# Patient Record
Sex: MALE | Race: WHITE | NOT HISPANIC OR LATINO | Employment: OTHER | URBAN - METROPOLITAN AREA
[De-identification: names, ages, dates, MRNs, and addresses within clinical notes are randomized per-mention and may not be internally consistent; named-entity substitution may affect disease eponyms.]

---

## 2018-12-11 ENCOUNTER — TELEPHONE (OUTPATIENT)
Dept: PRIMARY CARE CLINIC | Facility: CLINIC | Age: 78
End: 2018-12-11

## 2018-12-11 NOTE — TELEPHONE ENCOUNTER
----- Message from Noelle Dowling sent at 12/11/2018 10:05 AM CST -----  Type: Needs Medical Advice    Who Called:  [facility]  Best Call Back Number: [phone]  Additional Information: caller is requesting a return call concerning post hospital discharge medications, noted discrepancies.    Thank you

## 2018-12-11 NOTE — TELEPHONE ENCOUNTER
We have not seen patient yet. He does have an appointment tomorrow in which we will review medications and discharge instructions.

## 2018-12-12 ENCOUNTER — OFFICE VISIT (OUTPATIENT)
Dept: PRIMARY CARE CLINIC | Facility: CLINIC | Age: 78
End: 2018-12-12
Payer: MEDICARE

## 2018-12-12 VITALS
WEIGHT: 284.19 LBS | HEIGHT: 76 IN | RESPIRATION RATE: 18 BRPM | HEART RATE: 82 BPM | OXYGEN SATURATION: 95 % | TEMPERATURE: 98 F | DIASTOLIC BLOOD PRESSURE: 77 MMHG | BODY MASS INDEX: 34.61 KG/M2 | SYSTOLIC BLOOD PRESSURE: 146 MMHG

## 2018-12-12 DIAGNOSIS — Z95.1 HX OF CABG: ICD-10-CM

## 2018-12-12 DIAGNOSIS — Z86.79 H/O ATRIAL FIBRILLATION WITHOUT CURRENT MEDICATION: ICD-10-CM

## 2018-12-12 DIAGNOSIS — I73.9 PVD (PERIPHERAL VASCULAR DISEASE): ICD-10-CM

## 2018-12-12 DIAGNOSIS — G89.4 CHRONIC PAIN SYNDROME: ICD-10-CM

## 2018-12-12 DIAGNOSIS — I25.10 CORONARY ARTERY DISEASE, ANGINA PRESENCE UNSPECIFIED, UNSPECIFIED VESSEL OR LESION TYPE, UNSPECIFIED WHETHER NATIVE OR TRANSPLANTED HEART: ICD-10-CM

## 2018-12-12 DIAGNOSIS — I10 ESSENTIAL HYPERTENSION: ICD-10-CM

## 2018-12-12 DIAGNOSIS — E11.9 TYPE 2 DIABETES MELLITUS WITHOUT COMPLICATION, WITHOUT LONG-TERM CURRENT USE OF INSULIN: Primary | ICD-10-CM

## 2018-12-12 DIAGNOSIS — I50.9 CHRONIC CONGESTIVE HEART FAILURE, UNSPECIFIED HEART FAILURE TYPE: ICD-10-CM

## 2018-12-12 DIAGNOSIS — M79.604 LEG PAIN, DIFFUSE, RIGHT: ICD-10-CM

## 2018-12-12 PROCEDURE — 99203 OFFICE O/P NEW LOW 30 MIN: CPT | Mod: S$GLB,,, | Performed by: INTERNAL MEDICINE

## 2018-12-12 PROCEDURE — 1101F PT FALLS ASSESS-DOCD LE1/YR: CPT | Mod: CPTII,S$GLB,, | Performed by: INTERNAL MEDICINE

## 2018-12-12 PROCEDURE — 99999 PR PBB SHADOW E&M-EST. PATIENT-LVL IV: CPT | Mod: PBBFAC,,, | Performed by: INTERNAL MEDICINE

## 2018-12-12 RX ORDER — TAMSULOSIN HYDROCHLORIDE 0.4 MG/1
0.4 CAPSULE ORAL DAILY
COMMUNITY
End: 2019-04-10 | Stop reason: ALTCHOICE

## 2018-12-12 RX ORDER — METFORMIN HYDROCHLORIDE EXTENDED-RELEASE TABLETS 500 MG/1
500 TABLET, FILM COATED, EXTENDED RELEASE ORAL 2 TIMES DAILY WITH MEALS
Qty: 180 TABLET | Refills: 3 | Status: SHIPPED | OUTPATIENT
Start: 2018-12-12 | End: 2018-12-14

## 2018-12-12 RX ORDER — HYDROCHLOROTHIAZIDE 12.5 MG/1
12.5 CAPSULE ORAL EVERY MORNING
Qty: 90 CAPSULE | Refills: 3 | Status: ON HOLD | OUTPATIENT
Start: 2018-12-12 | End: 2019-02-02

## 2018-12-12 RX ORDER — ALBUTEROL SULFATE 90 UG/1
2 AEROSOL, METERED RESPIRATORY (INHALATION) EVERY 6 HOURS PRN
COMMUNITY
End: 2019-06-21

## 2018-12-12 RX ORDER — MONTELUKAST SODIUM 10 MG/1
10 TABLET ORAL NIGHTLY
COMMUNITY
End: 2019-06-21

## 2018-12-13 NOTE — PROGRESS NOTES
Subjective:       Patient ID: Nick De Anda is a 78 y.o. male.    Chief Complaint: Establish Care    HPI  patient is here of at establish new primary care and refill his medication is major medical problem include type 2 diabetes mellitus currently well controlled with medication and diet congestive heart failure the stable on diuretic history of coronary artery disease status post the CABG and history of single episode of atrial fib see  the Pap was as cardiologist away getting monitor currently on Plavix as an anticoagulant patient main physical symptom is the chronic pain in his right leg the toe leg the knee the hip and lower back patient currently see a podiatrist will have a right toe surgery to straighten up to toe eating it may help him with the get his gait and eventually pain when he had the proper gait patient bring the results most recent left in October this year old normal in diabetics well control hemoglobin A1c 6.1  Review of Systems   Constitutional: Negative for activity change, fatigue and unexpected weight change.   HENT: Negative for congestion, dental problem, nosebleeds and rhinorrhea.    Eyes: Negative for visual disturbance.   Respiratory: Negative for shortness of breath and wheezing.    Cardiovascular: Negative for chest pain and palpitations.   Gastrointestinal: Negative for constipation, diarrhea and nausea.   Genitourinary: Negative for difficulty urinating, dysuria and hematuria.   Musculoskeletal: Positive for arthralgias and back pain. Negative for myalgias.   Skin: Negative for rash.   Neurological: Negative for weakness and headaches.   Psychiatric/Behavioral: Negative for behavioral problems and dysphoric mood. The patient is not nervous/anxious.        Objective:      Physical Exam   Constitutional: He is oriented to person, place, and time. He appears well-developed and well-nourished. No distress.   HENT:   Head: Normocephalic and atraumatic.   Right Ear: External ear  normal.   Left Ear: External ear normal.   Nose: Nose normal.   Mouth/Throat: Oropharynx is clear and moist. No oropharyngeal exudate.   Eyes: Conjunctivae and EOM are normal. Pupils are equal, round, and reactive to light. Right eye exhibits no discharge. Left eye exhibits no discharge.   Neck: Normal range of motion. Neck supple. No thyromegaly present.   Cardiovascular: Normal rate, regular rhythm, normal heart sounds and intact distal pulses. Exam reveals no gallop and no friction rub.   No murmur heard.  Pulmonary/Chest: Effort normal and breath sounds normal. No respiratory distress. He has no wheezes. He has no rales. He exhibits no tenderness.   Abdominal: Soft. Bowel sounds are normal. He exhibits no distension. There is no tenderness. There is no rebound and no guarding.   Musculoskeletal: Normal range of motion. He exhibits no edema, tenderness (Tender and swollen right big toe tenderness around the right kneecap and right hip) or deformity.   Lymphadenopathy:     He has no cervical adenopathy.   Neurological: He is alert and oriented to person, place, and time.   Skin: Skin is warm and dry. Capillary refill takes less than 2 seconds. No rash noted. No erythema.   Psychiatric: He has a normal mood and affect. Judgment and thought content normal.   Nursing note and vitals reviewed.      Assessment:       1. Type 2 diabetes mellitus without complication, without long-term current use of insulin    2. Chronic congestive heart failure, unspecified heart failure type    3. Hx of CABG    4. H/O atrial fibrillation without current medication    5. Essential hypertension    6. Coronary artery disease, angina presence unspecified, unspecified vessel or lesion type, unspecified whether native or transplanted heart    7. PVD (peripheral vascular disease)    8. Chronic pain syndrome    9. Leg pain, diffuse, right        Plan:       Type 2 diabetes mellitus without complication, without long-term current use of  insulin  Comments:  Well controlled with diet and metformin b.i.d.  Orders:  -     metFORMIN (FORTAMET) 500 mg 24 hr tablet; Take 1 tablet (500 mg total) by mouth 2 (two) times daily with meals.  Dispense: 180 tablet; Refill: 3    Chronic congestive heart failure, unspecified heart failure type  -     hydroCHLOROthiazide (MICROZIDE) 12.5 mg capsule; Take 1 capsule (12.5 mg total) by mouth every morning.  Dispense: 90 capsule; Refill: 3    Hx of CABG    H/O atrial fibrillation without current medication  Comments:  Patient has appointment cardiologist Dr. sanchez    Essential hypertension  Comments:  Stable on medication    Coronary artery disease, angina presence unspecified, unspecified vessel or lesion type, unspecified whether native or transplanted heart    PVD (peripheral vascular disease)  Comments:  Continue with medical treatment    Chronic pain syndrome  -     Ambulatory referral to Pain Clinic    Leg pain, diffuse, right  Comments:  Rule out radiculopathy

## 2018-12-14 RX ORDER — METFORMIN HYDROCHLORIDE 500 MG/1
500 TABLET, EXTENDED RELEASE ORAL 2 TIMES DAILY WITH MEALS
Qty: 180 TABLET | Refills: 3 | OUTPATIENT
Start: 2018-12-14 | End: 2019-12-14

## 2018-12-14 NOTE — TELEPHONE ENCOUNTER
Spoke with pharmacy, patient's insurance does not cover Fortamet 500 mg 24 hour BID. Patient's insurance covers Glucophage XR. Pended. Please sign and send RX to pharmacy.

## 2018-12-14 NOTE — TELEPHONE ENCOUNTER
----- Message from Alicia Vance sent at 12/14/2018  1:33 PM CST -----  Contact: self  Patient states that insurance is denying the prescription of metFORMIN (FORTAMET) 500 mg 24 hr tablet due to it has the word Osmotic. Patient needs the prescription resent with out the word osmotic. Please call patient at [phone].   [facility] [address] [place] [id number]  Phone: [phone] Fax: [phone]

## 2018-12-19 RX ORDER — METFORMIN HYDROCHLORIDE 500 MG/1
500 TABLET, EXTENDED RELEASE ORAL 2 TIMES DAILY WITH MEALS
Qty: 180 TABLET | Refills: 3 | Status: SHIPPED | OUTPATIENT
Start: 2018-12-19 | End: 2020-05-05

## 2018-12-19 NOTE — ADDENDUM NOTE
Addended by: DANIEL ARTEAGA on: 12/19/2018 11:55 AM     Modules accepted: Orders     No pertinent family history in first degree relatives

## 2018-12-19 NOTE — TELEPHONE ENCOUNTER
Patient last saw you on 12/12/18 and was prescribed Fortamet 500 mg BID. Insurance does not cover this medication. Re-pended. Please send RX to patient's pharmacy. Patient has a F/U with you in February.

## 2019-01-03 ENCOUNTER — TELEPHONE (OUTPATIENT)
Dept: PAIN MEDICINE | Facility: CLINIC | Age: 79
End: 2019-01-03

## 2019-01-03 DIAGNOSIS — M54.42 CHRONIC LOW BACK PAIN WITH BILATERAL SCIATICA, UNSPECIFIED BACK PAIN LATERALITY: Primary | ICD-10-CM

## 2019-01-03 DIAGNOSIS — G89.29 CHRONIC LOW BACK PAIN WITH BILATERAL SCIATICA, UNSPECIFIED BACK PAIN LATERALITY: Primary | ICD-10-CM

## 2019-01-03 DIAGNOSIS — M54.41 CHRONIC LOW BACK PAIN WITH BILATERAL SCIATICA, UNSPECIFIED BACK PAIN LATERALITY: Primary | ICD-10-CM

## 2019-01-03 NOTE — TELEPHONE ENCOUNTER
Called and spoke with patient about setting an appointment with [facility] related to a referral from Dr Adler. Patient stated he is experiencing pain to his lower back, down his legs, extending to his feet; stated he tore his achilles tendon and has an awkward gait due to it which is causing pain to his lower back and hips. Informed patient of [facility] practice and treatment plans. Made patient aware that imaging orders were being placed that needs to be obtained prior to the appointment. Patient indicated understanding the information provided and would like to proceed and has scheduled an appointment. No further issues discussed.

## 2019-01-10 NOTE — TELEPHONE ENCOUNTER
Please sign pended order and include how many times pt. Is to test his sugar.    ----- Message from Celeste Reyes sent at 1/10/2019  4:20 PM CST -----  Contact: pt  Pt is requesting a prescription for a new Blood Glucose Meter and test strips.  Please call pt to advise  Call back   Thanks

## 2019-01-11 ENCOUNTER — OFFICE VISIT (OUTPATIENT)
Dept: PRIMARY CARE CLINIC | Facility: CLINIC | Age: 79
End: 2019-01-11
Payer: MEDICARE

## 2019-01-11 VITALS
TEMPERATURE: 98 F | OXYGEN SATURATION: 97 % | HEIGHT: 76 IN | DIASTOLIC BLOOD PRESSURE: 77 MMHG | WEIGHT: 283.13 LBS | SYSTOLIC BLOOD PRESSURE: 134 MMHG | RESPIRATION RATE: 18 BRPM | HEART RATE: 76 BPM | BODY MASS INDEX: 34.48 KG/M2

## 2019-01-11 DIAGNOSIS — I10 ESSENTIAL HYPERTENSION: ICD-10-CM

## 2019-01-11 DIAGNOSIS — E11.9 TYPE 2 DIABETES MELLITUS WITHOUT COMPLICATION, WITHOUT LONG-TERM CURRENT USE OF INSULIN: Primary | ICD-10-CM

## 2019-01-11 DIAGNOSIS — F41.9 ANXIETY: ICD-10-CM

## 2019-01-11 DIAGNOSIS — G89.4 CHRONIC PAIN SYNDROME: ICD-10-CM

## 2019-01-11 DIAGNOSIS — Z12.5 PROSTATE CANCER SCREENING: ICD-10-CM

## 2019-01-11 PROCEDURE — 3078F PR MOST RECENT DIASTOLIC BLOOD PRESSURE < 80 MM HG: ICD-10-PCS | Mod: CPTII,S$GLB,, | Performed by: INTERNAL MEDICINE

## 2019-01-11 PROCEDURE — 3075F PR MOST RECENT SYSTOLIC BLOOD PRESS GE 130-139MM HG: ICD-10-PCS | Mod: CPTII,S$GLB,, | Performed by: INTERNAL MEDICINE

## 2019-01-11 PROCEDURE — 99999 PR PBB SHADOW E&M-EST. PATIENT-LVL V: CPT | Mod: PBBFAC,,, | Performed by: INTERNAL MEDICINE

## 2019-01-11 PROCEDURE — 3075F SYST BP GE 130 - 139MM HG: CPT | Mod: CPTII,S$GLB,, | Performed by: INTERNAL MEDICINE

## 2019-01-11 PROCEDURE — 1101F PR PT FALLS ASSESS DOC 0-1 FALLS W/OUT INJ PAST YR: ICD-10-PCS | Mod: CPTII,S$GLB,, | Performed by: INTERNAL MEDICINE

## 2019-01-11 PROCEDURE — 1101F PT FALLS ASSESS-DOCD LE1/YR: CPT | Mod: CPTII,S$GLB,, | Performed by: INTERNAL MEDICINE

## 2019-01-11 PROCEDURE — 3078F DIAST BP <80 MM HG: CPT | Mod: CPTII,S$GLB,, | Performed by: INTERNAL MEDICINE

## 2019-01-11 PROCEDURE — 99213 OFFICE O/P EST LOW 20 MIN: CPT | Mod: S$GLB,,, | Performed by: INTERNAL MEDICINE

## 2019-01-11 PROCEDURE — 99499 RISK ADDL DX/OHS AUDIT: ICD-10-PCS | Mod: S$GLB,,, | Performed by: INTERNAL MEDICINE

## 2019-01-11 PROCEDURE — 99999 PR PBB SHADOW E&M-EST. PATIENT-LVL V: ICD-10-PCS | Mod: PBBFAC,,, | Performed by: INTERNAL MEDICINE

## 2019-01-11 PROCEDURE — 99213 PR OFFICE/OUTPT VISIT, EST, LEVL III, 20-29 MIN: ICD-10-PCS | Mod: S$GLB,,, | Performed by: INTERNAL MEDICINE

## 2019-01-11 PROCEDURE — 99499 UNLISTED E&M SERVICE: CPT | Mod: S$GLB,,, | Performed by: INTERNAL MEDICINE

## 2019-01-11 RX ORDER — INSULIN PUMP SYRINGE, 3 ML
EACH MISCELLANEOUS
Qty: 1 EACH | Refills: 0 | Status: SHIPPED | OUTPATIENT
Start: 2019-01-11 | End: 2020-10-05

## 2019-01-11 RX ORDER — CIPROFLOXACIN 500 MG/1
TABLET ORAL
COMMUNITY
Start: 2019-01-07 | End: 2019-02-01 | Stop reason: SDUPTHER

## 2019-01-11 RX ORDER — RIVAROXABAN 20 MG/1
20 TABLET, FILM COATED ORAL NIGHTLY
COMMUNITY
Start: 2019-01-04 | End: 2019-12-02 | Stop reason: SDUPTHER

## 2019-01-13 PROBLEM — G89.4 CHRONIC PAIN SYNDROME: Status: ACTIVE | Noted: 2019-01-13

## 2019-01-13 PROBLEM — E11.9 TYPE 2 DIABETES MELLITUS WITHOUT COMPLICATION, WITHOUT LONG-TERM CURRENT USE OF INSULIN: Status: ACTIVE | Noted: 2019-01-13

## 2019-01-13 PROBLEM — I10 ESSENTIAL HYPERTENSION: Status: ACTIVE | Noted: 2019-01-13

## 2019-01-13 PROBLEM — Z95.1 HX OF CABG: Status: ACTIVE | Noted: 2019-01-13

## 2019-01-13 NOTE — PROGRESS NOTES
Subjective:       Patient ID: Nick De Anda is a 78 y.o. male.    Chief Complaint: Follow-up    HPI  patient is here for follow-up he was seen in emergency room for a bronchitis treated with antibiotic and steroid which make his a blood glucose fluctuating higher but better now trending down and his bronchitis also resolving finish up his antibiotic patient had chronic pain in knee hip lower back and anxiety has been on hydrocodone 10 and Valium 10 but now about to see new pain management may need injects shin in his lower back patient thing that he can get off the hydrocodone 10 but has a lot of anxiety according to patient and his wife nervous about try to get of Valium  Review of Systems    Objective:      Physical Exam   Constitutional: He is oriented to person, place, and time. He appears well-developed and well-nourished. No distress.   Ambulate with cane   HENT:   Head: Normocephalic and atraumatic.   Right Ear: External ear normal.   Left Ear: External ear normal.   Mouth/Throat: Oropharynx is clear and moist. No oropharyngeal exudate.   Mild nasal congestion bilaterally   Eyes: Conjunctivae and EOM are normal. Pupils are equal, round, and reactive to light. Right eye exhibits no discharge. Left eye exhibits no discharge.   Neck: Normal range of motion. Neck supple. No thyromegaly present.   Cardiovascular: Normal rate, regular rhythm, normal heart sounds and intact distal pulses. Exam reveals no gallop and no friction rub.   No murmur heard.  Pulmonary/Chest: Effort normal and breath sounds normal. No respiratory distress. He has no wheezes. He has no rales. He exhibits no tenderness.   Abdominal: Soft. Bowel sounds are normal. He exhibits no distension. There is no tenderness. There is no rebound and no guarding.   Musculoskeletal: Normal range of motion. He exhibits no edema, tenderness or deformity.   Lymphadenopathy:     He has no cervical adenopathy.   Neurological: He is alert and oriented to  person, place, and time.   Skin: Skin is warm and dry. Capillary refill takes less than 2 seconds. No rash noted. No erythema.   Psychiatric: He has a normal mood and affect. Judgment and thought content normal.   Nursing note and vitals reviewed.      Assessment:       1. Type 2 diabetes mellitus without complication, without long-term current use of insulin    2. Anxiety    3. Chronic pain syndrome    4. Essential hypertension    5. Prostate cancer screening        Plan:       Type 2 diabetes mellitus without complication, without long-term current use of insulin  Comments:  fluctuating due to treatment with steroid for bronchitis  Orders:  -     Hemoglobin A1c; Future; Expected date: 01/13/2019  -     Microalbumin/creatinine urine ratio; Future; Expected date: 01/13/2019    Anxiety  -     Ambulatory referral to Psychiatry    Chronic pain syndrome  Comments:  About to see new pain management possible lower back injection    Essential hypertension  Comments:  Stable on medication  Orders:  -     CBC auto differential; Future; Expected date: 01/13/2019  -     Comprehensive metabolic panel; Future; Expected date: 01/13/2019  -     Lipid panel; Future; Expected date: 01/13/2019    Prostate cancer screening  -     PSA, Screening; Future; Expected date: 01/13/2019

## 2019-01-29 ENCOUNTER — OFFICE VISIT (OUTPATIENT)
Dept: PAIN MEDICINE | Facility: CLINIC | Age: 79
End: 2019-01-29
Attending: ANESTHESIOLOGY
Payer: MEDICARE

## 2019-01-29 VITALS
DIASTOLIC BLOOD PRESSURE: 70 MMHG | SYSTOLIC BLOOD PRESSURE: 128 MMHG | WEIGHT: 284.31 LBS | HEIGHT: 76 IN | HEART RATE: 98 BPM | BODY MASS INDEX: 34.62 KG/M2

## 2019-01-29 DIAGNOSIS — M47.816 LUMBAR SPONDYLOSIS: Primary | ICD-10-CM

## 2019-01-29 DIAGNOSIS — Z79.01 ANTICOAGULANT LONG-TERM USE: ICD-10-CM

## 2019-01-29 DIAGNOSIS — M62.838 MUSCLE SPASM: ICD-10-CM

## 2019-01-29 DIAGNOSIS — M51.36 DDD (DEGENERATIVE DISC DISEASE), LUMBAR: ICD-10-CM

## 2019-01-29 PROCEDURE — 99204 OFFICE O/P NEW MOD 45 MIN: CPT | Mod: S$GLB,,, | Performed by: ANESTHESIOLOGY

## 2019-01-29 PROCEDURE — 3078F DIAST BP <80 MM HG: CPT | Mod: CPTII,S$GLB,, | Performed by: ANESTHESIOLOGY

## 2019-01-29 PROCEDURE — 1101F PT FALLS ASSESS-DOCD LE1/YR: CPT | Mod: CPTII,S$GLB,, | Performed by: ANESTHESIOLOGY

## 2019-01-29 PROCEDURE — 99204 PR OFFICE/OUTPT VISIT, NEW, LEVL IV, 45-59 MIN: ICD-10-PCS | Mod: S$GLB,,, | Performed by: ANESTHESIOLOGY

## 2019-01-29 PROCEDURE — 99499 RISK ADDL DX/OHS AUDIT: ICD-10-PCS | Mod: S$GLB,,, | Performed by: ANESTHESIOLOGY

## 2019-01-29 PROCEDURE — 99499 UNLISTED E&M SERVICE: CPT | Mod: S$GLB,,, | Performed by: ANESTHESIOLOGY

## 2019-01-29 PROCEDURE — 99999 PR PBB SHADOW E&M-EST. PATIENT-LVL III: ICD-10-PCS | Mod: PBBFAC,,, | Performed by: ANESTHESIOLOGY

## 2019-01-29 PROCEDURE — 1101F PR PT FALLS ASSESS DOC 0-1 FALLS W/OUT INJ PAST YR: ICD-10-PCS | Mod: CPTII,S$GLB,, | Performed by: ANESTHESIOLOGY

## 2019-01-29 PROCEDURE — 3074F PR MOST RECENT SYSTOLIC BLOOD PRESSURE < 130 MM HG: ICD-10-PCS | Mod: CPTII,S$GLB,, | Performed by: ANESTHESIOLOGY

## 2019-01-29 PROCEDURE — 3074F SYST BP LT 130 MM HG: CPT | Mod: CPTII,S$GLB,, | Performed by: ANESTHESIOLOGY

## 2019-01-29 PROCEDURE — 99999 PR PBB SHADOW E&M-EST. PATIENT-LVL III: CPT | Mod: PBBFAC,,, | Performed by: ANESTHESIOLOGY

## 2019-01-29 PROCEDURE — 3078F PR MOST RECENT DIASTOLIC BLOOD PRESSURE < 80 MM HG: ICD-10-PCS | Mod: CPTII,S$GLB,, | Performed by: ANESTHESIOLOGY

## 2019-01-29 RX ORDER — TIZANIDINE 4 MG/1
4 TABLET ORAL DAILY PRN
Qty: 30 TABLET | Refills: 0 | Status: SHIPPED | OUTPATIENT
Start: 2019-01-29 | End: 2019-02-24 | Stop reason: SDUPTHER

## 2019-01-29 NOTE — PROGRESS NOTES
Chronic Pain - New Consult    Referring Physician: Santiago Adler MD    Chief Complaint   Patient presents with    Back Pain        SUBJECTIVE:    Nick De Anda is a 79 y/o male with hx of CAD s/p CABG (2012) and a-fib on Xarelto who presents to the clinic for the evaluation of low back pain. The back pain started >10 years ago and symptoms have been worsening over the past year. He attributes the worsening low back pain to an altered gait secondary to post-op pain from right foot surgery.The pain is located in the lower back area and radiates into the buttocks and hips. He does not complain of pain radiating down the leg but does endorse muscle spasm in the right lower extremity. The back pain is described as aching, burning, sharp and stabbing and is rated as 7/10. The pain is rated with a score of  7/10 on the BEST day and a score of 10/10 on the WORST day.  Symptoms interfere with daily activity and sleeping. The pain is exacerbated by Sitting, Standing, Lifting and Getting out of bed/chair.  The pain is mitigated by medication and rest. He was previously prescribed Norco and Valium by his previous PCP who has now retired. The patient reports 3-4 hours of uninterrupted sleep per night.     Patient denies night fever/night sweats, bowel/bladder incontinence, significant weight loss and significant motor weakness.    Physical Therapy/Home Exercise: no      Pain Disability Index Review:  Last 3 PDI Scores 1/29/2019   Pain Disability Index (PDI) 43       Pain Medications:    - Opioids: Norco 10/325 daily as needed (does not take every day)  - Valium 10 mg daily      report:  Reviewed     Imaging:     X-Ray Lumbar Spine Ap Lateral w/Flex Ext (1/22/2019):    FINDINGS:  There are diffuse disc space narrowing throughout the lumbar spine with large bulky bridging osteophytes.  There is sclerosis seen in the mid and lower lumbar spine.  There is no evidence of lumbar instability.    Past Medical History:    Diagnosis Date    Anticoagulant long-term use     Arthritis     Cancer     skin    Coronary artery disease     Diabetes mellitus     diet    History of staphylococcal infection 2012    after CABG    Hypertension      Past Surgical History:   Procedure Laterality Date    CARDIAC SURGERY      cabg    CORONARY STENT PLACEMENT      CORRECTION, HAMMER TOE Left 2014    [facility]    CYSTOSCOPY      prostate infection    EXCISION-BONE SPUR-FOOT SUTURE BRIDGE  WITH GRAFTING & PRP Left 10/23/2015    [facility] at [facility] OR    RECTOPERITONEAL FISTULA CLOSURE      SHOULDER SURGERY      Triple bypass       Social History     Socioeconomic History    Marital status:      Spouse name: Not on file    Number of children: Not on file    Years of education: Not on file    Highest education level: Not on file   Social Needs    Financial resource strain: Not on file    Food insecurity - worry: Not on file    Food insecurity - inability: Not on file    Transportation needs - medical: Not on file    Transportation needs - non-medical: Not on file   Occupational History    Not on file   Tobacco Use    Smoking status: Former Smoker     Last attempt to quit: 10/19/1979     Years since quittin.3   Substance and Sexual Activity    Alcohol use: No    Drug use: Not on file    Sexual activity: Not on file   Other Topics Concern    Not on file   Social History Narrative    Not on file     No family history on file.    Review of patient's allergies indicates:   Allergen Reactions    Penicillins Anaphylaxis     Can take keflex       Current Outpatient Medications   Medication Sig    amlodipine-benazepril 5-20 mg (LOTREL) 5-20 mg per capsule Take 1 capsule by mouth 2 (two) times daily.    ascorbic acid (VITAMIN C) 500 MG tablet Take 500 mg by mouth once daily.    atorvastatin (LIPITOR) 40 MG tablet Take 40 mg by mouth once daily.    blood-glucose  "meter kit Use as instructed    diazepam (VALIUM) 10 MG Tab Take 10 mg by mouth continuous prn.    hydroCHLOROthiazide (MICROZIDE) 12.5 mg capsule Take 1 capsule (12.5 mg total) by mouth every morning.    hydrocodone-acetaminophen 10-325mg (NORCO)  mg Tab Take by mouth continuous prn.    metFORMIN (GLUCOPHAGE-XR) 500 MG 24 hr tablet Take 1 tablet (500 mg total) by mouth 2 (two) times daily with meals.    montelukast (SINGULAIR) 10 mg tablet Take 10 mg by mouth every evening.    potassium chloride SA (K-DUR,KLOR-CON) 10 MEQ tablet Take 10 mEq by mouth once daily.    tamsulosin (FLOMAX) 0.4 mg Cap Take 0.4 mg by mouth once daily.    XARELTO 20 mg Tab Take 20 mg by mouth every evening.     albuterol (VENTOLIN HFA) 90 mcg/actuation inhaler Inhale 2 puffs into the lungs every 6 (six) hours as needed for Wheezing. Rescue    b complex vitamins tablet Take 1 tablet by mouth once daily.    ciprofloxacin HCl (CIPRO) 500 MG tablet     MULTIVITAMIN W-MINERALS/LUTEIN (CENTRUM SILVER ORAL) Take by mouth once daily.    tiZANidine (ZANAFLEX) 4 MG tablet Take 1 tablet (4 mg total) by mouth daily as needed.     No current facility-administered medications for this visit.        REVIEW OF SYSTEMS:    GENERAL:  No weight loss, malaise or fevers.  HEENT:  Negative for frequent or significant headaches.  NECK:  Negative for lumps, goiter, and significant neck swelling.  RESPIRATORY:  Negative for wheezing or shortness of breath.  CARDIOVASCULAR:  + palpitations (hx of a-fib)  GI:  Negative for abdominal discomfort, blood in stools or black stools or change in bowel habits.  MUSCULOSKELETAL:  See HPI.  SKIN:  Negative for lesions, rash, and itching.  PSYCH:  Negative for sleep disturbance, mood disorder and recent psychosocial stressors.  HEMATOLOGY/LYMPHOLOGY:  Takes Xarelto  NEURO:   No history of  seizures or tremors.  All other reviewed and negative other than HPI.    OBJECTIVE:    /70   Pulse 98   Ht 6' 4" " (1.93 m)   Wt 128.9 kg (284 lb 4.5 oz)   BMI 34.60 kg/m²     PHYSICAL EXAMINATION:    General appearance: Well appearing, in no acute distress, alert and oriented x3. Obese.  Psych:  Mood and affect appropriate.  Skin: Skin color, texture, turgor normal, no rashes or lesions, in both upper and lower body.  Head/face:  Normocephalic, atraumatic.   Neck: No pain to palpation over the cervical paraspinous muscles.  No pain with neck flexion, extension, or lateral flexion.   Cor: RRR  Pulm: Breathing unlabored.  GI:  Soft and non-tender.  Back: Straight leg raising is negative to radicular pain. Tenderness to palpation over the lumbar paraspinous muscles and facets. + pain with back extension.  ROM is severely decreased on back extension.  Extremities: Edema noted in the right foot.  Musculoskeletal: Tenderness to palpation over the bilateral greater trochanters. No atrophy or tone abnormalities are noted.  Neuro: Bilateral upper and lower extremity coordination and muscle stretch reflexes are physiologic and symmetric.  No loss of sensation is noted.  Strength testing:    Right hip flexion: 5/5  Left hip flexion: 5/5  Right knee extension: 5/5  Left knee extension: 5/5  Right knee flexion: 5/5  Left knee flexion: 5/5  Right ankle dorsiflexion: 5/5  Left ankle dorsiflexion: 5/5    Gait: Antalgic, uses cane.     ASSESSMENT: 78 y.o.  male with chronic low back pain, consistent with facet arthropathy.     1. Lumbar spondylosis    2. DDD (degenerative disc disease), lumbar    3. Muscle spasm    4. Anticoagulant long-term use         PLAN:     - I have stressed the importance of physical activity and a home exercise plan to help with pain and improve health.  - Rx Tizanidine 4 mg as needed for pain/spasm.  - In the future, I will consider lumbar medial branch blocks. Patient is awaiting appointment with electrophysiology regarding a-fib.  - RTC after cardiology visit.    The above plan and management options were discussed  at length with patient. Patient is in agreement with the above and verbalized understanding. It will be communicated with the referring physician via electronic record, fax, or mail.    Junaid Rocha III  01/29/2019

## 2019-01-29 NOTE — LETTER
January 29, 2019      Santiago Adler MD  [address] [place] [address]           [facility]  [address], [facility] [address]  [place] [address]  Phone: [phone]  Fax: [phone]          Patient: Nick De Anda   MR Number: [id number]   YOB: 1940   Date of Visit: 1/29/2019       Dear Dr. Santiago Adler:    Thank you for referring Nick De Anda to me for evaluation. Attached you will find relevant portions of my assessment and plan of care.    If you have questions, please do not hesitate to call me. I look forward to following Nick De Anda along with you.    Sincerely,    Junaid Rocha III, MD    Enclosure  CC:  No Recipients    If you would like to receive this communication electronically, please contact [email or web address] or [phone] to request more information on test company Link access.    For providers and/or their staff who would like to refer a patient to [facility], please contact us through our one-stop-shop provider referral line, [facility], at [phone].    If you feel you have received this communication in error or would no longer like to receive these types of communications, please e-mail [email or web address]

## 2019-01-31 NOTE — TELEPHONE ENCOUNTER
Wants a presc for cipro for a UTI, also dropped a form off for diabetic supplies and wants to be sure it is done

## 2019-01-31 NOTE — TELEPHONE ENCOUNTER
----- Message from Darcie Vernon sent at 1/31/2019 12:57 PM CST -----  Contact: Wife  Type:  Patient Requesting Referral    Who Called:  Anahi, wife  Does the patient already have the specialty appointment scheduled?:  No  If yes, what is the date of that appointment?:  N/A  Referral to What Specialty:  Urologist  Reason for Referral:  UTI  Does the patient want the referral with a specific physician?:  No  Is the specialist an [facility] or [facility]?:  N/A  Patient Requesting a Call Back?:  Yes  Best Call Back Number:  [phone]  Additional Information:   Please call her. Thanks.

## 2019-02-01 RX ORDER — CIPROFLOXACIN 500 MG/1
500 TABLET ORAL EVERY 12 HOURS
Qty: 20 TABLET | Refills: 0 | Status: ON HOLD | OUTPATIENT
Start: 2019-02-01 | End: 2019-02-02

## 2019-02-02 PROBLEM — N39.0 URINARY TRACT INFECTION WITH HEMATURIA: Status: ACTIVE | Noted: 2019-02-02

## 2019-02-02 PROBLEM — R31.9 URINARY TRACT INFECTION WITH HEMATURIA: Status: ACTIVE | Noted: 2019-02-02

## 2019-02-02 PROBLEM — I48.91 ATRIAL FIBRILLATION: Status: ACTIVE | Noted: 2019-02-02

## 2019-02-02 PROBLEM — E87.1 HYPONATREMIA: Status: ACTIVE | Noted: 2019-02-02

## 2019-02-03 PROBLEM — D69.6 THROMBOCYTOPENIA: Status: ACTIVE | Noted: 2019-02-03

## 2019-02-08 ENCOUNTER — TELEPHONE (OUTPATIENT)
Dept: PRIMARY CARE CLINIC | Facility: CLINIC | Age: 79
End: 2019-02-08

## 2019-02-08 NOTE — TELEPHONE ENCOUNTER
Called patient and notified him of lab results and he stated that he brought the prescription back to our office for his glucometer and supplies because [facility] on [address] told him it didn't have a code or was missing info on it. He said he gave it to 's nurse and would like to know if it has been taking care of.

## 2019-02-12 ENCOUNTER — OFFICE VISIT (OUTPATIENT)
Dept: PRIMARY CARE CLINIC | Facility: CLINIC | Age: 79
End: 2019-02-12
Payer: MEDICARE

## 2019-02-12 VITALS
DIASTOLIC BLOOD PRESSURE: 70 MMHG | OXYGEN SATURATION: 94 % | BODY MASS INDEX: 37.91 KG/M2 | WEIGHT: 279.88 LBS | TEMPERATURE: 98 F | HEIGHT: 72 IN | SYSTOLIC BLOOD PRESSURE: 126 MMHG | HEART RATE: 86 BPM | RESPIRATION RATE: 18 BRPM

## 2019-02-12 DIAGNOSIS — Z86.79 H/O ATRIAL FIBRILLATION WITHOUT CURRENT MEDICATION: ICD-10-CM

## 2019-02-12 DIAGNOSIS — N30.00 ACUTE CYSTITIS WITHOUT HEMATURIA: Primary | ICD-10-CM

## 2019-02-12 DIAGNOSIS — E11.9 TYPE 2 DIABETES MELLITUS WITHOUT COMPLICATION, WITHOUT LONG-TERM CURRENT USE OF INSULIN: ICD-10-CM

## 2019-02-12 LAB
BILIRUB SERPL-MCNC: NORMAL MG/DL
BLOOD URINE, POC: NORMAL
COLOR, POC UA: YELLOW
GLUCOSE UR QL STRIP: NORMAL
KETONES UR QL STRIP: NORMAL
LEUKOCYTE ESTERASE URINE, POC: NORMAL
NITRITE, POC UA: NORMAL
PH, POC UA: 5
PROTEIN, POC: NORMAL
SPECIFIC GRAVITY, POC UA: 1.01
UROBILINOGEN, POC UA: NORMAL

## 2019-02-12 PROCEDURE — 3078F PR MOST RECENT DIASTOLIC BLOOD PRESSURE < 80 MM HG: ICD-10-PCS | Mod: CPTII,S$GLB,, | Performed by: INTERNAL MEDICINE

## 2019-02-12 PROCEDURE — 99999 PR PBB SHADOW E&M-EST. PATIENT-LVL IV: CPT | Mod: PBBFAC,,, | Performed by: INTERNAL MEDICINE

## 2019-02-12 PROCEDURE — 99213 OFFICE O/P EST LOW 20 MIN: CPT | Mod: 25,S$GLB,, | Performed by: INTERNAL MEDICINE

## 2019-02-12 PROCEDURE — 3074F SYST BP LT 130 MM HG: CPT | Mod: CPTII,S$GLB,, | Performed by: INTERNAL MEDICINE

## 2019-02-12 PROCEDURE — 99499 RISK ADDL DX/OHS AUDIT: ICD-10-PCS | Mod: S$GLB,,, | Performed by: INTERNAL MEDICINE

## 2019-02-12 PROCEDURE — 99999 PR PBB SHADOW E&M-EST. PATIENT-LVL IV: ICD-10-PCS | Mod: PBBFAC,,, | Performed by: INTERNAL MEDICINE

## 2019-02-12 PROCEDURE — 81002 URINALYSIS NONAUTO W/O SCOPE: CPT | Mod: S$GLB,,, | Performed by: INTERNAL MEDICINE

## 2019-02-12 PROCEDURE — 99213 PR OFFICE/OUTPT VISIT, EST, LEVL III, 20-29 MIN: ICD-10-PCS | Mod: 25,S$GLB,, | Performed by: INTERNAL MEDICINE

## 2019-02-12 PROCEDURE — 99499 UNLISTED E&M SERVICE: CPT | Mod: S$GLB,,, | Performed by: INTERNAL MEDICINE

## 2019-02-12 PROCEDURE — 1101F PR PT FALLS ASSESS DOC 0-1 FALLS W/OUT INJ PAST YR: ICD-10-PCS | Mod: CPTII,S$GLB,, | Performed by: INTERNAL MEDICINE

## 2019-02-12 PROCEDURE — 3078F DIAST BP <80 MM HG: CPT | Mod: CPTII,S$GLB,, | Performed by: INTERNAL MEDICINE

## 2019-02-12 PROCEDURE — 3074F PR MOST RECENT SYSTOLIC BLOOD PRESSURE < 130 MM HG: ICD-10-PCS | Mod: CPTII,S$GLB,, | Performed by: INTERNAL MEDICINE

## 2019-02-12 PROCEDURE — 81002 POCT URINE DIPSTICK WITHOUT MICROSCOPE: ICD-10-PCS | Mod: S$GLB,,, | Performed by: INTERNAL MEDICINE

## 2019-02-12 PROCEDURE — 1101F PT FALLS ASSESS-DOCD LE1/YR: CPT | Mod: CPTII,S$GLB,, | Performed by: INTERNAL MEDICINE

## 2019-02-13 ENCOUNTER — OFFICE VISIT (OUTPATIENT)
Dept: CARDIOLOGY | Facility: CLINIC | Age: 79
End: 2019-02-13
Payer: MEDICARE

## 2019-02-13 VITALS
HEART RATE: 80 BPM | DIASTOLIC BLOOD PRESSURE: 64 MMHG | WEIGHT: 282.44 LBS | BODY MASS INDEX: 38.3 KG/M2 | SYSTOLIC BLOOD PRESSURE: 137 MMHG

## 2019-02-13 DIAGNOSIS — I10 ESSENTIAL HYPERTENSION: ICD-10-CM

## 2019-02-13 DIAGNOSIS — Z95.1 HX OF CABG: ICD-10-CM

## 2019-02-13 DIAGNOSIS — E11.9 TYPE 2 DIABETES MELLITUS WITHOUT COMPLICATION, WITHOUT LONG-TERM CURRENT USE OF INSULIN: ICD-10-CM

## 2019-02-13 DIAGNOSIS — Z98.61 POST PTCA: ICD-10-CM

## 2019-02-13 DIAGNOSIS — I48.19 PERSISTENT ATRIAL FIBRILLATION: Primary | ICD-10-CM

## 2019-02-13 DIAGNOSIS — I25.10 CORONARY ARTERY DISEASE INVOLVING NATIVE CORONARY ARTERY OF NATIVE HEART WITHOUT ANGINA PECTORIS: ICD-10-CM

## 2019-02-13 DIAGNOSIS — E78.5 DYSLIPIDEMIA: ICD-10-CM

## 2019-02-13 PROCEDURE — 99204 OFFICE O/P NEW MOD 45 MIN: CPT | Mod: S$GLB,,, | Performed by: INTERNAL MEDICINE

## 2019-02-13 PROCEDURE — 99204 PR OFFICE/OUTPT VISIT, NEW, LEVL IV, 45-59 MIN: ICD-10-PCS | Mod: S$GLB,,, | Performed by: INTERNAL MEDICINE

## 2019-02-13 PROCEDURE — 99999 PR PBB SHADOW E&M-EST. PATIENT-LVL III: CPT | Mod: PBBFAC,,, | Performed by: INTERNAL MEDICINE

## 2019-02-13 PROCEDURE — 3075F PR MOST RECENT SYSTOLIC BLOOD PRESS GE 130-139MM HG: ICD-10-PCS | Mod: CPTII,S$GLB,, | Performed by: INTERNAL MEDICINE

## 2019-02-13 PROCEDURE — 99999 PR PBB SHADOW E&M-EST. PATIENT-LVL III: ICD-10-PCS | Mod: PBBFAC,,, | Performed by: INTERNAL MEDICINE

## 2019-02-13 PROCEDURE — 1101F PR PT FALLS ASSESS DOC 0-1 FALLS W/OUT INJ PAST YR: ICD-10-PCS | Mod: CPTII,S$GLB,, | Performed by: INTERNAL MEDICINE

## 2019-02-13 PROCEDURE — 3078F PR MOST RECENT DIASTOLIC BLOOD PRESSURE < 80 MM HG: ICD-10-PCS | Mod: CPTII,S$GLB,, | Performed by: INTERNAL MEDICINE

## 2019-02-13 PROCEDURE — 1101F PT FALLS ASSESS-DOCD LE1/YR: CPT | Mod: CPTII,S$GLB,, | Performed by: INTERNAL MEDICINE

## 2019-02-13 PROCEDURE — 99499 RISK ADDL DX/OHS AUDIT: ICD-10-PCS | Mod: S$GLB,,, | Performed by: INTERNAL MEDICINE

## 2019-02-13 PROCEDURE — 3078F DIAST BP <80 MM HG: CPT | Mod: CPTII,S$GLB,, | Performed by: INTERNAL MEDICINE

## 2019-02-13 PROCEDURE — 3075F SYST BP GE 130 - 139MM HG: CPT | Mod: CPTII,S$GLB,, | Performed by: INTERNAL MEDICINE

## 2019-02-13 PROCEDURE — 99499 UNLISTED E&M SERVICE: CPT | Mod: S$GLB,,, | Performed by: INTERNAL MEDICINE

## 2019-02-13 RX ORDER — CLOPIDOGREL BISULFATE 75 MG/1
75 TABLET ORAL DAILY
Qty: 90 TABLET | Refills: 3 | Status: SHIPPED | OUTPATIENT
Start: 2019-02-13 | End: 2020-03-10 | Stop reason: SDUPTHER

## 2019-02-13 RX ORDER — IRBESARTAN 150 MG/1
150 TABLET ORAL NIGHTLY
Qty: 90 TABLET | Refills: 3 | Status: SHIPPED | OUTPATIENT
Start: 2019-02-13 | End: 2019-11-13

## 2019-02-13 RX ORDER — AMLODIPINE BESYLATE 5 MG/1
5 TABLET ORAL DAILY
Qty: 90 TABLET | Refills: 3 | Status: SHIPPED | OUTPATIENT
Start: 2019-02-13 | End: 2019-04-02

## 2019-02-13 NOTE — PATIENT INSTRUCTIONS
Add back Plavix one daily  Stop amlodipine /benazepril   Begin amlodipine 5 mg daily  Begin Irbesartan 150 mg daily

## 2019-02-13 NOTE — PROGRESS NOTES
Subjective:   Patient ID:  Nick De Anda is a 78 y.o. male who presents for evaluation of Other (Transfer)      HPI:   Nick Garzarpresents to establish cardiovascular care. He has been treated at [facility]. Nick DeA nda has known coronary artery disease having had PCI in the past and CABG X 3 in 2014. 2 1/2 months ago he was noted to have an irregular heart beat. Ultimately he states a 30 day monitor confirmed atrial fib. His aspirin and Plavix were stopped and he has been on Xarelto. Recently he had a UTI and atrial fib confirmed. He is on no rate slowing medications. Echo with normal EF and moderate LA enlargementThe patient is unaware of the arrhythmia. Nick De Anda is not exercising due to knee and foot pain. Nick De Anda denies chest pain, shortness of breath, palpitations, presyncope , or syncope. Nick De Anda has hypertension. Nick De Anda has dyslipidemia  on high intensity statin.   .  Review of Systems   Constitution: Positive for weight loss. Negative for weakness, malaise/fatigue and weight gain.   Eyes: Negative for blurred vision.   Cardiovascular: Negative for chest pain, claudication, cyanosis, dyspnea on exertion, irregular heartbeat, leg swelling, near-syncope, orthopnea, palpitations, paroxysmal nocturnal dyspnea and syncope.   Respiratory: Negative for cough, shortness of breath and wheezing.    Musculoskeletal: Positive for joint pain. Negative for falls and myalgias.   Gastrointestinal: Positive for abdominal pain and constipation. Negative for heartburn, nausea and vomiting.        Occasional left subcostal discomfort   Genitourinary: Negative for nocturia.   Neurological: Negative for brief paralysis, dizziness, focal weakness, headaches, numbness and paresthesias.   Psychiatric/Behavioral: Negative for altered mental status.       Current Outpatient Medications   Medication Sig    albuterol (VENTOLIN HFA) 90 mcg/actuation inhaler  Inhale 2 puffs into the lungs every 6 (six) hours as needed for Wheezing. Rescue    ascorbic acid (VITAMIN C) 500 MG tablet Take 500 mg by mouth once daily.    atorvastatin (LIPITOR) 40 MG tablet Take 40 mg by mouth once daily.    b complex vitamins tablet Take 1 tablet by mouth once daily.    blood-glucose meter kit Use as instructed    diazepam (VALIUM) 10 MG Tab Take 10 mg by mouth continuous prn.    doxycycline (VIBRA-TABS) 100 MG tablet Take 1 tablet (100 mg total) by mouth every 12 (twelve) hours.    hydrocodone-acetaminophen 10-325mg (NORCO)  mg Tab Take by mouth continuous prn.    metFORMIN (GLUCOPHAGE-XR) 500 MG 24 hr tablet Take 1 tablet (500 mg total) by mouth 2 (two) times daily with meals.    montelukast (SINGULAIR) 10 mg tablet Take 10 mg by mouth every evening.    MULTIVITAMIN W-MINERALS/LUTEIN (CENTRUM SILVER ORAL) Take by mouth once daily.    nystatin (MYCOSTATIN) powder Apply topically 4 (four) times daily.    potassium chloride SA (K-DUR,KLOR-CON) 10 MEQ tablet Take 10 mEq by mouth once daily.    tamsulosin (FLOMAX) 0.4 mg Cap Take 0.4 mg by mouth once daily.    XARELTO 20 mg Tab Take 20 mg by mouth every evening.     amLODIPine (NORVASC) 5 MG tablet Take 1 tablet (5 mg total) by mouth once daily.    clopidogrel (PLAVIX) 75 mg tablet Take 1 tablet (75 mg total) by mouth once daily.    irbesartan (AVAPRO) 150 MG tablet Take 1 tablet (150 mg total) by mouth every evening.    phenazopyridine (PYRIDIUM) 200 MG tablet Take 1 tablet (200 mg total) by mouth once daily. for 10 days     No current facility-administered medications for this visit.      Objective:   Physical Exam   Constitutional: He is oriented to person, place, and time. He appears well-developed. No distress.   /64 (BP Location: Left arm, Patient Position: Sitting)   Pulse 80   Wt 128.1 kg (282 lb 6.6 oz)   BMI 38.30 kg/m²    HENT:   Head: Normocephalic.   Right Ear: External ear normal.   Left Ear:  External ear normal.   Eyes: EOM are normal. Pupils are equal, round, and reactive to light. No scleral icterus.   Neck: Neck supple. No JVD present. No thyromegaly present.   Cardiovascular: Normal rate, normal heart sounds and intact distal pulses. An irregularly irregular rhythm present. PMI is not displaced. Exam reveals no gallop and no friction rub.   No murmur heard.  Pulmonary/Chest: Effort normal and breath sounds normal. No respiratory distress. He has no wheezes. He has no rales.   Abdominal: Soft. He exhibits no distension. There is no hepatosplenomegaly. There is no tenderness.   Musculoskeletal: He exhibits no edema or tenderness.   Gait normal   Neurological: He is alert and oriented to person, place, and time.   Skin: Skin is warm and dry. No rash noted.   Psychiatric: He has a normal mood and affect. His behavior is normal.       Lab Results   Component Value Date     (L) 02/04/2019    K 4.1 02/04/2019     02/04/2019    CO2 26 02/04/2019    BUN 15 02/04/2019    CREATININE 0.8 02/04/2019     02/04/2019    HGBA1C 5.7 (H) 02/03/2019    AST 33 02/02/2019    ALT 19 02/02/2019    ALBUMIN 3.2 (L) 02/02/2019    PROT 6.5 02/02/2019    BILITOT 1.5 (H) 02/02/2019    WBC 4.60 02/04/2019    HGB 12.0 (L) 02/04/2019    HCT 35.4 (L) 02/04/2019    MCV 86 02/04/2019     02/04/2019    CHOL 113 01/30/2019    HDL 46 01/30/2019    LDLCALC 47 01/30/2019    TRIG 102 01/30/2019       Assessment:     1. Persistent atrial fibrillation    2. Coronary artery disease involving native coronary artery of native heart without angina pectoris: Stable    3. Post PTCA   History of CABG X3   4. Essential hypertension : Adequate control   5. Dyslipidemia :  on high intensity statin At LDL goal   6. Type 2 diabetes mellitus without complication, without long-term current use of insulin        Plan:     Nick was seen today for other.    Diagnoses and all orders for this visit:    Persistent atrial  fibrillation  -     Ambulatory Referral to Electrophysiology              EKG  Coronary artery disease involving native coronary artery of native heart without angina pectoris  -       Post PTCA  History of CABG X 3  -     clopidogrel (PLAVIX) 75 mg tablet; Take 1 tablet (75 mg total) by mouth once daily.    Essential hypertension  -     irbesartan (AVAPRO) 150 MG tablet; Take 1 tablet (150 mg total) by mouth every evening.in place of ACE  -     amLODIPine (NORVASC) 5 MG tablet; Take 1 tablet (5 mg total) by mouth once daily.    Dyslipidemia  - Continue current regimen  .[place]iter[place] diet recommended     Type 2 diabetes mellitus without complication, without long-term current use of insulin

## 2019-02-13 NOTE — PROGRESS NOTES
Patient, Nick De Anda (MRN #[id number]), presented with a recorded BMI of 38.3 kg/m^2 and a documented comorbidity(s):  - Diabetes Mellitus Type 2  - Hypertension  - Hyperlipidemia  - Atrial Fibrillation  to which the severe obesity is a contributing factor. This is consistent with the definition of severe obesity (BMI 35.0-39.9) with comorbidity (ICD-10 E66.01, Z68.35). The patient's severe obesity was monitored, evaluated, addressed and/or treated. This addendum to the medical record is made on 02/13/2019.

## 2019-02-14 NOTE — PROGRESS NOTES
Subjective:       Patient ID: Nick De Anda is a 78 y.o. male.    Chief Complaint: Hospital Follow Up    HPI patient is here for follow-up from the hospital last week admitted for UTI short of breath from atrial fibrillation CHF patient was treated with IV antibiotic UTI resolving for his atrial fib CHF patient was seen by cardiologist Dr. herrera and his Plavix was changed to Xarelto and will have further cardiac workup as outpatient with Dr. Herrera patient currently deny any short of breath chest pain fever dysuria hematuria and no symptoms of bleeding no nausea vomiting or diarrhea still have a few antibiotic to finish for UTI  Review of Systems    Objective:      Physical Exam   Constitutional: He is oriented to person, place, and time. He appears well-developed and well-nourished. No distress.   HENT:   Head: Normocephalic and atraumatic.   Right Ear: External ear normal.   Left Ear: External ear normal.   Nose: Nose normal.   Mouth/Throat: Oropharynx is clear and moist. No oropharyngeal exudate.   Eyes: Conjunctivae and EOM are normal. Pupils are equal, round, and reactive to light. Right eye exhibits no discharge. Left eye exhibits no discharge.   Neck: Normal range of motion. Neck supple. No thyromegaly present.   Cardiovascular: Normal rate, regular rhythm, normal heart sounds and intact distal pulses. Exam reveals no gallop and no friction rub.   No murmur heard.  Pulmonary/Chest: Effort normal and breath sounds normal. No respiratory distress. He has no wheezes. He has no rales. He exhibits no tenderness.   Abdominal: Soft. Bowel sounds are normal. He exhibits no distension. There is no tenderness. There is no rebound and no guarding.   Musculoskeletal: Normal range of motion. He exhibits no edema, tenderness or deformity.   Lymphadenopathy:     He has no cervical adenopathy.   Neurological: He is alert and oriented to person, place, and time.   Skin: Skin is warm and dry. Capillary refill takes less  than 2 seconds. No rash noted. No erythema.   Psychiatric: He has a normal mood and affect. Judgment and thought content normal.   Nursing note and vitals reviewed.      Assessment:       1. Acute cystitis without hematuria    2. Type 2 diabetes mellitus without complication, without long-term current use of insulin    3. H/O atrial fibrillation without current medication        Plan:       Acute cystitis without hematuria  Comments:  Clinically resolving and urine culture and blood culture in the hospital were negative  Orders:  -     POCT URINE DIPSTICK WITHOUT MICROSCOPE    Type 2 diabetes mellitus without complication, without long-term current use of insulin  Comments:  Diabetes well control hemoglobin A1c less than 6.0  Orders:  -     Foot Exam Performed  -     Ambulatory Referral to Ophthalmology    H/O atrial fibrillation without current medication  Comments:  Currently has a regular rhythm on exam and on Xarelto as an anticoagulant off Plavix has appointment with Dr. Herrera next week cardiologist

## 2019-02-15 ENCOUNTER — TELEPHONE (OUTPATIENT)
Dept: CARDIOLOGY | Facility: CLINIC | Age: 79
End: 2019-02-15

## 2019-02-15 ENCOUNTER — TELEPHONE (OUTPATIENT)
Dept: PRIMARY CARE CLINIC | Facility: CLINIC | Age: 79
End: 2019-02-15

## 2019-02-15 NOTE — TELEPHONE ENCOUNTER
Dr. Dean, The pt says that he picked irbesartan up from his pharmacy and in the the drug information literature it advised the pt to call the prescriber if he was on potassium. The pt says that he takes 10 meq of potassium every other day - was not prescribed by you. Last potassium level on 2-4-19 was 4.1. Spoke with Ms Peralta and record was reviewed - says ok for the pt to hold potassium until you return on Tuesday and we will have your response. Pt advised to take irbesartan for his BP and he verbalized understanding of instructions.

## 2019-02-15 NOTE — TELEPHONE ENCOUNTER
----- Message from Steff Osborne sent at 2/15/2019  1:55 PM CST -----  Contact: Patient  Patient is having a problem getting his diabetic supplies.  Patient would like to speak with Yasmine at the office regarding.  Call Back#[phone]  Thanks

## 2019-02-15 NOTE — TELEPHONE ENCOUNTER
----- Message from Gisselle Allen MA sent at 2/15/2019  3:42 PM CST -----  Contact: patient callalina Solorzano please call the patient at [phone] he need  To talk to  you about his medication  Irbesartan 150 mg  And his Potassium. Last visit was on 2- . Thank you.

## 2019-02-18 ENCOUNTER — PATIENT MESSAGE (OUTPATIENT)
Dept: PRIMARY CARE CLINIC | Facility: CLINIC | Age: 79
End: 2019-02-18

## 2019-02-18 ENCOUNTER — TELEPHONE (OUTPATIENT)
Dept: PRIMARY CARE CLINIC | Facility: CLINIC | Age: 79
End: 2019-02-18

## 2019-02-18 NOTE — TELEPHONE ENCOUNTER
----- Message from Becky Goldberg sent at 2/18/2019 11:54 AM CST -----  Contact: self                                                attn:  Dr Adler only  Patient [phone] is calling to speak with Dr Adler himself/[facility] is not honoring the way the prescription is written/patient is paying mostly out of his pocket/patient checks his blood 3 times daily and [employer] [employer] will only pay for 100 strips for 3 months which is not near enough/please advise

## 2019-02-18 NOTE — TELEPHONE ENCOUNTER
Faxed over another prescription stating patient tests three times a day to [facility] @[phone]

## 2019-02-18 NOTE — TELEPHONE ENCOUNTER
,          Pt is aware you are out until 2/19/19.The pt said that he is taking the HCTZ 25 mg 1 tab QD .Instucted the pt that upon his hospital d/c on 2/4/19 he was told to stop taking it.Pt said that was a mistake b/c he has been taking it for many years and would like to stay on it.He said that it helps him keep the fluid off his lungs, he would like to remain on it.He would like you to Rx it and if you do,would you advise that he needs to take K+ too. Please advise.Thanks,Jyoti

## 2019-02-19 ENCOUNTER — INITIAL CONSULT (OUTPATIENT)
Dept: ELECTROPHYSIOLOGY | Facility: CLINIC | Age: 79
End: 2019-02-19
Payer: MEDICARE

## 2019-02-19 ENCOUNTER — PATIENT MESSAGE (OUTPATIENT)
Dept: PRIMARY CARE CLINIC | Facility: CLINIC | Age: 79
End: 2019-02-19

## 2019-02-19 ENCOUNTER — HOSPITAL ENCOUNTER (OUTPATIENT)
Dept: CARDIOLOGY | Facility: CLINIC | Age: 79
Discharge: HOME | End: 2019-02-19
Payer: MEDICARE

## 2019-02-19 ENCOUNTER — PATIENT MESSAGE (OUTPATIENT)
Dept: CARDIOLOGY | Facility: CLINIC | Age: 79
End: 2019-02-19

## 2019-02-19 VITALS
HEIGHT: 76 IN | SYSTOLIC BLOOD PRESSURE: 130 MMHG | WEIGHT: 278.44 LBS | HEART RATE: 96 BPM | DIASTOLIC BLOOD PRESSURE: 72 MMHG | BODY MASS INDEX: 33.91 KG/M2

## 2019-02-19 DIAGNOSIS — I48.19 PERSISTENT ATRIAL FIBRILLATION: ICD-10-CM

## 2019-02-19 DIAGNOSIS — E11.9 TYPE 2 DIABETES MELLITUS WITHOUT COMPLICATION, WITHOUT LONG-TERM CURRENT USE OF INSULIN: ICD-10-CM

## 2019-02-19 DIAGNOSIS — I25.10 CORONARY ARTERY DISEASE INVOLVING NATIVE CORONARY ARTERY OF NATIVE HEART WITHOUT ANGINA PECTORIS: ICD-10-CM

## 2019-02-19 DIAGNOSIS — I48.0 PAROXYSMAL ATRIAL FIBRILLATION: Primary | ICD-10-CM

## 2019-02-19 DIAGNOSIS — Z95.1 HX OF CABG: ICD-10-CM

## 2019-02-19 PROCEDURE — 99999 PR PBB SHADOW E&M-EST. PATIENT-LVL III: CPT | Mod: PBBFAC,,, | Performed by: INTERNAL MEDICINE

## 2019-02-19 PROCEDURE — 1101F PT FALLS ASSESS-DOCD LE1/YR: CPT | Mod: CPTII,S$GLB,, | Performed by: INTERNAL MEDICINE

## 2019-02-19 PROCEDURE — 3075F PR MOST RECENT SYSTOLIC BLOOD PRESS GE 130-139MM HG: ICD-10-PCS | Mod: CPTII,S$GLB,, | Performed by: INTERNAL MEDICINE

## 2019-02-19 PROCEDURE — 3078F PR MOST RECENT DIASTOLIC BLOOD PRESSURE < 80 MM HG: ICD-10-PCS | Mod: CPTII,S$GLB,, | Performed by: INTERNAL MEDICINE

## 2019-02-19 PROCEDURE — 3075F SYST BP GE 130 - 139MM HG: CPT | Mod: CPTII,S$GLB,, | Performed by: INTERNAL MEDICINE

## 2019-02-19 PROCEDURE — 1101F PR PT FALLS ASSESS DOC 0-1 FALLS W/OUT INJ PAST YR: ICD-10-PCS | Mod: CPTII,S$GLB,, | Performed by: INTERNAL MEDICINE

## 2019-02-19 PROCEDURE — 3078F DIAST BP <80 MM HG: CPT | Mod: CPTII,S$GLB,, | Performed by: INTERNAL MEDICINE

## 2019-02-19 PROCEDURE — 99214 OFFICE O/P EST MOD 30 MIN: CPT | Mod: S$GLB,,, | Performed by: INTERNAL MEDICINE

## 2019-02-19 PROCEDURE — 93000 RHYTHM STRIP: ICD-10-PCS | Mod: S$GLB,,, | Performed by: INTERNAL MEDICINE

## 2019-02-19 PROCEDURE — 99214 PR OFFICE/OUTPT VISIT, EST, LEVL IV, 30-39 MIN: ICD-10-PCS | Mod: S$GLB,,, | Performed by: INTERNAL MEDICINE

## 2019-02-19 PROCEDURE — 93000 ELECTROCARDIOGRAM COMPLETE: CPT | Mod: S$GLB,,, | Performed by: INTERNAL MEDICINE

## 2019-02-19 PROCEDURE — 99999 PR PBB SHADOW E&M-EST. PATIENT-LVL III: ICD-10-PCS | Mod: PBBFAC,,, | Performed by: INTERNAL MEDICINE

## 2019-02-19 RX ORDER — HYDROCHLOROTHIAZIDE 12.5 MG/1
12.5 CAPSULE ORAL DAILY
COMMUNITY
Start: 2018-12-12 | End: 2020-01-21 | Stop reason: SDUPTHER

## 2019-02-19 NOTE — TELEPHONE ENCOUNTER
Spoke with patient states needing a written prescription of diabetic supplies so he can bring it to [facility]. Notified can come  states understanding

## 2019-02-19 NOTE — PROGRESS NOTES
Subjective:    Patient ID:  Nick De Anda is a 78 y.o. male who presents for evaluation of Atrial Fibrillation    Referring Cardiologist: Mitesh Dean MD  Primary Care Physician: Santiago Adler MD    HPI  I had the pleasure of seeing Mr. De Anda today in our electrophysiology clinic in consultation for his atrial arrhythmia. As you are aware he is a pleasant 78 year-old man with coronary artery disease s/p CABG with preserved LVEF, type 2 diabetes, hypertension and recently discovered paroxysmal atrial fibrillation. He recently transferred care from [facility] where his AF was diagnosed and he was initiated on xarelto. He was recently admitted for a symptomatic UTI and observed to have a paroxysm of atrial fibrillation with ventricular rates in the 40s-60s (slower rates appeared to correlate during overnight hours). He monitors his vitals at home and for a while notes it indicates he has an irregular heart beat however he feels normal. He has established care with Dr. Dean in cardiology on 2/13/2019 and was noted to be in atrial fibrillation. He has no obvious symptoms. I reviewed all available electrocardiograms in Epic which show sinus rhythm on 2/5/2014 and 2/2/2019. An ECG dated 2/3/2019 notes rate controlled atrial fibrillation.    My interpretation of today's in clinic ECG is normal sinus rhythm.    Review of Systems   Constitution: Negative for fever, weakness and malaise/fatigue.   HENT: Negative for congestion and sore throat.    Eyes: Negative for blurred vision and visual disturbance.   Cardiovascular: Negative for chest pain, dyspnea on exertion, leg swelling and palpitations.   Respiratory: Negative for cough and shortness of breath.    Skin: Negative.    Musculoskeletal: Negative.    Gastrointestinal: Negative for bloating, abdominal pain, hematochezia and melena.   Neurological: Negative for dizziness and light-headedness.        Objective:    Physical Exam   Constitutional: He is  oriented to person, place, and time. He appears well-developed and well-nourished. No distress.   HENT:   Head: Normocephalic and atraumatic.   Eyes: Conjunctivae are normal. Right eye exhibits no discharge. Left eye exhibits no discharge.   Neck: Neck supple. No JVD present.   Cardiovascular: Normal rate, regular rhythm and normal heart sounds. Exam reveals no gallop and no friction rub.   No murmur heard.  Pulmonary/Chest: Effort normal and breath sounds normal. No respiratory distress. He has no wheezes. He has no rales.   Abdominal: Soft. Bowel sounds are normal. He exhibits no distension. There is no tenderness. There is no rebound.   Musculoskeletal: He exhibits no edema.   Neurological: He is alert and oriented to person, place, and time.   Skin: Skin is warm and dry. He is not diaphoretic.   Psychiatric: He has a normal mood and affect. His behavior is normal. Judgment and thought content normal.   Vitals reviewed.        Assessment:       1. Paroxysmal atrial fibrillation    2. Hx of CABG    3. Coronary artery disease involving native coronary artery of native heart without angina pectoris    4. Type 2 diabetes mellitus without complication, without long-term current use of insulin         Plan:     In summary, Mr. De Anda is a pleasant 78 year-old man with coronary artery disease s/p CABG with preserved LVEF, type 2 diabetes, hypertension and recently discovered persistent atrial fibrillation. I had a long discussion with the patient about the pathophysiology and risks of atrial fibrillation and its basic pathophysiology, including its health implications and treatment options. Specifically, I addressed the need for CVA (stroke) prophylaxis with aspirin versus oral anticoagulation (warfarin vs DOACs, discussed bleeding risks, and need to come to the ER for any head trauma for CT scanning even if asymptomatic).  His [id number] score is 5 and long-term anticoagulation is recommended. I also discussed the goal  to reduce symptomatic arrhythmic episodes by pharmacologic and/or procedural methods and utilizing a rhythm versus a rate control strategy.  He has no symptoms with his paroxysms. Recommend no additional therapy at this time and do not see benefit to aggressive rhythm control strategies. He can follow-up with me on an as needed basis. He should follow-up with either Dr. Dean or Dr. Herrera.    Thank you for allowing me to participate in the care of this patient. Please do not hesitate to call me with any questions or concerns.    Yung Paez MD, PhD  Cardiac Electrophysiology

## 2019-02-19 NOTE — TELEPHONE ENCOUNTER
Pt instructed as ordered by  & he reports understanding of these instructions.He asked that a message be sent through the pt portal with these instructions as well.Msg sent.

## 2019-02-19 NOTE — LETTER
February 19, 2019      Mitesh Dean MD  [address]  [facility] [address]           [facility] - Arrhythmia  [address]  [facility] [address]  Phone: [phone]  Fax: [phone]          Patient: Nick De Anda   MR Number: [id number]   YOB: 1940   Date of Visit: 2/19/2019       Dear Dr. Mitesh Dean:    Thank you for referring Nick De Anda to me for evaluation. Attached you will find relevant portions of my assessment and plan of care.    If you have questions, please do not hesitate to call me. I look forward to following Nick De Anda along with you.    Sincerely,    Yung Paez MD    Enclosure  CC:  No Recipients    If you would like to receive this communication electronically, please contact [email or web address] or [phone] to request more information on [employer] access.    For providers and/or their staff who would like to refer a patient to [facility], please contact us through our one-stop-shop provider referral line, [facility], at [phone].    If you feel you have received this communication in error or would no longer like to receive these types of communications, please e-mail [email or web address]

## 2019-02-22 ENCOUNTER — TELEPHONE (OUTPATIENT)
Dept: PRIMARY CARE CLINIC | Facility: CLINIC | Age: 79
End: 2019-02-22

## 2019-02-24 DIAGNOSIS — M62.838 MUSCLE SPASM: ICD-10-CM

## 2019-02-25 RX ORDER — TIZANIDINE 4 MG/1
TABLET ORAL
Qty: 30 TABLET | Refills: 0 | Status: SHIPPED | OUTPATIENT
Start: 2019-02-25 | End: 2019-04-01 | Stop reason: ALTCHOICE

## 2019-03-20 ENCOUNTER — PATIENT MESSAGE (OUTPATIENT)
Dept: PRIMARY CARE CLINIC | Facility: CLINIC | Age: 79
End: 2019-03-20

## 2019-03-20 NOTE — PROGRESS NOTES
Health Maintenance Due   Topic Date Due    Eye Exam  09/29/1950    Zoster Vaccine  09/29/1999    Pneumococcal Vaccine (65+ Low/Medium Risk) (1 of 2 - PCV13) 09/29/2004       Immunizations reviewed. Legacy reviewed. Outreach attempt x 1 via patient portal. Pre-visit chart review completed.  Referral on file for Ophthalmology, patient needs to sign JESSICA.

## 2019-03-22 ENCOUNTER — PATIENT MESSAGE (OUTPATIENT)
Dept: PRIMARY CARE CLINIC | Facility: CLINIC | Age: 79
End: 2019-03-22

## 2019-03-25 ENCOUNTER — TELEPHONE (OUTPATIENT)
Dept: ADMINISTRATIVE | Facility: HOSPITAL | Age: 79
End: 2019-03-25

## 2019-03-25 NOTE — LETTER
March 25, 2019    Dr. Ana Christianson             [facility]  [address]  [facility] [address]  Phone: [phone] March 25, 2019     Patient: Nick De Anda    YOB: 1940   Date of Visit: 3/25/2019       To Whom It May Concern:    Please fax patient's most recent eye exam to [phone] so we may update our records.     If you have any questions or concerns, please don't hesitate to call.    Sincerely,        Santiago Adler MD

## 2019-03-27 ENCOUNTER — OFFICE VISIT (OUTPATIENT)
Dept: CARDIOLOGY | Facility: CLINIC | Age: 79
End: 2019-03-27
Payer: MEDICARE

## 2019-03-27 VITALS
WEIGHT: 278 LBS | DIASTOLIC BLOOD PRESSURE: 67 MMHG | BODY MASS INDEX: 33.84 KG/M2 | SYSTOLIC BLOOD PRESSURE: 132 MMHG | HEART RATE: 84 BPM

## 2019-03-27 DIAGNOSIS — Z98.61 POST PTCA: ICD-10-CM

## 2019-03-27 DIAGNOSIS — E78.5 DYSLIPIDEMIA: ICD-10-CM

## 2019-03-27 DIAGNOSIS — I10 ESSENTIAL HYPERTENSION: ICD-10-CM

## 2019-03-27 DIAGNOSIS — I48.0 PAROXYSMAL ATRIAL FIBRILLATION: ICD-10-CM

## 2019-03-27 DIAGNOSIS — I25.10 CORONARY ARTERY DISEASE INVOLVING NATIVE CORONARY ARTERY OF NATIVE HEART WITHOUT ANGINA PECTORIS: Primary | ICD-10-CM

## 2019-03-27 DIAGNOSIS — Z95.1 HX OF CABG: ICD-10-CM

## 2019-03-27 PROCEDURE — 3078F PR MOST RECENT DIASTOLIC BLOOD PRESSURE < 80 MM HG: ICD-10-PCS | Mod: CPTII,S$GLB,, | Performed by: INTERNAL MEDICINE

## 2019-03-27 PROCEDURE — 99214 OFFICE O/P EST MOD 30 MIN: CPT | Mod: S$GLB,,, | Performed by: INTERNAL MEDICINE

## 2019-03-27 PROCEDURE — 3075F SYST BP GE 130 - 139MM HG: CPT | Mod: CPTII,S$GLB,, | Performed by: INTERNAL MEDICINE

## 2019-03-27 PROCEDURE — 99999 PR PBB SHADOW E&M-EST. PATIENT-LVL IV: ICD-10-PCS | Mod: PBBFAC,,, | Performed by: INTERNAL MEDICINE

## 2019-03-27 PROCEDURE — 3078F DIAST BP <80 MM HG: CPT | Mod: CPTII,S$GLB,, | Performed by: INTERNAL MEDICINE

## 2019-03-27 PROCEDURE — 1101F PR PT FALLS ASSESS DOC 0-1 FALLS W/OUT INJ PAST YR: ICD-10-PCS | Mod: CPTII,S$GLB,, | Performed by: INTERNAL MEDICINE

## 2019-03-27 PROCEDURE — 3075F PR MOST RECENT SYSTOLIC BLOOD PRESS GE 130-139MM HG: ICD-10-PCS | Mod: CPTII,S$GLB,, | Performed by: INTERNAL MEDICINE

## 2019-03-27 PROCEDURE — 99999 PR PBB SHADOW E&M-EST. PATIENT-LVL IV: CPT | Mod: PBBFAC,,, | Performed by: INTERNAL MEDICINE

## 2019-03-27 PROCEDURE — 99214 PR OFFICE/OUTPT VISIT, EST, LEVL IV, 30-39 MIN: ICD-10-PCS | Mod: S$GLB,,, | Performed by: INTERNAL MEDICINE

## 2019-03-27 PROCEDURE — 1101F PT FALLS ASSESS-DOCD LE1/YR: CPT | Mod: CPTII,S$GLB,, | Performed by: INTERNAL MEDICINE

## 2019-03-27 RX ORDER — HYDROCHLOROTHIAZIDE 25 MG/1
12.5 TABLET ORAL DAILY
Qty: 90 TABLET | Refills: 3 | Status: SHIPPED | OUTPATIENT
Start: 2019-03-27 | End: 2019-04-02

## 2019-03-27 NOTE — PROGRESS NOTES
Subjective:    Patient ID:  Nick De Anda is a 78 y.o. male who presents for follow-up of CAD atrial fibrillation  HPI     Nick De Anda was initially seen by Dr Dean 2/13/17 to establish cardiovascular care. He has been treated at [facility][ [facility]]. He has known coronary artery disease having had PCI in the past and CABG X 3 in 2014. 2 1/2 months ago he was noted to have an irregular heart beat. Ultimately he states a 30 day monitor confirmed paroxsymal  atrial fib. He has increase RODRIGEZ when in AF. His was stopped and he has been on plavix and Xarelto. He was referred to [facility] Cifuentes in  and was seen 2/19/19 who recommended to continue with non interventional care with rate control. His home BPs ranges 145-157 systolic. He plans foot surgery.    Conclusion 2/4/19    · Mild concentric left ventricular hypertrophy.  · Normal left ventricular systolic function. The estimated ejection fraction is 55%  · Indeterminate left ventricular diastolic function.  · Normal right ventricular systolic function.  · Moderate left atrial enlargement.  · Mild right atrial enlargement.  · Intermediate central venous pressure (8 mm Hg).  · The estimated PA systolic pressure is 33 mm Hg          Lab Results   Component Value Date     (L) 02/04/2019    K 4.1 02/04/2019     02/04/2019    CO2 26 02/04/2019    BUN 15 02/04/2019    CREATININE 0.8 02/04/2019     02/04/2019    HGBA1C 5.7 (H) 02/03/2019    AST 33 02/02/2019    ALT 19 02/02/2019    ALBUMIN 3.2 (L) 02/02/2019    PROT 6.5 02/02/2019    BILITOT 1.5 (H) 02/02/2019    WBC 4.60 02/04/2019    HGB 12.0 (L) 02/04/2019    HCT 35.4 (L) 02/04/2019    MCV 86 02/04/2019     02/04/2019    PSA 1.4 01/30/2019         Lab Results   Component Value Date    CHOL 113 01/30/2019    HDL 46 01/30/2019    TRIG 102 01/30/2019       Lab Results   Component Value Date    LDLCALC 47 01/30/2019       Past Medical History:   Diagnosis Date    Anticoagulant  long-term use     Arthritis     Cancer     skin    Coronary artery disease     Diabetes mellitus     diet    History of staphylococcal infection 2012    after CABG    Hypertension     Persistent atrial fibrillation 2/2/2019       Current Outpatient Medications:     albuterol (VENTOLIN HFA) 90 mcg/actuation inhaler, Inhale 2 puffs into the lungs every 6 (six) hours as needed for Wheezing. Rescue, Disp: , Rfl:     amLODIPine (NORVASC) 5 MG tablet, Take 1 tablet (5 mg total) by mouth once daily., Disp: 90 tablet, Rfl: 3    ascorbic acid (VITAMIN C) 500 MG tablet, Take 500 mg by mouth once daily., Disp: , Rfl:     atorvastatin (LIPITOR) 40 MG tablet, Take 40 mg by mouth once daily., Disp: , Rfl:     b complex vitamins tablet, Take 1 tablet by mouth once daily., Disp: , Rfl:     blood-glucose meter kit, Use as instructed, Disp: 1 each, Rfl: 0    clopidogrel (PLAVIX) 75 mg tablet, Take 1 tablet (75 mg total) by mouth once daily., Disp: 90 tablet, Rfl: 3    diazepam (VALIUM) 10 MG Tab, Take 10 mg by mouth continuous prn., Disp: , Rfl:     hydroCHLOROthiazide (MICROZIDE) 12.5 mg capsule, , Disp: , Rfl:     hydrocodone-acetaminophen 10-325mg (NORCO)  mg Tab, Take by mouth continuous prn. , Disp: , Rfl:     irbesartan (AVAPRO) 150 MG tablet, Take 1 tablet (150 mg total) by mouth every evening., Disp: 90 tablet, Rfl: 3    metFORMIN (GLUCOPHAGE-XR) 500 MG 24 hr tablet, Take 1 tablet (500 mg total) by mouth 2 (two) times daily with meals., Disp: 180 tablet, Rfl: 3    montelukast (SINGULAIR) 10 mg tablet, Take 10 mg by mouth every evening., Disp: , Rfl:     MULTIVITAMIN W-MINERALS/LUTEIN (CENTRUM SILVER ORAL), Take by mouth once daily., Disp: , Rfl:     nystatin (MYCOSTATIN) powder, Apply topically 4 (four) times daily., Disp: 1 Bottle, Rfl: 2    potassium chloride SA (K-DUR,KLOR-CON) 10 MEQ tablet, Take 10 mEq by mouth once daily., Disp: , Rfl:     tamsulosin (FLOMAX) 0.4 mg Cap, Take 0.4 mg by  mouth once daily., Disp: , Rfl:     tiZANidine (ZANAFLEX) 4 MG tablet, TAKE 1 TABLET BY MOUTH ONCE DAILY AS NEEDED, Disp: 30 tablet, Rfl: 0    XARELTO 20 mg Tab, Take 20 mg by mouth every evening. , Disp: , Rfl:     hydroCHLOROthiazide (HYDRODIURIL) 25 MG tablet, Take 0.5 tablets (12.5 mg total) by mouth once daily., Disp: 90 tablet, Rfl: 3          Review of Systems   Constitution: Negative for decreased appetite, diaphoresis, fever, malaise/fatigue, weight gain and weight loss.   HENT: Negative for congestion, ear discharge, ear pain and nosebleeds.    Eyes: Negative for blurred vision, double vision and visual disturbance.   Cardiovascular: Positive for dyspnea on exertion and palpitations. Negative for chest pain, claudication, cyanosis, irregular heartbeat, leg swelling, near-syncope, orthopnea, paroxysmal nocturnal dyspnea and syncope.   Respiratory: Negative for cough, hemoptysis, shortness of breath, sleep disturbances due to breathing, snoring, sputum production and wheezing.    Endocrine: Negative for polydipsia, polyphagia and polyuria.   Hematologic/Lymphatic: Negative for adenopathy and bleeding problem. Does not bruise/bleed easily.   Skin: Negative for color change, nail changes, poor wound healing and rash.   Musculoskeletal: Positive for joint pain. Negative for muscle cramps and muscle weakness. Joint swelling: foot.   Gastrointestinal: Negative for abdominal pain, anorexia, change in bowel habit, hematochezia, nausea and vomiting.   Genitourinary: Negative for dysuria, frequency and hematuria.   Neurological: Negative for brief paralysis, difficulty with concentration, excessive daytime sleepiness, dizziness, focal weakness, headaches, light-headedness, seizures, vertigo and weakness.   Psychiatric/Behavioral: Negative for altered mental status and depression.   Allergic/Immunologic: Negative for persistent infections.        Objective:/67   Pulse 84   Wt 126.1 kg (278 lb)   BMI 33.84  kg/m²             Physical Exam   Constitutional: He is oriented to person, place, and time. He appears well-developed and well-nourished.   obese   HENT:   Head: Normocephalic.   Right Ear: External ear normal.   Left Ear: External ear normal.   Nose: Nose normal.   Inspection of lips, teeth and gums normal   Eyes: Pupils are equal, round, and reactive to light. EOM are normal. No scleral icterus.   Neck: Normal range of motion. Neck supple. No JVD present. No tracheal deviation present. No thyromegaly present.   Cardiovascular: Normal rate, regular rhythm and intact distal pulses. Exam reveals no gallop and no friction rub.   No murmur heard.  Pulses:       Dorsalis pedis pulses are 0 on the right side, and 0 on the left side.        Posterior tibial pulses are 0 on the right side, and 0 on the left side.   Pulmonary/Chest: Effort normal and breath sounds normal.   Abdominal: Bowel sounds are normal. He exhibits no distension. There is no hepatosplenomegaly. There is no tenderness. There is no guarding.   Musculoskeletal: Normal range of motion. He exhibits no edema or tenderness.   Lymphadenopathy:   Palpation of neck and groin lymph nodes normal   Neurological: He is alert and oriented to person, place, and time. No cranial nerve deficit. He exhibits normal muscle tone. Coordination normal.   Skin: Skin is dry.   Palpation of skin normal   Psychiatric: His behavior is normal. Judgment and thought content normal.         Assessment:       1. Coronary artery disease involving native coronary artery of native heart without angina pectoris    2. Essential hypertension    3. Paroxysmal atrial fibrillation    4. Post PTCA    5. Dyslipidemia    6. Hx of CABG         Plan:       Nick was seen today for follow-up.    Diagnoses and all orders for this visit:    Coronary artery disease involving native coronary artery of native heart without angina pectoris    Essential hypertension  -     Basic metabolic panel; Future;  Expected date: 06/25/2019    Paroxysmal atrial fibrillation    Post PTCA    Dyslipidemia    Hx of CABG    Other orders  -     hydroCHLOROthiazide (HYDRODIURIL) 25 MG tablet; Take 0.5 tablets (12.5 mg total) by mouth once daily.        low CV risk [ 0.9%] for low risk surgery

## 2019-04-01 ENCOUNTER — PATIENT MESSAGE (OUTPATIENT)
Dept: PRIMARY CARE CLINIC | Facility: CLINIC | Age: 79
End: 2019-04-01

## 2019-04-01 DIAGNOSIS — M62.838 MUSCLE SPASM: ICD-10-CM

## 2019-04-02 ENCOUNTER — OFFICE VISIT (OUTPATIENT)
Dept: PRIMARY CARE CLINIC | Facility: CLINIC | Age: 79
End: 2019-04-02
Payer: MEDICARE

## 2019-04-02 VITALS
TEMPERATURE: 98 F | HEIGHT: 76 IN | SYSTOLIC BLOOD PRESSURE: 146 MMHG | RESPIRATION RATE: 18 BRPM | BODY MASS INDEX: 33.84 KG/M2 | OXYGEN SATURATION: 96 % | HEART RATE: 80 BPM | DIASTOLIC BLOOD PRESSURE: 77 MMHG

## 2019-04-02 DIAGNOSIS — I10 ESSENTIAL HYPERTENSION: ICD-10-CM

## 2019-04-02 DIAGNOSIS — E11.9 TYPE 2 DIABETES MELLITUS WITHOUT COMPLICATION, WITHOUT LONG-TERM CURRENT USE OF INSULIN: ICD-10-CM

## 2019-04-02 DIAGNOSIS — L73.9 FOLLICULITIS: Primary | ICD-10-CM

## 2019-04-02 PROCEDURE — 99999 PR PBB SHADOW E&M-EST. PATIENT-LVL IV: ICD-10-PCS | Mod: PBBFAC,,, | Performed by: INTERNAL MEDICINE

## 2019-04-02 PROCEDURE — 1101F PT FALLS ASSESS-DOCD LE1/YR: CPT | Mod: CPTII,S$GLB,, | Performed by: INTERNAL MEDICINE

## 2019-04-02 PROCEDURE — 99214 PR OFFICE/OUTPT VISIT, EST, LEVL IV, 30-39 MIN: ICD-10-PCS | Mod: S$GLB,,, | Performed by: INTERNAL MEDICINE

## 2019-04-02 PROCEDURE — 99999 PR PBB SHADOW E&M-EST. PATIENT-LVL IV: CPT | Mod: PBBFAC,,, | Performed by: INTERNAL MEDICINE

## 2019-04-02 PROCEDURE — 3077F PR MOST RECENT SYSTOLIC BLOOD PRESSURE >= 140 MM HG: ICD-10-PCS | Mod: CPTII,S$GLB,, | Performed by: INTERNAL MEDICINE

## 2019-04-02 PROCEDURE — 3077F SYST BP >= 140 MM HG: CPT | Mod: CPTII,S$GLB,, | Performed by: INTERNAL MEDICINE

## 2019-04-02 PROCEDURE — 99214 OFFICE O/P EST MOD 30 MIN: CPT | Mod: S$GLB,,, | Performed by: INTERNAL MEDICINE

## 2019-04-02 PROCEDURE — 1101F PR PT FALLS ASSESS DOC 0-1 FALLS W/OUT INJ PAST YR: ICD-10-PCS | Mod: CPTII,S$GLB,, | Performed by: INTERNAL MEDICINE

## 2019-04-02 PROCEDURE — 3078F DIAST BP <80 MM HG: CPT | Mod: CPTII,S$GLB,, | Performed by: INTERNAL MEDICINE

## 2019-04-02 PROCEDURE — 3078F PR MOST RECENT DIASTOLIC BLOOD PRESSURE < 80 MM HG: ICD-10-PCS | Mod: CPTII,S$GLB,, | Performed by: INTERNAL MEDICINE

## 2019-04-02 RX ORDER — MUPIROCIN 20 MG/G
OINTMENT TOPICAL 2 TIMES DAILY
Qty: 15 G | Refills: 0 | Status: SHIPPED | OUTPATIENT
Start: 2019-04-02 | End: 2019-09-10 | Stop reason: ALTCHOICE

## 2019-04-02 RX ORDER — AMLODIPINE AND BENAZEPRIL HYDROCHLORIDE 5; 20 MG/1; MG/1
CAPSULE ORAL
Refills: 0 | COMMUNITY
Start: 2019-03-30 | End: 2019-04-09

## 2019-04-02 NOTE — PROGRESS NOTES
Subjective:       Patient ID: Nick De Anda is a 78 y.o. male.    Chief Complaint: Follow-up    HPI  patient is here for follow-up has been seen by cardiologist Dr. Dean and now Dr. Allred his benazepril changed to losartan and diuretics were stopped but and reinstate or patient blood pressure was higher than normal a month ago systolic was in the 150s now begin to go down in the 140s seen his back on diuretic a couple days ago will continue to monitor patient denies short of breath or chest pain dizziness syncope patient also have skin sore on the left upper chest for few days not sure if anything bite him but no exudate  Review of Systems    Objective:      Physical Exam   Constitutional: He is oriented to person, place, and time. He appears well-developed and well-nourished. No distress.   HENT:   Head: Normocephalic and atraumatic.   Right Ear: External ear normal.   Left Ear: External ear normal.   Nose: Nose normal.   Mouth/Throat: Oropharynx is clear and moist. No oropharyngeal exudate.   Eyes: Pupils are equal, round, and reactive to light. Conjunctivae and EOM are normal. Right eye exhibits no discharge. Left eye exhibits no discharge.   Neck: Normal range of motion. Neck supple. No thyromegaly present.   Cardiovascular: Normal rate, regular rhythm, normal heart sounds and intact distal pulses. Exam reveals no gallop and no friction rub.   No murmur heard.  Pulmonary/Chest: Effort normal and breath sounds normal. No respiratory distress. He has no wheezes. He has no rales. He exhibits no tenderness.   Abdominal: Soft. Bowel sounds are normal. He exhibits no distension. There is no tenderness. There is no rebound and no guarding.   Musculoskeletal: Normal range of motion. He exhibits tenderness (Tenderness of the right foot with deformity of the right ankle). He exhibits no edema or deformity.   Lymphadenopathy:     He has no cervical adenopathy.   Neurological: He is alert and oriented to person,  place, and time.   Skin: Skin is warm and dry. Capillary refill takes less than 2 seconds. No rash noted. No erythema.   Small Pap will the left upper chest tender erythematous with a tiny scab in the center   Psychiatric: He has a normal mood and affect. Judgment and thought content normal.   Nursing note and vitals reviewed.      Assessment:       1. Folliculitis    2. Essential hypertension    3. Type 2 diabetes mellitus without complication, without long-term current use of insulin        Plan:       Folliculitis  Comments:  Left upper chest will try local care with Bactroban ointment twice a day  Orders:  -     mupirocin (BACTROBAN) 2 % ointment; Apply topically 2 (two) times daily.  Dispense: 15 g; Refill: 0    Essential hypertension  Comments:  A start improving on the sheet of paper that patient bring in    Type 2 diabetes mellitus without complication, without long-term current use of insulin  Comments:  Fairly stable with diet and medication

## 2019-04-09 ENCOUNTER — CLINICAL SUPPORT (OUTPATIENT)
Dept: PRIMARY CARE CLINIC | Facility: CLINIC | Age: 79
End: 2019-04-09
Payer: MEDICARE

## 2019-04-09 VITALS — HEART RATE: 60 BPM | DIASTOLIC BLOOD PRESSURE: 70 MMHG | SYSTOLIC BLOOD PRESSURE: 120 MMHG

## 2019-04-09 PROCEDURE — 99999 PR PBB SHADOW E&M-EST. PATIENT-LVL II: CPT | Mod: PBBFAC,,,

## 2019-04-09 PROCEDURE — 99999 PR PBB SHADOW E&M-EST. PATIENT-LVL II: ICD-10-PCS | Mod: PBBFAC,,,

## 2019-04-09 RX ORDER — AMLODIPINE BESYLATE 5 MG/1
5 TABLET ORAL DAILY
COMMUNITY
End: 2019-11-13 | Stop reason: SINTOL

## 2019-04-09 NOTE — PROGRESS NOTES
Patient ID verified by name and . Blood pressure and pulse obtained. Physician notified. Patient instructed to continue on current BP medications per physicians order. Patient verbalized understanding.

## 2019-04-10 ENCOUNTER — PATIENT MESSAGE (OUTPATIENT)
Dept: PRIMARY CARE CLINIC | Facility: CLINIC | Age: 79
End: 2019-04-10

## 2019-05-27 ENCOUNTER — PATIENT OUTREACH (OUTPATIENT)
Dept: ADMINISTRATIVE | Facility: HOSPITAL | Age: 79
End: 2019-05-27

## 2019-05-27 NOTE — PROGRESS NOTES
Immunizations reviewed. Legacy reviewed. Message sent to patient via portal. Pre-visit chart review completed.

## 2019-06-10 ENCOUNTER — OFFICE VISIT (OUTPATIENT)
Dept: PRIMARY CARE CLINIC | Facility: CLINIC | Age: 79
End: 2019-06-10
Payer: MEDICARE

## 2019-06-10 VITALS
WEIGHT: 276.69 LBS | BODY MASS INDEX: 33.69 KG/M2 | HEIGHT: 76 IN | OXYGEN SATURATION: 95 % | SYSTOLIC BLOOD PRESSURE: 138 MMHG | HEART RATE: 86 BPM | TEMPERATURE: 99 F | RESPIRATION RATE: 18 BRPM | DIASTOLIC BLOOD PRESSURE: 73 MMHG

## 2019-06-10 DIAGNOSIS — E11.9 TYPE 2 DIABETES MELLITUS WITHOUT COMPLICATION, WITHOUT LONG-TERM CURRENT USE OF INSULIN: ICD-10-CM

## 2019-06-10 DIAGNOSIS — E66.9 OBESITY, UNSPECIFIED CLASSIFICATION, UNSPECIFIED OBESITY TYPE, UNSPECIFIED WHETHER SERIOUS COMORBIDITY PRESENT: ICD-10-CM

## 2019-06-10 DIAGNOSIS — F41.9 ANXIETY: ICD-10-CM

## 2019-06-10 DIAGNOSIS — I10 ESSENTIAL HYPERTENSION: ICD-10-CM

## 2019-06-10 DIAGNOSIS — Z95.1 HX OF CABG: ICD-10-CM

## 2019-06-10 DIAGNOSIS — I48.0 PAROXYSMAL ATRIAL FIBRILLATION: Primary | ICD-10-CM

## 2019-06-10 PROCEDURE — 99499 RISK ADDL DX/OHS AUDIT: ICD-10-PCS | Mod: S$GLB,,, | Performed by: INTERNAL MEDICINE

## 2019-06-10 PROCEDURE — 3078F DIAST BP <80 MM HG: CPT | Mod: CPTII,S$GLB,, | Performed by: INTERNAL MEDICINE

## 2019-06-10 PROCEDURE — 99499 UNLISTED E&M SERVICE: CPT | Mod: S$GLB,,, | Performed by: INTERNAL MEDICINE

## 2019-06-10 PROCEDURE — 3075F PR MOST RECENT SYSTOLIC BLOOD PRESS GE 130-139MM HG: ICD-10-PCS | Mod: CPTII,S$GLB,, | Performed by: INTERNAL MEDICINE

## 2019-06-10 PROCEDURE — 1101F PT FALLS ASSESS-DOCD LE1/YR: CPT | Mod: CPTII,S$GLB,, | Performed by: INTERNAL MEDICINE

## 2019-06-10 PROCEDURE — 1101F PR PT FALLS ASSESS DOC 0-1 FALLS W/OUT INJ PAST YR: ICD-10-PCS | Mod: CPTII,S$GLB,, | Performed by: INTERNAL MEDICINE

## 2019-06-10 PROCEDURE — 99999 PR PBB SHADOW E&M-EST. PATIENT-LVL IV: CPT | Mod: PBBFAC,,, | Performed by: INTERNAL MEDICINE

## 2019-06-10 PROCEDURE — 3078F PR MOST RECENT DIASTOLIC BLOOD PRESSURE < 80 MM HG: ICD-10-PCS | Mod: CPTII,S$GLB,, | Performed by: INTERNAL MEDICINE

## 2019-06-10 PROCEDURE — 3075F SYST BP GE 130 - 139MM HG: CPT | Mod: CPTII,S$GLB,, | Performed by: INTERNAL MEDICINE

## 2019-06-10 PROCEDURE — 99999 PR PBB SHADOW E&M-EST. PATIENT-LVL IV: ICD-10-PCS | Mod: PBBFAC,,, | Performed by: INTERNAL MEDICINE

## 2019-06-10 PROCEDURE — 99214 PR OFFICE/OUTPT VISIT, EST, LEVL IV, 30-39 MIN: ICD-10-PCS | Mod: S$GLB,,, | Performed by: INTERNAL MEDICINE

## 2019-06-10 PROCEDURE — 99214 OFFICE O/P EST MOD 30 MIN: CPT | Mod: S$GLB,,, | Performed by: INTERNAL MEDICINE

## 2019-06-10 RX ORDER — DIAZEPAM 10 MG/1
10 TABLET ORAL DAILY PRN
Qty: 30 TABLET | Refills: 0 | Status: SHIPPED | OUTPATIENT
Start: 2019-06-10 | End: 2019-09-10 | Stop reason: SDUPTHER

## 2019-06-10 NOTE — PROGRESS NOTES
Subjective:       Patient ID: Nick De Anda is a 78 y.o. male.    Chief Complaint: Annual Exam    HPI  Pt c/o here for f/u DM well controlled pt ahs been seen by cardiologist and electrophysiologist for paroxysmal a fib no new change in medication still on xarelto and metoprolol pt feel bad when in a fib tire can't do anything and anxiety when in a fib was on Valium did well only take occasionally  Prn pt denies sob cp bleeding no RODRIGEZ  Pt ahd Prevnar 13 2 yrs ago with [facility] no need for any further pneumococcal vaccine   Review of Systems    Objective:      Physical Exam   Constitutional: He is oriented to person, place, and time. He appears well-developed and well-nourished. No distress.   overwt   HENT:   Head: Normocephalic and atraumatic.   Right Ear: External ear normal.   Left Ear: External ear normal.   Nose: Nose normal.   Mouth/Throat: Oropharynx is clear and moist. No oropharyngeal exudate.   Eyes: Pupils are equal, round, and reactive to light. Conjunctivae and EOM are normal. Right eye exhibits no discharge. Left eye exhibits no discharge.   Neck: Normal range of motion. Neck supple. No thyromegaly present.   Cardiovascular: Normal rate, regular rhythm, normal heart sounds and intact distal pulses. Exam reveals no gallop and no friction rub.   No murmur heard.  Pulmonary/Chest: Effort normal and breath sounds normal. No respiratory distress. He has no wheezes. He has no rales. He exhibits no tenderness.   Abdominal: Soft. Bowel sounds are normal. He exhibits no distension. There is no tenderness. There is no rebound and no guarding.   Musculoskeletal: Normal range of motion. He exhibits no edema, tenderness or deformity.   Lymphadenopathy:     He has no cervical adenopathy.   Neurological: He is alert and oriented to person, place, and time.   Skin: Skin is warm and dry. Capillary refill takes less than 2 seconds. No rash noted. No erythema.   Psychiatric: He has a normal mood and affect.  Judgment and thought content normal.   Nursing note and vitals reviewed.      Assessment:       1. Paroxysmal atrial fibrillation    2. Anxiety    3. Type 2 diabetes mellitus without complication, without long-term current use of insulin    4. Hx of CABG    5. Essential hypertension    6. Obesity, unspecified classification, unspecified obesity type, unspecified whether serious comorbidity present        Plan:       Paroxysmal atrial fibrillation  Comments:  pt will see his cardiologist for ?antiarrythmic drugs    Anxiety  Comments:  will try valium again since pt did well discuss SSRI but also concern with cardiac drug interactions  Orders:  -     diazePAM (VALIUM) 10 MG Tab; Take 1 tablet (10 mg total) by mouth daily as needed.  Dispense: 30 tablet; Refill: 0    Type 2 diabetes mellitus without complication, without long-term current use of insulin  Comments:  well controlled Hgba1c 5.7 last labs    Hx of CABG    Essential hypertension  Comments:  stable on medications    Obesity, unspecified classification, unspecified obesity type, unspecified whether serious comorbidity present  Comments:  pt is working on diet exercise loose a few lbs since last visit

## 2019-06-11 ENCOUNTER — OFFICE VISIT (OUTPATIENT)
Dept: ELECTROPHYSIOLOGY | Facility: CLINIC | Age: 79
End: 2019-06-11
Payer: MEDICARE

## 2019-06-11 ENCOUNTER — HOSPITAL ENCOUNTER (OUTPATIENT)
Dept: CARDIOLOGY | Facility: CLINIC | Age: 79
Discharge: HOME | End: 2019-06-11
Payer: MEDICARE

## 2019-06-11 VITALS
BODY MASS INDEX: 33.73 KG/M2 | HEIGHT: 76 IN | HEART RATE: 71 BPM | WEIGHT: 277 LBS | DIASTOLIC BLOOD PRESSURE: 74 MMHG | SYSTOLIC BLOOD PRESSURE: 151 MMHG

## 2019-06-11 DIAGNOSIS — I48.0 PAROXYSMAL ATRIAL FIBRILLATION: Primary | ICD-10-CM

## 2019-06-11 DIAGNOSIS — I10 ESSENTIAL HYPERTENSION: ICD-10-CM

## 2019-06-11 DIAGNOSIS — Z95.1 HX OF CABG: ICD-10-CM

## 2019-06-11 DIAGNOSIS — E11.9 TYPE 2 DIABETES MELLITUS WITHOUT COMPLICATION, WITHOUT LONG-TERM CURRENT USE OF INSULIN: ICD-10-CM

## 2019-06-11 DIAGNOSIS — I25.10 CORONARY ARTERY DISEASE INVOLVING NATIVE CORONARY ARTERY OF NATIVE HEART WITHOUT ANGINA PECTORIS: ICD-10-CM

## 2019-06-11 DIAGNOSIS — I48.3 TYPICAL ATRIAL FLUTTER: ICD-10-CM

## 2019-06-11 DIAGNOSIS — I48.19 PERSISTENT ATRIAL FIBRILLATION: ICD-10-CM

## 2019-06-11 PROCEDURE — 93005 ELECTROCARDIOGRAM TRACING: CPT | Mod: S$GLB,,, | Performed by: INTERNAL MEDICINE

## 2019-06-11 PROCEDURE — 99999 PR PBB SHADOW E&M-EST. PATIENT-LVL III: CPT | Mod: PBBFAC,,, | Performed by: INTERNAL MEDICINE

## 2019-06-11 PROCEDURE — 3077F SYST BP >= 140 MM HG: CPT | Mod: CPTII,S$GLB,, | Performed by: INTERNAL MEDICINE

## 2019-06-11 PROCEDURE — 93010 RHYTHM STRIP: ICD-10-PCS | Mod: S$GLB,,, | Performed by: INTERNAL MEDICINE

## 2019-06-11 PROCEDURE — 1101F PR PT FALLS ASSESS DOC 0-1 FALLS W/OUT INJ PAST YR: ICD-10-PCS | Mod: CPTII,S$GLB,, | Performed by: INTERNAL MEDICINE

## 2019-06-11 PROCEDURE — 99999 PR PBB SHADOW E&M-EST. PATIENT-LVL III: ICD-10-PCS | Mod: PBBFAC,,, | Performed by: INTERNAL MEDICINE

## 2019-06-11 PROCEDURE — 93005 RHYTHM STRIP: ICD-10-PCS | Mod: S$GLB,,, | Performed by: INTERNAL MEDICINE

## 2019-06-11 PROCEDURE — 99214 OFFICE O/P EST MOD 30 MIN: CPT | Mod: S$GLB,,, | Performed by: INTERNAL MEDICINE

## 2019-06-11 PROCEDURE — 99214 PR OFFICE/OUTPT VISIT, EST, LEVL IV, 30-39 MIN: ICD-10-PCS | Mod: S$GLB,,, | Performed by: INTERNAL MEDICINE

## 2019-06-11 PROCEDURE — 93010 ELECTROCARDIOGRAM REPORT: CPT | Mod: S$GLB,,, | Performed by: INTERNAL MEDICINE

## 2019-06-11 PROCEDURE — 3078F DIAST BP <80 MM HG: CPT | Mod: CPTII,S$GLB,, | Performed by: INTERNAL MEDICINE

## 2019-06-11 PROCEDURE — 3078F PR MOST RECENT DIASTOLIC BLOOD PRESSURE < 80 MM HG: ICD-10-PCS | Mod: CPTII,S$GLB,, | Performed by: INTERNAL MEDICINE

## 2019-06-11 PROCEDURE — 1101F PT FALLS ASSESS-DOCD LE1/YR: CPT | Mod: CPTII,S$GLB,, | Performed by: INTERNAL MEDICINE

## 2019-06-11 PROCEDURE — 3077F PR MOST RECENT SYSTOLIC BLOOD PRESSURE >= 140 MM HG: ICD-10-PCS | Mod: CPTII,S$GLB,, | Performed by: INTERNAL MEDICINE

## 2019-06-11 NOTE — PROGRESS NOTES
Subjective:    Patient ID:  Nick De Anda is a 78 y.o. male who presents for evaluation of Atrial Fibrillation    Referring Cardiologist: Mitesh Dean MD  Primary Care Physician: Santiago Adler MD    HPI  Prior Hx:  I had the pleasure of seeing Mr. De Anda today in our electrophysiology clinic in consultation for his atrial arrhythmia. As you are aware he is a pleasant 78 year-old man with coronary artery disease s/p CABG with preserved LVEF, type 2 diabetes, hypertension and recently discovered paroxysmal atrial fibrillation. He recently transferred care from [facility] where his AF was diagnosed and he was initiated on xarelto. He was recently admitted for a symptomatic UTI and observed to have a paroxysm of atrial fibrillation with ventricular rates in the 40s-60s (slower rates appeared to correlate during overnight hours). He monitors his vitals at home and for a while notes it indicates he has an irregular heart beat however he feels normal. He has established care with Dr. Dean in cardiology on 2/13/2019 and was noted to be in atrial fibrillation. He has no obvious symptoms. I reviewed all available electrocardiograms in Epic which show sinus rhythm on 2/5/2014 and 2/2/2019. An ECG dated 2/3/2019 notes rate controlled atrial fibrillation. When I saw him in clinic 2/2019 he was in sinus rhythm. At that visit since he had no symptoms with AF and was rate controlled when in AF I recommended no aggressive rhythm control strategy and would continue his current management. He was to see me prn.    Interim Hx:  Patient returns for discussion of his AF. He reports now he believes AF causes fatigue. Notes episodes every week or so that correlate to being fatigue.    My interpretation of today's in clinic ECG is atrial flutter (typical) with variable AV conduction and an average rate of 71 bpm.      Review of Systems   Constitution: Positive for malaise/fatigue. Negative for fever.   HENT: Negative for  congestion and sore throat.    Eyes: Negative for blurred vision and visual disturbance.   Cardiovascular: Negative for chest pain, dyspnea on exertion, leg swelling and palpitations.   Respiratory: Negative for cough and shortness of breath.    Skin: Negative.    Musculoskeletal: Negative.    Gastrointestinal: Negative for bloating, abdominal pain, hematochezia and melena.   Neurological: Negative for dizziness, light-headedness and weakness.        Objective:    Physical Exam   Constitutional: He is oriented to person, place, and time. He appears well-developed and well-nourished. No distress.   HENT:   Head: Normocephalic and atraumatic.   Eyes: Conjunctivae are normal. Right eye exhibits no discharge. Left eye exhibits no discharge.   Neck: Neck supple. No JVD present.   Cardiovascular: Normal rate, regular rhythm and normal heart sounds. Exam reveals no gallop and no friction rub.   No murmur heard.  Pulmonary/Chest: Effort normal and breath sounds normal. No respiratory distress. He has no wheezes. He has no rales.   Abdominal: Soft. Bowel sounds are normal. He exhibits no distension. There is no tenderness. There is no rebound.   Musculoskeletal: He exhibits no edema.   Neurological: He is alert and oriented to person, place, and time.   Skin: Skin is warm and dry. He is not diaphoretic.   Psychiatric: He has a normal mood and affect. His behavior is normal. Judgment and thought content normal.   Vitals reviewed.        Assessment:       1. Paroxysmal atrial fibrillation    2. Typical atrial flutter    3. Hx of CABG    4. Essential hypertension    5. Coronary artery disease involving native coronary artery of native heart without angina pectoris    6. Type 2 diabetes mellitus without complication, without long-term current use of insulin         Plan:     In summary, Mr. De Anda is a pleasant 78 year-old man with coronary artery disease s/p CABG with preserved LVEF, type 2 diabetes, hypertension and recently  discovered persistent atrial fibrillation. I had a long discussion with the patient about the pathophysiology and risks of atrial fibrillation and its basic pathophysiology, including its health implications and treatment options. Specifically, I addressed the need for CVA (stroke) prophylaxis with aspirin versus oral anticoagulation (warfarin vs DOACs, discussed bleeding risks, and need to come to the ER for any head trauma for CT scanning even if asymptomatic).  His [id number] score is 5 and long-term anticoagulation is recommended. He now feels like his arrhythmias are causing symptoms. Today he is in typical atrial flutter. He desires rhythm control. He was briefly on sotalol post-CABG. Recommend DEANGELO/DCCV and admit for sotalol 80mg bid. Cancel DEANGELO/DCCV if in sinus rhythm on presentation.    Thank you for allowing me to participate in the care of this patient. Please do not hesitate to call me with any questions or concerns.    Yung Paez MD, PhD  Cardiac Electrophysiology

## 2019-06-13 ENCOUNTER — TELEPHONE (OUTPATIENT)
Dept: ELECTROPHYSIOLOGY | Facility: CLINIC | Age: 79
End: 2019-06-13

## 2019-06-17 ENCOUNTER — ANESTHESIA EVENT (OUTPATIENT)
Dept: MEDSURG UNIT | Facility: HOSPITAL | Age: 79
End: 2019-06-17
Payer: MEDICARE

## 2019-06-18 ENCOUNTER — TELEPHONE (OUTPATIENT)
Dept: ELECTROPHYSIOLOGY | Facility: CLINIC | Age: 79
End: 2019-06-18

## 2019-06-18 ENCOUNTER — ANESTHESIA (OUTPATIENT)
Dept: MEDSURG UNIT | Facility: HOSPITAL | Age: 79
End: 2019-06-18
Payer: MEDICARE

## 2019-06-18 ENCOUNTER — HOSPITAL ENCOUNTER (OUTPATIENT)
Facility: HOSPITAL | Age: 79
Discharge: HOME | End: 2019-06-18
Attending: INTERNAL MEDICINE | Admitting: INTERNAL MEDICINE
Payer: MEDICARE

## 2019-06-18 ENCOUNTER — HOSPITAL ENCOUNTER (OUTPATIENT)
Dept: CARDIOLOGY | Facility: CLINIC | Age: 79
Discharge: HOME | End: 2019-06-18
Payer: MEDICARE

## 2019-06-18 VITALS
HEART RATE: 63 BPM | HEIGHT: 76 IN | OXYGEN SATURATION: 98 % | SYSTOLIC BLOOD PRESSURE: 161 MMHG | WEIGHT: 275 LBS | DIASTOLIC BLOOD PRESSURE: 99 MMHG | BODY MASS INDEX: 33.49 KG/M2 | RESPIRATION RATE: 18 BRPM | TEMPERATURE: 97 F

## 2019-06-18 DIAGNOSIS — I48.91 ATRIAL FIBRILLATION: ICD-10-CM

## 2019-06-18 DIAGNOSIS — I48.92 ATRIAL FLUTTER: ICD-10-CM

## 2019-06-18 DIAGNOSIS — I48.19 PERSISTENT ATRIAL FIBRILLATION: Primary | ICD-10-CM

## 2019-06-18 DIAGNOSIS — I48.0 PAROXYSMAL ATRIAL FIBRILLATION: Primary | ICD-10-CM

## 2019-06-18 LAB — POCT GLUCOSE: 108 MG/DL (ref 70–110)

## 2019-06-18 PROCEDURE — D9220A PRA ANESTHESIA: ICD-10-PCS | Mod: ANES,,, | Performed by: ANESTHESIOLOGY

## 2019-06-18 PROCEDURE — 93010 ELECTROCARDIOGRAM REPORT: CPT | Mod: 76,,, | Performed by: INTERNAL MEDICINE

## 2019-06-18 PROCEDURE — D9220A PRA ANESTHESIA: Mod: CRNA,,, | Performed by: NURSE ANESTHETIST, CERTIFIED REGISTERED

## 2019-06-18 PROCEDURE — 93010 EKG 12-LEAD: ICD-10-PCS | Mod: ,,, | Performed by: INTERNAL MEDICINE

## 2019-06-18 PROCEDURE — 37000008 HC ANESTHESIA 1ST 15 MINUTES: Performed by: INTERNAL MEDICINE

## 2019-06-18 PROCEDURE — 63600175 PHARM REV CODE 636 W HCPCS: Performed by: NURSE ANESTHETIST, CERTIFIED REGISTERED

## 2019-06-18 PROCEDURE — D9220A PRA ANESTHESIA: ICD-10-PCS | Mod: CRNA,,, | Performed by: NURSE ANESTHETIST, CERTIFIED REGISTERED

## 2019-06-18 PROCEDURE — 93005 ELECTROCARDIOGRAM TRACING: CPT

## 2019-06-18 PROCEDURE — 37000009 HC ANESTHESIA EA ADD 15 MINS: Performed by: INTERNAL MEDICINE

## 2019-06-18 PROCEDURE — 25000003 PHARM REV CODE 250: Performed by: NURSE PRACTITIONER

## 2019-06-18 PROCEDURE — 93010 ELECTROCARDIOGRAM REPORT: CPT | Mod: ,,, | Performed by: INTERNAL MEDICINE

## 2019-06-18 PROCEDURE — 25000003 PHARM REV CODE 250: Performed by: NURSE ANESTHETIST, CERTIFIED REGISTERED

## 2019-06-18 PROCEDURE — D9220A PRA ANESTHESIA: Mod: ANES,,, | Performed by: ANESTHESIOLOGY

## 2019-06-18 RX ORDER — LIDOCAINE HCL/PF 100 MG/5ML
SYRINGE (ML) INTRAVENOUS
Status: DISCONTINUED | OUTPATIENT
Start: 2019-06-18 | End: 2019-06-18

## 2019-06-18 RX ORDER — PROPOFOL 10 MG/ML
VIAL (ML) INTRAVENOUS
Status: DISCONTINUED | OUTPATIENT
Start: 2019-06-18 | End: 2019-06-18

## 2019-06-18 RX ORDER — PROPOFOL 10 MG/ML
VIAL (ML) INTRAVENOUS CONTINUOUS PRN
Status: DISCONTINUED | OUTPATIENT
Start: 2019-06-18 | End: 2019-06-18

## 2019-06-18 RX ORDER — SOTALOL HYDROCHLORIDE 80 MG/1
80 TABLET ORAL EVERY 12 HOURS
Qty: 60 TABLET | Refills: 11 | Status: SHIPPED | OUTPATIENT
Start: 2019-06-18 | End: 2020-06-08 | Stop reason: SDUPTHER

## 2019-06-18 RX ORDER — KETAMINE HYDROCHLORIDE 10 MG/ML
INJECTION, SOLUTION INTRAMUSCULAR; INTRAVENOUS
Status: DISCONTINUED | OUTPATIENT
Start: 2019-06-18 | End: 2019-06-18

## 2019-06-18 RX ORDER — SODIUM CHLORIDE 0.9 % (FLUSH) 0.9 %
5 SYRINGE (ML) INJECTION
Status: ACTIVE | OUTPATIENT
Start: 2019-06-18

## 2019-06-18 RX ORDER — SODIUM CHLORIDE 9 MG/ML
INJECTION, SOLUTION INTRAVENOUS CONTINUOUS
Status: ACTIVE | OUTPATIENT
Start: 2019-06-18

## 2019-06-18 RX ORDER — SODIUM CHLORIDE 0.9 % (FLUSH) 0.9 %
3 SYRINGE (ML) INJECTION
Status: DISCONTINUED | OUTPATIENT
Start: 2019-06-18 | End: 2019-06-18 | Stop reason: HOSPADM

## 2019-06-18 RX ADMIN — LIDOCAINE HYDROCHLORIDE 20 MG: 20 INJECTION, SOLUTION INTRAVENOUS at 01:06

## 2019-06-18 RX ADMIN — SODIUM CHLORIDE: 0.9 INJECTION, SOLUTION INTRAVENOUS at 01:06

## 2019-06-18 RX ADMIN — PROPOFOL 70 MG: 10 INJECTION, EMULSION INTRAVENOUS at 01:06

## 2019-06-18 RX ADMIN — PROPOFOL 150 MCG/KG/MIN: 10 INJECTION, EMULSION INTRAVENOUS at 01:06

## 2019-06-18 RX ADMIN — KETAMINE HYDROCHLORIDE 20 MG: 10 INJECTION, SOLUTION INTRAMUSCULAR; INTRAVENOUS at 01:06

## 2019-06-18 NOTE — ASSESSMENT & PLAN NOTE
Planned for DEANGELO-DCCV however upon adminstering propofol patient spontaneously converted to normal sinus rhythm. Therefore we did not perform a DEANGELO.

## 2019-06-18 NOTE — TELEPHONE ENCOUNTER
Spoke to Mr. De Anda in person in the [facility].  EKG scheduled for Thursday 6/20 at 12 pm at [facility].  Explained this is due to start of Sotalol.    EKG also scheduled for Tuesday 6/25 for s/p dccv done today to ensure he is remaining in a SR.     Mr. De Anda verbalizes understanding and will call our office with any questions or concerns.

## 2019-06-18 NOTE — Clinical Note
Spontanious Conversion to sinus rhythm. 12 lead ECG completed, results shown to Dr Paez. DEANGELO/DCCV cancelled per Dr Paez.

## 2019-06-18 NOTE — NURSING TRANSFER
Nursing Transfer Note      6/18/2019     Transfer To: ep pacu 3 to sscu 08    Transfer via stretcher    Transfer with none per order    Transported by chanel becerril, pacu rn  Medicines sent: none  Chart send with patient: Yes    Notified: spouse    Patient reassessed at: 6/18/19 1415    Upon arrival to floor: patient oriented to room, call bell in reach and bed in lowest position

## 2019-06-18 NOTE — SUBJECTIVE & OBJECTIVE
Past Medical History:   Diagnosis Date    Anticoagulant long-term use     Arthritis     Cancer     skin    Coronary artery disease     Diabetes mellitus     diet    Encounter for blood transfusion     History of staphylococcal infection 2012    after CABG    Hypertension     Persistent atrial fibrillation 2/2/2019       Past Surgical History:   Procedure Laterality Date    CARDIAC SURGERY      cabg    CORONARY STENT PLACEMENT      CORRECTION, HAMMER TOE Left 2/7/2014    [facility] OR    CYSTOSCOPY      prostate infection    EXCISION-BONE SPUR-FOOT SUTURE BRIDGE  WITH GRAFTING & PRP Left 10/23/2015    [facility] at [facility] OR    FOOT SURGERY      RECTOPERITONEAL FISTULA CLOSURE      SHOULDER SURGERY      Triple bypass         Review of patient's allergies indicates:   Allergen Reactions    Penicillins Anaphylaxis     Can take keflex       No current facility-administered medications on file prior to encounter.      Current Outpatient Medications on File Prior to Encounter   Medication Sig    albuterol (VENTOLIN HFA) 90 mcg/actuation inhaler Inhale 2 puffs into the lungs every 6 (six) hours as needed for Wheezing. Rescue    amLODIPine (NORVASC) 5 MG tablet Take 5 mg by mouth once daily.    ascorbic acid (VITAMIN C) 500 MG tablet Take 500 mg by mouth once daily.    atorvastatin (LIPITOR) 40 MG tablet Take 40 mg by mouth once daily.    clopidogrel (PLAVIX) 75 mg tablet Take 1 tablet (75 mg total) by mouth once daily.    diazePAM (VALIUM) 10 MG Tab Take 1 tablet (10 mg total) by mouth daily as needed.    hydroCHLOROthiazide (MICROZIDE) 12.5 mg capsule Take 12.5 mg by mouth once daily.     irbesartan (AVAPRO) 150 MG tablet Take 1 tablet (150 mg total) by mouth every evening.    metFORMIN (GLUCOPHAGE-XR) 500 MG 24 hr tablet Take 1 tablet (500 mg total) by mouth 2 (two) times daily with meals.    montelukast (SINGULAIR) 10 mg tablet Take 10 mg by  mouth every evening.    MULTIVITAMIN W-MINERALS/LUTEIN (CENTRUM SILVER ORAL) Take by mouth once daily.    mupirocin (BACTROBAN) 2 % ointment Apply topically 2 (two) times daily.    XARELTO 20 mg Tab Take 20 mg by mouth every evening.     blood-glucose meter kit Use as instructed     Family History     None        Tobacco Use    Smoking status: Former Smoker     Last attempt to quit: 10/19/1979     Years since quittin.6   Substance and Sexual Activity    Alcohol use: No    Drug use: Never    Sexual activity: Not on file     Review of Systems   All other systems reviewed and are negative.    Objective:     Vital Signs (Most Recent):  Temp: 96.5 °F (35.8 °C) (19 1143)  Pulse: (!) 49 (19 1143)  Resp: 18 (19 1143)  BP: (!) 146/65 (19 1145)  SpO2: 97 % (19 1143) Vital Signs (24h Range):  Temp:  [96.5 °F (35.8 °C)] 96.5 °F (35.8 °C)  Pulse:  [49] 49  Resp:  [18] 18  SpO2:  [97 %] 97 %  BP: (139-146)/(63-65) 146/65     Weight: 124.7 kg (275 lb)  Body mass index is 33.47 kg/m².    SpO2: 97 %  O2 Device (Oxygen Therapy): room air      Intake/Output Summary (Last 24 hours) at 2019 1334  Last data filed at 2019 1332  Gross per 24 hour   Intake 100 ml   Output --   Net 100 ml       Lines/Drains/Airways     Peripheral Intravenous Line                 Peripheral IV - Single Lumen 19 1143 20 G Distal;Left;Posterior Forearm less than 1 day                Physical Exam   Constitutional: He is oriented to person, place, and time. He appears well-developed and well-nourished.   HENT:   Head: Normocephalic and atraumatic.   Eyes: No scleral icterus.   Cardiovascular: Normal rate and normal heart sounds.   Irregular rhythm    Pulmonary/Chest: Effort normal and breath sounds normal.   Musculoskeletal: He exhibits no edema.   Neurological: He is alert and oriented to person, place, and time.   Skin: Skin is warm.   Psychiatric: He has a normal mood and affect.       Significant  Labs: All pertinent lab results from the last 24 hours have been reviewed.    Significant Imaging: EKG: as above

## 2019-06-18 NOTE — H&P
[facility]  Cardiology  History and Physical     Patient Name: Nick De Anda  MRN: [id number]  Admission Date: 6/18/2019  Code Status: Prior   Attending Provider: Yung Paze MD   Primary Care Physician: Santiago Adler MD  Principal Problem:<principal problem not specified>    Patient information was obtained from patient and ER records.     Subjective:     Chief Complaint:  AFL     HPI:      TRANSESOPHAGEAL ECHOCARDIOGRAPHY   PRE-PROCEDURE NOTE    06/18/2019    HPI:     Nick De Anda is a 78 y.o. man with PMHx of AFL.    Dysphagia or odynophagia:  No  Liver Disease, esophageal disease, or known varices:  No  Upper GI Bleeding: No  Snoring:  Yes  Sleep Apnea:  No  Prior neck surgery or radiation:  No  History of anesthetic difficulties:  No  Family history of anesthetic difficulties:  No  Last oral intake:  12 hours ago  Able to move neck in all directions:  Yes    No previous hx of CVA.  EKG typical AFL    Past Medical History:   Diagnosis Date    Anticoagulant long-term use     Arthritis     Cancer     skin    Coronary artery disease     Diabetes mellitus     diet    Encounter for blood transfusion     History of staphylococcal infection 2012    after CABG    Hypertension     Persistent atrial fibrillation 2/2/2019       Past Surgical History:   Procedure Laterality Date    CARDIAC SURGERY      cabg    CORONARY STENT PLACEMENT      CORRECTION, HAMMER TOE Left 2/7/2014    [facility] OR    CYSTOSCOPY      prostate infection    EXCISION-BONE SPUR-FOOT SUTURE BRIDGE  WITH GRAFTING & PRP Left 10/23/2015    Performed by [facility] at [facility] OR    FOOT SURGERY      RECTOPERITONEAL FISTULA CLOSURE      SHOULDER SURGERY      Triple bypass         Review of patient's allergies indicates:   Allergen Reactions    Penicillins Anaphylaxis     Can take keflex       No current facility-administered medications on file prior to encounter.       Current Outpatient Medications on File Prior to Encounter   Medication Sig    albuterol (VENTOLIN HFA) 90 mcg/actuation inhaler Inhale 2 puffs into the lungs every 6 (six) hours as needed for Wheezing. Rescue    amLODIPine (NORVASC) 5 MG tablet Take 5 mg by mouth once daily.    ascorbic acid (VITAMIN C) 500 MG tablet Take 500 mg by mouth once daily.    atorvastatin (LIPITOR) 40 MG tablet Take 40 mg by mouth once daily.    clopidogrel (PLAVIX) 75 mg tablet Take 1 tablet (75 mg total) by mouth once daily.    diazePAM (VALIUM) 10 MG Tab Take 1 tablet (10 mg total) by mouth daily as needed.    hydroCHLOROthiazide (MICROZIDE) 12.5 mg capsule Take 12.5 mg by mouth once daily.     irbesartan (AVAPRO) 150 MG tablet Take 1 tablet (150 mg total) by mouth every evening.    metFORMIN (GLUCOPHAGE-XR) 500 MG 24 hr tablet Take 1 tablet (500 mg total) by mouth 2 (two) times daily with meals.    montelukast (SINGULAIR) 10 mg tablet Take 10 mg by mouth every evening.    MULTIVITAMIN W-MINERALS/LUTEIN (CENTRUM SILVER ORAL) Take by mouth once daily.    mupirocin (BACTROBAN) 2 % ointment Apply topically 2 (two) times daily.    XARELTO 20 mg Tab Take 20 mg by mouth every evening.     blood-glucose meter kit Use as instructed     Family History     None        Tobacco Use    Smoking status: Former Smoker     Last attempt to quit: 10/19/1979     Years since quittin.6   Substance and Sexual Activity    Alcohol use: No    Drug use: Never    Sexual activity: Not on file     Review of Systems   All other systems reviewed and are negative.    Objective:     Vital Signs (Most Recent):  Temp: 96.5 °F (35.8 °C) (19 1143)  Pulse: (!) 49 (19 1143)  Resp: 18 (19 1143)  BP: (!) 146/65 (19 1145)  SpO2: 97 % (19 1143) Vital Signs (24h Range):  Temp:  [96.5 °F (35.8 °C)] 96.5 °F (35.8 °C)  Pulse:  [49] 49  Resp:  [18] 18  SpO2:  [97 %] 97 %  BP: (139-146)/(63-65) 146/65     Weight: 124.7 kg (275  lb)  Body mass index is 33.47 kg/m².    SpO2: 97 %  O2 Device (Oxygen Therapy): room air      Intake/Output Summary (Last 24 hours) at 6/18/2019 1334  Last data filed at 6/18/2019 1332  Gross per 24 hour   Intake 100 ml   Output --   Net 100 ml       Lines/Drains/Airways     Peripheral Intravenous Line                 Peripheral IV - Single Lumen 06/18/19 1143 20 G Distal;Left;Posterior Forearm less than 1 day                Physical Exam   Constitutional: He is oriented to person, place, and time. He appears well-developed and well-nourished.   HENT:   Head: Normocephalic and atraumatic.   Eyes: No scleral icterus.   Cardiovascular: Normal rate and normal heart sounds.   Irregular rhythm    Pulmonary/Chest: Effort normal and breath sounds normal.   Musculoskeletal: He exhibits no edema.   Neurological: He is alert and oriented to person, place, and time.   Skin: Skin is warm.   Psychiatric: He has a normal mood and affect.       Significant Labs: All pertinent lab results from the last 24 hours have been reviewed.    Significant Imaging: EKG: as above     Assessment and Plan:     Persistent atrial fibrillation    Planned for DEANGELO-DCCV however upon adminstering propofol patient spontaneously converted to normal sinus rhythm. Therefore we did not perform a DEANGELO.            VTE Risk Mitigation (From admission, onward)    None          Cassius Nelson MD  Cardiology   [facility]

## 2019-06-18 NOTE — PLAN OF CARE
Vss. sats 99% on room air. Pt converted in procedure on his own.  Arrived to to ep pacu 3, sr on cm to sb.  12 lead ekg done prior to transfer to ep pacu 3. Done in procedure room.  Pt's wife in room sscu 8, updated by md and rn.  Verbalizes understanding. Pt tolerating sips of water. See flowsheet for full assessment.

## 2019-06-18 NOTE — PROGRESS NOTES
12 lead ekg done post procedure prior to transfer to ep pacu 3.  sr with 1st degree av block done. Dr ferris shown ekg.  POLINA arenas aware as well. Pt's wife updated over phone by ep pacu rn. Verbalizes understanding.

## 2019-06-18 NOTE — DISCHARGE SUMMARY
[facility]  Cardiac Electrophysiology  Discharge Summary      Patient Name: Nick De Anda  MRN: [id number]  Admission Date: 6/18/2019  Hospital Length of Stay: 0 days  Discharge Date and Time:  06/18/2019 4:37 PM  Attending Physician: MD Yung Paez   Discharging Provider: Stephanie Barber NP  Primary Care Physician: Santiago Adler MD    HPI: 78 year-old man with coronary artery disease s/p CABG with preserved LVEF, type 2 diabetes, hypertension and recently discovered persistent atrial fibrillation. Patient is currently on xarelto with dinner. ( instructed to take with evening meal)   Patient elected to  Proceed for planned DEANGELO/DCCV and plans for sotalol 80 mg BID post procedure     Hospital Course:  DEANGELO cancelled as patient spontaneously converted to sinus rhythm with administration of propofol for sedation.   Denies any symptoms, ambulating with no issues.    Plan to continue home medications including eliquis 5 mg BID.   Patient will be discharged on sotalol 80 mg q12 hours >  EKG reviewed by MD Philippe Barragan QT/QTc 444/454 ms. Pt to follow up with EKG  On 6/20/19 ( instructed the importance of doing this post procedure EKG to assess QTc)  Clinic follow up in 4 weeks with MD Philippe Barragan     Discharge plans/instructions discussed by STAFF Philippe and myself,  with patient and spouse   who verbalized understanding  and agreement of plans of care. No further questions or concerns  voiced at this time.       New Medications:  - sotalol 80 mg BID        Consults:    -Anesthesia       Final Active Diagnoses:    Diagnosis Date Noted POA    PRINCIPAL PROBLEM:  Persistent atrial fibrillation [I48.1] 06/18/2019 Yes      Problems Resolved During this Admission:       Discharged Condition: good    Disposition: Home or Self Care    Follow Up:  Follow-up Information     Yung Paez MD In 4 weeks.    Specialties:  Electrophysiology, Cardiology  Why:  EKG on 6/20/19 at 12 PM given starting sotalol    Contact information:  [address]  [facility] [id number]  [phone]                 Patient Instructions:      No driving until:   Order Comments: If you were driving prior to the procedure, NO driving for 24 hours post procedure     Other restrictions (specify):   Order Comments: Diet  -You may resume oral intake after you are discharged, as long you have no swallowing difficulties.     Side effects:  -You may be drowsy for the remainder of the day from the sedation.     Because you have received sedation for this procedure:  -Limit activity for the remainder of the day.  -Do not drive or operate any equipment for the remainder of the day ( No driving for 24 hours)  -Do not smoke for at least 6 hours and until you are fully awake and alert.  -Do not drink alcoholic beverage for 24 hours.  -Defer important decision making until the following day.     Go to the Emergency Department if you develop:   -Bleeding  -Weakness or numbness  -Visual, gait or speech disturbance  -New chest pain, palpitations, shortness of breath, rapid heart beat, or fainting  -Fever     Follow up:  -EKG in 1 week.     Notify your health care provider if you experience any of the following:  temperature >100.4     Notify your health care provider if you experience any of the following:  persistent nausea and vomiting or diarrhea     Notify your health care provider if you experience any of the following:  severe uncontrolled pain     Notify your health care provider if you experience any of the following:  redness, tenderness, or signs of infection (pain, swelling, redness, odor or green/yellow discharge around incision site)     Notify your health care provider if you experience any of the following:  difficulty breathing or increased cough     Notify your health care provider if you experience any of the following:  severe persistent headache     Notify your health care provider if you experience any of the following:  worsening  rash     Notify your health care provider if you experience any of the following:  persistent dizziness, light-headedness, or visual disturbances     Notify your health care provider if you experience any of the following:  increased confusion or weakness     Notify your health care provider if you experience any of the following:   Order Comments: For any concerning medical symptoms     EKG 12-lead   Standing Status: Future Standing Exp. Date: 06/17/20   Scheduling Instructions: EKG for 6 /20/19 at 12 PM     Order Specific Question Answer Comments   Diagnosis Atrial fibrillation [427.31.ICD-9-CM]      Medications:  Reconciled Home Medications:      Medication List      START taking these medications    sotalol 80 MG tablet  Commonly known as:  BETAPACE  Take 1 tablet (80 mg total) by mouth every 12 (twelve) hours.        CONTINUE taking these medications    amLODIPine 5 MG tablet  Commonly known as:  NORVASC  Take 5 mg by mouth once daily.     atorvastatin 40 MG tablet  Commonly known as:  LIPITOR  Take 40 mg by mouth once daily.     blood-glucose meter kit  Use as instructed     CENTRUM SILVER ORAL  Take by mouth once daily.     clopidogrel 75 mg tablet  Commonly known as:  PLAVIX  Take 1 tablet (75 mg total) by mouth once daily.     diazePAM 10 MG Tab  Commonly known as:  VALIUM  Take 1 tablet (10 mg total) by mouth daily as needed.     hydroCHLOROthiazide 12.5 mg capsule  Commonly known as:  MICROZIDE  Take 12.5 mg by mouth once daily.     irbesartan 150 MG tablet  Commonly known as:  AVAPRO  Take 1 tablet (150 mg total) by mouth every evening.     metFORMIN 500 MG 24 hr tablet  Commonly known as:  GLUCOPHAGE-XR  Take 1 tablet (500 mg total) by mouth 2 (two) times daily with meals.     montelukast 10 mg tablet  Commonly known as:  SINGULAIR  Take 10 mg by mouth every evening.     mupirocin 2 % ointment  Commonly known as:  BACTROBAN  Apply topically 2 (two) times daily.     VENTOLIN HFA 90 mcg/actuation  inhaler  Generic drug:  albuterol  Inhale 2 puffs into the lungs every 6 (six) hours as needed for Wheezing. Rescue     VITAMIN C 500 MG tablet  Generic drug:  ascorbic acid (vitamin C)  Take 500 mg by mouth once daily.     XARELTO 20 mg Tab  Generic drug:  rivaroxaban  Take 20 mg by mouth every evening.            Time spent on the discharge of patient: 35  minutes    Stephanie Barber NP  Cardiac Electrophysiology  [facility]  STAFF MD Yung Paez

## 2019-06-18 NOTE — TRANSFER OF CARE
"Anesthesia Transfer of Care Note    Patient: Nick De Anda    Procedure(s) Performed: Procedure(s) (LRB):  CARDIOVERSION (N/A)  ECHOCARDIOGRAM, TRANSESOPHAGEAL (N/A)    Patient location: PACU    Anesthesia Type: general    Transport from OR: Transported from OR on 2-3 L/min O2 by NC with adequate spontaneous ventilation    Post pain: adequate analgesia    Post assessment: no apparent anesthetic complications    Post vital signs: stable    Level of consciousness: awake and alert    Nausea/Vomiting: no nausea/vomiting    Complications: none    Transfer of care protocol was followed      Last vitals:   Visit Vitals  BP (!) 146/65   Pulse (!) 49   Temp 35.8 °C (96.5 °F) (Oral)   Resp 18   Ht 6' 4" (1.93 m)   Wt 124.7 kg (275 lb)   SpO2 97%   BMI 33.47 kg/m²     "

## 2019-06-18 NOTE — HPI
TRANSESOPHAGEAL ECHOCARDIOGRAPHY   PRE-PROCEDURE NOTE    06/18/2019    HPI:     Nick De Anda is a 78 y.o. man with PMHx of AFL.    Dysphagia or odynophagia:  No  Liver Disease, esophageal disease, or known varices:  No  Upper GI Bleeding: No  Snoring:  Yes  Sleep Apnea:  No  Prior neck surgery or radiation:  No  History of anesthetic difficulties:  No  Family history of anesthetic difficulties:  No  Last oral intake:  12 hours ago  Able to move neck in all directions:  Yes    No previous hx of CVA.  EKG typical AFL

## 2019-06-18 NOTE — PLAN OF CARE
Problem: Adult Inpatient Plan of Care  Goal: Plan of Care Review  Outcome: Ongoing (interventions implemented as appropriate)  Received report from AKBAR Serrano in EP PACU. Patient s/p DCCV, AAOx3. VSS, no c/o pain or discomfort at this time, resp even and unlabored. Post procedure protocol reviewed with patient and patient's family. Understanding verbalized. Family members at bedside. Nurse call bell within reach. Will continue to monitor per post procedure protocol.

## 2019-06-18 NOTE — ANESTHESIA PREPROCEDURE EVALUATION
06/18/2019  Nick De Anda is a 78 y.o., male.    Anesthesia Evaluation    I have reviewed the Patient Summary Reports.     I have reviewed the Medications.     Review of Systems  Anesthesia Hx:  History of prior surgery of interest to airway management or planning:  Denies Personal Hx of Anesthesia complications.   Social:  Former Smoker    Cardiovascular:   Hypertension CAD  CABG/stent Dysrhythmias atrial fibrillation hyperlipidemia Normal BiV Fxn   Musculoskeletal:   Arthritis     Neurological:   Neuromuscular Disease,   Chronic Pain Syndrome   Endocrine:   Diabetes        Physical Exam  General:  Obesity    Airway/Jaw/Neck:  Airway Findings: Mouth Opening: Normal Tongue: Normal  General Airway Assessment: Adult  Mallampati: III  Improves to II with phonation.  TM Distance: Normal, at least 6 cm  Jaw/Neck Findings:  Neck ROM: Normal ROM       Chest/Lungs:  Chest/Lungs Findings: Normal Respiratory Rate     Heart/Vascular:  Heart Findings: Rate: Normal        Mental Status:  Mental Status Findings:  Alert and Oriented         Anesthesia Plan  Type of Anesthesia, risks & benefits discussed:  Anesthesia Type:  general  Patient's Preference: Natural airway general   Intra-op Monitoring Plan: standard ASA monitors  Intra-op Monitoring Plan Comments:   Post Op Pain Control Plan: IV/PO Opioids PRN  Post Op Pain Control Plan Comments:   Induction:   IV  Beta Blocker:  Patient is on a Beta-Blocker and has received one dose within the past 24 hours (No further documentation required).       Informed Consent: Patient understands risks and agrees with Anesthesia plan.  Questions answered. Anesthesia consent signed with patient.  ASA Score: 3     Day of Surgery Review of History & Physical:    H&P update referred to the surgeon.     Anesthesia Plan Notes: NPO confirmed.   No history of anesthesia problems.  Did  well with propofol previously.           Ready For Surgery From Anesthesia Perspective.

## 2019-06-20 ENCOUNTER — TELEPHONE (OUTPATIENT)
Dept: CARDIOLOGY | Facility: HOSPITAL | Age: 79
End: 2019-06-20

## 2019-06-20 NOTE — PROCEDURES
DEANGELO cancelled as patient spontaneously converted to sinus rhythm with administration of propofol for sedation.

## 2019-06-21 ENCOUNTER — PATIENT MESSAGE (OUTPATIENT)
Dept: PRIMARY CARE CLINIC | Facility: CLINIC | Age: 79
End: 2019-06-21

## 2019-06-28 ENCOUNTER — TELEPHONE (OUTPATIENT)
Dept: ELECTROPHYSIOLOGY | Facility: CLINIC | Age: 79
End: 2019-06-28

## 2019-06-28 NOTE — TELEPHONE ENCOUNTER
----- Message from Estefany Belcher sent at 6/28/2019  2:25 PM CDT -----  Contact: Pt called   Pt has a question regarding medication XARELTO 20 mg Tab. Please call pt @ [phone]. Thank you.

## 2019-06-28 NOTE — TELEPHONE ENCOUNTER
Spoke to Salvatore Ran who is interested in financial assistance as he is having difficulty affording Xarelto due to gap.  Let him know I will ask our financial assistance team to get him started with application.      He will call me on Wednesday next week for update.

## 2019-07-02 ENCOUNTER — TELEPHONE (OUTPATIENT)
Dept: PHARMACY | Facility: CLINIC | Age: 79
End: 2019-07-02

## 2019-07-08 ENCOUNTER — TELEPHONE (OUTPATIENT)
Dept: PHARMACY | Facility: CLINIC | Age: 79
End: 2019-07-08

## 2019-07-08 NOTE — TELEPHONE ENCOUNTER
The patient stated he couldn't hear me after several attempts of speaking louder and trying to explain why I was calling the patient yelled he didn't want help from me and disconnected the call.

## 2019-07-12 ENCOUNTER — PATIENT MESSAGE (OUTPATIENT)
Dept: ELECTROPHYSIOLOGY | Facility: CLINIC | Age: 79
End: 2019-07-12

## 2019-07-15 DIAGNOSIS — I48.0 PAROXYSMAL ATRIAL FIBRILLATION: Primary | ICD-10-CM

## 2019-07-18 ENCOUNTER — OFFICE VISIT (OUTPATIENT)
Dept: PRIMARY CARE CLINIC | Facility: CLINIC | Age: 79
End: 2019-07-18
Payer: MEDICARE

## 2019-07-18 VITALS
WEIGHT: 280.13 LBS | BODY MASS INDEX: 34.11 KG/M2 | RESPIRATION RATE: 18 BRPM | OXYGEN SATURATION: 96 % | HEART RATE: 60 BPM | SYSTOLIC BLOOD PRESSURE: 139 MMHG | DIASTOLIC BLOOD PRESSURE: 61 MMHG | HEIGHT: 76 IN | TEMPERATURE: 98 F

## 2019-07-18 DIAGNOSIS — N39.0 RECURRENT UTI: ICD-10-CM

## 2019-07-18 DIAGNOSIS — N30.00 ACUTE CYSTITIS WITHOUT HEMATURIA: Primary | ICD-10-CM

## 2019-07-18 LAB
BILIRUB SERPL-MCNC: ABNORMAL MG/DL
BLOOD URINE, POC: ABNORMAL
COLOR, POC UA: YELLOW
GLUCOSE UR QL STRIP: ABNORMAL
KETONES UR QL STRIP: ABNORMAL
LEUKOCYTE ESTERASE URINE, POC: ABNORMAL
NITRITE, POC UA: ABNORMAL
PH, POC UA: 5
PROTEIN, POC: ABNORMAL
SPECIFIC GRAVITY, POC UA: 1.01
UROBILINOGEN, POC UA: ABNORMAL

## 2019-07-18 PROCEDURE — 1101F PR PT FALLS ASSESS DOC 0-1 FALLS W/OUT INJ PAST YR: ICD-10-PCS | Mod: CPTII,S$GLB,, | Performed by: INTERNAL MEDICINE

## 2019-07-18 PROCEDURE — 3075F PR MOST RECENT SYSTOLIC BLOOD PRESS GE 130-139MM HG: ICD-10-PCS | Mod: CPTII,S$GLB,, | Performed by: INTERNAL MEDICINE

## 2019-07-18 PROCEDURE — 99213 OFFICE O/P EST LOW 20 MIN: CPT | Mod: 25,S$GLB,, | Performed by: INTERNAL MEDICINE

## 2019-07-18 PROCEDURE — 99213 PR OFFICE/OUTPT VISIT, EST, LEVL III, 20-29 MIN: ICD-10-PCS | Mod: 25,S$GLB,, | Performed by: INTERNAL MEDICINE

## 2019-07-18 PROCEDURE — 3078F DIAST BP <80 MM HG: CPT | Mod: CPTII,S$GLB,, | Performed by: INTERNAL MEDICINE

## 2019-07-18 PROCEDURE — 99999 PR PBB SHADOW E&M-EST. PATIENT-LVL IV: CPT | Mod: PBBFAC,,, | Performed by: INTERNAL MEDICINE

## 2019-07-18 PROCEDURE — 81002 POCT URINE DIPSTICK WITHOUT MICROSCOPE: ICD-10-PCS | Mod: S$GLB,,, | Performed by: INTERNAL MEDICINE

## 2019-07-18 PROCEDURE — 99999 PR PBB SHADOW E&M-EST. PATIENT-LVL IV: ICD-10-PCS | Mod: PBBFAC,,, | Performed by: INTERNAL MEDICINE

## 2019-07-18 PROCEDURE — 3075F SYST BP GE 130 - 139MM HG: CPT | Mod: CPTII,S$GLB,, | Performed by: INTERNAL MEDICINE

## 2019-07-18 PROCEDURE — 1101F PT FALLS ASSESS-DOCD LE1/YR: CPT | Mod: CPTII,S$GLB,, | Performed by: INTERNAL MEDICINE

## 2019-07-18 PROCEDURE — 3078F PR MOST RECENT DIASTOLIC BLOOD PRESSURE < 80 MM HG: ICD-10-PCS | Mod: CPTII,S$GLB,, | Performed by: INTERNAL MEDICINE

## 2019-07-18 PROCEDURE — 81002 URINALYSIS NONAUTO W/O SCOPE: CPT | Mod: S$GLB,,, | Performed by: INTERNAL MEDICINE

## 2019-07-18 RX ORDER — CEPHALEXIN 500 MG/1
500 CAPSULE ORAL 4 TIMES DAILY
Qty: 20 CAPSULE | Refills: 1 | Status: SHIPPED | OUTPATIENT
Start: 2019-07-18 | End: 2019-08-01

## 2019-07-18 RX ORDER — CEPHALEXIN 500 MG/1
1 CAPSULE ORAL 4 TIMES DAILY
Refills: 0 | COMMUNITY
Start: 2019-07-15 | End: 2019-08-01

## 2019-07-18 NOTE — PROGRESS NOTES
Subjective:       Patient ID: Nick De Anda is a 78 y.o. male.    Chief Complaint: Follow-up (ER- uti)    HPI  Pt was in ER at [facility] with symptoms UTI dysuria frequency confirm by U/A in ER not sure if urine cx taken given rocephin IV? And sent home on keflex 500mg po qid pt clinically better no fever chill no back pain no n/v/d dysuria improving pt states ha sincrease frequency of UTI  Review of Systems    Objective:      Physical Exam   Constitutional: He is oriented to person, place, and time. He appears well-developed and well-nourished. No distress.   HENT:   Head: Normocephalic and atraumatic.   Right Ear: External ear normal.   Left Ear: External ear normal.   Nose: Nose normal.   Mouth/Throat: Oropharynx is clear and moist. No oropharyngeal exudate.   Eyes: Pupils are equal, round, and reactive to light. Conjunctivae and EOM are normal. Right eye exhibits no discharge. Left eye exhibits no discharge.   Neck: Normal range of motion. Neck supple. No thyromegaly present.   Cardiovascular: Normal rate, normal heart sounds and intact distal pulses. Exam reveals no gallop and no friction rub.   No murmur heard.  irr irr rhythm with controlled heart yen   Pulmonary/Chest: Effort normal and breath sounds normal. No respiratory distress. He has no wheezes. He has no rales. He exhibits no tenderness.   Abdominal: Soft. Bowel sounds are normal. He exhibits no distension. There is no tenderness. There is no rebound and no guarding.   Musculoskeletal: Normal range of motion. He exhibits no edema, tenderness or deformity.   Lymphadenopathy:     He has no cervical adenopathy.   Neurological: He is alert and oriented to person, place, and time.   Skin: Skin is warm and dry. Capillary refill takes less than 2 seconds. No rash noted. No erythema.   Psychiatric: He has a normal mood and affect. Judgment and thought content normal.   Nursing note and vitals reviewed.      Assessment:       1. Acute cystitis  without hematuria    2. Recurrent UTI        Plan:       Acute cystitis without hematuria  -     cephALEXin (KEFLEX) 500 MG capsule; Take 1 capsule (500 mg total) by mouth 4 (four) times daily.  Dispense: 20 capsule; Refill: 1  -     POCT URINE DIPSTICK WITHOUT MICROSCOPE              U/A much better will continue with current po abx x 10 days and refer to urology and get kidney bladder u/s  Recurrent UTI  -     US Retroperitoneal Complete; Future; Expected date: 07/18/2019  -     Ambulatory referral to Urology

## 2019-07-19 ENCOUNTER — TELEPHONE (OUTPATIENT)
Dept: ELECTROPHYSIOLOGY | Facility: CLINIC | Age: 79
End: 2019-07-19

## 2019-07-19 NOTE — TELEPHONE ENCOUNTER
----- Message from Nallely Roblero RN sent at 7/19/2019  3:32 PM CDT -----  Contact: Patient      ----- Message -----  From: Marina Hastings  Sent: 7/19/2019   2:34 PM  To: Jesús Lindsay the pt is retuning a call. Please call him back @ [phone]. Thanks, Marina

## 2019-07-30 ENCOUNTER — PATIENT MESSAGE (OUTPATIENT)
Dept: PRIMARY CARE CLINIC | Facility: CLINIC | Age: 79
End: 2019-07-30

## 2019-08-01 ENCOUNTER — ANTI-COAG VISIT (OUTPATIENT)
Dept: CARDIOLOGY | Facility: CLINIC | Age: 79
End: 2019-08-01

## 2019-08-01 ENCOUNTER — HOSPITAL ENCOUNTER (OUTPATIENT)
Dept: CARDIOLOGY | Facility: CLINIC | Age: 79
Discharge: HOME | End: 2019-08-01
Payer: MEDICARE

## 2019-08-01 ENCOUNTER — TELEPHONE (OUTPATIENT)
Dept: PRIMARY CARE CLINIC | Facility: CLINIC | Age: 79
End: 2019-08-01

## 2019-08-01 ENCOUNTER — OFFICE VISIT (OUTPATIENT)
Dept: ELECTROPHYSIOLOGY | Facility: CLINIC | Age: 79
End: 2019-08-01
Payer: MEDICARE

## 2019-08-01 VITALS
BODY MASS INDEX: 34.5 KG/M2 | SYSTOLIC BLOOD PRESSURE: 149 MMHG | DIASTOLIC BLOOD PRESSURE: 65 MMHG | WEIGHT: 283.31 LBS | HEIGHT: 76 IN | HEART RATE: 62 BPM

## 2019-08-01 DIAGNOSIS — I48.0 PAROXYSMAL ATRIAL FIBRILLATION: ICD-10-CM

## 2019-08-01 DIAGNOSIS — Z79.01 LONG TERM (CURRENT) USE OF ANTICOAGULANTS: ICD-10-CM

## 2019-08-01 DIAGNOSIS — Z79.899 ENCOUNTER FOR MONITORING SOTALOL THERAPY: ICD-10-CM

## 2019-08-01 DIAGNOSIS — I48.19 PERSISTENT ATRIAL FIBRILLATION: ICD-10-CM

## 2019-08-01 DIAGNOSIS — I48.3 TYPICAL ATRIAL FLUTTER: ICD-10-CM

## 2019-08-01 DIAGNOSIS — I10 ESSENTIAL HYPERTENSION: ICD-10-CM

## 2019-08-01 DIAGNOSIS — I48.0 PAROXYSMAL ATRIAL FIBRILLATION: Primary | ICD-10-CM

## 2019-08-01 DIAGNOSIS — Z51.81 ENCOUNTER FOR MONITORING SOTALOL THERAPY: ICD-10-CM

## 2019-08-01 PROCEDURE — 99999 PR PBB SHADOW E&M-EST. PATIENT-LVL III: CPT | Mod: PBBFAC,,, | Performed by: NURSE PRACTITIONER

## 2019-08-01 PROCEDURE — 93010 ELECTROCARDIOGRAM REPORT: CPT | Mod: S$GLB,,, | Performed by: INTERNAL MEDICINE

## 2019-08-01 PROCEDURE — 99499 UNLISTED E&M SERVICE: CPT | Mod: S$GLB,,, | Performed by: NURSE PRACTITIONER

## 2019-08-01 PROCEDURE — 3078F PR MOST RECENT DIASTOLIC BLOOD PRESSURE < 80 MM HG: ICD-10-PCS | Mod: CPTII,S$GLB,, | Performed by: NURSE PRACTITIONER

## 2019-08-01 PROCEDURE — 3077F PR MOST RECENT SYSTOLIC BLOOD PRESSURE >= 140 MM HG: ICD-10-PCS | Mod: CPTII,S$GLB,, | Performed by: NURSE PRACTITIONER

## 2019-08-01 PROCEDURE — 3077F SYST BP >= 140 MM HG: CPT | Mod: CPTII,S$GLB,, | Performed by: NURSE PRACTITIONER

## 2019-08-01 PROCEDURE — 1101F PT FALLS ASSESS-DOCD LE1/YR: CPT | Mod: CPTII,S$GLB,, | Performed by: NURSE PRACTITIONER

## 2019-08-01 PROCEDURE — 99214 PR OFFICE/OUTPT VISIT, EST, LEVL IV, 30-39 MIN: ICD-10-PCS | Mod: S$GLB,,, | Performed by: NURSE PRACTITIONER

## 2019-08-01 PROCEDURE — 93005 ELECTROCARDIOGRAM TRACING: CPT | Mod: S$GLB,,, | Performed by: INTERNAL MEDICINE

## 2019-08-01 PROCEDURE — 99499 RISK ADDL DX/OHS AUDIT: ICD-10-PCS | Mod: S$GLB,,, | Performed by: NURSE PRACTITIONER

## 2019-08-01 PROCEDURE — 99999 PR PBB SHADOW E&M-EST. PATIENT-LVL III: ICD-10-PCS | Mod: PBBFAC,,, | Performed by: NURSE PRACTITIONER

## 2019-08-01 PROCEDURE — 93005 RHYTHM STRIP: ICD-10-PCS | Mod: S$GLB,,, | Performed by: INTERNAL MEDICINE

## 2019-08-01 PROCEDURE — 3078F DIAST BP <80 MM HG: CPT | Mod: CPTII,S$GLB,, | Performed by: NURSE PRACTITIONER

## 2019-08-01 PROCEDURE — 1101F PR PT FALLS ASSESS DOC 0-1 FALLS W/OUT INJ PAST YR: ICD-10-PCS | Mod: CPTII,S$GLB,, | Performed by: NURSE PRACTITIONER

## 2019-08-01 PROCEDURE — 93010 RHYTHM STRIP: ICD-10-PCS | Mod: S$GLB,,, | Performed by: INTERNAL MEDICINE

## 2019-08-01 PROCEDURE — 99214 OFFICE O/P EST MOD 30 MIN: CPT | Mod: S$GLB,,, | Performed by: NURSE PRACTITIONER

## 2019-08-01 RX ORDER — WARFARIN SODIUM 5 MG/1
5 TABLET ORAL DAILY
Qty: 30 TABLET | Refills: 11 | Status: SHIPPED | OUTPATIENT
Start: 2019-08-01 | End: 2019-09-10 | Stop reason: ALTCHOICE

## 2019-08-01 NOTE — TELEPHONE ENCOUNTER
----- Message from Darcie Vernon sent at 8/1/2019 12:37 PM CDT -----  Contact: Patient  Type:  Patient Returning Call    Who Called:  Nick, patient  Who Left Message for Patient:  Jessy  Does the patient know what this is regarding?:  US results  Best Call Back Number:  [phone]  Additional Information:  Missed your call, please call him after 2:00pm. Thanks.

## 2019-08-01 NOTE — PROGRESS NOTES
Mr. De Anda is a patient of Dr. Paez and was last seen in clinic 6/11/2019.      Subjective:   Patient ID:  Nick De Anda is a 78 y.o. male who presents for follow-up of Atrial Fibrillation  .     HPI:    Mr. De Anda is a 78 y.o. male with CAD (CABG), DM, HTN, pAF here for follow up after sotalol initiation.     Background:    Referring Cardiologist: Mitesh Dean MD  Primary Care Physician: Santiago Adler MD    Mr. De Anda has a history of coronary artery disease s/p CABG with preserved LVEF, type 2 diabetes, hypertension and recently discovered paroxysmal atrial fibrillation. He recently transferred care from [facility] where his AF was diagnosed and he was initiated on xarelto. He was recently admitted for a symptomatic UTI and observed to have a paroxysm of atrial fibrillation with ventricular rates in the 40s-60s (slower rates appeared to correlate during overnight hours). He monitors his vitals at home and for a while notes it indicates he has an irregular heart beat however he feels normal. He has established care with Dr. Dean in cardiology on 2/13/2019 and was noted to be in atrial fibrillation. He has no obvious symptoms. I reviewed all available electrocardiograms in Epic which show sinus rhythm on 2/5/2014 and 2/2/2019. An ECG dated 2/3/2019 notes rate controlled atrial fibrillation. When Dr. Paez saw him in clinic 2/2019 he was in sinus rhythm. At that visit since he had no symptoms with AF and was rate controlled when in AF it was recommended no aggressive rhythm control strategy and would continue his current management. He was to f/u prn.    6/2019:  Patient returns for discussion of his AF. He reports now he believes AF causes fatigue. Notes episodes every week or so that correlate to being fatigue.  Clinic ECG is atrial flutter (typical) with variable AV conduction and an average rate of 71 bpm. He desires rhythm control. He was briefly on sotalol post-CABG. Recommend DEANGELO/DCCV and  admit for sotalol 80mg bid. Cancel DEANGELO/DCCV if in sinus rhythm on presentation.    Update (08/01/2019):    6/18/2019: DEANGELO cancelled as patient spontaneously converted to sinus rhythm with administration of propofol for sedation. Was admitted for sotalol initiation.    Today he says he has been feeling much better since in sinus rhythm. He notices that he can walk farther than before. He has foot problems so it is somewhat painful for him to walk. Still having some weakness and SOB, but this has improved. Denies CP, palps, light-headedness, syncope.    He is currently taking xarelto 20mg daily for stroke prophylaxis and denies significant bleeding episodes. He is currently being treated with sotalol 80mg BID for rhythm control.  Kidney function is stable, with a creatinine of 0.9 on 6/11/2019.    I have personally reviewed the patient's EKG today, which shows sinus rhythm at 62bpm. CA interval is 208.  QRS is 90ms. QTc is 460.    Recent Cardiac Tests:    2D Echo (2/4/2019):  · Mild concentric left ventricular hypertrophy.  · Normal left ventricular systolic function. The estimated ejection fraction is 55%  · Indeterminate left ventricular diastolic function.  · Normal right ventricular systolic function.  · Moderate left atrial enlargement.  · Mild right atrial enlargement.  · Intermediate central venous pressure (8 mm Hg).  · The estimated PA systolic pressure is 33 mm Hg    Current Outpatient Medications   Medication Sig    amLODIPine (NORVASC) 5 MG tablet Take 5 mg by mouth once daily.    ascorbic acid (VITAMIN C) 500 MG tablet Take 500 mg by mouth once daily.    atorvastatin (LIPITOR) 40 MG tablet Take 40 mg by mouth once daily.    blood-glucose meter kit Use as instructed    clopidogrel (PLAVIX) 75 mg tablet Take 1 tablet (75 mg total) by mouth once daily.    hydroCHLOROthiazide (MICROZIDE) 12.5 mg capsule Take 12.5 mg by mouth once daily.     irbesartan (AVAPRO) 150 MG tablet Take 1 tablet (150 mg total)  "by mouth every evening.    metFORMIN (GLUCOPHAGE-XR) 500 MG 24 hr tablet Take 1 tablet (500 mg total) by mouth 2 (two) times daily with meals.    MULTIVITAMIN W-MINERALS/LUTEIN (CENTRUM SILVER ORAL) Take by mouth once daily.    mupirocin (BACTROBAN) 2 % ointment Apply topically 2 (two) times daily.    sotalol (BETAPACE) 80 MG tablet Take 1 tablet (80 mg total) by mouth every 12 (twelve) hours.    XARELTO 20 mg Tab Take 20 mg by mouth every evening.     diazePAM (VALIUM) 10 MG Tab Take 1 tablet (10 mg total) by mouth daily as needed.     No current facility-administered medications for this visit.      Facility-Administered Medications Ordered in Other Visits   Medication    0.9%  NaCl infusion    sodium chloride 0.9% flush 5 mL     Review of Systems   Constitution: Positive for malaise/fatigue (mild).   Cardiovascular: Positive for dyspnea on exertion (mild). Negative for chest pain, irregular heartbeat, leg swelling and palpitations.   Respiratory: Negative for shortness of breath.    Hematologic/Lymphatic: Negative for bleeding problem.   Skin: Negative for rash.   Musculoskeletal: Negative for myalgias.   Gastrointestinal: Negative for hematemesis, hematochezia and nausea.   Genitourinary: Negative for hematuria.   Neurological: Negative for light-headedness.   Psychiatric/Behavioral: Negative for altered mental status.   Allergic/Immunologic: Negative for persistent infections.     Objective:        BP (!) 149/65   Pulse 62   Ht 6' 4" (1.93 m)   Wt 128.5 kg (283 lb 4.7 oz)   BMI 34.48 kg/m²     Physical Exam   Constitutional: He is oriented to person, place, and time. He appears well-developed and well-nourished.   HENT:   Head: Normocephalic.   Nose: Nose normal.   Eyes: Pupils are equal, round, and reactive to light.   Cardiovascular: Normal rate, regular rhythm, S1 normal and S2 normal.   No murmur heard.  Pulses:       Radial pulses are 2+ on the right side, and 2+ on the left side. "   Pulmonary/Chest: Breath sounds normal. No respiratory distress.   Abdominal: Normal appearance.   Musculoskeletal: Normal range of motion. He exhibits no edema.   Neurological: He is alert and oriented to person, place, and time.   Skin: Skin is warm and dry. No erythema.   Psychiatric: He has a normal mood and affect. His speech is normal and behavior is normal.   Nursing note and vitals reviewed.    Lab Results   Component Value Date     06/11/2019    K 3.7 06/11/2019    BUN 25 (H) 06/11/2019    CREATININE 0.9 06/11/2019    ALT 19 02/02/2019    AST 33 02/02/2019    HGB 15.5 06/11/2019    HCT 45.3 06/11/2019    LDLCALC 47 01/30/2019       Recent Labs   Lab 06/11/19  1455   INR 1.1       Assessment:     1. Paroxysmal atrial fibrillation    2. Essential hypertension    3. Typical atrial flutter    4. Encounter for monitoring sotalol therapy      Plan:     In summary, Mr. De Anda is a 78 y.o. male with CAD (CABG), DM, HTN, pAF here for follow up after sotalol initiation.   He is doing well from a rhythm perspective 6 weeks s/p initiation of sotalol, maintaining sinus rhythm and feeling substantially better, with more energy and less RODRIGEZ. He would like to switch to coumadin for financial reasons. I offered to refer him to patient assistance and he declined, preferring coumadin. Placed coumadin clinic referral. He is still having some RODRIGEZ but this is improving, will have him RTC in 3 months to review his symptoms.     Follow coumadin clinic instructions re: switching from xarelto.  Continue sotalol.  RTC in 3 months, sooner if needed.    *A copy of this note has been sent to Dr. Paez*    Follow up in about 3 months (around 11/1/2019).    ------------------------------------------------------------------    Faina Espinosa, WILLIAM, NP-C  Cardiac Electrophysiology

## 2019-08-01 NOTE — PROGRESS NOTES
79 y/o male with atrial fibrillation (CHADSvasc=5) previously on rivaroxaban and wants to transition to warfarin. His other PMH inculdes CAD (CABG), DM, HTN.    Will contact patient 8/2 to confirm receipt of warfarin and instruct on transition. We will also get him set up for patient education and monitoring.

## 2019-08-02 NOTE — PROGRESS NOTES
Patient states he is transitioning from Xarelto to Coumadin related to cost and he has 30 days left of Xarelto pills.  He has not picked up his Coumadin prescription but I instructed him to he will need to call me back to verify his strength tab.  He will continue taking Xarelto until instructed to stop, start on 8/27/19 Coumadin 1 tab daily after 5 pm, and clinic education appointment/INR on 8/30/19 which he verbalized understanding.  He requested to get his INR at [facility] lab.    8/20/19  Patient states he has not picked up Coumadin prescription yet.  I instructed him to  Coumadin and call me back to verify the strength tablet he has.  I review the plan of care with him to transition from Xarelto to Coumadin and he verbalized understanding and repeated back correctly.    8/21/19  I called but no answer and unable to leave a voice message as the phone just keeps ringing.  I sent a message on [employer] for patient to call back to confirm he picked up his Coumadin prescription and to verify his strength tablet.

## 2019-08-20 NOTE — PROGRESS NOTES
8/20/19 - patient called to confirm he filled his warfarin Rx and does have the 5mg tablets. He understands he is to start taking 5mg qPM on 8/27 and will report to clinic on 8/30.

## 2019-08-21 ENCOUNTER — PATIENT MESSAGE (OUTPATIENT)
Dept: CARDIOLOGY | Facility: CLINIC | Age: 79
End: 2019-08-21

## 2019-08-23 ENCOUNTER — PATIENT MESSAGE (OUTPATIENT)
Dept: CARDIOLOGY | Facility: CLINIC | Age: 79
End: 2019-08-23

## 2019-08-26 ENCOUNTER — PATIENT MESSAGE (OUTPATIENT)
Dept: CARDIOLOGY | Facility: CLINIC | Age: 79
End: 2019-08-26

## 2019-08-30 ENCOUNTER — ANTI-COAG VISIT (OUTPATIENT)
Dept: CARDIOLOGY | Facility: CLINIC | Age: 79
End: 2019-08-30
Payer: MEDICARE

## 2019-08-30 DIAGNOSIS — I48.0 PAROXYSMAL ATRIAL FIBRILLATION: ICD-10-CM

## 2019-08-30 DIAGNOSIS — Z79.01 LONG TERM (CURRENT) USE OF ANTICOAGULANTS: Primary | ICD-10-CM

## 2019-08-30 LAB — INR PPP: 1.3 (ref 2–3)

## 2019-08-30 PROCEDURE — 93793 ANTICOAG MGMT PT WARFARIN: CPT | Mod: S$GLB,,,

## 2019-08-30 PROCEDURE — 85610 POCT INR: ICD-10-PCS | Mod: QW,S$GLB,, | Performed by: INTERNAL MEDICINE

## 2019-08-30 PROCEDURE — 85610 PROTHROMBIN TIME: CPT | Mod: QW,S$GLB,, | Performed by: INTERNAL MEDICINE

## 2019-08-30 PROCEDURE — 93793 PR ANTICOAGULANT MGMT FOR PT TAKING WARFARIN: ICD-10-PCS | Mod: S$GLB,,,

## 2019-08-30 NOTE — PROGRESS NOTES
INR not at goal. Medications, chart, and patient findings reviewed. See calendar for adjustments to dose and follow up plan.  Pt has only been on coumadin for 3 days.  Will continue current regimen & re-assess 9/3.  Findings: Pt has bruises due to use & denies any other recent changes.    A full discussion of the nature of anticoagulants has been carried out.  A benefit risk analysis has been presented to the patient, so that they understand the justification for choosing anticoagulation at this time. The need for frequent and regular monitoring, precise dosage adjustment and compliance is stressed.  Side effects of potential bleeding are discussed.  The patient should avoid any OTC items containing aspirin or ibuprofen, and should avoid great swings in general diet.  Avoid alcohol consumption.  Call if any signs of abnormal bleeding.  Patient was educated on situations that would require placing a call to the Coumadin Clinic, including bleeding or unusual bruising issues, changes in health, diet or medications,upcoming procedures that require warfarin interruption, and missed Coumadin dose(s). Patient expressed understanding that avoidance of consistency with these parameters could cause fluctuations in INR, leading to more frequent visits and increase risk of adverse events.

## 2019-09-03 ENCOUNTER — ANTI-COAG VISIT (OUTPATIENT)
Dept: CARDIOLOGY | Facility: CLINIC | Age: 79
End: 2019-09-03
Payer: MEDICARE

## 2019-09-03 DIAGNOSIS — I48.0 PAROXYSMAL ATRIAL FIBRILLATION: ICD-10-CM

## 2019-09-03 DIAGNOSIS — Z79.01 LONG TERM (CURRENT) USE OF ANTICOAGULANTS: ICD-10-CM

## 2019-09-03 PROCEDURE — 93793 PR ANTICOAGULANT MGMT FOR PT TAKING WARFARIN: ICD-10-PCS | Mod: S$GLB,,,

## 2019-09-03 PROCEDURE — 93793 ANTICOAG MGMT PT WARFARIN: CPT | Mod: S$GLB,,,

## 2019-09-03 NOTE — PROGRESS NOTES
INR not at goal - still finding appropriate dose in patient new to warfarin. Medications, chart, and patient findings reviewed. See calendar for adjustments to dose and follow up plan.

## 2019-09-05 ENCOUNTER — PATIENT MESSAGE (OUTPATIENT)
Dept: ELECTROPHYSIOLOGY | Facility: CLINIC | Age: 79
End: 2019-09-05

## 2019-09-10 ENCOUNTER — OFFICE VISIT (OUTPATIENT)
Dept: PRIMARY CARE CLINIC | Facility: CLINIC | Age: 79
End: 2019-09-10
Payer: MEDICARE

## 2019-09-10 ENCOUNTER — CLINICAL SUPPORT (OUTPATIENT)
Dept: PRIMARY CARE CLINIC | Facility: CLINIC | Age: 79
End: 2019-09-10
Payer: MEDICARE

## 2019-09-10 VITALS
WEIGHT: 289.13 LBS | DIASTOLIC BLOOD PRESSURE: 80 MMHG | HEART RATE: 53 BPM | HEIGHT: 76 IN | TEMPERATURE: 98 F | BODY MASS INDEX: 35.21 KG/M2 | RESPIRATION RATE: 20 BRPM | OXYGEN SATURATION: 96 % | SYSTOLIC BLOOD PRESSURE: 138 MMHG

## 2019-09-10 DIAGNOSIS — F41.9 ANXIETY: ICD-10-CM

## 2019-09-10 DIAGNOSIS — N39.0 RECURRENT UTI: ICD-10-CM

## 2019-09-10 DIAGNOSIS — Z12.5 PROSTATE CANCER SCREENING: Primary | ICD-10-CM

## 2019-09-10 DIAGNOSIS — Z12.5 PROSTATE CANCER SCREENING: ICD-10-CM

## 2019-09-10 DIAGNOSIS — E11.9 TYPE 2 DIABETES MELLITUS WITHOUT COMPLICATION, WITHOUT LONG-TERM CURRENT USE OF INSULIN: ICD-10-CM

## 2019-09-10 DIAGNOSIS — R60.0 PERIPHERAL EDEMA: ICD-10-CM

## 2019-09-10 DIAGNOSIS — I48.0 PAROXYSMAL ATRIAL FIBRILLATION: ICD-10-CM

## 2019-09-10 LAB
ALBUMIN SERPL BCP-MCNC: 3.8 G/DL (ref 3.5–5.2)
ALP SERPL-CCNC: 66 U/L (ref 38–126)
ALT SERPL W/O P-5'-P-CCNC: 21 U/L (ref 17–63)
ANION GAP SERPL CALC-SCNC: 10 MMOL/L (ref 8–16)
AST SERPL-CCNC: 25 U/L (ref 15–41)
BASOPHILS # BLD AUTO: 0.1 K/UL (ref 0–0.2)
BASOPHILS NFR BLD: 0.7 % (ref 0–1.9)
BILIRUB SERPL-MCNC: 1.1 MG/DL (ref 0.3–1.2)
BUN SERPL-MCNC: 16 MG/DL (ref 8–23)
CALCIUM SERPL-MCNC: 9.1 MG/DL (ref 8.6–10)
CHLORIDE SERPL-SCNC: 99 MMOL/L (ref 101–111)
CHOLEST SERPL-MCNC: 148 MG/DL (ref 80–200)
CHOLEST/HDLC SERPL: 3.1 {RATIO} (ref 2–5)
CO2 SERPL-SCNC: 27 MMOL/L (ref 23–29)
COMPLEXED PSA SERPL-MCNC: 1.4 NG/ML (ref 0–4)
CREAT SERPL-MCNC: 0.8 MG/DL (ref 0.5–1.4)
DIFFERENTIAL METHOD: NORMAL
EOSINOPHIL # BLD AUTO: 0.4 K/UL (ref 0–0.5)
EOSINOPHIL NFR BLD: 5.2 % (ref 0–8)
ERYTHROCYTE [DISTWIDTH] IN BLOOD BY AUTOMATED COUNT: 14.2 % (ref 11.5–14.5)
EST. GFR  (AFRICAN AMERICAN): >60 ML/MIN/1.73 M^2
EST. GFR  (NON AFRICAN AMERICAN): >60 ML/MIN/1.73 M^2
ESTIMATED AVG GLUCOSE: 114 MG/DL (ref 68–131)
GLUCOSE SERPL-MCNC: 108 MG/DL (ref 74–118)
HBA1C MFR BLD HPLC: 5.6 % (ref 4–5.6)
HCT VFR BLD AUTO: 43.3 % (ref 40–54)
HDLC SERPL-MCNC: 48 MG/DL (ref 40–75)
HDLC SERPL: 32.4 % (ref 20–50)
HGB BLD-MCNC: 14.7 G/DL (ref 14–18)
LDLC SERPL CALC-MCNC: 69 MG/DL
LYMPHOCYTES # BLD AUTO: 2.1 K/UL (ref 1–4.8)
LYMPHOCYTES NFR BLD: 27.5 % (ref 18–48)
MCH RBC QN AUTO: 30 PG (ref 27–31)
MCHC RBC AUTO-ENTMCNC: 33.9 G/DL (ref 32–36)
MCV RBC AUTO: 88 FL (ref 82–98)
MONOCYTES # BLD AUTO: 0.8 K/UL (ref 0.3–1)
MONOCYTES NFR BLD: 10.5 % (ref 4–15)
NEUTROPHILS # BLD AUTO: 4.3 K/UL (ref 1.8–7.7)
NEUTROPHILS NFR BLD: 56.1 % (ref 38–73)
NONHDLC SERPL-MCNC: 100 MG/DL
PLATELET # BLD AUTO: 210 K/UL (ref 150–350)
PMV BLD AUTO: 9.5 FL (ref 9.2–12.9)
POTASSIUM SERPL-SCNC: 4.1 MMOL/L (ref 3.5–5.1)
PROT SERPL-MCNC: 7.1 G/DL (ref 6–8.4)
RBC # BLD AUTO: 4.9 M/UL (ref 4.6–6.2)
SODIUM SERPL-SCNC: 136 MMOL/L (ref 136–145)
TRIGL SERPL-MCNC: 156 MG/DL (ref 30–150)
WBC # BLD AUTO: 7.7 K/UL (ref 3.9–12.7)

## 2019-09-10 PROCEDURE — 99214 PR OFFICE/OUTPT VISIT, EST, LEVL IV, 30-39 MIN: ICD-10-PCS | Mod: S$GLB,,, | Performed by: INTERNAL MEDICINE

## 2019-09-10 PROCEDURE — 99999 PR PBB SHADOW E&M-EST. PATIENT-LVL III: CPT | Mod: PBBFAC,,, | Performed by: INTERNAL MEDICINE

## 2019-09-10 PROCEDURE — 83036 HEMOGLOBIN GLYCOSYLATED A1C: CPT

## 2019-09-10 PROCEDURE — 99999 PR PBB SHADOW E&M-EST. PATIENT-LVL III: ICD-10-PCS | Mod: PBBFAC,,, | Performed by: INTERNAL MEDICINE

## 2019-09-10 PROCEDURE — 1101F PT FALLS ASSESS-DOCD LE1/YR: CPT | Mod: CPTII,S$GLB,, | Performed by: INTERNAL MEDICINE

## 2019-09-10 PROCEDURE — 3079F DIAST BP 80-89 MM HG: CPT | Mod: CPTII,S$GLB,, | Performed by: INTERNAL MEDICINE

## 2019-09-10 PROCEDURE — 3079F PR MOST RECENT DIASTOLIC BLOOD PRESSURE 80-89 MM HG: ICD-10-PCS | Mod: CPTII,S$GLB,, | Performed by: INTERNAL MEDICINE

## 2019-09-10 PROCEDURE — 3075F SYST BP GE 130 - 139MM HG: CPT | Mod: CPTII,S$GLB,, | Performed by: INTERNAL MEDICINE

## 2019-09-10 PROCEDURE — 85025 COMPLETE CBC W/AUTO DIFF WBC: CPT

## 2019-09-10 PROCEDURE — 80061 LIPID PANEL: CPT

## 2019-09-10 PROCEDURE — 80053 COMPREHEN METABOLIC PANEL: CPT

## 2019-09-10 PROCEDURE — 36415 PR COLLECTION VENOUS BLOOD,VENIPUNCTURE: ICD-10-PCS | Mod: S$GLB,,, | Performed by: INTERNAL MEDICINE

## 2019-09-10 PROCEDURE — 36415 COLL VENOUS BLD VENIPUNCTURE: CPT | Mod: S$GLB,,, | Performed by: INTERNAL MEDICINE

## 2019-09-10 PROCEDURE — 3075F PR MOST RECENT SYSTOLIC BLOOD PRESS GE 130-139MM HG: ICD-10-PCS | Mod: CPTII,S$GLB,, | Performed by: INTERNAL MEDICINE

## 2019-09-10 PROCEDURE — 1101F PR PT FALLS ASSESS DOC 0-1 FALLS W/OUT INJ PAST YR: ICD-10-PCS | Mod: CPTII,S$GLB,, | Performed by: INTERNAL MEDICINE

## 2019-09-10 PROCEDURE — 99214 OFFICE O/P EST MOD 30 MIN: CPT | Mod: S$GLB,,, | Performed by: INTERNAL MEDICINE

## 2019-09-10 PROCEDURE — 84153 ASSAY OF PSA TOTAL: CPT

## 2019-09-10 RX ORDER — NYSTATIN 100000 U/G
45 CREAM TOPICAL 2 TIMES DAILY
COMMUNITY
End: 2021-03-16 | Stop reason: ALTCHOICE

## 2019-09-10 RX ORDER — ALBUTEROL SULFATE 90 UG/1
2 AEROSOL, METERED RESPIRATORY (INHALATION) EVERY 6 HOURS PRN
Refills: 3 | COMMUNITY
Start: 2019-08-27 | End: 2020-03-18 | Stop reason: ALTCHOICE

## 2019-09-10 RX ORDER — DIAZEPAM 10 MG/1
10 TABLET ORAL DAILY PRN
Qty: 30 TABLET | Refills: 0 | Status: SHIPPED | OUTPATIENT
Start: 2019-09-10 | End: 2020-01-21 | Stop reason: SDUPTHER

## 2019-09-10 NOTE — PROGRESS NOTES
Subjective:       Patient ID: Nick De Anda is a 78 y.o. male.    Chief Complaint: Follow-up (3 month follow up ); Medication Refill (valium 10 mg ); Leg Pain; and Urinary Tract Infection    HPI  patient is here for 3 month follow-up has been follow with urologist for recurrent UTI on a few rounds of antibiotic patient fill out his still had a UTI but he has an appointment with urologist today will let him do urinalysis his cardiac status also stable have the paroxysmal atrial fib currently in sinus rhythm with the sotalol and Xarelto patient also complained of chronic arthritic pain in the fingers wrists and knees from osteoarthritis not able to take NSAIDs due to a Xarelto also stop Lasix due to urinary frequency has slight swelling in both lower extremity bilaterally no short of breath chest pain dyspnea with exertion or orthopnea  Review of Systems    Objective:      Physical Exam   Constitutional: He is oriented to person, place, and time. He appears well-developed and well-nourished. No distress.   HENT:   Head: Normocephalic and atraumatic.   Right Ear: External ear normal.   Left Ear: External ear normal.   Nose: Nose normal.   Mouth/Throat: Oropharynx is clear and moist. No oropharyngeal exudate.   Eyes: Pupils are equal, round, and reactive to light. Conjunctivae and EOM are normal. Right eye exhibits no discharge. Left eye exhibits no discharge.   Neck: Normal range of motion. Neck supple. No thyromegaly present.   Cardiovascular: Normal rate, regular rhythm, normal heart sounds and intact distal pulses. Exam reveals no gallop and no friction rub.   No murmur heard.  Pulmonary/Chest: Effort normal and breath sounds normal. No respiratory distress. He has no wheezes. He has no rales. He exhibits no tenderness.   Abdominal: Soft. Bowel sounds are normal. He exhibits no distension. There is no tenderness. There is no rebound and no guarding.   Musculoskeletal: Normal range of motion. He exhibits no  edema (A +1 edema in the lower extremity from mid leg down to feet  bilaterally), tenderness or deformity.   Lymphadenopathy:     He has no cervical adenopathy.   Neurological: He is alert and oriented to person, place, and time.   Skin: Skin is warm. Capillary refill takes less than 2 seconds. No rash noted. No erythema.   Psychiatric: He has a normal mood and affect. Judgment and thought content normal.   Nursing note and vitals reviewed.      Assessment:       1. Prostate cancer screening    2. Anxiety    3. Type 2 diabetes mellitus without complication, without long-term current use of insulin    4. Peripheral edema    5. Paroxysmal atrial fibrillation    6. Recurrent UTI        Plan:       Prostate cancer screening  -     PSA, Screening; Future; Expected date: 09/10/2019    Anxiety  Comments:  will try valium again since pt did well discuss SSRI but also concern with cardiac drug interactions  Orders:  -     diazePAM (VALIUM) 10 MG Tab; Take 1 tablet (10 mg total) by mouth daily as needed.  Dispense: 30 tablet; Refill: 0    Type 2 diabetes mellitus without complication, without long-term current use of insulin  -     CBC auto differential; Future; Expected date: 09/10/2019  -     Comprehensive metabolic panel; Future; Expected date: 09/10/2019  -     Lipid panel; Future; Expected date: 09/10/2019  -     Hemoglobin A1c; Future; Expected date: 09/10/2019    Peripheral edema  Comments:  With take Lasix p.r.n.    Paroxysmal atrial fibrillation  Comments:  Currently in sinus rhythm on sotalol continue Xarelto and follow-up with cardiologist    Recurrent UTI  Comments:  Patient with see his urologist today will do UA a

## 2019-09-11 ENCOUNTER — PATIENT MESSAGE (OUTPATIENT)
Dept: PRIMARY CARE CLINIC | Facility: CLINIC | Age: 79
End: 2019-09-11

## 2019-10-22 ENCOUNTER — PATIENT MESSAGE (OUTPATIENT)
Dept: ELECTROPHYSIOLOGY | Facility: CLINIC | Age: 79
End: 2019-10-22

## 2019-10-23 ENCOUNTER — TELEPHONE (OUTPATIENT)
Dept: ELECTROPHYSIOLOGY | Facility: CLINIC | Age: 79
End: 2019-10-23

## 2019-10-23 ENCOUNTER — PATIENT MESSAGE (OUTPATIENT)
Dept: ELECTROPHYSIOLOGY | Facility: CLINIC | Age: 79
End: 2019-10-23

## 2019-10-23 DIAGNOSIS — I48.0 PAROXYSMAL ATRIAL FIBRILLATION: Primary | ICD-10-CM

## 2019-10-23 NOTE — TELEPHONE ENCOUNTER
Fina hastings have pt call ext [phone]  ----- Message from Faina Espinosa NP sent at 10/23/2019  4:01 PM CDT -----  Can we get Mr. De Anda scheduled for an EKG asap? Just an EKG appt now not a clinic appt. Thanks.

## 2019-10-24 ENCOUNTER — TELEPHONE (OUTPATIENT)
Dept: ELECTROPHYSIOLOGY | Facility: CLINIC | Age: 79
End: 2019-10-24

## 2019-10-25 ENCOUNTER — TELEPHONE (OUTPATIENT)
Dept: ELECTROPHYSIOLOGY | Facility: CLINIC | Age: 79
End: 2019-10-25

## 2019-10-25 ENCOUNTER — PATIENT MESSAGE (OUTPATIENT)
Dept: ELECTROPHYSIOLOGY | Facility: CLINIC | Age: 79
End: 2019-10-25

## 2019-10-25 NOTE — TELEPHONE ENCOUNTER
----- Message from Sherin Lopez MA sent at 10/25/2019  3:51 PM CDT -----  Contact:  patient call  Rosalba, you tried calling patient. See  Your chart note.    Angélica  ----- Message -----  From: Gisselle Allen MA  Sent: 10/25/2019   2:55 PM CDT  To: Philippe RUBIO Staff    The patient is returning your phone call. Please call [phone]. Thank you.

## 2019-10-25 NOTE — TELEPHONE ENCOUNTER
Confirmed pt had EKG done  ----- Message from Faina Espinosa NP sent at 10/25/2019 10:06 AM CDT -----  Looks like he had an EKG appt yesterday and it was canceled. Can we get him rescheduled for just the EKG appt if he still thinks he is in AF? Otherwise he can wait until his appt next week with Dr. Paez. Thanks,  J  ----- Message -----  From: Faina Espinosa NP  Sent: 10/23/2019   4:02 PM CDT  To: Faina Espinosa NP    Let's get an ECG and confirm. If QT is still ok we can increase to 120mg bid and DEANGELO/cardiovert after 2 days of taking that.

## 2019-10-25 NOTE — TELEPHONE ENCOUNTER
----- Message from Faina Espinosa NP sent at 10/25/2019 11:23 AM CDT -----  Yes. He should stay on the same dose of sotalol at this time.   ----- Message -----  From: Kelsey Forbes MA  Sent: 10/25/2019  10:55 AM CDT  To: Faina Espinosa NP    Let him know about meds ?   ----- Message -----  From: Faina Espinosa NP  Sent: 10/25/2019  10:33 AM CDT  To: Kelsey Forbes MA    Ok thanks. I see the EKG. He was in regular rhythm so no medication changes for now.  ----- Message -----  From: Kelsey Forbes MA  Sent: 10/25/2019  10:13 AM CDT  To: Faina Espinosa NP    Pt had EKG done yesterday , final results are not in yet. Talked with    ----- Message -----  From: Faina Espinosa NP  Sent: 10/25/2019  10:06 AM CDT  To: Kelsey Forbes MA    Looks like he had an EKG appt yesterday and it was canceled. Can we get him rescheduled for just the EKG appt if he still thinks he is in AF? Otherwise he can wait until his appt next week with Dr. Paez. Thanks,  J  ----- Message -----  From: Faina Espinosa NP  Sent: 10/23/2019   4:02 PM CDT  To: Faina Espinosa NP    Let's get an ECG and confirm. If QT is still ok we can increase to 120mg bid and DEANGELO/cardiovert after 2 days of taking that.

## 2019-10-25 NOTE — TELEPHONE ENCOUNTER
----- Message from Yung Paez MD sent at 10/25/2019  1:02 PM CDT -----  ECG yesterday noted sinus rhythm

## 2019-10-29 ENCOUNTER — PATIENT OUTREACH (OUTPATIENT)
Dept: ADMINISTRATIVE | Facility: OTHER | Age: 79
End: 2019-10-29

## 2019-10-31 ENCOUNTER — HOSPITAL ENCOUNTER (OUTPATIENT)
Dept: CARDIOLOGY | Facility: CLINIC | Age: 79
Discharge: HOME | End: 2019-10-31
Payer: MEDICARE

## 2019-10-31 ENCOUNTER — OFFICE VISIT (OUTPATIENT)
Dept: ELECTROPHYSIOLOGY | Facility: CLINIC | Age: 79
End: 2019-10-31
Payer: MEDICARE

## 2019-10-31 VITALS
DIASTOLIC BLOOD PRESSURE: 69 MMHG | HEIGHT: 76 IN | WEIGHT: 292.75 LBS | SYSTOLIC BLOOD PRESSURE: 140 MMHG | HEART RATE: 58 BPM | BODY MASS INDEX: 35.65 KG/M2

## 2019-10-31 DIAGNOSIS — Z51.81 ENCOUNTER FOR MONITORING SOTALOL THERAPY: ICD-10-CM

## 2019-10-31 DIAGNOSIS — I48.0 PAROXYSMAL ATRIAL FIBRILLATION: Primary | ICD-10-CM

## 2019-10-31 DIAGNOSIS — I48.19 PERSISTENT ATRIAL FIBRILLATION: ICD-10-CM

## 2019-10-31 DIAGNOSIS — I48.3 TYPICAL ATRIAL FLUTTER: ICD-10-CM

## 2019-10-31 DIAGNOSIS — Z95.1 HX OF CABG: ICD-10-CM

## 2019-10-31 DIAGNOSIS — I10 ESSENTIAL HYPERTENSION: ICD-10-CM

## 2019-10-31 DIAGNOSIS — E11.9 TYPE 2 DIABETES MELLITUS WITHOUT COMPLICATION, WITHOUT LONG-TERM CURRENT USE OF INSULIN: ICD-10-CM

## 2019-10-31 DIAGNOSIS — I25.10 CORONARY ARTERY DISEASE INVOLVING NATIVE CORONARY ARTERY OF NATIVE HEART WITHOUT ANGINA PECTORIS: ICD-10-CM

## 2019-10-31 DIAGNOSIS — Z79.899 ENCOUNTER FOR MONITORING SOTALOL THERAPY: ICD-10-CM

## 2019-10-31 PROCEDURE — 99213 OFFICE O/P EST LOW 20 MIN: CPT | Mod: S$GLB,,, | Performed by: INTERNAL MEDICINE

## 2019-10-31 PROCEDURE — 93005 RHYTHM STRIP: ICD-10-PCS | Mod: S$GLB,,, | Performed by: INTERNAL MEDICINE

## 2019-10-31 PROCEDURE — 1101F PR PT FALLS ASSESS DOC 0-1 FALLS W/OUT INJ PAST YR: ICD-10-PCS | Mod: CPTII,S$GLB,, | Performed by: INTERNAL MEDICINE

## 2019-10-31 PROCEDURE — 93010 ELECTROCARDIOGRAM REPORT: CPT | Mod: S$GLB,,, | Performed by: INTERNAL MEDICINE

## 2019-10-31 PROCEDURE — 93010 RHYTHM STRIP: ICD-10-PCS | Mod: S$GLB,,, | Performed by: INTERNAL MEDICINE

## 2019-10-31 PROCEDURE — 3077F SYST BP >= 140 MM HG: CPT | Mod: CPTII,S$GLB,, | Performed by: INTERNAL MEDICINE

## 2019-10-31 PROCEDURE — 3077F PR MOST RECENT SYSTOLIC BLOOD PRESSURE >= 140 MM HG: ICD-10-PCS | Mod: CPTII,S$GLB,, | Performed by: INTERNAL MEDICINE

## 2019-10-31 PROCEDURE — 93005 ELECTROCARDIOGRAM TRACING: CPT | Mod: S$GLB,,, | Performed by: INTERNAL MEDICINE

## 2019-10-31 PROCEDURE — 99213 PR OFFICE/OUTPT VISIT, EST, LEVL III, 20-29 MIN: ICD-10-PCS | Mod: S$GLB,,, | Performed by: INTERNAL MEDICINE

## 2019-10-31 PROCEDURE — 99999 PR PBB SHADOW E&M-EST. PATIENT-LVL III: ICD-10-PCS | Mod: PBBFAC,,, | Performed by: INTERNAL MEDICINE

## 2019-10-31 PROCEDURE — 1101F PT FALLS ASSESS-DOCD LE1/YR: CPT | Mod: CPTII,S$GLB,, | Performed by: INTERNAL MEDICINE

## 2019-10-31 PROCEDURE — 3078F DIAST BP <80 MM HG: CPT | Mod: CPTII,S$GLB,, | Performed by: INTERNAL MEDICINE

## 2019-10-31 PROCEDURE — 99999 PR PBB SHADOW E&M-EST. PATIENT-LVL III: CPT | Mod: PBBFAC,,, | Performed by: INTERNAL MEDICINE

## 2019-10-31 PROCEDURE — 3078F PR MOST RECENT DIASTOLIC BLOOD PRESSURE < 80 MM HG: ICD-10-PCS | Mod: CPTII,S$GLB,, | Performed by: INTERNAL MEDICINE

## 2019-10-31 NOTE — PROGRESS NOTES
Subjective:    Patient ID:  Nick De Anda is a 79 y.o. male who presents for evaluation of Atrial Fibrillation    Referring Cardiologist: Mitesh Dean MD  Primary Care Physician: Santiago Adler MD    HPI  Prior Hx:  I had the pleasure of seeing Mr. De Anda today in our electrophysiology clinic in consultation for his atrial arrhythmia. As you are aware he is a pleasant 79 year-old man with coronary artery disease s/p CABG with preserved LVEF, type 2 diabetes, hypertension and recently discovered paroxysmal atrial fibrillation and typical atrial flutter. He recently transferred care from [facility] where his AF was diagnosed and he was initiated on xarelto. He was recently admitted for a symptomatic UTI and observed to have a paroxysm of atrial fibrillation with ventricular rates in the 40s-60s (slower rates appeared to correlate during overnight hours). He monitors his vitals at home and for a while notes it indicates he has an irregular heart beat however he feels normal. He has established care with Dr. Dean in cardiology on 2/13/2019 and was noted to be in atrial fibrillation. He has no obvious symptoms. I reviewed all available electrocardiograms in Epic which show sinus rhythm on 2/5/2014 and 2/2/2019. An ECG dated 2/3/2019 notes rate controlled atrial fibrillation. When I saw him in clinic 2/2019 he was in sinus rhythm. At that visit since he had no symptoms with AF and was rate controlled when in AF I recommended no aggressive rhythm control strategy and would continue his current management. He was to see me prn.    Mr. De Anda returned for discussion of his AF 6/2019. He reported he believed AF was causing fatigue. Notes episodes every week or so that correlate to being fatigue. He was admitted for DCCV and sotalol initiation. He converted when propofol was administered and the DEANGELO/DCCV was cancelled. He saw Faina Espinosa in EP clinic 8/1/2019 and was in sinus rhythm. He felt better in sinus  rhythm.    Interim Hx:  Mr. De Anda recently called noting recurrence of symptoms that occurred twice since he started sotalol. One episode lasted 2 days and another 3 days. He notes his pulse was irregular in this setting. He came in for an ECG noted sinus rhythm however he felt he had already converted.    My interpretation of today's in clinic ECG is sinus rhythm with a rate of 58 bpm, NH interval of 210 and QT of 444ms.      Review of Systems   Constitution: Positive for malaise/fatigue. Negative for fever.   HENT: Negative for congestion and sore throat.    Eyes: Negative for blurred vision and visual disturbance.   Cardiovascular: Negative for chest pain, dyspnea on exertion, leg swelling and palpitations.   Respiratory: Negative for cough and shortness of breath.    Skin: Negative.    Musculoskeletal: Negative.    Gastrointestinal: Negative for bloating, abdominal pain, hematochezia and melena.   Neurological: Negative for dizziness, light-headedness and weakness.        Objective:    Physical Exam   Constitutional: He is oriented to person, place, and time. He appears well-developed and well-nourished. No distress.   HENT:   Head: Normocephalic and atraumatic.   Eyes: Conjunctivae are normal. Right eye exhibits no discharge. Left eye exhibits no discharge.   Neck: Neck supple. No JVD present.   Cardiovascular: Normal rate, regular rhythm and normal heart sounds. Exam reveals no gallop and no friction rub.   No murmur heard.  Pulmonary/Chest: Effort normal and breath sounds normal. No respiratory distress. He has no wheezes. He has no rales.   Abdominal: Soft. Bowel sounds are normal. He exhibits no distension. There is no tenderness. There is no rebound.   Musculoskeletal: He exhibits no edema.   Neurological: He is alert and oriented to person, place, and time.   Skin: Skin is warm and dry. He is not diaphoretic.   Psychiatric: He has a normal mood and affect. His behavior is normal. Judgment and thought  content normal.   Vitals reviewed.        Assessment:       1. Paroxysmal atrial fibrillation    2. Typical atrial flutter    3. Hx of CABG    4. Coronary artery disease involving native coronary artery of native heart without angina pectoris    5. Essential hypertension    6. Type 2 diabetes mellitus without complication, without long-term current use of insulin    7. Encounter for monitoring sotalol therapy         Plan:     In summary, Mr. De Anda is a pleasant 79 year-old man with coronary artery disease s/p CABG with preserved LVEF, type 2 diabetes, hypertension and symptomatic paroxysmal atrial fibrillation/flutter. He is now on sotalol. He feels significantly better and is in sinus rhythm. He feels like he had 2 episodes of AF over the past 4 months. We discussed options of increasing sotalol to 120mg bid versus changing to amiodarone. He wants to stay on the 80mg of sotalol for now. His [id number] score is 5 and long-term anticoagulation is recommended. RTC in 6 months, sooner if needed.    Thank you for allowing me to participate in the care of this patient. Please do not hesitate to call me with any questions or concerns.    Yung Paez MD, PhD  Cardiac Electrophysiology

## 2019-11-13 ENCOUNTER — OFFICE VISIT (OUTPATIENT)
Dept: CARDIOLOGY | Facility: CLINIC | Age: 79
End: 2019-11-13
Payer: MEDICARE

## 2019-11-13 VITALS
BODY MASS INDEX: 35.85 KG/M2 | HEART RATE: 61 BPM | SYSTOLIC BLOOD PRESSURE: 137 MMHG | DIASTOLIC BLOOD PRESSURE: 68 MMHG | WEIGHT: 294.56 LBS | OXYGEN SATURATION: 97 %

## 2019-11-13 DIAGNOSIS — I25.10 CORONARY ARTERY DISEASE INVOLVING NATIVE CORONARY ARTERY OF NATIVE HEART WITHOUT ANGINA PECTORIS: ICD-10-CM

## 2019-11-13 DIAGNOSIS — I48.0 PAROXYSMAL ATRIAL FIBRILLATION: ICD-10-CM

## 2019-11-13 DIAGNOSIS — R60.0 PERIPHERAL EDEMA: ICD-10-CM

## 2019-11-13 DIAGNOSIS — I25.10 CORONARY ARTERY DISEASE, ANGINA PRESENCE UNSPECIFIED, UNSPECIFIED VESSEL OR LESION TYPE, UNSPECIFIED WHETHER NATIVE OR TRANSPLANTED HEART: Primary | ICD-10-CM

## 2019-11-13 DIAGNOSIS — R07.9 ACUTE CHEST PAIN: ICD-10-CM

## 2019-11-13 DIAGNOSIS — I10 ESSENTIAL HYPERTENSION: ICD-10-CM

## 2019-11-13 PROCEDURE — 99999 PR PBB SHADOW E&M-EST. PATIENT-LVL IV: CPT | Mod: PBBFAC,,, | Performed by: INTERNAL MEDICINE

## 2019-11-13 PROCEDURE — 3075F SYST BP GE 130 - 139MM HG: CPT | Mod: CPTII,S$GLB,, | Performed by: INTERNAL MEDICINE

## 2019-11-13 PROCEDURE — 93010 ELECTROCARDIOGRAM REPORT: CPT | Mod: S$GLB,,, | Performed by: INTERNAL MEDICINE

## 2019-11-13 PROCEDURE — 93005 EKG 12-LEAD: ICD-10-PCS | Mod: S$GLB,,, | Performed by: INTERNAL MEDICINE

## 2019-11-13 PROCEDURE — 99999 PR PBB SHADOW E&M-EST. PATIENT-LVL IV: ICD-10-PCS | Mod: PBBFAC,,, | Performed by: INTERNAL MEDICINE

## 2019-11-13 PROCEDURE — 99214 OFFICE O/P EST MOD 30 MIN: CPT | Mod: S$GLB,,, | Performed by: INTERNAL MEDICINE

## 2019-11-13 PROCEDURE — 1101F PT FALLS ASSESS-DOCD LE1/YR: CPT | Mod: CPTII,S$GLB,, | Performed by: INTERNAL MEDICINE

## 2019-11-13 PROCEDURE — 3075F PR MOST RECENT SYSTOLIC BLOOD PRESS GE 130-139MM HG: ICD-10-PCS | Mod: CPTII,S$GLB,, | Performed by: INTERNAL MEDICINE

## 2019-11-13 PROCEDURE — 93005 ELECTROCARDIOGRAM TRACING: CPT | Mod: S$GLB,,, | Performed by: INTERNAL MEDICINE

## 2019-11-13 PROCEDURE — 3078F PR MOST RECENT DIASTOLIC BLOOD PRESSURE < 80 MM HG: ICD-10-PCS | Mod: CPTII,S$GLB,, | Performed by: INTERNAL MEDICINE

## 2019-11-13 PROCEDURE — 3078F DIAST BP <80 MM HG: CPT | Mod: CPTII,S$GLB,, | Performed by: INTERNAL MEDICINE

## 2019-11-13 PROCEDURE — 1101F PR PT FALLS ASSESS DOC 0-1 FALLS W/OUT INJ PAST YR: ICD-10-PCS | Mod: CPTII,S$GLB,, | Performed by: INTERNAL MEDICINE

## 2019-11-13 PROCEDURE — 93010 EKG 12-LEAD: ICD-10-PCS | Mod: S$GLB,,, | Performed by: INTERNAL MEDICINE

## 2019-11-13 PROCEDURE — 99214 PR OFFICE/OUTPT VISIT, EST, LEVL IV, 30-39 MIN: ICD-10-PCS | Mod: S$GLB,,, | Performed by: INTERNAL MEDICINE

## 2019-11-13 RX ORDER — IRBESARTAN 300 MG/1
300 TABLET ORAL NIGHTLY
Qty: 90 TABLET | Refills: 3 | Status: SHIPPED | OUTPATIENT
Start: 2019-11-13 | End: 2020-12-01 | Stop reason: SDUPTHER

## 2019-11-13 RX ORDER — NITROGLYCERIN 0.4 MG/1
0.4 TABLET SUBLINGUAL EVERY 5 MIN PRN
Qty: 50 TABLET | Refills: 3 | Status: SHIPPED | OUTPATIENT
Start: 2019-11-13 | End: 2022-03-08

## 2019-11-13 NOTE — PROGRESS NOTES
Subjective:    Patient ID:  Nick De Anda is a 79 y.o. y.o. male who presents for initial visit for Atrial Fibrillation      This is my 1st evaluation of a gentleman who has had coronary artery bypass grafting and stents in his coronary arteries.  These procedures were done years ago stents replaced in  in [facility] on the CABG was in .  Patient has a history of atrial fibrillation which is chronic and recurring.  His complaint today is swelling of his lower extremities and he has been having this left precordial chest pain that he is not sure what it is all about.  He has gone to the lecture physiology lab and once he gets to the lab he conversed to normal sinus rhythm before defibrillation.  He has been taking amlodipine and Altagracia Profore his blood pressure.  He also takes sotalol for his atrial fibrillation and Xarelto and Plavix.      Past Medical History:   Diagnosis Date    Anticoagulant long-term use     Arthritis     Cancer     skin    Coronary artery disease     Diabetes mellitus     diet    Encounter for blood transfusion     History of staphylococcal infection     after CABG    Hypertension     Persistent atrial fibrillation 2019        Social History     Socioeconomic History    Marital status:      Spouse name: Not on file    Number of children: Not on file    Years of education: Not on file    Highest education level: Not on file   Occupational History    Not on file   Social Needs    Financial resource strain: Not on file    Food insecurity:     Worry: Not on file     Inability: Not on file    Transportation needs:     Medical: Not on file     Non-medical: Not on file   Tobacco Use    Smoking status: Former Smoker     Last attempt to quit: 10/19/1979     Years since quittin.0   Substance and Sexual Activity    Alcohol use: No    Drug use: Never    Sexual activity: Not Currently   Lifestyle    Physical activity:     Days per week: Not on file      Minutes per session: Not on file    Stress: Not on file   Relationships    Social connections:     Talks on phone: Not on file     Gets together: Not on file     Attends Scientologist service: Not on file     Active member of club or organization: Not on file     Attends meetings of clubs or organizations: Not on file     Relationship status: Not on file   Other Topics Concern    Not on file   Social History Narrative    Not on file        No family history on file.     Review of Systems   Constitutional: Negative.    HENT: Positive for hearing loss.    Eyes: Negative.    Respiratory: Negative.    Cardiovascular:        Atrial fibrillation, stents placed in his coronaries in 2006 coronary artery bypass grafting in 2012, recurrent atrial fibrillation, recent chest pain.  Recent swelling of his ankles and lower extremities.   Gastrointestinal: Negative.    Genitourinary: Negative.    Musculoskeletal:        Ambulates with a walker.   Endo/Heme/Allergies:        Type 2 diabetes mellitus.        Objective:     /68 (BP Location: Left arm, Patient Position: Sitting, BP Method: Large (Automatic))   Pulse 61   Wt 133.6 kg (294 lb 8.6 oz)   SpO2 97%   BMI 35.85 kg/m²     Physical Exam   Constitutional: He is oriented to person, place, and time and well-developed, well-nourished, and in no distress.   HENT:   Head: Normocephalic.   Eyes: Pupils are equal, round, and reactive to light.   Cardiovascular: Normal rate, regular rhythm and normal heart sounds.   Pulmonary/Chest: Effort normal and breath sounds normal.   Musculoskeletal:   Patient is ambulating with a walker today.   Neurological: He is alert and oriented to person, place, and time.   Skin: Skin is warm and dry.   Psychiatric: Affect normal.       Labs:     Lab Results   Component Value Date     09/10/2019    K 4.1 09/10/2019    CL 99 (L) 09/10/2019    CO2 27 09/10/2019    BUN 16 09/10/2019    CREATININE 0.8 09/10/2019    ANIONGAP 10 09/10/2019      Lab Results   Component Value Date    HGBA1C 5.6 09/10/2019     No results found for: BNP, BNPTRIAGEBLO    Lab Results   Component Value Date    WBC 7.70 09/10/2019    HGB 14.7 09/10/2019    HCT 43.3 09/10/2019     09/10/2019    GRAN 4.3 09/10/2019    GRAN 56.1 09/10/2019     Lab Results   Component Value Date    CHOL 148 09/10/2019    HDL 48 09/10/2019    LDLCALC 69 09/10/2019    TRIG 156 (H) 09/10/2019         Results for orders placed or performed during the hospital encounter of 06/25/19   EKG 12-lead    Collection Time: 06/25/19 11:51 AM    Narrative    Test Reason : I48.0,    Vent. Rate : 052 BPM     Atrial Rate : 052 BPM     P-R Int : 230 ms          QRS Dur : 082 ms      QT Int : 456 ms       P-R-T Axes : 068 010 056 degrees     QTc Int : 424 ms    Sinus bradycardia with 1st degree A-V block  Otherwise normal ECG  When compared with ECG of 20-JUN-2019 12:01,  No significant change was found  Confirmed by Feliciano Izaguirre MD ([id number]) on 6/25/2019 1:06:37 PM    Referred By: LASHELL ESQUIVEL           Confirmed By:Feliciano Izaguirre MD        .  Meds:     Current Outpatient Medications:     albuterol (PROVENTIL/VENTOLIN HFA) 90 mcg/actuation inhaler, Inhale 2 puffs into the lungs every 6 (six) hours as needed., Disp: , Rfl: 3    atorvastatin (LIPITOR) 40 MG tablet, Take 40 mg by mouth once daily., Disp: , Rfl:     blood-glucose meter kit, Use as instructed, Disp: 1 each, Rfl: 0    clopidogrel (PLAVIX) 75 mg tablet, Take 1 tablet (75 mg total) by mouth once daily., Disp: 90 tablet, Rfl: 3    hydroCHLOROthiazide (MICROZIDE) 12.5 mg capsule, Take 12.5 mg by mouth once daily. , Disp: , Rfl:     irbesartan (AVAPRO) 300 MG tablet, Take 1 tablet (300 mg total) by mouth every evening., Disp: 90 tablet, Rfl: 3    metFORMIN (GLUCOPHAGE-XR) 500 MG 24 hr tablet, Take 1 tablet (500 mg total) by mouth 2 (two) times daily with meals., Disp: 180 tablet, Rfl: 3    MULTIVITAMIN W-MINERALS/LUTEIN (CENTRUM SILVER ORAL),  Take by mouth once daily., Disp: , Rfl:     nystatin (MYCOSTATIN) cream, Apply 45 g topically 2 (two) times daily., Disp: , Rfl:     sotalol (BETAPACE) 80 MG tablet, Take 1 tablet (80 mg total) by mouth every 12 (twelve) hours., Disp: 60 tablet, Rfl: 11    XARELTO 20 mg Tab, Take 20 mg by mouth every evening. , Disp: , Rfl:     diazePAM (VALIUM) 10 MG Tab, Take 1 tablet (10 mg total) by mouth daily as needed., Disp: 30 tablet, Rfl: 0    FLUZONE HIGH-DOSE 2019-20, PF, 180 mcg/0.5 mL Syrg, Inject 0.5 mLs as directed once., Disp: , Rfl:     nitroGLYCERIN (NITROSTAT) 0.4 MG SL tablet, Place 1 tablet (0.4 mg total) under the tongue every 5 (five) minutes as needed for Chest pain., Disp: 50 tablet, Rfl: 3  No current facility-administered medications for this visit.     Facility-Administered Medications Ordered in Other Visits:     0.9%  NaCl infusion, , Intravenous, Continuous, Rebecca Dickinson, NP    sodium chloride 0.9% flush 5 mL, 5 mL, Intravenous, PRN, Rebecca Dickinson NP      Assessment & Plan:     Coronary artery disease involving native coronary artery of native heart without angina pectoris  The chest pain that the patient is experiencing may be related to coronary artery disease therefore he should undergo SPECT myocardial perfusion scan and an echocardiogram is also prescribed nitroglycerin to utilize p.r.n. chest pain and he has taught how to use it appropriately.    Peripheral edema  The peripheral edema may be related to left ventricular dysfunction but most likely is related to the amlodipine which I am going to discontinue on this visit.  And I will increase is a per pro from 150 mg to 300 mg p.o. q.day.    A-fib  Currently the patient is in normal sinus rhythm.

## 2019-11-13 NOTE — ASSESSMENT & PLAN NOTE
The chest pain that the patient is experiencing may be related to coronary artery disease therefore he should undergo SPECT myocardial perfusion scan and an echocardiogram is also prescribed nitroglycerin to utilize p.r.n. chest pain and he has taught how to use it appropriately.

## 2019-11-13 NOTE — ASSESSMENT & PLAN NOTE
The peripheral edema may be related to left ventricular dysfunction but most likely is related to the amlodipine which I am going to discontinue on this visit.  And I will increase is a per pro from 150 mg to 300 mg p.o. q.day.

## 2019-11-14 ENCOUNTER — PATIENT MESSAGE (OUTPATIENT)
Dept: CARDIOLOGY | Facility: CLINIC | Age: 79
End: 2019-11-14

## 2019-11-20 ENCOUNTER — OFFICE VISIT (OUTPATIENT)
Dept: PRIMARY CARE CLINIC | Facility: CLINIC | Age: 79
End: 2019-11-20
Payer: MEDICARE

## 2019-11-20 VITALS
SYSTOLIC BLOOD PRESSURE: 146 MMHG | OXYGEN SATURATION: 98 % | TEMPERATURE: 97 F | BODY MASS INDEX: 35.68 KG/M2 | RESPIRATION RATE: 16 BRPM | DIASTOLIC BLOOD PRESSURE: 58 MMHG | HEIGHT: 76 IN | HEART RATE: 54 BPM | WEIGHT: 293 LBS

## 2019-11-20 DIAGNOSIS — Z23 NEED FOR VACCINATION: Primary | ICD-10-CM

## 2019-11-20 DIAGNOSIS — I48.0 PAROXYSMAL ATRIAL FIBRILLATION: ICD-10-CM

## 2019-11-20 DIAGNOSIS — R60.0 PERIPHERAL EDEMA: ICD-10-CM

## 2019-11-20 PROCEDURE — 99999 PR PBB SHADOW E&M-EST. PATIENT-LVL IV: ICD-10-PCS | Mod: PBBFAC,,, | Performed by: INTERNAL MEDICINE

## 2019-11-20 PROCEDURE — G0009 ADMIN PNEUMOCOCCAL VACCINE: HCPCS | Mod: S$GLB,,, | Performed by: INTERNAL MEDICINE

## 2019-11-20 PROCEDURE — 3078F DIAST BP <80 MM HG: CPT | Mod: CPTII,S$GLB,, | Performed by: INTERNAL MEDICINE

## 2019-11-20 PROCEDURE — 1126F AMNT PAIN NOTED NONE PRSNT: CPT | Mod: S$GLB,,, | Performed by: INTERNAL MEDICINE

## 2019-11-20 PROCEDURE — 3077F PR MOST RECENT SYSTOLIC BLOOD PRESSURE >= 140 MM HG: ICD-10-PCS | Mod: CPTII,S$GLB,, | Performed by: INTERNAL MEDICINE

## 2019-11-20 PROCEDURE — 99999 PR PBB SHADOW E&M-EST. PATIENT-LVL IV: CPT | Mod: PBBFAC,,, | Performed by: INTERNAL MEDICINE

## 2019-11-20 PROCEDURE — G0009 PNEUMOCOCCAL POLYSACCHARIDE VACCINE 23-VALENT =>2YO SQ IM: ICD-10-PCS | Mod: S$GLB,,, | Performed by: INTERNAL MEDICINE

## 2019-11-20 PROCEDURE — 90732 PNEUMOCOCCAL POLYSACCHARIDE VACCINE 23-VALENT =>2YO SQ IM: ICD-10-PCS | Mod: S$GLB,,, | Performed by: INTERNAL MEDICINE

## 2019-11-20 PROCEDURE — 3077F SYST BP >= 140 MM HG: CPT | Mod: CPTII,S$GLB,, | Performed by: INTERNAL MEDICINE

## 2019-11-20 PROCEDURE — 1101F PT FALLS ASSESS-DOCD LE1/YR: CPT | Mod: CPTII,S$GLB,, | Performed by: INTERNAL MEDICINE

## 2019-11-20 PROCEDURE — 1159F MED LIST DOCD IN RCRD: CPT | Mod: S$GLB,,, | Performed by: INTERNAL MEDICINE

## 2019-11-20 PROCEDURE — 90732 PPSV23 VACC 2 YRS+ SUBQ/IM: CPT | Mod: S$GLB,,, | Performed by: INTERNAL MEDICINE

## 2019-11-20 PROCEDURE — 1101F PR PT FALLS ASSESS DOC 0-1 FALLS W/OUT INJ PAST YR: ICD-10-PCS | Mod: CPTII,S$GLB,, | Performed by: INTERNAL MEDICINE

## 2019-11-20 PROCEDURE — 3078F PR MOST RECENT DIASTOLIC BLOOD PRESSURE < 80 MM HG: ICD-10-PCS | Mod: CPTII,S$GLB,, | Performed by: INTERNAL MEDICINE

## 2019-11-20 PROCEDURE — 99213 PR OFFICE/OUTPT VISIT, EST, LEVL III, 20-29 MIN: ICD-10-PCS | Mod: 25,S$GLB,, | Performed by: INTERNAL MEDICINE

## 2019-11-20 PROCEDURE — 1126F PR PAIN SEVERITY QUANTIFIED, NO PAIN PRESENT: ICD-10-PCS | Mod: S$GLB,,, | Performed by: INTERNAL MEDICINE

## 2019-11-20 PROCEDURE — 99213 OFFICE O/P EST LOW 20 MIN: CPT | Mod: 25,S$GLB,, | Performed by: INTERNAL MEDICINE

## 2019-11-20 PROCEDURE — 1159F PR MEDICATION LIST DOCUMENTED IN MEDICAL RECORD: ICD-10-PCS | Mod: S$GLB,,, | Performed by: INTERNAL MEDICINE

## 2019-11-20 NOTE — PROGRESS NOTES
Verified pt ID using name and . Allergies verified with pt. Administered Pneumo 23 IM in L. Deltoid per physician order using aseptic technique. Aspirated and no blood return noted. Pt tolerated well with no adverse reactions noted.

## 2019-11-21 NOTE — PROGRESS NOTES
Subjective:       Patient ID: Nick De Anda is a 79 y.o. male.    Chief Complaint: Leg Swelling (1 month follow up - Seeing Dr. Clement - d/c pt. from Amlodipine )    HPI  patient is here for follow-up has paroxysmal atrial fib with controlled rate and on Eliquis patient now seeing Dr. Clement Cardiology here in [place] scheduled for nuclear stress test tomorrow he denies short of breath chest pain does have fluid in lower extremity improving slowly since the off amlodipine no short of breath chest pain no fever chill  Review of Systems    Objective:      Physical Exam   Constitutional: He is oriented to person, place, and time. He appears well-developed and well-nourished. No distress.   HENT:   Head: Normocephalic and atraumatic.   Right Ear: External ear normal.   Left Ear: External ear normal.   Nose: Nose normal.   Mouth/Throat: Oropharynx is clear and moist. No oropharyngeal exudate.   Eyes: Pupils are equal, round, and reactive to light. Conjunctivae and EOM are normal. Right eye exhibits no discharge. Left eye exhibits no discharge.   Neck: Normal range of motion. Neck supple. No thyromegaly present.   Cardiovascular: Normal rate, regular rhythm, normal heart sounds and intact distal pulses. Exam reveals no gallop and no friction rub.   No murmur heard.  Pulmonary/Chest: Effort normal and breath sounds normal. No respiratory distress. He has no wheezes. He has no rales. He exhibits no tenderness.   Abdominal: Soft. Bowel sounds are normal. He exhibits no distension. There is no tenderness. There is no rebound and no guarding.   Musculoskeletal: Normal range of motion. He exhibits edema (Plus one edema in lower extremity bilaterally). He exhibits no tenderness or deformity.   Lymphadenopathy:     He has no cervical adenopathy.   Neurological: He is alert and oriented to person, place, and time.   Skin: Skin is warm and dry. Capillary refill takes less than 2 seconds. No rash noted. No erythema.    Psychiatric: He has a normal mood and affect. Judgment and thought content normal.   Nursing note and vitals reviewed.      Assessment:       1. Need for vaccination    2. Paroxysmal atrial fibrillation    3. Peripheral edema        Plan:       Need for vaccination  -     (In Office Administered) Pneumococcal Polysaccharide Vaccine (23 Valent) (SQ/IM)    Paroxysmal atrial fibrillation  Comments:  Stable on medication with control heart rate nuclear stress test scheduled for tomorrow    Peripheral edema  Comments:  Agree with holding amlodipine and monitor continue with diuretic

## 2019-11-26 ENCOUNTER — PATIENT MESSAGE (OUTPATIENT)
Dept: CARDIOLOGY | Facility: CLINIC | Age: 79
End: 2019-11-26

## 2019-11-27 ENCOUNTER — TELEPHONE (OUTPATIENT)
Dept: CARDIOLOGY | Facility: CLINIC | Age: 79
End: 2019-11-27

## 2019-11-27 NOTE — TELEPHONE ENCOUNTER
Spoke with patient about making an appointment with Dr Clement to follow up with him about the recent NM testing. Appointment has been set for 11/29. Patient verbalized understanding of the information provided to him. No further issues discussed.

## 2019-11-27 NOTE — TELEPHONE ENCOUNTER
----- Message from Raquel Delarosa MA sent at 11/27/2019 12:14 PM CST -----  Contact: Self/[phone]  Pt states he got a msg in regards to scheduling an appt to see Dr. Clement  Assistant on Friday Pt states he responded back to the msg but never got an answer.

## 2019-11-29 ENCOUNTER — OFFICE VISIT (OUTPATIENT)
Dept: CARDIOLOGY | Facility: CLINIC | Age: 79
End: 2019-11-29
Payer: MEDICARE

## 2019-11-29 ENCOUNTER — ANTI-COAG VISIT (OUTPATIENT)
Dept: CARDIOLOGY | Facility: CLINIC | Age: 79
End: 2019-11-29

## 2019-11-29 DIAGNOSIS — Z79.01 LONG TERM (CURRENT) USE OF ANTICOAGULANTS: ICD-10-CM

## 2019-11-29 DIAGNOSIS — I25.10 CORONARY ARTERY DISEASE, ANGINA PRESENCE UNSPECIFIED, UNSPECIFIED VESSEL OR LESION TYPE, UNSPECIFIED WHETHER NATIVE OR TRANSPLANTED HEART: Primary | ICD-10-CM

## 2019-11-29 PROCEDURE — 1159F MED LIST DOCD IN RCRD: CPT | Mod: S$GLB,,, | Performed by: INTERNAL MEDICINE

## 2019-11-29 PROCEDURE — 99212 PR OFFICE/OUTPT VISIT, EST, LEVL II, 10-19 MIN: ICD-10-PCS | Mod: S$GLB,,, | Performed by: INTERNAL MEDICINE

## 2019-11-29 PROCEDURE — 1101F PT FALLS ASSESS-DOCD LE1/YR: CPT | Mod: CPTII,S$GLB,, | Performed by: INTERNAL MEDICINE

## 2019-11-29 PROCEDURE — 99212 OFFICE O/P EST SF 10 MIN: CPT | Mod: S$GLB,,, | Performed by: INTERNAL MEDICINE

## 2019-11-29 PROCEDURE — 1159F PR MEDICATION LIST DOCUMENTED IN MEDICAL RECORD: ICD-10-PCS | Mod: S$GLB,,, | Performed by: INTERNAL MEDICINE

## 2019-11-29 PROCEDURE — 1101F PR PT FALLS ASSESS DOC 0-1 FALLS W/OUT INJ PAST YR: ICD-10-PCS | Mod: CPTII,S$GLB,, | Performed by: INTERNAL MEDICINE

## 2019-11-29 NOTE — PROGRESS NOTES
This patient came to the waiting room today but did not 1 AP seen because he did not want to make his co-pay.  He has had a SPECT scan and an echocardiogram.  The results of the echocardiogram were mildly abnormal and review of the SPECT scan is also mildly abnormal.  Went to the waiting room to speak with him and his wife to inform him of the abnormalities I recommend that he have a PET scan and after has a PET scan that we need to have an office visit to discuss the results.  He seemed to be satisfied with that recommendation.

## 2019-12-01 ENCOUNTER — PATIENT MESSAGE (OUTPATIENT)
Dept: ELECTROPHYSIOLOGY | Facility: CLINIC | Age: 79
End: 2019-12-01

## 2019-12-02 ENCOUNTER — TELEPHONE (OUTPATIENT)
Dept: CARDIOLOGY | Facility: CLINIC | Age: 79
End: 2019-12-02

## 2019-12-02 ENCOUNTER — PATIENT MESSAGE (OUTPATIENT)
Dept: CARDIOLOGY | Facility: CLINIC | Age: 79
End: 2019-12-02

## 2019-12-02 ENCOUNTER — PATIENT MESSAGE (OUTPATIENT)
Dept: ELECTROPHYSIOLOGY | Facility: CLINIC | Age: 79
End: 2019-12-02

## 2019-12-02 RX ORDER — RIVAROXABAN 20 MG/1
20 TABLET, FILM COATED ORAL NIGHTLY
Qty: 30 TABLET | Refills: 11 | Status: SHIPPED | OUTPATIENT
Start: 2019-12-02 | End: 2020-11-23

## 2019-12-03 ENCOUNTER — TELEPHONE (OUTPATIENT)
Dept: CARDIOLOGY | Facility: CLINIC | Age: 79
End: 2019-12-03

## 2019-12-03 ENCOUNTER — PATIENT MESSAGE (OUTPATIENT)
Dept: PRIMARY CARE CLINIC | Facility: CLINIC | Age: 79
End: 2019-12-03

## 2019-12-03 DIAGNOSIS — E78.5 HYPERLIPIDEMIA, UNSPECIFIED HYPERLIPIDEMIA TYPE: Primary | ICD-10-CM

## 2019-12-03 RX ORDER — ATORVASTATIN CALCIUM 40 MG/1
40 TABLET, FILM COATED ORAL DAILY
Qty: 90 TABLET | Refills: 3 | Status: SHIPPED | OUTPATIENT
Start: 2019-12-03 | End: 2020-12-07 | Stop reason: SDUPTHER

## 2019-12-06 ENCOUNTER — TELEPHONE (OUTPATIENT)
Dept: CARDIOLOGY | Facility: CLINIC | Age: 79
End: 2019-12-06

## 2019-12-10 ENCOUNTER — CLINICAL SUPPORT (OUTPATIENT)
Dept: CARDIOLOGY | Facility: CLINIC | Age: 79
End: 2019-12-10
Attending: INTERNAL MEDICINE
Payer: MEDICARE

## 2019-12-10 VITALS — SYSTOLIC BLOOD PRESSURE: 127 MMHG | HEART RATE: 59 BPM | DIASTOLIC BLOOD PRESSURE: 61 MMHG

## 2019-12-10 DIAGNOSIS — I25.10 CORONARY ARTERY DISEASE, ANGINA PRESENCE UNSPECIFIED, UNSPECIFIED VESSEL OR LESION TYPE, UNSPECIFIED WHETHER NATIVE OR TRANSPLANTED HEART: ICD-10-CM

## 2019-12-10 LAB
CFR FLOW - ANTERIOR: 2.01
CFR FLOW - INFERIOR: 1.97
CFR FLOW - LATERAL: 2.05
CFR FLOW - MAX: 2.72
CFR FLOW - MIN: 1.47
CFR FLOW - SEPTAL: 2.1
CFR FLOW - WHOLE HEART: 2.03
CV PHARM DOSE: 60 MG
CV STRESS BASE HR: 56 BPM
DIASTOLIC BLOOD PRESSURE: 70 MMHG
END DIASTOLIC INDEX-HIGH: 170 ML/M2
END SYSTOLIC INDEX-HIGH: 70 ML/M2
NUC REST DIASTOLIC VOLUME INDEX: 77
NUC REST EJECTION FRACTION: 74
NUC REST SYSTOLIC VOLUME INDEX: 20
NUC STRESS DIASTOLIC VOLUME INDEX: 88
NUC STRESS EJECTION FRACTION: 78 %
NUC STRESS SYSTOLIC VOLUME INDEX: 19
OHS CV CPX 85 PERCENT MAX PREDICTED HEART RATE MALE: 119
OHS CV CPX MAX PREDICTED HEART RATE: 140
OHS CV CPX PATIENT IS FEMALE: 0
OHS CV CPX PATIENT IS MALE: 1
OHS CV CPX PEAK DIASTOLIC BLOOD PRESSURE: 57 MMHG
OHS CV CPX PEAK HEAR RATE: 59 BPM
OHS CV CPX PEAK RATE PRESSURE PRODUCT: 7493
OHS CV CPX PEAK SYSTOLIC BLOOD PRESSURE: 127 MMHG
OHS CV CPX PERCENT MAX PREDICTED HEART RATE ACHIEVED: 42
OHS CV CPX RATE PRESSURE PRODUCT PRESENTING: 8176
REST FLOW - ANTERIOR: 0.68 CC/MIN/G
REST FLOW - INFERIOR: 0.61 CC/MIN/G
REST FLOW - LATERAL: 0.59 CC/MIN/G
REST FLOW - MAX: 0.8 CC/MIN/G
REST FLOW - MIN: 0.5 CC/MIN/G
REST FLOW - SEPTAL: 0.64 CC/MIN/G
REST FLOW - WHOLE HEART: 0.63 CC/MIN/G
RETIRED EF AND QEF - SEE NOTES: 51 %
STRESS ECHO TARGET HR: 119 BPM
STRESS FLOW - ANTERIOR: 1.36 CC/MIN/G
STRESS FLOW - INFERIOR: 1.2 CC/MIN/G
STRESS FLOW - LATERAL: 1.19 CC/MIN/G
STRESS FLOW - MAX: 1.6 CC/MIN/G
STRESS FLOW - MIN: 0.8 CC/MIN/G
STRESS FLOW - SEPTAL: 1.34 CC/MIN/G
STRESS FLOW - WHOLE HEART: 1.27 CC/MIN/G
SYSTOLIC BLOOD PRESSURE: 146 MMHG

## 2019-12-10 PROCEDURE — A9555 RB82 RUBIDIUM: HCPCS | Mod: S$GLB,,, | Performed by: INTERNAL MEDICINE

## 2019-12-10 PROCEDURE — 78492 CARDIAC PET SCAN STRESS (CUPID ONLY): ICD-10-PCS | Mod: S$GLB,,, | Performed by: INTERNAL MEDICINE

## 2019-12-10 PROCEDURE — 93015 CV STRESS TEST SUPVJ I&R: CPT | Mod: S$GLB,,, | Performed by: INTERNAL MEDICINE

## 2019-12-10 PROCEDURE — 78492 MYOCRD IMG PET MLT RST&STRS: CPT | Mod: S$GLB,,, | Performed by: INTERNAL MEDICINE

## 2019-12-10 PROCEDURE — 99999 PR PBB SHADOW E&M-EST. PATIENT-LVL II: CPT | Mod: PBBFAC,,,

## 2019-12-10 PROCEDURE — 93015 CARDIAC PET SCAN STRESS (CUPID ONLY): ICD-10-PCS | Mod: S$GLB,,, | Performed by: INTERNAL MEDICINE

## 2019-12-10 PROCEDURE — 99999 PR PBB SHADOW E&M-EST. PATIENT-LVL II: ICD-10-PCS | Mod: PBBFAC,,,

## 2019-12-10 PROCEDURE — A9555 CARDIAC PET SCAN STRESS (CUPID ONLY): ICD-10-PCS | Mod: S$GLB,,, | Performed by: INTERNAL MEDICINE

## 2019-12-10 RX ORDER — DIPYRIDAMOLE 5 MG/ML
60 INJECTION INTRAVENOUS
Status: COMPLETED | OUTPATIENT
Start: 2019-12-10 | End: 2019-12-10

## 2019-12-10 RX ADMIN — DIPYRIDAMOLE 60 MG: 5 INJECTION INTRAVENOUS at 11:12

## 2019-12-18 ENCOUNTER — HOSPITAL ENCOUNTER (OUTPATIENT)
Dept: CARDIOLOGY | Facility: CLINIC | Age: 79
Discharge: HOME | End: 2019-12-18
Payer: MEDICARE

## 2019-12-18 ENCOUNTER — TELEPHONE (OUTPATIENT)
Dept: CARDIOLOGY | Facility: CLINIC | Age: 79
End: 2019-12-18

## 2019-12-18 ENCOUNTER — OFFICE VISIT (OUTPATIENT)
Dept: CARDIOLOGY | Facility: CLINIC | Age: 79
End: 2019-12-18
Payer: MEDICARE

## 2019-12-18 VITALS
WEIGHT: 290.56 LBS | SYSTOLIC BLOOD PRESSURE: 158 MMHG | HEART RATE: 63 BPM | DIASTOLIC BLOOD PRESSURE: 70 MMHG | BODY MASS INDEX: 36.13 KG/M2 | HEIGHT: 75 IN | OXYGEN SATURATION: 96 %

## 2019-12-18 DIAGNOSIS — R00.2 PALPITATION: Primary | ICD-10-CM

## 2019-12-18 DIAGNOSIS — I25.810 CORONARY ARTERY DISEASE INVOLVING CORONARY BYPASS GRAFT OF NATIVE HEART WITHOUT ANGINA PECTORIS: ICD-10-CM

## 2019-12-18 DIAGNOSIS — I10 BENIGN ESSENTIAL HTN: Primary | ICD-10-CM

## 2019-12-18 DIAGNOSIS — R00.2 PALPITATION: ICD-10-CM

## 2019-12-18 DIAGNOSIS — I50.32 CHRONIC HEART FAILURE WITH PRESERVED EJECTION FRACTION: ICD-10-CM

## 2019-12-18 PROCEDURE — 1101F PR PT FALLS ASSESS DOC 0-1 FALLS W/OUT INJ PAST YR: ICD-10-PCS | Mod: CPTII,S$GLB,, | Performed by: INTERNAL MEDICINE

## 2019-12-18 PROCEDURE — 1126F AMNT PAIN NOTED NONE PRSNT: CPT | Mod: S$GLB,,, | Performed by: INTERNAL MEDICINE

## 2019-12-18 PROCEDURE — 99214 OFFICE O/P EST MOD 30 MIN: CPT | Mod: S$GLB,,, | Performed by: INTERNAL MEDICINE

## 2019-12-18 PROCEDURE — 3078F PR MOST RECENT DIASTOLIC BLOOD PRESSURE < 80 MM HG: ICD-10-PCS | Mod: CPTII,S$GLB,, | Performed by: INTERNAL MEDICINE

## 2019-12-18 PROCEDURE — 3077F SYST BP >= 140 MM HG: CPT | Mod: CPTII,S$GLB,, | Performed by: INTERNAL MEDICINE

## 2019-12-18 PROCEDURE — 1126F PR PAIN SEVERITY QUANTIFIED, NO PAIN PRESENT: ICD-10-PCS | Mod: S$GLB,,, | Performed by: INTERNAL MEDICINE

## 2019-12-18 PROCEDURE — 99999 PR PBB SHADOW E&M-EST. PATIENT-LVL V: ICD-10-PCS | Mod: PBBFAC,GC,,

## 2019-12-18 PROCEDURE — 93005 ELECTROCARDIOGRAM TRACING: CPT | Mod: S$GLB

## 2019-12-18 PROCEDURE — 1101F PT FALLS ASSESS-DOCD LE1/YR: CPT | Mod: CPTII,S$GLB,, | Performed by: INTERNAL MEDICINE

## 2019-12-18 PROCEDURE — 99999 PR PBB SHADOW E&M-EST. PATIENT-LVL V: CPT | Mod: PBBFAC,GC,,

## 2019-12-18 PROCEDURE — 3077F PR MOST RECENT SYSTOLIC BLOOD PRESSURE >= 140 MM HG: ICD-10-PCS | Mod: CPTII,S$GLB,, | Performed by: INTERNAL MEDICINE

## 2019-12-18 PROCEDURE — 3078F DIAST BP <80 MM HG: CPT | Mod: CPTII,S$GLB,, | Performed by: INTERNAL MEDICINE

## 2019-12-18 PROCEDURE — 93000 ELECTROCARDIOGRAM COMPLETE: CPT | Mod: S$GLB,,, | Performed by: INTERNAL MEDICINE

## 2019-12-18 PROCEDURE — 1159F PR MEDICATION LIST DOCUMENTED IN MEDICAL RECORD: ICD-10-PCS | Mod: S$GLB,,, | Performed by: INTERNAL MEDICINE

## 2019-12-18 PROCEDURE — 99214 PR OFFICE/OUTPT VISIT, EST, LEVL IV, 30-39 MIN: ICD-10-PCS | Mod: S$GLB,,, | Performed by: INTERNAL MEDICINE

## 2019-12-18 PROCEDURE — 93000 EKG 12-LEAD: ICD-10-PCS | Mod: S$GLB,,, | Performed by: INTERNAL MEDICINE

## 2019-12-18 PROCEDURE — 1159F MED LIST DOCD IN RCRD: CPT | Mod: S$GLB,,, | Performed by: INTERNAL MEDICINE

## 2019-12-18 RX ORDER — SPIRONOLACTONE 25 MG/1
25 TABLET ORAL DAILY
Qty: 30 TABLET | Refills: 11 | Status: SHIPPED | OUTPATIENT
Start: 2019-12-18 | End: 2020-01-07 | Stop reason: SDUPTHER

## 2019-12-18 NOTE — PROGRESS NOTES
Cardiology Clinic Note  Reason for Visit: Chest pain    HPI:   This is an 78yo man who recently saw Dr. Clement for chest pain.    He has a history of CAD s/p CABG (2012) and PCI (2006), paroxysmal Afib on xarelto and sotalol being followed by Dr. Paez, HTN, HLD. He presented with complaints of swelling of his lower extremities and left precordial chest pain that was localizing to the left lateral chest, that was dull, and resembled heartburn, and never exertional, he has not had to take nitro for it.    For the edema, he had stopped amlodipine and patient reports improvement in edema since then, to compensate, he had increased his irbesartan to 300mg.    Of note, he has never had an MI or angina. Currently he has fatigue with his paroxysms of Afib, but no exertional symptoms.    ROS:    Constitution: Negative for fever, chills, weight loss or gain.   HENT: Negative for sore throat, rhinorrhea, or headache.  Eyes: Negative for blurred or double vision.   Cardiovascular: See above  Pulmonary: Negative for SOB   Gastrointestinal: Negative for abdominal pain, nausea, vomiting, or diarrhea.   : Negative for dysuria.   Neurological: Negative for focal weakness or sensory changes.  PMH:     Past Medical History:   Diagnosis Date    Anticoagulant long-term use     Arthritis     Cancer     skin    Coronary artery disease     Diabetes mellitus     diet    Encounter for blood transfusion     History of staphylococcal infection 2012    after CABG    Hypertension     Persistent atrial fibrillation 2/2/2019     Past Surgical History:   Procedure Laterality Date    CARDIAC SURGERY      cabg    CORONARY STENT PLACEMENT      CYSTOSCOPY      prostate infection    FOOT SURGERY      RECTOPERITONEAL FISTULA CLOSURE      SHOULDER SURGERY      Triple bypass       Allergies:     Review of patient's allergies indicates:   Allergen Reactions    Penicillins Anaphylaxis     Can take keflex    Amoxicillin Swelling and  Rash     Medications:     Current Outpatient Medications on File Prior to Visit   Medication Sig Dispense Refill    albuterol (PROVENTIL/VENTOLIN HFA) 90 mcg/actuation inhaler Inhale 2 puffs into the lungs every 6 (six) hours as needed.  3    atorvastatin (LIPITOR) 40 MG tablet Take 1 tablet (40 mg total) by mouth once daily. 90 tablet 3    blood-glucose meter kit Use as instructed 1 each 0    clopidogrel (PLAVIX) 75 mg tablet Take 1 tablet (75 mg total) by mouth once daily. 90 tablet 3    FLUZONE HIGH-DOSE 2019-20, PF, 180 mcg/0.5 mL Syrg Inject 0.5 mLs as directed once.      hydroCHLOROthiazide (MICROZIDE) 12.5 mg capsule Take 12.5 mg by mouth once daily.       irbesartan (AVAPRO) 300 MG tablet Take 1 tablet (300 mg total) by mouth every evening. 90 tablet 3    metFORMIN (GLUCOPHAGE-XR) 500 MG 24 hr tablet Take 1 tablet (500 mg total) by mouth 2 (two) times daily with meals. 180 tablet 3    MULTIVITAMIN W-MINERALS/LUTEIN (CENTRUM SILVER ORAL) Take by mouth once daily.      nitroGLYCERIN (NITROSTAT) 0.4 MG SL tablet Place 1 tablet (0.4 mg total) under the tongue every 5 (five) minutes as needed for Chest pain. 50 tablet 3    nystatin (MYCOSTATIN) cream Apply 45 g topically 2 (two) times daily.      sotalol (BETAPACE) 80 MG tablet Take 1 tablet (80 mg total) by mouth every 12 (twelve) hours. 60 tablet 11    XARELTO 20 mg Tab Take 1 tablet (20 mg total) by mouth every evening. 30 tablet 11    diazePAM (VALIUM) 10 MG Tab Take 1 tablet (10 mg total) by mouth daily as needed. 30 tablet 0     Current Facility-Administered Medications on File Prior to Visit   Medication Dose Route Frequency Provider Last Rate Last Dose    0.9%  NaCl infusion   Intravenous Continuous Rebecca Dickinson NP        sodium chloride 0.9% flush 5 mL  5 mL Intravenous PRN Rebecca Dickinson NP         Social History:     Social History     Tobacco Use    Smoking status: Former Smoker     Last attempt to quit: 10/19/1979      "Years since quittin.1    Smokeless tobacco: Never Used   Substance Use Topics    Alcohol use: No     Family History:     Family History   Family history unknown: Yes     Physical Exam:   BP (!) 158/70 (BP Location: Left arm, Patient Position: Sitting, BP Method: Large (Automatic))   Pulse 63   Ht 6' 3" (1.905 m)   Wt 131.8 kg (290 lb 9.1 oz)   SpO2 96%   BMI 36.32 kg/m²      Constitutional: No distress, obese, conversant  HEENT: Sclera anicteric, PERRLA, EOMI  Neck: No JVD, no masses, good movement  CV: RRR, S1 and S2 normal, no additional heart sounds or murmurs. Pulses 2+ and equal bilaterally in radial arteries, 2+ PT pulses.  Pulm: Clear to auscultation bilaterally with symmetrical expansion.  GI: Abdomen soft, non-tender, good bowel sounds  Extremities: Both extremities intact and grossly normal, skin is warm, bilateral non-pitting 2+ edema noted at the ankles  Skin: No ecchymosis, erythema, or ulcers  Psych: AOx3, appropriate affect  Neuro: CNII-XII intact, no focal deficits      Labs:     Lab Results   Component Value Date     09/10/2019    K 4.1 09/10/2019    CL 99 (L) 09/10/2019    CO2 27 09/10/2019    BUN 16 09/10/2019    CREATININE 0.8 09/10/2019    ANIONGAP 10 09/10/2019     Lab Results   Component Value Date    HGBA1C 5.6 09/10/2019     No results found for: BNP, BNPTRIAGEBLO Lab Results   Component Value Date    WBC 7.70 09/10/2019    HGB 14.7 09/10/2019    HCT 43.3 09/10/2019     09/10/2019    GRAN 4.3 09/10/2019    GRAN 56.1 09/10/2019     Lab Results   Component Value Date    CHOL 148 09/10/2019    HDL 48 09/10/2019    LDLCALC 69 09/10/2019    TRIG 156 (H) 09/10/2019          Imaging:   PET stress:     The relative PET images are normal showing no clinically significant regional resting or stress induced perfusion defects.    Whole heart absolute myocardial perfusion (cc/min/g) averaged 0.63 cc/min/g at rest (which is normal), 1.27 cc/min/g at stress (which is mildly " reduced), and 2.03 CFR (which is mildly reduced).    Gated perfusion images showed an ejection fraction of 74% at rest and 78% during stress. Normal ejection fraction is greater than 51%.    Wall motion was normal at rest and during stress.    LV cavity size is normal at rest and stress.    The EKG portion of this study is negative for ischemia.    There were no arrhythmias during stress.    The patient reported no chest pain during the stress test.    There are no prior studies for comparison.    Nuc Stress EF   Date Value Ref Range Status   12/10/2019 78 % Final     Nuc Rest EF   Date Value Ref Range Status   12/10/2019 74  Final     ECHO 11/2019  · Mild left atrial enlargement.  · Mild right ventricular enlargement.  · Moderate concentric left ventricular hypertrophy.  · Mildly decreased left ventricular systolic function. The estimated ejection fraction is 45%  · Grade I (mild) left ventricular diastolic dysfunction consistent with impaired relaxation.  · Local segmental wall motion abnormalities.  · Septal wall has abnormal motion.  · Mild-to-moderate mitral regurgitation.  · Mild to moderate tricuspid regurgitation.    EKG: normal sinus rhythm, no blocks or conduction defects, no ischemic changes  Assessment:   1. Stable CAD - pet normal, no exertional symptoms (PET, echo and EKG tracings reviewed)  2. Chronic Heart failure with preserved EF  3. Paroxysmal AF ([id number] = 6)    Plan:   Start spironolactone 25mg for BP  BMP in one week  Counseled to keep a BP diary  Counseled on low-salt diet  Counseled on starting exercise as per AHA recommendations  Continue irbesartan 300mg  Continue atorvastatin 40mg daily  Xarelto and sotalol as per Dr. Paez recommendations    Signed:  Isidro Soto MD  Cardiology Fellow  Pager - [phone]  12/18/2019 3:53 PM

## 2019-12-18 NOTE — PATIENT INSTRUCTIONS
For your Hypertension and heart disease  1. Eat a low salt diet; less than 1500mg per day of salt, avoid pre-prepared foods without a documented salt content  2. Take medications as prescribed;      A. Start taking spironolactone 25mg daily with your hydrochlorothiazide      B. Please get repeat non-fasting bloodwork (BMP) in a week  3. Keep a blood pressure diary with twice daily blood pressure measurements, bring to your next appointment, call if you continue to have systolic blood pressures (top number) greater than 140.  4. Please exercise at least 30 minutes per day 5 times per week, this makes your heart stronger and reduces blood pressure, preventing additional future heart problems

## 2019-12-26 ENCOUNTER — LAB VISIT (OUTPATIENT)
Dept: LAB | Facility: HOSPITAL | Age: 79
End: 2019-12-26
Payer: MEDICARE

## 2019-12-26 DIAGNOSIS — I10 BENIGN ESSENTIAL HTN: ICD-10-CM

## 2019-12-26 LAB
ANION GAP SERPL CALC-SCNC: 8 MMOL/L (ref 8–16)
BUN SERPL-MCNC: 15 MG/DL (ref 8–23)
CALCIUM SERPL-MCNC: 9.5 MG/DL (ref 8.7–10.5)
CHLORIDE SERPL-SCNC: 103 MMOL/L (ref 95–110)
CO2 SERPL-SCNC: 28 MMOL/L (ref 23–29)
CREAT SERPL-MCNC: 0.8 MG/DL (ref 0.5–1.4)
EST. GFR  (AFRICAN AMERICAN): >60 ML/MIN/1.73 M^2
EST. GFR  (NON AFRICAN AMERICAN): >60 ML/MIN/1.73 M^2
GLUCOSE SERPL-MCNC: 138 MG/DL (ref 70–110)
POTASSIUM SERPL-SCNC: 4.1 MMOL/L (ref 3.5–5.1)
SODIUM SERPL-SCNC: 139 MMOL/L (ref 136–145)

## 2019-12-26 PROCEDURE — 80048 BASIC METABOLIC PNL TOTAL CA: CPT

## 2019-12-26 PROCEDURE — 36415 COLL VENOUS BLD VENIPUNCTURE: CPT | Mod: PN

## 2019-12-30 ENCOUNTER — PATIENT MESSAGE (OUTPATIENT)
Dept: CARDIOLOGY | Facility: CLINIC | Age: 79
End: 2019-12-30

## 2020-01-06 ENCOUNTER — PATIENT MESSAGE (OUTPATIENT)
Dept: CARDIOLOGY | Facility: CLINIC | Age: 80
End: 2020-01-06

## 2020-01-07 ENCOUNTER — PATIENT OUTREACH (OUTPATIENT)
Dept: ADMINISTRATIVE | Facility: HOSPITAL | Age: 80
End: 2020-01-07

## 2020-01-07 DIAGNOSIS — I10 BENIGN ESSENTIAL HTN: ICD-10-CM

## 2020-01-07 RX ORDER — SPIRONOLACTONE 50 MG/1
50 TABLET, FILM COATED ORAL DAILY
Qty: 30 TABLET | Refills: 11 | Status: SHIPPED | OUTPATIENT
Start: 2020-01-07 | End: 2020-06-25 | Stop reason: DRUGHIGH

## 2020-01-13 ENCOUNTER — PATIENT MESSAGE (OUTPATIENT)
Dept: CARDIOLOGY | Facility: CLINIC | Age: 80
End: 2020-01-13

## 2020-01-16 ENCOUNTER — PATIENT MESSAGE (OUTPATIENT)
Dept: CARDIOLOGY | Facility: CLINIC | Age: 80
End: 2020-01-16

## 2020-01-20 NOTE — TELEPHONE ENCOUNTER
01/20/2020  3:22 PM    Called patient to clarify therapeutic discussion -- spironolactone 50, HCTZ 25. That we need about a week on his therapy to determine what effect there has been on blood pressure.    He will take the medications and keep a diary and send in ~1 week and will get BMP at that time as well.    Isidro Soto   Cardiology fellow

## 2020-01-21 ENCOUNTER — OFFICE VISIT (OUTPATIENT)
Dept: PRIMARY CARE CLINIC | Facility: CLINIC | Age: 80
End: 2020-01-21
Payer: MEDICARE

## 2020-01-21 VITALS
RESPIRATION RATE: 20 BRPM | TEMPERATURE: 98 F | HEIGHT: 76 IN | BODY MASS INDEX: 34.9 KG/M2 | OXYGEN SATURATION: 97 % | HEART RATE: 63 BPM | DIASTOLIC BLOOD PRESSURE: 60 MMHG | WEIGHT: 286.63 LBS | SYSTOLIC BLOOD PRESSURE: 130 MMHG

## 2020-01-21 DIAGNOSIS — F41.9 ANXIETY: ICD-10-CM

## 2020-01-21 DIAGNOSIS — E11.9 ENCOUNTER FOR DIABETIC FOOT EXAM: ICD-10-CM

## 2020-01-21 DIAGNOSIS — I48.0 PAROXYSMAL ATRIAL FIBRILLATION: ICD-10-CM

## 2020-01-21 DIAGNOSIS — I10 ESSENTIAL HYPERTENSION: ICD-10-CM

## 2020-01-21 DIAGNOSIS — H10.9 CONJUNCTIVITIS OF RIGHT EYE, UNSPECIFIED CONJUNCTIVITIS TYPE: ICD-10-CM

## 2020-01-21 DIAGNOSIS — E11.9 TYPE 2 DIABETES MELLITUS WITHOUT COMPLICATION, WITHOUT LONG-TERM CURRENT USE OF INSULIN: ICD-10-CM

## 2020-01-21 DIAGNOSIS — R30.0 DYSURIA: Primary | ICD-10-CM

## 2020-01-21 PROCEDURE — 1101F PT FALLS ASSESS-DOCD LE1/YR: CPT | Mod: CPTII,S$GLB,, | Performed by: INTERNAL MEDICINE

## 2020-01-21 PROCEDURE — 99999 PR PBB SHADOW E&M-EST. PATIENT-LVL III: ICD-10-PCS | Mod: PBBFAC,,, | Performed by: INTERNAL MEDICINE

## 2020-01-21 PROCEDURE — 1159F PR MEDICATION LIST DOCUMENTED IN MEDICAL RECORD: ICD-10-PCS | Mod: S$GLB,,, | Performed by: INTERNAL MEDICINE

## 2020-01-21 PROCEDURE — 3078F PR MOST RECENT DIASTOLIC BLOOD PRESSURE < 80 MM HG: ICD-10-PCS | Mod: CPTII,S$GLB,, | Performed by: INTERNAL MEDICINE

## 2020-01-21 PROCEDURE — 1125F AMNT PAIN NOTED PAIN PRSNT: CPT | Mod: S$GLB,,, | Performed by: INTERNAL MEDICINE

## 2020-01-21 PROCEDURE — 3075F SYST BP GE 130 - 139MM HG: CPT | Mod: CPTII,S$GLB,, | Performed by: INTERNAL MEDICINE

## 2020-01-21 PROCEDURE — 81002 URINALYSIS NONAUTO W/O SCOPE: CPT | Mod: S$GLB,,, | Performed by: INTERNAL MEDICINE

## 2020-01-21 PROCEDURE — 1125F PR PAIN SEVERITY QUANTIFIED, PAIN PRESENT: ICD-10-PCS | Mod: S$GLB,,, | Performed by: INTERNAL MEDICINE

## 2020-01-21 PROCEDURE — 3075F PR MOST RECENT SYSTOLIC BLOOD PRESS GE 130-139MM HG: ICD-10-PCS | Mod: CPTII,S$GLB,, | Performed by: INTERNAL MEDICINE

## 2020-01-21 PROCEDURE — 1159F MED LIST DOCD IN RCRD: CPT | Mod: S$GLB,,, | Performed by: INTERNAL MEDICINE

## 2020-01-21 PROCEDURE — 3078F DIAST BP <80 MM HG: CPT | Mod: CPTII,S$GLB,, | Performed by: INTERNAL MEDICINE

## 2020-01-21 PROCEDURE — 1101F PR PT FALLS ASSESS DOC 0-1 FALLS W/OUT INJ PAST YR: ICD-10-PCS | Mod: CPTII,S$GLB,, | Performed by: INTERNAL MEDICINE

## 2020-01-21 PROCEDURE — 99214 PR OFFICE/OUTPT VISIT, EST, LEVL IV, 30-39 MIN: ICD-10-PCS | Mod: 25,S$GLB,, | Performed by: INTERNAL MEDICINE

## 2020-01-21 PROCEDURE — 99999 PR PBB SHADOW E&M-EST. PATIENT-LVL III: CPT | Mod: PBBFAC,,, | Performed by: INTERNAL MEDICINE

## 2020-01-21 PROCEDURE — 81002 POCT URINE DIPSTICK WITHOUT MICROSCOPE: ICD-10-PCS | Mod: S$GLB,,, | Performed by: INTERNAL MEDICINE

## 2020-01-21 PROCEDURE — 99214 OFFICE O/P EST MOD 30 MIN: CPT | Mod: 25,S$GLB,, | Performed by: INTERNAL MEDICINE

## 2020-01-21 RX ORDER — PHENAZOPYRIDINE HYDROCHLORIDE 100 MG/1
100 TABLET, FILM COATED ORAL 3 TIMES DAILY PRN
Qty: 15 TABLET | Refills: 1 | Status: SHIPPED | OUTPATIENT
Start: 2020-01-21 | End: 2020-01-31

## 2020-01-21 RX ORDER — DIAZEPAM 10 MG/1
10 TABLET ORAL DAILY PRN
Qty: 30 TABLET | Refills: 1 | Status: SHIPPED | OUTPATIENT
Start: 2020-01-21 | End: 2020-05-08 | Stop reason: SDUPTHER

## 2020-01-21 RX ORDER — HYDROCHLOROTHIAZIDE 12.5 MG/1
12.5 CAPSULE ORAL DAILY
Qty: 90 CAPSULE | Refills: 2 | Status: SHIPPED | OUTPATIENT
Start: 2020-01-21 | End: 2020-10-05

## 2020-01-21 RX ORDER — TOBRAMYCIN AND DEXAMETHASONE 3; 1 MG/ML; MG/ML
1 SUSPENSION/ DROPS OPHTHALMIC EVERY 6 HOURS
Qty: 5 ML | Refills: 0 | Status: SHIPPED | OUTPATIENT
Start: 2020-01-21 | End: 2020-06-25

## 2020-01-21 NOTE — PROGRESS NOTES
Subjective:       Patient ID: Nick De Anda is a 79 y.o. male.    Chief Complaint: Follow-up (2 month follow up) and Back Pain (back and groin pain )    HPI  patient here for follow-up and refill medication he has been followed closely by Cardiology and arrythmology recently put on spironolactone patient claimed that it make him sick with nausea vomiting his heart go back into AFib patient stop medication feeling better but resume again today so far no reaction he also had chronic anxiety that his take Valium once in a while to help to relax avoid a tachycardia and AFib he also complained of some pelvic discomfort in the groin and mild dysuria but no hematuria no fever chill a deny constipation or diarrhea a diabetes is fairly control last hemoglobin A1c only 5.2 for health maintenance patient up-to-date except shingles vaccine  Review of Systems    Objective:      Physical Exam   Constitutional: He is oriented to person, place, and time. He appears well-developed and well-nourished. No distress.   HENT:   Head: Normocephalic and atraumatic.   Right Ear: External ear normal.   Left Ear: External ear normal.   Nose: Nose normal.   Mouth/Throat: Oropharynx is clear and moist. No oropharyngeal exudate.   Eyes: Pupils are equal, round, and reactive to light. Conjunctivae and EOM are normal. Right eye exhibits no discharge. Left eye exhibits no discharge.   Neck: Normal range of motion. Neck supple. No thyromegaly present.   Cardiovascular: Normal rate, regular rhythm, normal heart sounds and intact distal pulses. Exam reveals no gallop and no friction rub.   No murmur heard.  Pulses:       Dorsalis pedis pulses are 1+ on the right side, and 1+ on the left side.        Posterior tibial pulses are 1+ on the right side, and 1+ on the left side.   Pulmonary/Chest: Effort normal and breath sounds normal. No respiratory distress. He has no wheezes. He has no rales. He exhibits no tenderness.   Abdominal: Soft. Bowel  sounds are normal. He exhibits no distension. There is no tenderness. There is no rebound and no guarding.   Musculoskeletal: Normal range of motion. He exhibits no edema or tenderness.        Right foot: There is deformity (Right great toe overlap 2nd and 3rd toes and hammertoes). There is normal range of motion.        Left foot: There is normal range of motion and no deformity.   Feet:   Right Foot:   Protective Sensation: 3 sites tested. 3 sites sensed.   Skin Integrity: Negative for ulcer, blister, skin breakdown, erythema or callus.   Left Foot:   Protective Sensation: 3 sites tested. 3 sites sensed.   Skin Integrity: Negative for ulcer, blister, skin breakdown, erythema or callus.   Lymphadenopathy:     He has no cervical adenopathy.   Neurological: He is alert and oriented to person, place, and time.   Skin: Skin is warm and dry. Capillary refill takes less than 2 seconds. No rash noted. No erythema.   Psychiatric: He has a normal mood and affect. Judgment and thought content normal.   Nursing note and vitals reviewed.      Assessment:       1. Dysuria    2. Anxiety    3. Paroxysmal atrial fibrillation    4. Essential hypertension    5. Type 2 diabetes mellitus without complication, without long-term current use of insulin    6. Conjunctivitis of right eye, unspecified conjunctivitis type    7. Encounter for diabetic foot exam        Plan:       Dysuria  Comments:  UA normal will treat with Pyridium p.r.n.  Orders:  -     POCT urine dipstick without microscope  -     phenazopyridine (PYRIDIUM) 100 MG tablet; Take 1 tablet (100 mg total) by mouth 3 (three) times daily as needed for Pain.  Dispense: 15 tablet; Refill: 1    Anxiety  Comments:  will try valium again since pt did well discuss SSRI but also concern with cardiac drug interactions  Orders:  -     diazePAM (VALIUM) 10 MG Tab; Take 1 tablet (10 mg total) by mouth daily as needed.  Dispense: 30 tablet; Refill: 1    Paroxysmal atrial  fibrillation  Comments:  With control heart rate continue with treatment and anticoagulation    Essential hypertension  -     hydroCHLOROthiazide (MICROZIDE) 12.5 mg capsule; Take 1 capsule (12.5 mg total) by mouth once daily.  Dispense: 90 capsule; Refill: 2    Type 2 diabetes mellitus without complication, without long-term current use of insulin  Comments:  Currently well control continue with treatment    Conjunctivitis of right eye, unspecified conjunctivitis type  -     tobramycin-dexamethasone 0.3-0.1% (TOBRADEX) 0.3-0.1 % DrpS; Place 1 drop into the right eye every 6 (six) hours.  Dispense: 5 mL; Refill: 0    Encounter for diabetic foot exam  Comments:  Deformity of the right foot big toe overlap 2nd toe and hammertoe

## 2020-01-22 ENCOUNTER — PATIENT MESSAGE (OUTPATIENT)
Dept: CARDIOLOGY | Facility: CLINIC | Age: 80
End: 2020-01-22

## 2020-01-27 ENCOUNTER — PATIENT MESSAGE (OUTPATIENT)
Dept: CARDIOLOGY | Facility: CLINIC | Age: 80
End: 2020-01-27

## 2020-01-28 ENCOUNTER — PATIENT MESSAGE (OUTPATIENT)
Dept: CARDIOLOGY | Facility: CLINIC | Age: 80
End: 2020-01-28

## 2020-01-28 ENCOUNTER — TELEPHONE (OUTPATIENT)
Dept: CARDIOLOGY | Facility: CLINIC | Age: 80
End: 2020-01-28

## 2020-01-28 NOTE — TELEPHONE ENCOUNTER
I called Mr. De Anda if he is not able to send over the B/p reading he will fax it over to the office.    Martha

## 2020-01-29 DIAGNOSIS — I10 ESSENTIAL HYPERTENSION: ICD-10-CM

## 2020-01-29 DIAGNOSIS — Z98.61 POST PTCA: ICD-10-CM

## 2020-01-29 RX ORDER — IRBESARTAN 150 MG/1
TABLET ORAL
Refills: 0 | OUTPATIENT
Start: 2020-01-29

## 2020-01-29 RX ORDER — CLOPIDOGREL BISULFATE 75 MG/1
TABLET ORAL
Refills: 0 | OUTPATIENT
Start: 2020-01-29

## 2020-01-31 ENCOUNTER — PATIENT MESSAGE (OUTPATIENT)
Dept: CARDIOLOGY | Facility: CLINIC | Age: 80
End: 2020-01-31

## 2020-02-06 ENCOUNTER — LAB VISIT (OUTPATIENT)
Dept: LAB | Facility: HOSPITAL | Age: 80
End: 2020-02-06
Payer: MEDICARE

## 2020-02-06 DIAGNOSIS — I10 BENIGN ESSENTIAL HTN: ICD-10-CM

## 2020-02-06 LAB
ANION GAP SERPL CALC-SCNC: 10 MMOL/L (ref 8–16)
BUN SERPL-MCNC: 21 MG/DL (ref 8–23)
CALCIUM SERPL-MCNC: 9.5 MG/DL (ref 8.7–10.5)
CHLORIDE SERPL-SCNC: 104 MMOL/L (ref 95–110)
CO2 SERPL-SCNC: 26 MMOL/L (ref 23–29)
CREAT SERPL-MCNC: 1 MG/DL (ref 0.5–1.4)
EST. GFR  (AFRICAN AMERICAN): >60 ML/MIN/1.73 M^2
EST. GFR  (NON AFRICAN AMERICAN): >60 ML/MIN/1.73 M^2
GLUCOSE SERPL-MCNC: 123 MG/DL (ref 70–110)
POTASSIUM SERPL-SCNC: 4.3 MMOL/L (ref 3.5–5.1)
SODIUM SERPL-SCNC: 140 MMOL/L (ref 136–145)

## 2020-02-06 PROCEDURE — 80048 BASIC METABOLIC PNL TOTAL CA: CPT

## 2020-02-06 PROCEDURE — 36415 COLL VENOUS BLD VENIPUNCTURE: CPT | Mod: PN

## 2020-03-02 ENCOUNTER — HOSPITAL ENCOUNTER (OUTPATIENT)
Dept: RADIOLOGY | Facility: OTHER | Age: 80
Discharge: HOME | End: 2020-03-02
Attending: OTOLARYNGOLOGY
Payer: MEDICARE

## 2020-03-02 DIAGNOSIS — R13.10 DYSPHAGIA: ICD-10-CM

## 2020-03-02 PROCEDURE — 74220 X-RAY XM ESOPHAGUS 1CNTRST: CPT | Mod: 26,,, | Performed by: RADIOLOGY

## 2020-03-02 PROCEDURE — 25500020 PHARM REV CODE 255: Performed by: OTOLARYNGOLOGY

## 2020-03-02 PROCEDURE — 74220 FL ESOPHAGRAM COMPLETE: ICD-10-PCS | Mod: 26,,, | Performed by: RADIOLOGY

## 2020-03-02 PROCEDURE — 74220 X-RAY XM ESOPHAGUS 1CNTRST: CPT | Mod: TC

## 2020-03-02 PROCEDURE — A9698 NON-RAD CONTRAST MATERIALNOC: HCPCS | Performed by: OTOLARYNGOLOGY

## 2020-03-02 RX ADMIN — BARIUM SULFATE 250 ML: 0.6 SUSPENSION ORAL at 11:03

## 2020-03-08 ENCOUNTER — PATIENT MESSAGE (OUTPATIENT)
Dept: CARDIOLOGY | Facility: CLINIC | Age: 80
End: 2020-03-08

## 2020-03-09 ENCOUNTER — PATIENT OUTREACH (OUTPATIENT)
Dept: ADMINISTRATIVE | Facility: HOSPITAL | Age: 80
End: 2020-03-09

## 2020-03-10 ENCOUNTER — PATIENT MESSAGE (OUTPATIENT)
Dept: ELECTROPHYSIOLOGY | Facility: CLINIC | Age: 80
End: 2020-03-10

## 2020-03-10 DIAGNOSIS — Z98.61 POST PTCA: ICD-10-CM

## 2020-03-10 RX ORDER — CLOPIDOGREL BISULFATE 75 MG/1
75 TABLET ORAL DAILY
Qty: 90 TABLET | Refills: 3 | Status: SHIPPED | OUTPATIENT
Start: 2020-03-10 | End: 2021-03-08

## 2020-03-10 RX ORDER — INSULIN PUMP SYRINGE, 3 ML
EACH MISCELLANEOUS
Qty: 1 EACH | Refills: 0 | OUTPATIENT
Start: 2020-03-10

## 2020-03-10 NOTE — PROGRESS NOTES
Immunizations reviewed. Legacy reviewed. Care Everywhere reviewed. N Provider Portal reviewed. Portal message sent to patient. Pre-visit chart review completed.

## 2020-03-18 RX ORDER — OMEPRAZOLE 40 MG/1
1 CAPSULE, DELAYED RELEASE ORAL DAILY
COMMUNITY
Start: 2020-02-22 | End: 2020-07-05

## 2020-03-24 ENCOUNTER — OFFICE VISIT (OUTPATIENT)
Dept: PRIMARY CARE CLINIC | Facility: CLINIC | Age: 80
End: 2020-03-24
Payer: MEDICARE

## 2020-03-24 ENCOUNTER — TELEPHONE (OUTPATIENT)
Dept: PRIMARY CARE CLINIC | Facility: CLINIC | Age: 80
End: 2020-03-24

## 2020-03-24 VITALS
DIASTOLIC BLOOD PRESSURE: 70 MMHG | RESPIRATION RATE: 18 BRPM | TEMPERATURE: 98 F | SYSTOLIC BLOOD PRESSURE: 150 MMHG | OXYGEN SATURATION: 97 % | WEIGHT: 284.75 LBS | HEIGHT: 76 IN | HEART RATE: 71 BPM | BODY MASS INDEX: 34.67 KG/M2

## 2020-03-24 DIAGNOSIS — I48.0 PAROXYSMAL ATRIAL FIBRILLATION: ICD-10-CM

## 2020-03-24 DIAGNOSIS — D69.6 THROMBOCYTOPENIA: ICD-10-CM

## 2020-03-24 DIAGNOSIS — I10 ESSENTIAL HYPERTENSION: ICD-10-CM

## 2020-03-24 DIAGNOSIS — E11.9 TYPE 2 DIABETES MELLITUS WITHOUT COMPLICATION, WITHOUT LONG-TERM CURRENT USE OF INSULIN: ICD-10-CM

## 2020-03-24 DIAGNOSIS — N39.0 RECURRENT UTI: ICD-10-CM

## 2020-03-24 DIAGNOSIS — J40 BRONCHITIS: Primary | ICD-10-CM

## 2020-03-24 PROCEDURE — 99999 PR PBB SHADOW E&M-EST. PATIENT-LVL III: CPT | Mod: PBBFAC,,, | Performed by: INTERNAL MEDICINE

## 2020-03-24 PROCEDURE — 1126F AMNT PAIN NOTED NONE PRSNT: CPT | Mod: S$GLB,,, | Performed by: INTERNAL MEDICINE

## 2020-03-24 PROCEDURE — 99213 PR OFFICE/OUTPT VISIT, EST, LEVL III, 20-29 MIN: ICD-10-PCS | Mod: 25,S$GLB,, | Performed by: INTERNAL MEDICINE

## 2020-03-24 PROCEDURE — 1126F PR PAIN SEVERITY QUANTIFIED, NO PAIN PRESENT: ICD-10-PCS | Mod: S$GLB,,, | Performed by: INTERNAL MEDICINE

## 2020-03-24 PROCEDURE — 81002 URINALYSIS NONAUTO W/O SCOPE: CPT | Mod: S$GLB,,, | Performed by: INTERNAL MEDICINE

## 2020-03-24 PROCEDURE — 99213 OFFICE O/P EST LOW 20 MIN: CPT | Mod: 25,S$GLB,, | Performed by: INTERNAL MEDICINE

## 2020-03-24 PROCEDURE — 81002 POCT URINE DIPSTICK WITHOUT MICROSCOPE: ICD-10-PCS | Mod: S$GLB,,, | Performed by: INTERNAL MEDICINE

## 2020-03-24 PROCEDURE — 99499 UNLISTED E&M SERVICE: CPT | Mod: S$GLB,,, | Performed by: INTERNAL MEDICINE

## 2020-03-24 PROCEDURE — 99999 PR PBB SHADOW E&M-EST. PATIENT-LVL III: ICD-10-PCS | Mod: PBBFAC,,, | Performed by: INTERNAL MEDICINE

## 2020-03-24 PROCEDURE — 1101F PR PT FALLS ASSESS DOC 0-1 FALLS W/OUT INJ PAST YR: ICD-10-PCS | Mod: CPTII,S$GLB,, | Performed by: INTERNAL MEDICINE

## 2020-03-24 PROCEDURE — 3077F SYST BP >= 140 MM HG: CPT | Mod: CPTII,S$GLB,, | Performed by: INTERNAL MEDICINE

## 2020-03-24 PROCEDURE — 3077F PR MOST RECENT SYSTOLIC BLOOD PRESSURE >= 140 MM HG: ICD-10-PCS | Mod: CPTII,S$GLB,, | Performed by: INTERNAL MEDICINE

## 2020-03-24 PROCEDURE — 1159F MED LIST DOCD IN RCRD: CPT | Mod: S$GLB,,, | Performed by: INTERNAL MEDICINE

## 2020-03-24 PROCEDURE — 3078F PR MOST RECENT DIASTOLIC BLOOD PRESSURE < 80 MM HG: ICD-10-PCS | Mod: CPTII,S$GLB,, | Performed by: INTERNAL MEDICINE

## 2020-03-24 PROCEDURE — 3078F DIAST BP <80 MM HG: CPT | Mod: CPTII,S$GLB,, | Performed by: INTERNAL MEDICINE

## 2020-03-24 PROCEDURE — 1159F PR MEDICATION LIST DOCUMENTED IN MEDICAL RECORD: ICD-10-PCS | Mod: S$GLB,,, | Performed by: INTERNAL MEDICINE

## 2020-03-24 PROCEDURE — 1101F PT FALLS ASSESS-DOCD LE1/YR: CPT | Mod: CPTII,S$GLB,, | Performed by: INTERNAL MEDICINE

## 2020-03-24 PROCEDURE — 99499 RISK ADDL DX/OHS AUDIT: ICD-10-PCS | Mod: S$GLB,,, | Performed by: INTERNAL MEDICINE

## 2020-03-24 NOTE — TELEPHONE ENCOUNTER
Spoke to patient and he rescheduled for later same day to come in the office for a visit. Patient had problems with the brandon with his android devices.

## 2020-03-24 NOTE — TELEPHONE ENCOUNTER
----- Message from Melissa Childs sent at 3/24/2020  1:47 PM CDT -----  Contact: pt @ [phone]  Calling to speak with someone regarding his video appt with Dr. Adler, [facility] not sure what doctor's office.please call.

## 2020-03-24 NOTE — PROGRESS NOTES
Subjective:       Patient ID: Nick De Anda is a 79 y.o. male.    Chief Complaint: Follow-up    HPI  patient is here for follow-up 6 weeks ago he was seen by ENT for bad cold with runny nose congestion patchy at night he was given antibiotic doxycycline which helped to control his sinus congestion mucus but his still having mucus from his lung patchy a he wake up in morning he had to put a warm compress on his chest in order to cough up on the thick white mucous he deny fever night sweats weight loss body ache chill nausea vomiting or diarrhea his still have the dyspnea with exertion he has CT scan of the sinuses by ENT negative also put on Prilosec for acid reflux yet history of recurrent UTI but currently does not have any symptom will repeat UA to make sure  Review of Systems    Objective:      Physical Exam   Constitutional: He is oriented to person, place, and time. He appears well-developed and well-nourished. No distress.   Overweight   HENT:   Head: Normocephalic and atraumatic.   Right Ear: External ear normal.   Left Ear: External ear normal.   Nose: Nose normal.   Mouth/Throat: Oropharynx is clear and moist. No oropharyngeal exudate.   Eyes: Pupils are equal, round, and reactive to light. Conjunctivae and EOM are normal. Right eye exhibits no discharge. Left eye exhibits no discharge.   Neck: Normal range of motion. Neck supple. No thyromegaly present.   Cardiovascular: Normal rate, regular rhythm, normal heart sounds and intact distal pulses. Exam reveals no gallop and no friction rub.   No murmur heard.  Pulmonary/Chest: Effort normal. No respiratory distress. He has no wheezes. He has rales (Crackles the left lower lung with inspiration). He exhibits no tenderness.   Abdominal: Soft. Bowel sounds are normal. He exhibits no distension. There is no tenderness. There is no rebound and no guarding.   Musculoskeletal: Normal range of motion. He exhibits no edema, tenderness or deformity.    Lymphadenopathy:     He has no cervical adenopathy.   Neurological: He is alert and oriented to person, place, and time.   Skin: Skin is warm and dry. Capillary refill takes less than 2 seconds. No rash noted. No erythema.   Psychiatric: He has a normal mood and affect. Judgment and thought content normal.   Nursing note and vitals reviewed.      Assessment:       1. Bronchitis    2. Recurrent UTI    3. Essential hypertension    4. Type 2 diabetes mellitus without complication, without long-term current use of insulin    5. Thrombocytopenia    6. Paroxysmal atrial fibrillation        Plan:       Bronchitis  Comments:  Will treat patient a the lab results and chest x-ray  Orders:  -     X-Ray Chest PA And Lateral; Future; Expected date: 03/24/2020    Recurrent UTI  -     POCT urine dipstick without microscope    Essential hypertension  -     CBC auto differential; Future; Expected date: 03/24/2020  -     Comprehensive metabolic panel; Future; Expected date: 03/24/2020  -     Lipid panel; Future; Expected date: 03/24/2020    Type 2 diabetes mellitus without complication, without long-term current use of insulin  -     Lipid panel; Future; Expected date: 03/24/2020  -     Hemoglobin A1c; Future; Expected date: 03/24/2020    Thrombocytopenia  Comments:  Patient had no bruises or bleeding will check platelet count today    Paroxysmal atrial fibrillation  Comments:  Currently in a regular rhythm with controlled rate continue with sotalol and Xarelto

## 2020-03-24 NOTE — TELEPHONE ENCOUNTER
----- Message from Laurie Kincaid sent at 3/24/2020  1:41 PM CDT -----  Contact: Self   Pt could not get on website for appt due error of browers does not support. Please call and advise.

## 2020-03-25 ENCOUNTER — TELEPHONE (OUTPATIENT)
Dept: PRIMARY CARE CLINIC | Facility: CLINIC | Age: 80
End: 2020-03-25

## 2020-03-25 DIAGNOSIS — R05.3 COUGH, PERSISTENT: ICD-10-CM

## 2020-03-25 DIAGNOSIS — R93.89 ABNORMAL CXR: Primary | ICD-10-CM

## 2020-03-25 NOTE — TELEPHONE ENCOUNTER
----- Message from Myra Albarado sent at 3/25/2020 11:21 AM CDT -----  Contact: self   Patient is returning a phone call.  Who left a message for the patient: Flowre  Does patient know what this is regarding:  Results of chest xray  Comments:

## 2020-03-25 NOTE — TELEPHONE ENCOUNTER
Spoke with pt. Regarding abnormal Chest X-ray results, STAT CT scan scheduled for Friday 3/27/20 pt. Verbalized understanding

## 2020-03-25 NOTE — TELEPHONE ENCOUNTER
----- Message from Veronica Vazquez sent at 3/25/2020 11:25 AM CDT -----  Contact: self       ----- Message -----  From: Myra Albarado  Sent: 3/25/2020  11:21 AM CDT  To: Vitor Henderson Staff    Patient is returning a phone call.  Who left a message for the patient: Flower  Does patient know what this is regarding:  Results of chest xray  Comments:

## 2020-03-30 ENCOUNTER — TELEPHONE (OUTPATIENT)
Dept: PRIMARY CARE CLINIC | Facility: CLINIC | Age: 80
End: 2020-03-30

## 2020-03-30 DIAGNOSIS — R05.3 COUGH, PERSISTENT: Primary | ICD-10-CM

## 2020-03-30 DIAGNOSIS — J92.9 PLEURAL PLAQUE: ICD-10-CM

## 2020-03-30 DIAGNOSIS — J47.9 BRONCHIECTASIS WITHOUT COMPLICATION: ICD-10-CM

## 2020-03-30 NOTE — TELEPHONE ENCOUNTER
----- Message from Becky Reece sent at 3/30/2020  4:53 PM CDT -----  Contact: patient  Please call above patient at [phone] returning a call to the office waiting on a call back thanks.

## 2020-03-31 ENCOUNTER — TELEPHONE (OUTPATIENT)
Dept: PULMONOLOGY | Facility: CLINIC | Age: 80
End: 2020-03-31

## 2020-03-31 NOTE — TELEPHONE ENCOUNTER
Spoke to the pt who says my chart video visits are not compatible. He was informed that we are currently working on finding a way to get him a visit.

## 2020-03-31 NOTE — TELEPHONE ENCOUNTER
Spoke to the pt who is going to download the brandon to his wife phone for the visit and if he has any issues he will contact the office for assistance.

## 2020-04-07 ENCOUNTER — PATIENT OUTREACH (OUTPATIENT)
Dept: ADMINISTRATIVE | Facility: OTHER | Age: 80
End: 2020-04-07

## 2020-04-07 DIAGNOSIS — E11.9 TYPE 2 DIABETES MELLITUS WITHOUT COMPLICATION, WITHOUT LONG-TERM CURRENT USE OF INSULIN: Primary | ICD-10-CM

## 2020-04-08 ENCOUNTER — TELEPHONE (OUTPATIENT)
Dept: PULMONOLOGY | Facility: CLINIC | Age: 80
End: 2020-04-08

## 2020-04-08 ENCOUNTER — OFFICE VISIT (OUTPATIENT)
Dept: PULMONOLOGY | Facility: CLINIC | Age: 80
End: 2020-04-08
Payer: MEDICARE

## 2020-04-08 DIAGNOSIS — R05.3 COUGH, PERSISTENT: ICD-10-CM

## 2020-04-08 DIAGNOSIS — J92.9 PLEURAL PLAQUE: ICD-10-CM

## 2020-04-08 DIAGNOSIS — J47.9 BRONCHIECTASIS WITHOUT COMPLICATION: ICD-10-CM

## 2020-04-08 PROCEDURE — 99204 PR OFFICE/OUTPT VISIT, NEW, LEVL IV, 45-59 MIN: ICD-10-PCS | Mod: 95,,, | Performed by: NURSE PRACTITIONER

## 2020-04-08 PROCEDURE — 99204 OFFICE O/P NEW MOD 45 MIN: CPT | Mod: 95,,, | Performed by: NURSE PRACTITIONER

## 2020-04-08 NOTE — PROGRESS NOTES
The patient location is: Home  The chief complaint leading to consultation is: Abnormal CT, Cough , pleural Plaque.   Visit type: Virtual visit with synchronous audio and video  Total time spent with patient: Appx 30 mins.   Each patient to whom he or she provides medical services by telemedicine is:  (1) informed of the relationship between the physician and patient and the respective role of any other health care provider with respect to management of the patient; and (2) notified that he or she may decline to receive medical services by telemedicine and may withdraw from such care at any time.    Subjective:       Patient ID: Nick De Anda is a 79 y.o. male.    Chief Complaint: Abnormal CT, Cough , pleural Plaque.   HPI   Mr. De Anda is a 79 year old M presenting for virtual visitation in relation to abnormal CT of chest and cough. Wife was also present for visitation as Mr. De Anda is hard of hearing. Referred to pulmonary services as per PCP. Reports having ongoing cough for months. States was following with ENT and treated with doxy. Tells remains with cough and told maybe acid reflux- has not followed with GI. Currently on Prilosec. States can experience dyspnea on exertion, wheezing, cough with green mucus at times. Denies hx of lung disease. Reports smoking hx of appx 20 years smoking less than 0.5 packs a day- quit smoking 40 years ago. Had triple bypass in 2013. Denies fever, chills or night sweats. Reports personal hx of skin CA (pre cancer lesion) to scalp.    PCP ordered CT of chest which showed some abnormality.       Additional Pulmonary History:   Occupational/Environmental Exposures: Reports asbetosis exposure.  Explains worked aviation maintenance for years  Exposure to Animals/Pets: 2 dogs for 14 years  Travel History: Denies  History of exposures to TB: Denies   Family History of Lung Cancer: Denies  Childhood history of Lung Disease: Denies  Social History     Tobacco Use   Smoking Status  Former Smoker    Last attempt to quit: 10/19/1979    Years since quittin.5   Smokeless Tobacco Never Used      Review of Systems   Constitutional: Negative for fever, chills, weight loss and night sweats.   HENT: Positive for postnasal drip and congestion. Negative for rhinorrhea and trouble swallowing.    Respiratory: Positive for cough and dyspnea on extertion. Negative for hemoptysis, sputum production, choking, chest tightness, wheezing and use of rescue inhaler.    Cardiovascular: Positive for palpitations (Reports hx of AFIB. ). Negative for chest pain and leg swelling.   Musculoskeletal: Negative for joint swelling.   Skin: Negative for rash.   Gastrointestinal: Positive for acid reflux. Negative for nausea.   Neurological: Negative for dizziness and light-headedness.   Hematological: Excessive bruising (Reports on blood thinner. ).   Psychiatric/Behavioral: Negative for sleep disturbance.       Objective:      There were no vitals filed for this visit.   Physical Exam   Constitutional: He is oriented to person, place, and time. No distress.   Able to speak in complete sentences.   Patient was hard of hearing.    Neurological: He is alert and oriented to person, place, and time.   Psychiatric: He has a normal mood and affect. His behavior is normal. Judgment and thought content normal.     Personal Diagnostic Review    Echo 2019-  · Mild left atrial enlargement.  · Mild right ventricular enlargement.  · Moderate concentric left ventricular hypertrophy.  · Mildly decreased left ventricular systolic function. The estimated ejection fraction is 45%  · Grade I (mild) left ventricular diastolic dysfunction consistent with impaired relaxation.  · Local segmental wall motion abnormalities.  · Septal wall has abnormal motion.  · Mild-to-moderate mitral regurgitation.  · Mild to moderate tricuspid regurgitation.    CT Chest Without Contrast 3/27/2020     Impression       Calcified and noncalcified pleural  plaque in the left hemithorax.    Asymmetrical prominence of the left 1st costochondral junction corresponding to the findings on the recent chest x-ray.    Traction bronchiectasis and atelectasis or parenchymal fibrosis in the posteromedial aspect of the left lower lobe inferiorly.     Espogram 3/2/2020-  Impression       Moderate dysmotility mid to distal esophagus with tertiary contractions.    Otherwise unremarkable limited esophagram as detailed above.  Clinical correlation and consideration for further evaluation with endoscopy advised         Assessment:       1. Cough, persistent    2. Pleural plaque    3. Bronchiectasis without complication        Outpatient Encounter Medications as of 4/8/2020   Medication Sig Dispense Refill    atorvastatin (LIPITOR) 40 MG tablet Take 1 tablet (40 mg total) by mouth once daily. 90 tablet 3    blood-glucose meter kit Use as instructed 1 each 0    clopidogreL (PLAVIX) 75 mg tablet Take 1 tablet (75 mg total) by mouth once daily. 90 tablet 3    diazePAM (VALIUM) 10 MG Tab Take 1 tablet (10 mg total) by mouth daily as needed. 30 tablet 1    hydroCHLOROthiazide (MICROZIDE) 12.5 mg capsule Take 1 capsule (12.5 mg total) by mouth once daily. 90 capsule 2    irbesartan (AVAPRO) 300 MG tablet Take 1 tablet (300 mg total) by mouth every evening. 90 tablet 3    metFORMIN (GLUCOPHAGE-XR) 500 MG 24 hr tablet Take 1 tablet (500 mg total) by mouth 2 (two) times daily with meals. 180 tablet 3    MULTIVITAMIN W-MINERALS/LUTEIN (CENTRUM SILVER ORAL) Take by mouth once daily.      nitroGLYCERIN (NITROSTAT) 0.4 MG SL tablet Place 1 tablet (0.4 mg total) under the tongue every 5 (five) minutes as needed for Chest pain. 50 tablet 3    nystatin (MYCOSTATIN) cream Apply 45 g topically 2 (two) times daily.      omeprazole (PRILOSEC) 40 MG capsule Take 1 capsule by mouth once daily.      sotalol (BETAPACE) 80 MG tablet Take 1 tablet (80 mg total) by mouth every 12 (twelve) hours. 60  tablet 11    spironolactone (ALDACTONE) 50 MG tablet Take 1 tablet (50 mg total) by mouth once daily. 30 tablet 11    tobramycin-dexamethasone 0.3-0.1% (TOBRADEX) 0.3-0.1 % DrpS Place 1 drop into the right eye every 6 (six) hours. 5 mL 0    XARELTO 20 mg Tab Take 1 tablet (20 mg total) by mouth every evening. 30 tablet 11     Facility-Administered Encounter Medications as of 4/8/2020   Medication Dose Route Frequency Provider Last Rate Last Dose    0.9%  NaCl infusion   Intravenous Continuous Rebecca Dickinson NP        sodium chloride 0.9% flush 5 mL  5 mL Intravenous PRN Rebecca Dickinson NP         No orders of the defined types were placed in this encounter.    Plan:       Problem List Items Addressed This Visit        Pulmonary    Bronchiectasis without complication    Current Assessment & Plan     Reviewed CT of chest and collaborated with Dr. Acuna. No evidence for chronic cough when reviewing CT of chest.   Has pleural plaques which show rounds atelectasis  to left lower lobe. No bronchiectasis noted. Discussed these finding are benign.             Pleural plaque    Overview     Calcified and noncalcified pleural plaque in the left hemithorax.         Current Assessment & Plan     Noted on CT of chest this is a benign finding.          Cough, persistent    Current Assessment & Plan     Cough is likely from Upper airway or GI related. Esophogram showed moderate dysmotility. I encouraged he keeps GI appt. Continue with Prilosec. Treat sinus with topical nasal steroid, nasal saline solution and daily antihistamine (provided AVS handout).     Consider PFTs and pulmonary stress test if no improvement with cough after treating acid reflux and sinus.              Follow up in 2 months.     This note is dictated on M*Modal word recognition program.  There are word recognition mistakes that are occasionally missed on review.

## 2020-04-08 NOTE — ASSESSMENT & PLAN NOTE
Reviewed CT of chest and collaborated with Dr. Acuna. No evidence for chronic cough when reviewing CT of chest.   Has pleural plaques which show rounds atelectasis  to left lower lobe. No bronchiectasis noted. Discussed these finding are benign.

## 2020-04-08 NOTE — ASSESSMENT & PLAN NOTE
Cough is likely from Upper airway or GI related. Esophogram showed moderate dysmotility. I encouraged he keeps GI appt. Continue with Prilosec. Treat sinus with topical nasal steroid, nasal saline solution and daily antihistamine (provided AVS handout).     Consider PFTs and pulmonary stress test if no improvement with cough after treating acid reflux and sinus.

## 2020-04-08 NOTE — PATIENT INSTRUCTIONS
Nasal Saline:       A. Tilt head back and squirt into nostril 2-3 times until you taste saline in back of throat. Spit, and blow nose. Do this 4 times, alternating right and left nostril. Do this routine 2-3 times per day- at least once in the shower.    Gargle with warm salt water 2-3 times per day. About 1 cup, fairly warm, fairly salty    Benadryl 25mg at night    Flonase 2 sprays each nostril daily at night.     Follow up in 2 months.

## 2020-04-08 NOTE — TELEPHONE ENCOUNTER
----- Message from Arlene Romero sent at 4/8/2020  9:48 AM CDT -----  Pt called stated he have his phone working now.  He is ready for his VV at 3pm today

## 2020-04-08 NOTE — LETTER
April 13, 2020      Santiago Adler MD  [address] [place] [address]           [facility]  [address], [facility] [address]  [place] [address]  Phone: [phone]  Fax: [phone]          Patient: Nick De Anda   MR Number: [id number]   YOB: 1940   Date of Visit: 4/8/2020       Dear Dr. Santiago Adler:    Thank you for referring Nick De Anda to me for evaluation. Attached you will find relevant portions of my assessment and plan of care.    If you have questions, please do not hesitate to call me. I look forward to following Nick De Anda along with you.    Sincerely,    Ann Mcconnell NP    Enclosure  CC:  No Recipients    If you would like to receive this communication electronically, please contact [email or web address] or [phone] to request more information on [employer] access.    For providers and/or their staff who would like to refer a patient to [facility], please contact us through our one-stop-shop provider referral line, [facility], at [phone].    If you feel you have received this communication in error or would no longer like to receive these types of communications, please e-mail [email or web address]

## 2020-04-19 ENCOUNTER — PATIENT MESSAGE (OUTPATIENT)
Dept: ADMINISTRATIVE | Facility: OTHER | Age: 80
End: 2020-04-19

## 2020-05-05 RX ORDER — METFORMIN HYDROCHLORIDE 500 MG/1
TABLET, EXTENDED RELEASE ORAL
Qty: 180 TABLET | Refills: 0 | Status: SHIPPED | OUTPATIENT
Start: 2020-05-05 | End: 2020-06-19

## 2020-05-07 ENCOUNTER — PATIENT MESSAGE (OUTPATIENT)
Dept: PRIMARY CARE CLINIC | Facility: CLINIC | Age: 80
End: 2020-05-07

## 2020-05-08 ENCOUNTER — OFFICE VISIT (OUTPATIENT)
Dept: PRIMARY CARE CLINIC | Facility: CLINIC | Age: 80
End: 2020-05-08
Payer: MEDICARE

## 2020-05-08 ENCOUNTER — TELEPHONE (OUTPATIENT)
Dept: PRIMARY CARE CLINIC | Facility: CLINIC | Age: 80
End: 2020-05-08

## 2020-05-08 DIAGNOSIS — J47.9 BRONCHIECTASIS WITHOUT COMPLICATION: Primary | ICD-10-CM

## 2020-05-08 DIAGNOSIS — I48.0 PAROXYSMAL ATRIAL FIBRILLATION: ICD-10-CM

## 2020-05-08 DIAGNOSIS — F41.9 ANXIETY: ICD-10-CM

## 2020-05-08 DIAGNOSIS — J40 BRONCHITIS: ICD-10-CM

## 2020-05-08 PROCEDURE — 1101F PR PT FALLS ASSESS DOC 0-1 FALLS W/OUT INJ PAST YR: ICD-10-PCS | Mod: CPTII,95,, | Performed by: INTERNAL MEDICINE

## 2020-05-08 PROCEDURE — 99442 PR PHYSICIAN TELEPHONE EVALUATION 11-20 MIN: CPT | Mod: 95,,, | Performed by: INTERNAL MEDICINE

## 2020-05-08 PROCEDURE — 1101F PT FALLS ASSESS-DOCD LE1/YR: CPT | Mod: CPTII,95,, | Performed by: INTERNAL MEDICINE

## 2020-05-08 PROCEDURE — 99442 PR PHYSICIAN TELEPHONE EVALUATION 11-20 MIN: ICD-10-PCS | Mod: 95,,, | Performed by: INTERNAL MEDICINE

## 2020-05-08 PROCEDURE — 1159F MED LIST DOCD IN RCRD: CPT | Mod: 95,,, | Performed by: INTERNAL MEDICINE

## 2020-05-08 PROCEDURE — 1159F PR MEDICATION LIST DOCUMENTED IN MEDICAL RECORD: ICD-10-PCS | Mod: 95,,, | Performed by: INTERNAL MEDICINE

## 2020-05-08 RX ORDER — LEVALBUTEROL INHALATION SOLUTION 0.63 MG/3ML
1 SOLUTION RESPIRATORY (INHALATION) 4 TIMES DAILY PRN
Qty: 1 BOX | Refills: 3 | Status: SHIPPED | OUTPATIENT
Start: 2020-05-08 | End: 2021-03-09

## 2020-05-08 RX ORDER — DIAZEPAM 10 MG/1
10 TABLET ORAL DAILY PRN
Qty: 30 TABLET | Refills: 1 | Status: SHIPPED | OUTPATIENT
Start: 2020-05-08 | End: 2020-12-15 | Stop reason: SDUPTHER

## 2020-05-08 NOTE — TELEPHONE ENCOUNTER
----- Message from Elodia Hicks sent at 5/8/2020  2:59 PM CDT -----  Contact: Patient [phone]  Patient received medication but does not have the nebulizer.  Request call back.

## 2020-05-08 NOTE — PROGRESS NOTES
Subjective:       Patient ID: Nick De Anda is a 79 y.o. male.  The patient location is: home  The chief complaint leading to consultation is: coughing anxiety  Visit type: audio only  Total time spent with patient: 15 minutes  Each patient to whom he or she provides medical services by telemedicine is:  (1) informed of the relationship between the physician and patient and the respective role of any other health care provider with respect to management of the patient; and (2) notified that he or she may decline to receive medical services by telemedicine and may withdraw from such care at any time.    Notes:   Chief Complaint: No chief complaint on file.   Patient seen by telemedicine physical exam limited and vital signs not available  HPI patient is a follow-up visit he has a multiple medical problem including chronic atrial fibrillation anxiety hypertension diabetes and recently patient developed the chronic cough with the mucus passage in the morning when he wake up he had to get a heating pad to put on his chest to losing of the mucus to coughing now he doing better during the daytime he got CT scan of the chest does show bronchiectasis and already seen by pulmonology no new treatment except pulmonary function test and treat for acid reflux with patient already on omeprazole patient still have the same condition with copious mucus specially in the morning and the own wait for the mucus to come up is using a heating pad on his chest when he was in the hospital he was given at the [facility] treatment through a nebulizer and that really helped him a lot with the mucus in the short of breath patient is using the metered-dose inhaler currently without any relief  Review of Systems    Objective:      Physical Exam  patient is hard of hearing having some coughing during the conversation complained anxiety need refill on his medication that he take occasionally as needed he denies any physical pain deny chest pain  short of breath or palpitation  Assessment:       1. Bronchiectasis without complication    2. Anxiety    3. Bronchitis    4. Paroxysmal atrial fibrillation        Plan:       Bronchiectasis without complication  Comments:  Since the aerosol treatment help patient when he was in the hospital will try to get nebulizer and Xopenex since the patient have AFib for home use  Orders:  -     levalbuterol (XOPENEX) 0.63 mg/3 mL nebulizer solution; Take 3 mLs (0.63 mg total) by nebulization 4 (four) times daily as needed for Wheezing. Rescue  Dispense: 1 Box; Refill: 3  -     NEBULIZER FOR HOME USE  -     AFB CULTURE & SMEAR; Future; Expected date: 05/08/2020    Anxiety  Comments:  will try valium again since pt did well discuss SSRI but also concern with cardiac drug interactions  Orders:  -     diazePAM (VALIUM) 10 MG Tab; Take 1 tablet (10 mg total) by mouth daily as needed.  Dispense: 30 tablet; Refill: 1    Bronchitis  -     AFB CULTURE & SMEAR; Future; Expected date: 05/08/2020    Paroxysmal atrial fibrillation  Comments:  Continue with current treatment and anticoagulant Xarelto

## 2020-05-08 NOTE — TELEPHONE ENCOUNTER
Orders for Nebulizer Faxed to . Filled out MNF form. Faxed to [phone] Pt. Notified that orders have been faxed. Verbalized understanding.

## 2020-05-12 ENCOUNTER — TELEPHONE (OUTPATIENT)
Dept: PRIMARY CARE CLINIC | Facility: CLINIC | Age: 80
End: 2020-05-12

## 2020-05-12 ENCOUNTER — PATIENT MESSAGE (OUTPATIENT)
Dept: ELECTROPHYSIOLOGY | Facility: CLINIC | Age: 80
End: 2020-05-12

## 2020-05-12 ENCOUNTER — CLINICAL SUPPORT (OUTPATIENT)
Dept: PRIMARY CARE CLINIC | Facility: CLINIC | Age: 80
End: 2020-05-12
Payer: MEDICARE

## 2020-05-12 DIAGNOSIS — J40 BRONCHITIS: ICD-10-CM

## 2020-05-12 DIAGNOSIS — R05.3 COUGH, PERSISTENT: Primary | ICD-10-CM

## 2020-05-12 DIAGNOSIS — J47.9 BRONCHIECTASIS WITHOUT COMPLICATION: ICD-10-CM

## 2020-05-12 DIAGNOSIS — R05.3 COUGH, PERSISTENT: ICD-10-CM

## 2020-05-12 PROCEDURE — 87116 MYCOBACTERIA CULTURE: CPT

## 2020-05-12 PROCEDURE — 87206 SMEAR FLUORESCENT/ACID STAI: CPT

## 2020-05-12 PROCEDURE — 87070 CULTURE OTHR SPECIMN AEROBIC: CPT

## 2020-05-12 PROCEDURE — 87205 SMEAR GRAM STAIN: CPT

## 2020-05-12 PROCEDURE — 87015 SPECIMEN INFECT AGNT CONCNTJ: CPT

## 2020-05-14 ENCOUNTER — PATIENT MESSAGE (OUTPATIENT)
Dept: PRIMARY CARE CLINIC | Facility: CLINIC | Age: 80
End: 2020-05-14

## 2020-05-14 ENCOUNTER — OFFICE VISIT (OUTPATIENT)
Dept: PRIMARY CARE CLINIC | Facility: CLINIC | Age: 80
End: 2020-05-14
Payer: MEDICARE

## 2020-05-14 ENCOUNTER — TELEPHONE (OUTPATIENT)
Dept: PRIMARY CARE CLINIC | Facility: CLINIC | Age: 80
End: 2020-05-14

## 2020-05-14 DIAGNOSIS — B86 SCABIES INFESTATION: Primary | ICD-10-CM

## 2020-05-14 DIAGNOSIS — J47.9 BRONCHIECTASIS WITHOUT COMPLICATION: ICD-10-CM

## 2020-05-14 LAB
BACTERIA SPEC AEROBE CULT: NORMAL
GRAM STN SPEC: NORMAL

## 2020-05-14 PROCEDURE — 99442 PR PHYSICIAN TELEPHONE EVALUATION 11-20 MIN: CPT | Mod: 95,,, | Performed by: INTERNAL MEDICINE

## 2020-05-14 PROCEDURE — 99442 PR PHYSICIAN TELEPHONE EVALUATION 11-20 MIN: ICD-10-PCS | Mod: 95,,, | Performed by: INTERNAL MEDICINE

## 2020-05-14 RX ORDER — PERMETHRIN 50 MG/G
CREAM TOPICAL
Qty: 120 G | Refills: 1 | Status: SHIPPED | OUTPATIENT
Start: 2020-05-14 | End: 2020-06-25

## 2020-05-14 NOTE — TELEPHONE ENCOUNTER
Pt. Attached images of his mite bites via the pt. Portal pt. Has Audio Visit scheduled for 4:30 today

## 2020-05-14 NOTE — PROGRESS NOTES
Subjective:    The patient location is: home  The chief complaint leading to consultation is: skin rash  Visit type: audio only  Total time spent with patient: 12 minutes  Each patient to whom he or she provides medical services by telemedicine is:  (1) informed of the relationship between the physician and patient and the respective role of any other health care provider with respect to management of the patient; and (2) notified that he or she may decline to receive medical services by telemedicine and may withdraw from such care at any time.    Notes:    Patient ID: Nick De Anda is a 79 y.o. male.  Patient was seen by telemedicine physical exam limited vital sign not available  Chief Complaint: No chief complaint on file.    HPI  patient with complaint skin rash last week start in lower back severe itching patient has been scratching it now stressed to the chest and upper arms bilaterally and his wife also have a similar rash patient went to see his dermatologist put on p.o. medication for itching but the skin rash continued to get worse and spreading he denies short of breath chest pain nausea vomiting diarrhea no fever chill no new medication patient coughing congestion is getting better his sputum culture negative AFB smear negative culture pending  Review of Systems    Objective:      Physical Exam  patient appear to be in no distress on the phone complained of a skin rash itching uncomfortable in the upper chest upper arm and lower back  Assessment:       1. Scabies infestation    2. Bronchiectasis without complication        Plan:       Scabies infestation  Comments:  Instruct patient how to use the Perma teen cream and also use on his wife to and repeat in 1 week  Orders:  -     permethrin (ELIMITE) 5 % cream; Apply entired body from neck to toe leave on 10-12 hrs and wash repeat in 1 week  Dispense: 120 g; Refill: 1    Bronchiectasis without complication  Comments:  Clinically improving      .

## 2020-05-14 NOTE — TELEPHONE ENCOUNTER
Pt states he had labs drawn on Monday but I don't see any in our system, I don't see orders for labs?    Also, I have recently noticed a skin Rash around my waist and in my Groin area that looks like a Mite Infestation. According to the Photos and documentation on [facility], I have contracted some sort of Body Mite. I saw my Skin Doctor Monday and she said it was not a Bed Bug type bite, and she gave me an Anti Itch Medication. It has gotten worse since Monday and my research recommends that I ask you for a Lotion that Kills Mites on the Body. Can you do this for me? If you need to Call me, Please do. My Wife is now has the rash and Bites also. They didn't give a specific name of the Lotion, they only said it will kill the Mites.  I have begun the House eradication regime.  Let me Know what you think.  Thanks,   Nick Garza

## 2020-05-15 ENCOUNTER — PATIENT MESSAGE (OUTPATIENT)
Dept: ADMINISTRATIVE | Facility: OTHER | Age: 80
End: 2020-05-15

## 2020-05-18 ENCOUNTER — TELEPHONE (OUTPATIENT)
Dept: PRIMARY CARE CLINIC | Facility: CLINIC | Age: 80
End: 2020-05-18

## 2020-05-18 NOTE — TELEPHONE ENCOUNTER
----- Message from Darcie Vernon sent at 5/18/2020  4:24 PM CDT -----  Contact: Patient  Type:  Patient Returning Call    Who Called:  Nick, patient  Who Left Message for Patient:  Roro  Does the patient know what this is regarding?:  Culture results  Best Call Back Number:  [phone]  Additional Information:  Missed your call, please call him back. Thanks.

## 2020-05-19 NOTE — TELEPHONE ENCOUNTER
Spoke with pt. Regarding lab results. States this is the best he's felt thus far today, still having occasional flem discoloration. If still experiencing this will follow up in office next week.

## 2020-06-05 ENCOUNTER — TELEPHONE (OUTPATIENT)
Dept: CARDIOLOGY | Facility: CLINIC | Age: 80
End: 2020-06-05

## 2020-06-05 NOTE — TELEPHONE ENCOUNTER
Reached out to patient to schedule appointment.  No answer, left voice mail message.    ----- Message from Becky Reece sent at 6/5/2020  3:42 PM CDT -----  Contact: patient  Please call above patient at [phone] would like to schedule appointment waiting on a call back thanks.

## 2020-06-08 RX ORDER — SOTALOL HYDROCHLORIDE 80 MG/1
80 TABLET ORAL EVERY 12 HOURS
Qty: 60 TABLET | Refills: 11 | Status: SHIPPED | OUTPATIENT
Start: 2020-06-08 | End: 2021-03-09

## 2020-06-10 ENCOUNTER — PATIENT MESSAGE (OUTPATIENT)
Dept: PRIMARY CARE CLINIC | Facility: CLINIC | Age: 80
End: 2020-06-10

## 2020-06-15 ENCOUNTER — PATIENT MESSAGE (OUTPATIENT)
Dept: ELECTROPHYSIOLOGY | Facility: CLINIC | Age: 80
End: 2020-06-15

## 2020-06-15 ENCOUNTER — TELEPHONE (OUTPATIENT)
Dept: PRIMARY CARE CLINIC | Facility: CLINIC | Age: 80
End: 2020-06-15

## 2020-06-15 NOTE — TELEPHONE ENCOUNTER
----- Message from Iris Leach sent at 6/15/2020 11:37 AM CDT -----  Contact: [phone]  Patient would like to speak to the nurse in regards to possible new medication. Please call and advise.

## 2020-06-15 NOTE — TELEPHONE ENCOUNTER
"Spoke with pt. Wants us to fill out paperwork for his Xarelto 20 mg for the patient assistance program due to cost of medication. I tried to explain to the pt. That neither his Xarelto or his Plavix are prescribed by Dr. Adler and that Dr. Paez is the one who manages these medications and he could possibly contact their off. Pt. Got extremely rude and said, "ok, ok, ok, ill just get with Dr. Paez nurse." Pt. Also stated, "I don't know why I'm even telling you this anyway." and hung up the phone on me   "

## 2020-06-16 ENCOUNTER — TELEPHONE (OUTPATIENT)
Dept: PHARMACY | Facility: CLINIC | Age: 80
End: 2020-06-16

## 2020-06-16 NOTE — TELEPHONE ENCOUNTER
I left a message and mailed [employer] application for patient to sign and return necessary documents.

## 2020-06-19 ENCOUNTER — PATIENT MESSAGE (OUTPATIENT)
Dept: PRIMARY CARE CLINIC | Facility: CLINIC | Age: 80
End: 2020-06-19

## 2020-06-19 ENCOUNTER — TELEPHONE (OUTPATIENT)
Dept: PRIMARY CARE CLINIC | Facility: CLINIC | Age: 80
End: 2020-06-19

## 2020-06-19 DIAGNOSIS — E11.9 TYPE 2 DIABETES MELLITUS WITHOUT COMPLICATION, WITHOUT LONG-TERM CURRENT USE OF INSULIN: Primary | ICD-10-CM

## 2020-06-19 RX ORDER — METFORMIN HYDROCHLORIDE 500 MG/1
500 TABLET ORAL 2 TIMES DAILY WITH MEALS
Qty: 180 TABLET | Refills: 3 | Status: SHIPPED | OUTPATIENT
Start: 2020-06-19 | End: 2021-06-18

## 2020-06-19 NOTE — TELEPHONE ENCOUNTER
----- Message from Myra Albarado sent at 6/19/2020  8:24 AM CDT -----  Contact: self   Pt is following up on a [employer] message he sent about the recall of his metFORMIN (GLUCOPHAGE-XR) 500 MG XR 24hr tablet. Pt is asking if his medication should be changed to the regular Metformin. Please call and advise.

## 2020-06-24 NOTE — PROGRESS NOTES
Subjective:   Patient ID:  Nick De Anda is a 79 y.o. male who presents for follow-up of CAD    HPI:The patient is here for CAD/AF-he has seen others in past.      The patient has no chest pain, TIA,  syncope or pre-syncope.Patient does not exercise a lot.He has spells of AF and SOB/RODRIGEZ.His BP always 130-140        Review of Systems   Constitution: Negative for chills, decreased appetite, diaphoresis, fever, malaise/fatigue, night sweats, weight gain and weight loss.   HENT: Negative for congestion, hoarse voice, nosebleeds, sore throat and tinnitus.    Eyes: Negative for blurred vision, double vision, vision loss in left eye, vision loss in right eye, visual disturbance and visual halos.   Cardiovascular: Positive for dyspnea on exertion, irregular heartbeat and palpitations. Negative for chest pain, claudication, cyanosis, leg swelling, near-syncope, orthopnea, paroxysmal nocturnal dyspnea and syncope.   Respiratory: Positive for shortness of breath. Negative for cough, hemoptysis, sleep disturbances due to breathing, snoring, sputum production and wheezing.    Endocrine: Negative for cold intolerance, heat intolerance, polydipsia, polyphagia and polyuria.   Hematologic/Lymphatic: Negative for adenopathy and bleeding problem. Does not bruise/bleed easily.   Skin: Negative for color change, dry skin, flushing, itching, nail changes, poor wound healing, rash, skin cancer, suspicious lesions and unusual hair distribution.   Musculoskeletal: Positive for arthritis, joint pain, muscle cramps, muscle weakness and stiffness. Negative for back pain, falls, gout, joint swelling and myalgias.   Gastrointestinal: Negative for abdominal pain, anorexia, change in bowel habit, constipation, diarrhea, dysphagia, heartburn, hematemesis, hematochezia, melena and vomiting.   Genitourinary: Negative for decreased libido, dysuria, hematuria, hesitancy and urgency.   Neurological: Negative for excessive daytime sleepiness,  "dizziness, focal weakness, headaches, light-headedness, loss of balance, numbness, paresthesias, seizures, sensory change, tremors, vertigo and weakness.   Psychiatric/Behavioral: Negative for altered mental status, depression, hallucinations, memory loss, substance abuse and suicidal ideas. The patient does not have insomnia and is not nervous/anxious.    Allergic/Immunologic: Negative for environmental allergies and hives.       Objective: BP (!) 138/59   Pulse 62   Ht 6' 4" (1.93 m)   Wt 121.6 kg (268 lb 1.3 oz)   BMI 32.63 kg/m²      Physical Exam   Constitutional: He is oriented to person, place, and time. He appears well-developed and well-nourished. No distress.   HENT:   Head: Normocephalic.   Eyes: Pupils are equal, round, and reactive to light. EOM are normal.   Neck: Normal range of motion. No thyromegaly present.   Cardiovascular: Normal rate, regular rhythm, normal heart sounds and intact distal pulses. Exam reveals no gallop and no friction rub.   No murmur heard.  Pulses:       Carotid pulses are 3+ on the right side and 3+ on the left side.       Radial pulses are 3+ on the right side and 3+ on the left side.        Femoral pulses are 3+ on the right side and 3+ on the left side.       Popliteal pulses are 3+ on the right side and 3+ on the left side.        Dorsalis pedis pulses are 3+ on the right side and 3+ on the left side.        Posterior tibial pulses are 3+ on the right side and 3+ on the left side.   Pulmonary/Chest: Effort normal and breath sounds normal. No respiratory distress. He has no wheezes. He has no rales. He exhibits no tenderness.   Abdominal: Soft. He exhibits no distension and no mass. There is no abdominal tenderness.   Musculoskeletal: Normal range of motion.   Lymphadenopathy:     He has no cervical adenopathy.   Neurological: He is alert and oriented to person, place, and time.   Skin: Skin is warm. He is not diaphoretic. No cyanosis. Nails show no clubbing. "   Psychiatric: He has a normal mood and affect. His speech is normal and behavior is normal. Judgment and thought content normal. Cognition and memory are normal.       Assessment:     1. Coronary artery disease involving native coronary artery of native heart without angina pectoris    2. Hx of CABG    3. Long term (current) use of anticoagulants    4. Post PTCA    5. Type 2 diabetes mellitus without complication, without long-term current use of insulin    6. Typical atrial flutter    7. Paroxysmal atrial fibrillation    8. Bronchiectasis without complication    9. Essential hypertension    10. Hyponatremia    11. Dyspnea on exertion        Plan:   Discussed diet , achieving and maintaining ideal body weight, and exercise.   We reviewed meds in detail.  Reassured-Discussed goals, options, plan.  Omega-3 > 800/d EPA/DHA  Amlodipine 5 mg start with half at nite and if BP after 1-2 weeks still> 130 , increase to whole    Nick was seen today for coronary artery disease and hypertension.    Diagnoses and all orders for this visit:    Coronary artery disease involving native coronary artery of native heart without angina pectoris  -     amLODIPine (NORVASC) 5 MG tablet; Take 1 tablet (5 mg total) by mouth once daily. One-2 per day or as directed  -     Lipid Panel; Future; Expected date: 12/25/2020  -     Comprehensive metabolic panel; Future; Expected date: 12/25/2020  -     TSH; Future; Expected date: 12/25/2020  -     Echo Color Flow Doppler? Yes; Future; Expected date: 12/25/2020  -     Brain Natriuretic Peptide; Future; Expected date: 12/25/2020    Hx of CABG  -     TSH; Future; Expected date: 12/25/2020  -     Echo Color Flow Doppler? Yes; Future; Expected date: 12/25/2020    Long term (current) use of anticoagulants  -     TSH; Future; Expected date: 12/25/2020    Post PTCA  -     TSH; Future; Expected date: 12/25/2020    Type 2 diabetes mellitus without complication, without long-term current use of insulin  -      Lipid Panel; Future; Expected date: 12/25/2020  -     Comprehensive metabolic panel; Future; Expected date: 12/25/2020  -     TSH; Future; Expected date: 12/25/2020  -     Hemoglobin A1C; Future; Expected date: 12/25/2020    Typical atrial flutter  -     TSH; Future; Expected date: 12/25/2020  -     Echo Color Flow Doppler? Yes; Future; Expected date: 12/25/2020  -     Brain Natriuretic Peptide; Future; Expected date: 12/25/2020    Paroxysmal atrial fibrillation  -     TSH; Future; Expected date: 12/25/2020  -     Echo Color Flow Doppler? Yes; Future; Expected date: 12/25/2020  -     Brain Natriuretic Peptide; Future; Expected date: 12/25/2020    Bronchiectasis without complication  -     TSH; Future; Expected date: 12/25/2020    Essential hypertension  -     amLODIPine (NORVASC) 5 MG tablet; Take 1 tablet (5 mg total) by mouth once daily. One-2 per day or as directed  -     TSH; Future; Expected date: 12/25/2020    Hyponatremia  -     TSH; Future; Expected date: 12/25/2020    Dyspnea on exertion  -     TSH; Future; Expected date: 12/25/2020  -     Echo Color Flow Doppler? Yes; Future; Expected date: 12/25/2020  -     Brain Natriuretic Peptide; Future; Expected date: 12/25/2020    Other orders  -     spironolactone (ALDACTONE) 25 MG tablet; Take 25 mg by mouth once daily.            Follow up in about 6 months (around 12/25/2020) for with ECG and labs.

## 2020-06-25 ENCOUNTER — OFFICE VISIT (OUTPATIENT)
Dept: CARDIOLOGY | Facility: CLINIC | Age: 80
End: 2020-06-25
Payer: MEDICARE

## 2020-06-25 VITALS
WEIGHT: 268.06 LBS | DIASTOLIC BLOOD PRESSURE: 59 MMHG | HEIGHT: 76 IN | SYSTOLIC BLOOD PRESSURE: 138 MMHG | BODY MASS INDEX: 32.64 KG/M2 | HEART RATE: 62 BPM

## 2020-06-25 DIAGNOSIS — I10 ESSENTIAL HYPERTENSION: ICD-10-CM

## 2020-06-25 DIAGNOSIS — J47.9 BRONCHIECTASIS WITHOUT COMPLICATION: ICD-10-CM

## 2020-06-25 DIAGNOSIS — Z95.1 HX OF CABG: ICD-10-CM

## 2020-06-25 DIAGNOSIS — I48.0 PAROXYSMAL ATRIAL FIBRILLATION: ICD-10-CM

## 2020-06-25 DIAGNOSIS — R06.09 DYSPNEA ON EXERTION: ICD-10-CM

## 2020-06-25 DIAGNOSIS — I25.10 CORONARY ARTERY DISEASE INVOLVING NATIVE CORONARY ARTERY OF NATIVE HEART WITHOUT ANGINA PECTORIS: Primary | ICD-10-CM

## 2020-06-25 DIAGNOSIS — Z98.61 POST PTCA: ICD-10-CM

## 2020-06-25 DIAGNOSIS — Z79.01 LONG TERM (CURRENT) USE OF ANTICOAGULANTS: ICD-10-CM

## 2020-06-25 DIAGNOSIS — I48.3 TYPICAL ATRIAL FLUTTER: ICD-10-CM

## 2020-06-25 DIAGNOSIS — E87.1 HYPONATREMIA: ICD-10-CM

## 2020-06-25 DIAGNOSIS — E11.9 TYPE 2 DIABETES MELLITUS WITHOUT COMPLICATION, WITHOUT LONG-TERM CURRENT USE OF INSULIN: ICD-10-CM

## 2020-06-25 PROCEDURE — 1159F MED LIST DOCD IN RCRD: CPT | Mod: S$GLB,,, | Performed by: INTERNAL MEDICINE

## 2020-06-25 PROCEDURE — 3075F PR MOST RECENT SYSTOLIC BLOOD PRESS GE 130-139MM HG: ICD-10-PCS | Mod: CPTII,S$GLB,, | Performed by: INTERNAL MEDICINE

## 2020-06-25 PROCEDURE — 99999 PR PBB SHADOW E&M-EST. PATIENT-LVL IV: ICD-10-PCS | Mod: PBBFAC,,, | Performed by: INTERNAL MEDICINE

## 2020-06-25 PROCEDURE — 3075F SYST BP GE 130 - 139MM HG: CPT | Mod: CPTII,S$GLB,, | Performed by: INTERNAL MEDICINE

## 2020-06-25 PROCEDURE — 3078F PR MOST RECENT DIASTOLIC BLOOD PRESSURE < 80 MM HG: ICD-10-PCS | Mod: CPTII,S$GLB,, | Performed by: INTERNAL MEDICINE

## 2020-06-25 PROCEDURE — 99499 UNLISTED E&M SERVICE: CPT | Mod: S$GLB,,, | Performed by: INTERNAL MEDICINE

## 2020-06-25 PROCEDURE — 1159F PR MEDICATION LIST DOCUMENTED IN MEDICAL RECORD: ICD-10-PCS | Mod: S$GLB,,, | Performed by: INTERNAL MEDICINE

## 2020-06-25 PROCEDURE — 99214 PR OFFICE/OUTPT VISIT, EST, LEVL IV, 30-39 MIN: ICD-10-PCS | Mod: S$GLB,,, | Performed by: INTERNAL MEDICINE

## 2020-06-25 PROCEDURE — 3078F DIAST BP <80 MM HG: CPT | Mod: CPTII,S$GLB,, | Performed by: INTERNAL MEDICINE

## 2020-06-25 PROCEDURE — 1126F PR PAIN SEVERITY QUANTIFIED, NO PAIN PRESENT: ICD-10-PCS | Mod: S$GLB,,, | Performed by: INTERNAL MEDICINE

## 2020-06-25 PROCEDURE — 1126F AMNT PAIN NOTED NONE PRSNT: CPT | Mod: S$GLB,,, | Performed by: INTERNAL MEDICINE

## 2020-06-25 PROCEDURE — 99999 PR PBB SHADOW E&M-EST. PATIENT-LVL IV: CPT | Mod: PBBFAC,,, | Performed by: INTERNAL MEDICINE

## 2020-06-25 PROCEDURE — 1101F PT FALLS ASSESS-DOCD LE1/YR: CPT | Mod: CPTII,S$GLB,, | Performed by: INTERNAL MEDICINE

## 2020-06-25 PROCEDURE — 1101F PR PT FALLS ASSESS DOC 0-1 FALLS W/OUT INJ PAST YR: ICD-10-PCS | Mod: CPTII,S$GLB,, | Performed by: INTERNAL MEDICINE

## 2020-06-25 PROCEDURE — 99214 OFFICE O/P EST MOD 30 MIN: CPT | Mod: S$GLB,,, | Performed by: INTERNAL MEDICINE

## 2020-06-25 PROCEDURE — 99499 RISK ADDL DX/OHS AUDIT: ICD-10-PCS | Mod: S$GLB,,, | Performed by: INTERNAL MEDICINE

## 2020-06-25 RX ORDER — AMLODIPINE BESYLATE 5 MG/1
5 TABLET ORAL DAILY
Qty: 90 TABLET | Refills: 3 | Status: SHIPPED | OUTPATIENT
Start: 2020-06-25 | End: 2020-12-11 | Stop reason: SDUPTHER

## 2020-06-25 RX ORDER — SPIRONOLACTONE 25 MG/1
25 TABLET ORAL DAILY
COMMUNITY
Start: 2020-04-05 | End: 2020-10-05

## 2020-06-25 NOTE — PATIENT INSTRUCTIONS
Discussed diet , achieving and maintaining ideal body weight, and exercise.   We reviewed meds in detail.  Reassured-Discussed goals, options, plan.  Omega-3 > 800/d EPA/DHA  Amlodipine 5 mg start with half at nite and if BP after 1-2 weeks still> 130 , increase to whole

## 2020-07-01 ENCOUNTER — OFFICE VISIT (OUTPATIENT)
Dept: PRIMARY CARE CLINIC | Facility: CLINIC | Age: 80
End: 2020-07-01
Payer: MEDICARE

## 2020-07-01 VITALS
OXYGEN SATURATION: 98 % | TEMPERATURE: 98 F | RESPIRATION RATE: 20 BRPM | HEART RATE: 61 BPM | DIASTOLIC BLOOD PRESSURE: 50 MMHG | WEIGHT: 282.94 LBS | BODY MASS INDEX: 34.45 KG/M2 | HEIGHT: 76 IN | SYSTOLIC BLOOD PRESSURE: 140 MMHG

## 2020-07-01 DIAGNOSIS — J30.89 NON-SEASONAL ALLERGIC RHINITIS, UNSPECIFIED TRIGGER: ICD-10-CM

## 2020-07-01 DIAGNOSIS — E11.9 TYPE 2 DIABETES MELLITUS WITHOUT COMPLICATION, WITHOUT LONG-TERM CURRENT USE OF INSULIN: ICD-10-CM

## 2020-07-01 DIAGNOSIS — J47.9 BRONCHIECTASIS WITHOUT COMPLICATION: Primary | ICD-10-CM

## 2020-07-01 DIAGNOSIS — I48.0 PAROXYSMAL ATRIAL FIBRILLATION: ICD-10-CM

## 2020-07-01 PROCEDURE — 3077F PR MOST RECENT SYSTOLIC BLOOD PRESSURE >= 140 MM HG: ICD-10-PCS | Mod: CPTII,S$GLB,, | Performed by: INTERNAL MEDICINE

## 2020-07-01 PROCEDURE — 99999 PR PBB SHADOW E&M-EST. PATIENT-LVL V: ICD-10-PCS | Mod: PBBFAC,,, | Performed by: INTERNAL MEDICINE

## 2020-07-01 PROCEDURE — 1125F AMNT PAIN NOTED PAIN PRSNT: CPT | Mod: S$GLB,,, | Performed by: INTERNAL MEDICINE

## 2020-07-01 PROCEDURE — 1159F PR MEDICATION LIST DOCUMENTED IN MEDICAL RECORD: ICD-10-PCS | Mod: S$GLB,,, | Performed by: INTERNAL MEDICINE

## 2020-07-01 PROCEDURE — 1159F MED LIST DOCD IN RCRD: CPT | Mod: S$GLB,,, | Performed by: INTERNAL MEDICINE

## 2020-07-01 PROCEDURE — 1125F PR PAIN SEVERITY QUANTIFIED, PAIN PRESENT: ICD-10-PCS | Mod: S$GLB,,, | Performed by: INTERNAL MEDICINE

## 2020-07-01 PROCEDURE — 1101F PT FALLS ASSESS-DOCD LE1/YR: CPT | Mod: CPTII,S$GLB,, | Performed by: INTERNAL MEDICINE

## 2020-07-01 PROCEDURE — 99213 PR OFFICE/OUTPT VISIT, EST, LEVL III, 20-29 MIN: ICD-10-PCS | Mod: S$GLB,,, | Performed by: INTERNAL MEDICINE

## 2020-07-01 PROCEDURE — 99213 OFFICE O/P EST LOW 20 MIN: CPT | Mod: S$GLB,,, | Performed by: INTERNAL MEDICINE

## 2020-07-01 PROCEDURE — 3078F PR MOST RECENT DIASTOLIC BLOOD PRESSURE < 80 MM HG: ICD-10-PCS | Mod: CPTII,S$GLB,, | Performed by: INTERNAL MEDICINE

## 2020-07-01 PROCEDURE — 1101F PR PT FALLS ASSESS DOC 0-1 FALLS W/OUT INJ PAST YR: ICD-10-PCS | Mod: CPTII,S$GLB,, | Performed by: INTERNAL MEDICINE

## 2020-07-01 PROCEDURE — 99999 PR PBB SHADOW E&M-EST. PATIENT-LVL V: CPT | Mod: PBBFAC,,, | Performed by: INTERNAL MEDICINE

## 2020-07-01 PROCEDURE — 3077F SYST BP >= 140 MM HG: CPT | Mod: CPTII,S$GLB,, | Performed by: INTERNAL MEDICINE

## 2020-07-01 PROCEDURE — 3078F DIAST BP <80 MM HG: CPT | Mod: CPTII,S$GLB,, | Performed by: INTERNAL MEDICINE

## 2020-07-01 RX ORDER — LEVOCETIRIZINE DIHYDROCHLORIDE 5 MG/1
5 TABLET, FILM COATED ORAL NIGHTLY
Qty: 30 TABLET | Refills: 5 | Status: SHIPPED | OUTPATIENT
Start: 2020-07-01 | End: 2021-03-09

## 2020-07-01 NOTE — PROGRESS NOTES
Subjective:       Patient ID: Nick De Anda is a 79 y.o. male.    Chief Complaint: Follow-up    HPI  patient here for follow-up clinically he is doing better cardiovascular status is stable no short breath chest pain dizziness syncope palpitation patient is seeing Dr. Alas his new cardiologist his medication has been adjusted and he with see his electrophysiologist in the middle of this month his coughing still persistent much better than before his a sputum culture and AFB culture has been negative so far his still using his nebulizer couple times a day he is still several toe he deny any bleedings and his diabetes has been stable on current treatment  Review of Systems    Objective:      Physical Exam  Vitals signs and nursing note reviewed.   Constitutional:       General: He is not in acute distress.     Appearance: He is well-developed. He is obese.   HENT:      Head: Normocephalic and atraumatic.      Right Ear: External ear normal.      Left Ear: External ear normal.      Nose: Nose normal.      Mouth/Throat:      Pharynx: No oropharyngeal exudate.   Eyes:      General:         Right eye: No discharge.         Left eye: No discharge.      Conjunctiva/sclera: Conjunctivae normal.      Pupils: Pupils are equal, round, and reactive to light.   Neck:      Musculoskeletal: Normal range of motion and neck supple.      Thyroid: No thyromegaly.   Cardiovascular:      Rate and Rhythm: Normal rate and regular rhythm.      Heart sounds: Normal heart sounds. No murmur. No friction rub. No gallop.    Pulmonary:      Effort: Pulmonary effort is normal. No respiratory distress.      Breath sounds: Normal breath sounds. No wheezing or rales.   Chest:      Chest wall: No tenderness.   Abdominal:      General: Bowel sounds are normal. There is no distension.      Palpations: Abdomen is soft.      Tenderness: There is no abdominal tenderness.   Musculoskeletal: Normal range of motion.         General: No tenderness or  deformity.   Lymphadenopathy:      Cervical: No cervical adenopathy.   Skin:     General: Skin is warm and dry.      Capillary Refill: Capillary refill takes less than 2 seconds.      Findings: No erythema or rash.   Neurological:      Mental Status: He is alert and oriented to person, place, and time.   Psychiatric:         Mood and Affect: Mood normal.         Thought Content: Thought content normal.         Judgment: Judgment normal.         Assessment:       1. Bronchiectasis without complication    2. Non-seasonal allergic rhinitis, unspecified trigger    3. Type 2 diabetes mellitus without complication, without long-term current use of insulin    4. Paroxysmal atrial fibrillation        Plan:       Bronchiectasis without complication  Comments:  Continue with Mucinex and aresol tx prn sputum culture no growth so far for regular culture and AFB    Non-seasonal allergic rhinitis, unspecified trigger  -     levocetirizine (XYZAL) 5 MG tablet; Take 1 tablet (5 mg total) by mouth every evening. For allergy  Dispense: 30 tablet; Refill: 5    Type 2 diabetes mellitus without complication, without long-term current use of insulin  Comments:  Stable on current treatment    Paroxysmal atrial fibrillation  Comments:  Currently having regular rhythm with control heart rate patient been treated by Cardiology and electrophysiologist considering ablation

## 2020-07-06 ENCOUNTER — TELEPHONE (OUTPATIENT)
Dept: PRIMARY CARE CLINIC | Facility: CLINIC | Age: 80
End: 2020-07-06

## 2020-07-06 ENCOUNTER — PATIENT MESSAGE (OUTPATIENT)
Dept: PRIMARY CARE CLINIC | Facility: CLINIC | Age: 80
End: 2020-07-06

## 2020-07-06 NOTE — TELEPHONE ENCOUNTER
----- Message from Tenisha Mercado sent at 7/6/2020 12:40 PM CDT -----  Regarding: Rx  Contact: Patient @ [phone]  Good Afternoon,  Patent would like to know why he was change to pantoprazole (PROTONIX) 40 MG tablet. Patient would like a call from ,    Please call and advise

## 2020-07-14 ENCOUNTER — TELEPHONE (OUTPATIENT)
Dept: PHARMACY | Facility: CLINIC | Age: 80
End: 2020-07-14

## 2020-07-14 ENCOUNTER — TELEPHONE (OUTPATIENT)
Dept: PRIMARY CARE CLINIC | Facility: CLINIC | Age: 80
End: 2020-07-14

## 2020-07-14 DIAGNOSIS — E11.9 TYPE 2 DIABETES MELLITUS WITHOUT COMPLICATION, WITHOUT LONG-TERM CURRENT USE OF INSULIN: Primary | ICD-10-CM

## 2020-07-14 LAB
ACID FAST MOD KINY STN SPEC: NORMAL
MYCOBACTERIUM SPEC QL CULT: NORMAL

## 2020-07-14 NOTE — TELEPHONE ENCOUNTER
Faxed demographic, order, visit note and medical necessity form to [facility]

## 2020-07-14 NOTE — TELEPHONE ENCOUNTER
----- Message from Darcie Vernon sent at 7/14/2020 10:28 AM CDT -----  Contact: Jaxon with [employer] phone [phone]  Jaxon with [employer] phone [phone], Calling for an order for diabetic shoes. Please advise. Thanks.

## 2020-07-14 NOTE — TELEPHONE ENCOUNTER
Patient haven't met his 4% (672.00) out of pocket expense.  The patient provided document showing he has spent $303.72

## 2020-07-15 ENCOUNTER — TELEPHONE (OUTPATIENT)
Dept: ELECTROPHYSIOLOGY | Facility: CLINIC | Age: 80
End: 2020-07-15

## 2020-07-16 ENCOUNTER — OFFICE VISIT (OUTPATIENT)
Dept: ELECTROPHYSIOLOGY | Facility: CLINIC | Age: 80
End: 2020-07-16
Payer: MEDICARE

## 2020-07-16 ENCOUNTER — HOSPITAL ENCOUNTER (OUTPATIENT)
Dept: CARDIOLOGY | Facility: CLINIC | Age: 80
Discharge: HOME | End: 2020-07-16
Payer: MEDICARE

## 2020-07-16 VITALS
DIASTOLIC BLOOD PRESSURE: 60 MMHG | BODY MASS INDEX: 34.33 KG/M2 | HEART RATE: 65 BPM | HEIGHT: 76 IN | SYSTOLIC BLOOD PRESSURE: 114 MMHG | WEIGHT: 281.94 LBS

## 2020-07-16 DIAGNOSIS — I25.10 CORONARY ARTERY DISEASE INVOLVING NATIVE CORONARY ARTERY OF NATIVE HEART WITHOUT ANGINA PECTORIS: ICD-10-CM

## 2020-07-16 DIAGNOSIS — E11.9 TYPE 2 DIABETES MELLITUS WITHOUT COMPLICATION, WITHOUT LONG-TERM CURRENT USE OF INSULIN: ICD-10-CM

## 2020-07-16 DIAGNOSIS — I49.8 OTHER SPECIFIED CARDIAC ARRHYTHMIAS: Primary | ICD-10-CM

## 2020-07-16 DIAGNOSIS — I10 ESSENTIAL HYPERTENSION: ICD-10-CM

## 2020-07-16 DIAGNOSIS — Z95.1 HX OF CABG: ICD-10-CM

## 2020-07-16 DIAGNOSIS — I48.19 OTHER PERSISTENT ATRIAL FIBRILLATION: Primary | ICD-10-CM

## 2020-07-16 DIAGNOSIS — I48.3 TYPICAL ATRIAL FLUTTER: ICD-10-CM

## 2020-07-16 DIAGNOSIS — Z79.01 LONG TERM (CURRENT) USE OF ANTICOAGULANTS: ICD-10-CM

## 2020-07-16 DIAGNOSIS — I48.0 PAROXYSMAL ATRIAL FIBRILLATION: ICD-10-CM

## 2020-07-16 PROCEDURE — 3074F SYST BP LT 130 MM HG: CPT | Mod: CPTII,S$GLB,, | Performed by: INTERNAL MEDICINE

## 2020-07-16 PROCEDURE — 93010 EKG 12-LEAD: ICD-10-PCS | Mod: S$GLB,,, | Performed by: INTERNAL MEDICINE

## 2020-07-16 PROCEDURE — 99215 OFFICE O/P EST HI 40 MIN: CPT | Mod: S$GLB,,, | Performed by: INTERNAL MEDICINE

## 2020-07-16 PROCEDURE — 3078F DIAST BP <80 MM HG: CPT | Mod: CPTII,S$GLB,, | Performed by: INTERNAL MEDICINE

## 2020-07-16 PROCEDURE — 1101F PR PT FALLS ASSESS DOC 0-1 FALLS W/OUT INJ PAST YR: ICD-10-PCS | Mod: CPTII,S$GLB,, | Performed by: INTERNAL MEDICINE

## 2020-07-16 PROCEDURE — 1159F MED LIST DOCD IN RCRD: CPT | Mod: S$GLB,,, | Performed by: INTERNAL MEDICINE

## 2020-07-16 PROCEDURE — 99999 PR PBB SHADOW E&M-EST. PATIENT-LVL III: CPT | Mod: PBBFAC,,, | Performed by: INTERNAL MEDICINE

## 2020-07-16 PROCEDURE — 3078F PR MOST RECENT DIASTOLIC BLOOD PRESSURE < 80 MM HG: ICD-10-PCS | Mod: CPTII,S$GLB,, | Performed by: INTERNAL MEDICINE

## 2020-07-16 PROCEDURE — 99999 PR PBB SHADOW E&M-EST. PATIENT-LVL III: ICD-10-PCS | Mod: PBBFAC,,, | Performed by: INTERNAL MEDICINE

## 2020-07-16 PROCEDURE — 1101F PT FALLS ASSESS-DOCD LE1/YR: CPT | Mod: CPTII,S$GLB,, | Performed by: INTERNAL MEDICINE

## 2020-07-16 PROCEDURE — 99499 RISK ADDL DX/OHS AUDIT: ICD-10-PCS | Mod: S$GLB,,, | Performed by: INTERNAL MEDICINE

## 2020-07-16 PROCEDURE — 99215 PR OFFICE/OUTPT VISIT, EST, LEVL V, 40-54 MIN: ICD-10-PCS | Mod: S$GLB,,, | Performed by: INTERNAL MEDICINE

## 2020-07-16 PROCEDURE — 1126F AMNT PAIN NOTED NONE PRSNT: CPT | Mod: S$GLB,,, | Performed by: INTERNAL MEDICINE

## 2020-07-16 PROCEDURE — 1126F PR PAIN SEVERITY QUANTIFIED, NO PAIN PRESENT: ICD-10-PCS | Mod: S$GLB,,, | Performed by: INTERNAL MEDICINE

## 2020-07-16 PROCEDURE — 93005 ELECTROCARDIOGRAM TRACING: CPT | Mod: S$GLB,,, | Performed by: INTERNAL MEDICINE

## 2020-07-16 PROCEDURE — 93005 EKG 12-LEAD: ICD-10-PCS | Mod: S$GLB,,, | Performed by: INTERNAL MEDICINE

## 2020-07-16 PROCEDURE — 99499 UNLISTED E&M SERVICE: CPT | Mod: S$GLB,,, | Performed by: INTERNAL MEDICINE

## 2020-07-16 PROCEDURE — 1159F PR MEDICATION LIST DOCUMENTED IN MEDICAL RECORD: ICD-10-PCS | Mod: S$GLB,,, | Performed by: INTERNAL MEDICINE

## 2020-07-16 PROCEDURE — 3074F PR MOST RECENT SYSTOLIC BLOOD PRESSURE < 130 MM HG: ICD-10-PCS | Mod: CPTII,S$GLB,, | Performed by: INTERNAL MEDICINE

## 2020-07-16 PROCEDURE — 93010 ELECTROCARDIOGRAM REPORT: CPT | Mod: S$GLB,,, | Performed by: INTERNAL MEDICINE

## 2020-07-16 NOTE — PROGRESS NOTES
Subjective:    Patient ID:  Nick De Anda is a 79 y.o. male who presents for evaluation of Atrial Fibrillation    Primary Cardiologist: Robert Alas MD  Primary Care Physician: Santiago Adler MD    HPI  Prior Hx:  I had the pleasure of seeing Mr. De Anda today in our electrophysiology clinic in consultation for his atrial arrhythmia. As you are aware he is a pleasant 79 year-old man with coronary artery disease s/p CABG with preserved LVEF, type 2 diabetes, hypertension and recently discovered paroxysmal atrial fibrillation and typical atrial flutter. He recently transferred care from [facility] where his AF was diagnosed and he was initiated on xarelto. He was recently admitted for a symptomatic UTI and observed to have a paroxysm of atrial fibrillation with ventricular rates in the 40s-60s (slower rates appeared to correlate during overnight hours). He monitors his vitals at home and for a while notes it indicates he has an irregular heart beat however he feels normal. He has established care with Dr. Dean in cardiology on 2/13/2019 and was noted to be in atrial fibrillation. He has no obvious symptoms. I reviewed all available electrocardiograms in Epic which show sinus rhythm on 2/5/2014 and 2/2/2019. An ECG dated 2/3/2019 notes rate controlled atrial fibrillation. When I saw him in clinic 2/2019 he was in sinus rhythm. At that visit since he had no symptoms with AF and was rate controlled when in AF I recommended no aggressive rhythm control strategy and would continue his current management. He was to see me prn.    Mr. De Anda returned for discussion of his AF 6/2019. He reported he believed AF was causing fatigue. Notes episodes every week or so that correlate to being fatigue. He was admitted for DCCV and sotalol initiation. He converted when propofol was administered and the DEANGELO/DCCV was cancelled. He saw Faina Espinosa in EP clinic 8/1/2019 and was in sinus rhythm. He felt better in sinus  rhythm.    Mr. De Anda was seen in clinic 10/2019 and noted paroxysms of AF since starting sotalol. One episode lasted 2 days and another 3 days. He noted his pulse was irregular in this setting. He came in for an ECG which noted sinus rhythm however he felt he had already converted. We discussed increasing dosage to 120mg bid however he declined.    Interim Hx:  Mr. De Anda presents for routine AF follow-up. He reports he continues to have some paroxysms of AF, at times last 1/2 day- 1 day. He reports he has bronchiectasis/chronic bronchitis and retains fluid/phlegm which has been increased recently. Renal function 3/2020 was normal.    My interpretation of today's in clinic ECG is sinus rhythm with a rate of 65 bpm, QT interval is normal.      Review of Systems   Constitution: Negative for fever.   HENT: Negative for congestion and sore throat.    Eyes: Negative for blurred vision and visual disturbance.   Cardiovascular: Positive for irregular heartbeat and palpitations. Negative for chest pain, dyspnea on exertion and leg swelling.   Respiratory: Positive for cough and sputum production. Negative for shortness of breath.    Skin: Negative.    Musculoskeletal: Negative.    Gastrointestinal: Negative for bloating, abdominal pain, hematochezia and melena.   Neurological: Negative for dizziness, light-headedness and weakness.        Objective:    Physical Exam   Constitutional: He is oriented to person, place, and time. He appears well-developed and well-nourished. No distress.   HENT:   Head: Normocephalic and atraumatic.   Eyes: Conjunctivae are normal. Right eye exhibits no discharge. Left eye exhibits no discharge.   Neck: Neck supple. No JVD present.   Cardiovascular: Normal rate, regular rhythm and normal heart sounds. Exam reveals no gallop and no friction rub.   No murmur heard.  Pulmonary/Chest: Effort normal and breath sounds normal. No respiratory distress. He has no wheezes. He has no rales.   Abdominal:  Soft. Bowel sounds are normal. He exhibits no distension. There is no abdominal tenderness. There is no rebound.   Musculoskeletal:         General: No edema.   Neurological: He is alert and oriented to person, place, and time.   Skin: Skin is warm and dry. He is not diaphoretic.   Psychiatric: He has a normal mood and affect. His behavior is normal. Judgment and thought content normal.   Vitals reviewed.        Assessment:       1. Other persistent atrial fibrillation    2. Coronary artery disease involving native coronary artery of native heart without angina pectoris    3. Essential hypertension    4. Hx of CABG    5. Typical atrial flutter    6. Long term (current) use of anticoagulants    7. Type 2 diabetes mellitus without complication, without long-term current use of insulin         Plan:     In summary, Mr. De Anda is a pleasant 79 year-old man with coronary artery disease s/p CABG with preserved LVEF, type 2 diabetes, hypertension and symptomatic paroxysmal atrial fibrillation/flutter. He is now on sotalol. He feels significantly better and is in sinus rhythm. He is having symptomatic breakthrough on sotalol. His [id number] score is 5 and long-term anticoagulation is recommended. He is interested in PVI (he has been reading about it). I spent about a half hour discussing the nature of PVI including transseptal puncture. We discussed risks and benefits at length. Our discussion included, but was not limited to the risk of death, infection, bleeding, stroke, MI, cardiac perforation, embolism, cardiac tamponade, vascular injury, AE fistula, injury to phrenic nerve, pulmonary vein stenosis and other organic injury including the possibility for need for surgery or pacemaker implantation.  He is very interested however wants to see his pulmonologist first.    RTC in 3 months, sooner if needed.    Thank you for allowing me to participate in the care of this patient. Please do not hesitate to call me with any  questions or concerns.    Yung Paez MD, PhD  Cardiac Electrophysiology

## 2020-09-15 ENCOUNTER — TELEPHONE (OUTPATIENT)
Dept: PRIMARY CARE CLINIC | Facility: CLINIC | Age: 80
End: 2020-09-15

## 2020-09-22 ENCOUNTER — PATIENT MESSAGE (OUTPATIENT)
Dept: PRIMARY CARE CLINIC | Facility: CLINIC | Age: 80
End: 2020-09-22

## 2020-09-22 ENCOUNTER — PATIENT MESSAGE (OUTPATIENT)
Dept: CARDIOLOGY | Facility: CLINIC | Age: 80
End: 2020-09-22

## 2020-09-23 ENCOUNTER — TELEPHONE (OUTPATIENT)
Dept: PRIMARY CARE CLINIC | Facility: CLINIC | Age: 80
End: 2020-09-23

## 2020-09-23 DIAGNOSIS — D69.6 THROMBOCYTOPENIA: ICD-10-CM

## 2020-09-23 DIAGNOSIS — E11.9 TYPE 2 DIABETES MELLITUS WITHOUT COMPLICATION, WITHOUT LONG-TERM CURRENT USE OF INSULIN: Primary | ICD-10-CM

## 2020-09-23 DIAGNOSIS — E78.2 ELEVATED CHOLESTEROL WITH ELEVATED TRIGLYCERIDES: ICD-10-CM

## 2020-09-23 DIAGNOSIS — I50.9 CHRONIC CONGESTIVE HEART FAILURE, UNSPECIFIED HEART FAILURE TYPE: ICD-10-CM

## 2020-09-23 DIAGNOSIS — Z12.5 SCREENING FOR PROSTATE CANCER: ICD-10-CM

## 2020-09-23 DIAGNOSIS — E78.5 DYSLIPIDEMIA: ICD-10-CM

## 2020-09-23 DIAGNOSIS — I10 ESSENTIAL HYPERTENSION: ICD-10-CM

## 2020-09-23 NOTE — TELEPHONE ENCOUNTER
----- Message from Abena Infante sent at 9/23/2020 12:27 PM CDT -----  Contact: DAVID LUCAS [[id number]] @ [phone]  Patient want to speak with doctor about things to do in his My Chart for labs.  Patient only want to speak with the doctor not a nurse or Ma.

## 2020-09-24 ENCOUNTER — PATIENT MESSAGE (OUTPATIENT)
Dept: PRIMARY CARE CLINIC | Facility: CLINIC | Age: 80
End: 2020-09-24

## 2020-10-01 ENCOUNTER — LAB VISIT (OUTPATIENT)
Dept: LAB | Facility: HOSPITAL | Age: 80
End: 2020-10-01
Attending: INTERNAL MEDICINE
Payer: MEDICARE

## 2020-10-01 DIAGNOSIS — D69.6 THROMBOCYTOPENIA: ICD-10-CM

## 2020-10-01 DIAGNOSIS — E78.2 ELEVATED CHOLESTEROL WITH ELEVATED TRIGLYCERIDES: ICD-10-CM

## 2020-10-01 DIAGNOSIS — Z12.5 SCREENING FOR PROSTATE CANCER: ICD-10-CM

## 2020-10-01 DIAGNOSIS — I50.9 CHRONIC CONGESTIVE HEART FAILURE, UNSPECIFIED HEART FAILURE TYPE: ICD-10-CM

## 2020-10-01 DIAGNOSIS — E11.9 TYPE 2 DIABETES MELLITUS WITHOUT COMPLICATION, WITHOUT LONG-TERM CURRENT USE OF INSULIN: ICD-10-CM

## 2020-10-01 LAB
ALBUMIN SERPL BCP-MCNC: 3.5 G/DL (ref 3.5–5.2)
ALP SERPL-CCNC: 67 U/L (ref 55–135)
ALT SERPL W/O P-5'-P-CCNC: 14 U/L (ref 10–44)
ANION GAP SERPL CALC-SCNC: 10 MMOL/L (ref 8–16)
AST SERPL-CCNC: 17 U/L (ref 10–40)
BASOPHILS # BLD AUTO: 0.06 K/UL (ref 0–0.2)
BASOPHILS NFR BLD: 0.9 % (ref 0–1.9)
BILIRUB SERPL-MCNC: 0.9 MG/DL (ref 0.1–1)
BUN SERPL-MCNC: 14 MG/DL (ref 8–23)
CALCIUM SERPL-MCNC: 9.1 MG/DL (ref 8.7–10.5)
CHLORIDE SERPL-SCNC: 105 MMOL/L (ref 95–110)
CHOLEST SERPL-MCNC: 119 MG/DL (ref 120–199)
CHOLEST/HDLC SERPL: 2.8 {RATIO} (ref 2–5)
CO2 SERPL-SCNC: 26 MMOL/L (ref 23–29)
COMPLEXED PSA SERPL-MCNC: 1.4 NG/ML (ref 0–4)
CREAT SERPL-MCNC: 0.8 MG/DL (ref 0.5–1.4)
DIFFERENTIAL METHOD: ABNORMAL
EOSINOPHIL # BLD AUTO: 0.4 K/UL (ref 0–0.5)
EOSINOPHIL NFR BLD: 6.1 % (ref 0–8)
ERYTHROCYTE [DISTWIDTH] IN BLOOD BY AUTOMATED COUNT: 13.6 % (ref 11.5–14.5)
EST. GFR  (AFRICAN AMERICAN): >60 ML/MIN/1.73 M^2
EST. GFR  (NON AFRICAN AMERICAN): >60 ML/MIN/1.73 M^2
ESTIMATED AVG GLUCOSE: 120 MG/DL (ref 68–131)
GLUCOSE SERPL-MCNC: 100 MG/DL (ref 70–110)
HBA1C MFR BLD HPLC: 5.8 % (ref 4–5.6)
HCT VFR BLD AUTO: 41.7 % (ref 40–54)
HDLC SERPL-MCNC: 42 MG/DL (ref 40–75)
HDLC SERPL: 35.3 % (ref 20–50)
HGB BLD-MCNC: 13.2 G/DL (ref 14–18)
IMM GRANULOCYTES # BLD AUTO: 0.02 K/UL (ref 0–0.04)
IMM GRANULOCYTES NFR BLD AUTO: 0.3 % (ref 0–0.5)
LDLC SERPL CALC-MCNC: 54.4 MG/DL (ref 63–159)
LYMPHOCYTES # BLD AUTO: 1.9 K/UL (ref 1–4.8)
LYMPHOCYTES NFR BLD: 28 % (ref 18–48)
MCH RBC QN AUTO: 29.5 PG (ref 27–31)
MCHC RBC AUTO-ENTMCNC: 31.7 G/DL (ref 32–36)
MCV RBC AUTO: 93 FL (ref 82–98)
MONOCYTES # BLD AUTO: 0.7 K/UL (ref 0.3–1)
MONOCYTES NFR BLD: 10.5 % (ref 4–15)
NEUTROPHILS # BLD AUTO: 3.7 K/UL (ref 1.8–7.7)
NEUTROPHILS NFR BLD: 54.2 % (ref 38–73)
NONHDLC SERPL-MCNC: 77 MG/DL
NRBC BLD-RTO: 0 /100 WBC
PLATELET # BLD AUTO: 182 K/UL (ref 150–350)
PMV BLD AUTO: 11.8 FL (ref 9.2–12.9)
POTASSIUM SERPL-SCNC: 4.1 MMOL/L (ref 3.5–5.1)
PROT SERPL-MCNC: 6.4 G/DL (ref 6–8.4)
RBC # BLD AUTO: 4.48 M/UL (ref 4.6–6.2)
SODIUM SERPL-SCNC: 141 MMOL/L (ref 136–145)
TRIGL SERPL-MCNC: 113 MG/DL (ref 30–150)
WBC # BLD AUTO: 6.76 K/UL (ref 3.9–12.7)

## 2020-10-01 PROCEDURE — 80053 COMPREHEN METABOLIC PANEL: CPT

## 2020-10-01 PROCEDURE — 85025 COMPLETE CBC W/AUTO DIFF WBC: CPT

## 2020-10-01 PROCEDURE — 83036 HEMOGLOBIN GLYCOSYLATED A1C: CPT

## 2020-10-01 PROCEDURE — 80061 LIPID PANEL: CPT

## 2020-10-01 PROCEDURE — 84153 ASSAY OF PSA TOTAL: CPT

## 2020-10-01 PROCEDURE — 36415 COLL VENOUS BLD VENIPUNCTURE: CPT | Mod: PN

## 2020-10-05 ENCOUNTER — OFFICE VISIT (OUTPATIENT)
Dept: PRIMARY CARE CLINIC | Facility: CLINIC | Age: 80
End: 2020-10-05
Payer: MEDICARE

## 2020-10-05 VITALS
HEIGHT: 76 IN | BODY MASS INDEX: 35.49 KG/M2 | HEART RATE: 69 BPM | RESPIRATION RATE: 18 BRPM | DIASTOLIC BLOOD PRESSURE: 72 MMHG | WEIGHT: 291.44 LBS | OXYGEN SATURATION: 95 % | SYSTOLIC BLOOD PRESSURE: 132 MMHG | TEMPERATURE: 98 F

## 2020-10-05 DIAGNOSIS — G89.29 CHRONIC PAIN OF RIGHT KNEE: ICD-10-CM

## 2020-10-05 DIAGNOSIS — S63.642A SPRAIN OF METACARPOPHALANGEAL (MCP) JOINT OF LEFT THUMB, INITIAL ENCOUNTER: Primary | ICD-10-CM

## 2020-10-05 DIAGNOSIS — I48.0 PAROXYSMAL ATRIAL FIBRILLATION: ICD-10-CM

## 2020-10-05 DIAGNOSIS — M25.561 CHRONIC PAIN OF RIGHT KNEE: ICD-10-CM

## 2020-10-05 DIAGNOSIS — M54.31 SCIATICA OF RIGHT SIDE: ICD-10-CM

## 2020-10-05 PROCEDURE — 3078F PR MOST RECENT DIASTOLIC BLOOD PRESSURE < 80 MM HG: ICD-10-PCS | Mod: CPTII,S$GLB,, | Performed by: INTERNAL MEDICINE

## 2020-10-05 PROCEDURE — 99999 PR PBB SHADOW E&M-EST. PATIENT-LVL V: CPT | Mod: PBBFAC,,, | Performed by: INTERNAL MEDICINE

## 2020-10-05 PROCEDURE — 3075F SYST BP GE 130 - 139MM HG: CPT | Mod: CPTII,S$GLB,, | Performed by: INTERNAL MEDICINE

## 2020-10-05 PROCEDURE — 1159F PR MEDICATION LIST DOCUMENTED IN MEDICAL RECORD: ICD-10-PCS | Mod: S$GLB,,, | Performed by: INTERNAL MEDICINE

## 2020-10-05 PROCEDURE — 99499 RISK ADDL DX/OHS AUDIT: ICD-10-PCS | Mod: S$GLB,,, | Performed by: INTERNAL MEDICINE

## 2020-10-05 PROCEDURE — 99213 PR OFFICE/OUTPT VISIT, EST, LEVL III, 20-29 MIN: ICD-10-PCS | Mod: S$GLB,,, | Performed by: INTERNAL MEDICINE

## 2020-10-05 PROCEDURE — 1125F PR PAIN SEVERITY QUANTIFIED, PAIN PRESENT: ICD-10-PCS | Mod: S$GLB,,, | Performed by: INTERNAL MEDICINE

## 2020-10-05 PROCEDURE — 1159F MED LIST DOCD IN RCRD: CPT | Mod: S$GLB,,, | Performed by: INTERNAL MEDICINE

## 2020-10-05 PROCEDURE — 3078F DIAST BP <80 MM HG: CPT | Mod: CPTII,S$GLB,, | Performed by: INTERNAL MEDICINE

## 2020-10-05 PROCEDURE — 1125F AMNT PAIN NOTED PAIN PRSNT: CPT | Mod: S$GLB,,, | Performed by: INTERNAL MEDICINE

## 2020-10-05 PROCEDURE — 99499 UNLISTED E&M SERVICE: CPT | Mod: S$GLB,,, | Performed by: INTERNAL MEDICINE

## 2020-10-05 PROCEDURE — 99213 OFFICE O/P EST LOW 20 MIN: CPT | Mod: S$GLB,,, | Performed by: INTERNAL MEDICINE

## 2020-10-05 PROCEDURE — 1101F PT FALLS ASSESS-DOCD LE1/YR: CPT | Mod: CPTII,S$GLB,, | Performed by: INTERNAL MEDICINE

## 2020-10-05 PROCEDURE — 3075F PR MOST RECENT SYSTOLIC BLOOD PRESS GE 130-139MM HG: ICD-10-PCS | Mod: CPTII,S$GLB,, | Performed by: INTERNAL MEDICINE

## 2020-10-05 PROCEDURE — 99999 PR PBB SHADOW E&M-EST. PATIENT-LVL V: ICD-10-PCS | Mod: PBBFAC,,, | Performed by: INTERNAL MEDICINE

## 2020-10-05 PROCEDURE — 1101F PR PT FALLS ASSESS DOC 0-1 FALLS W/OUT INJ PAST YR: ICD-10-PCS | Mod: CPTII,S$GLB,, | Performed by: INTERNAL MEDICINE

## 2020-10-05 RX ORDER — HYDROCODONE BITARTRATE AND ACETAMINOPHEN 5; 325 MG/1; MG/1
1 TABLET ORAL EVERY 12 HOURS PRN
Qty: 25 TABLET | Refills: 0 | Status: SHIPPED | OUTPATIENT
Start: 2020-10-05 | End: 2021-02-04 | Stop reason: SDUPTHER

## 2020-10-05 NOTE — PROGRESS NOTES
Subjective:       Patient ID: Nick De Anda is a 80 y.o. male.    Chief Complaint: Follow-up (3 month follow up)    HPI  patient is here for routine follow-up his the paroxysmal atrial fib is stable his cardiologist recommending ablation to treat atrial fib permanently patient currently on Eliquis and Plavix patient denies short of breath palpitation he diabetes well control hemoglobin A1c 5.8 and last week he got up from a recliner and fell down hurt his left hand fell into the a recliner his left thumb get hyper extended still having pain at the base of the left thumb he also have chronic pain in the right knee right hip and sciatica on the right psi from the lower back into the right leg his on Xarelto not able to take NSAID and Tylenol not helping he had a take a pain medicine from his wife to get relief with able to sleep  Review of Systems    Objective:      Physical Exam  Vitals signs and nursing note reviewed.   Constitutional:       General: He is not in acute distress.     Appearance: He is well-developed.   HENT:      Head: Normocephalic and atraumatic.      Right Ear: External ear normal.      Left Ear: External ear normal.      Nose: Nose normal.      Mouth/Throat:      Pharynx: No oropharyngeal exudate.   Eyes:      Extraocular Movements: Extraocular movements intact.      Conjunctiva/sclera: Conjunctivae normal.      Pupils: Pupils are equal, round, and reactive to light.   Neck:      Musculoskeletal: Normal range of motion and neck supple.      Thyroid: No thyromegaly.   Cardiovascular:      Rate and Rhythm: Normal rate and regular rhythm.      Heart sounds: Normal heart sounds. No murmur. No friction rub. No gallop.    Pulmonary:      Effort: Pulmonary effort is normal. No respiratory distress.      Breath sounds: Normal breath sounds. No wheezing or rales.   Abdominal:      General: Bowel sounds are normal. There is no distension.      Palpations: Abdomen is soft.      Tenderness: There is  no abdominal tenderness. There is no guarding.   Musculoskeletal: Normal range of motion.         General: Tenderness (Subjective tenderness in the right knee right hip with weight-bearing and sciatica on the right lower back radiated into the posterior right thigh right leg) present. No deformity.   Lymphadenopathy:      Cervical: No cervical adenopathy.   Skin:     General: Skin is warm and dry.      Findings: No erythema or rash.   Neurological:      General: No focal deficit present.      Mental Status: He is alert and oriented to person, place, and time.   Psychiatric:         Mood and Affect: Mood normal.         Thought Content: Thought content normal.         Judgment: Judgment normal.         Assessment:       1. Sprain of metacarpophalangeal (MCP) joint of left thumb, initial encounter    2. Chronic pain of right knee    3. Sciatica of right side    4. Paroxysmal atrial fibrillation        Plan:       Sprain of metacarpophalangeal (MCP) joint of left thumb, initial encounter  -     HYDROcodone-acetaminophen (NORCO) 5-325 mg per tablet; Take 1 tablet by mouth every 12 (twelve) hours as needed for Pain.  Dispense: 25 tablet; Refill: 0    Chronic pain of right knee  -     HYDROcodone-acetaminophen (NORCO) 5-325 mg per tablet; Take 1 tablet by mouth every 12 (twelve) hours as needed for Pain.  Dispense: 25 tablet; Refill: 0    Sciatica of right side  -     HYDROcodone-acetaminophen (NORCO) 5-325 mg per tablet; Take 1 tablet by mouth every 12 (twelve) hours as needed for Pain.  Dispense: 25 tablet; Refill: 0    Paroxysmal atrial fibrillation  Comments:  Patient currently on regular rhythm awaiting ablation procedure continue with Xarelto and Plavix        Medication List with Changes/Refills   New Medications    HYDROCODONE-ACETAMINOPHEN (NORCO) 5-325 MG PER TABLET    Take 1 tablet by mouth every 12 (twelve) hours as needed for Pain.   Current Medications    AMLODIPINE (NORVASC) 5 MG TABLET    Take 1 tablet (5  mg total) by mouth once daily. One-2 per day or as directed    ATORVASTATIN (LIPITOR) 40 MG TABLET    Take 1 tablet (40 mg total) by mouth once daily.    CLOPIDOGREL (PLAVIX) 75 MG TABLET    Take 1 tablet (75 mg total) by mouth once daily.    DIAZEPAM (VALIUM) 10 MG TAB    Take 1 tablet (10 mg total) by mouth daily as needed.    HYDROCHLOROTHIAZIDE (MICROZIDE) 12.5 MG CAPSULE    Take 1 capsule (12.5 mg total) by mouth once daily.    IRBESARTAN (AVAPRO) 300 MG TABLET    Take 1 tablet (300 mg total) by mouth every evening.    LEVALBUTEROL (XOPENEX) 0.63 MG/3 ML NEBULIZER SOLUTION    Take 3 mLs (0.63 mg total) by nebulization 4 (four) times daily as needed for Wheezing. Rescue    LEVOCETIRIZINE (XYZAL) 5 MG TABLET    Take 1 tablet (5 mg total) by mouth every evening. For allergy    METFORMIN (GLUCOPHAGE) 500 MG TABLET    Take 1 tablet (500 mg total) by mouth 2 (two) times daily with meals.    MULTIVITAMIN W-MINERALS/LUTEIN (CENTRUM SILVER ORAL)    Take by mouth once daily.    NITROGLYCERIN (NITROSTAT) 0.4 MG SL TABLET    Place 1 tablet (0.4 mg total) under the tongue every 5 (five) minutes as needed for Chest pain.    NYSTATIN (MYCOSTATIN) CREAM    Apply 45 g topically 2 (two) times daily.    OMEGA-3-DHA-EPA-FISH OIL ORAL    Take 1 tablet by mouth once daily.    PANTOPRAZOLE (PROTONIX) 40 MG TABLET    Take 1 tablet (40 mg total) by mouth once daily.    SOTALOL (BETAPACE) 80 MG TABLET    Take 1 tablet (80 mg total) by mouth every 12 (twelve) hours.    XARELTO 20 MG TAB    Take 1 tablet (20 mg total) by mouth every evening.   Discontinued Medications    BLOOD-GLUCOSE METER KIT    Use as instructed    SPIRONOLACTONE (ALDACTONE) 25 MG TABLET    Take 25 mg by mouth once daily.

## 2020-10-06 NOTE — PROGRESS NOTES
Patient, Nick De Anda (MRN #[id number]), presented with a recorded BMI of 35.48 kg/m^2 and a documented comorbidity(s):  - Atrial Fibrillation  to which the severe obesity is a contributing factor. This is consistent with the definition of severe obesity (BMI 35.0-39.9) with comorbidity (ICD-10 E66.01, Z68.35). The patient's severe obesity was monitored, evaluated, addressed and/or treated. This addendum to the medical record is made on 10/05/2020.

## 2020-10-12 ENCOUNTER — PES CALL (OUTPATIENT)
Dept: ADMINISTRATIVE | Facility: CLINIC | Age: 80
End: 2020-10-12

## 2020-10-19 ENCOUNTER — TELEPHONE (OUTPATIENT)
Dept: ELECTROPHYSIOLOGY | Facility: CLINIC | Age: 80
End: 2020-10-19

## 2020-10-20 ENCOUNTER — HOSPITAL ENCOUNTER (OUTPATIENT)
Dept: CARDIOLOGY | Facility: CLINIC | Age: 80
Discharge: HOME | End: 2020-10-20
Payer: MEDICARE

## 2020-10-20 ENCOUNTER — OFFICE VISIT (OUTPATIENT)
Dept: ELECTROPHYSIOLOGY | Facility: CLINIC | Age: 80
End: 2020-10-20
Payer: MEDICARE

## 2020-10-20 VITALS
SYSTOLIC BLOOD PRESSURE: 124 MMHG | HEART RATE: 68 BPM | HEIGHT: 76 IN | DIASTOLIC BLOOD PRESSURE: 58 MMHG | WEIGHT: 253.75 LBS | BODY MASS INDEX: 30.9 KG/M2

## 2020-10-20 DIAGNOSIS — I48.0 PAROXYSMAL ATRIAL FIBRILLATION: Primary | ICD-10-CM

## 2020-10-20 DIAGNOSIS — I25.10 CORONARY ARTERY DISEASE INVOLVING NATIVE CORONARY ARTERY OF NATIVE HEART WITHOUT ANGINA PECTORIS: ICD-10-CM

## 2020-10-20 DIAGNOSIS — I48.3 TYPICAL ATRIAL FLUTTER: ICD-10-CM

## 2020-10-20 DIAGNOSIS — E11.9 TYPE 2 DIABETES MELLITUS WITHOUT COMPLICATION, WITHOUT LONG-TERM CURRENT USE OF INSULIN: ICD-10-CM

## 2020-10-20 DIAGNOSIS — I49.8 OTHER SPECIFIED CARDIAC ARRHYTHMIAS: ICD-10-CM

## 2020-10-20 DIAGNOSIS — I10 ESSENTIAL HYPERTENSION: ICD-10-CM

## 2020-10-20 DIAGNOSIS — Z95.1 HX OF CABG: ICD-10-CM

## 2020-10-20 PROCEDURE — 1101F PT FALLS ASSESS-DOCD LE1/YR: CPT | Mod: CPTII,S$GLB,, | Performed by: INTERNAL MEDICINE

## 2020-10-20 PROCEDURE — 1125F PR PAIN SEVERITY QUANTIFIED, PAIN PRESENT: ICD-10-PCS | Mod: S$GLB,,, | Performed by: INTERNAL MEDICINE

## 2020-10-20 PROCEDURE — 99999 PR PBB SHADOW E&M-EST. PATIENT-LVL IV: ICD-10-PCS | Mod: PBBFAC,,, | Performed by: INTERNAL MEDICINE

## 2020-10-20 PROCEDURE — 99215 OFFICE O/P EST HI 40 MIN: CPT | Mod: S$GLB,,, | Performed by: INTERNAL MEDICINE

## 2020-10-20 PROCEDURE — 1125F AMNT PAIN NOTED PAIN PRSNT: CPT | Mod: S$GLB,,, | Performed by: INTERNAL MEDICINE

## 2020-10-20 PROCEDURE — 99215 PR OFFICE/OUTPT VISIT, EST, LEVL V, 40-54 MIN: ICD-10-PCS | Mod: S$GLB,,, | Performed by: INTERNAL MEDICINE

## 2020-10-20 PROCEDURE — 93010 ELECTROCARDIOGRAM REPORT: CPT | Mod: S$GLB,,, | Performed by: INTERNAL MEDICINE

## 2020-10-20 PROCEDURE — 3074F SYST BP LT 130 MM HG: CPT | Mod: CPTII,S$GLB,, | Performed by: INTERNAL MEDICINE

## 2020-10-20 PROCEDURE — 1101F PR PT FALLS ASSESS DOC 0-1 FALLS W/OUT INJ PAST YR: ICD-10-PCS | Mod: CPTII,S$GLB,, | Performed by: INTERNAL MEDICINE

## 2020-10-20 PROCEDURE — 3078F PR MOST RECENT DIASTOLIC BLOOD PRESSURE < 80 MM HG: ICD-10-PCS | Mod: CPTII,S$GLB,, | Performed by: INTERNAL MEDICINE

## 2020-10-20 PROCEDURE — 1159F PR MEDICATION LIST DOCUMENTED IN MEDICAL RECORD: ICD-10-PCS | Mod: S$GLB,,, | Performed by: INTERNAL MEDICINE

## 2020-10-20 PROCEDURE — 3074F PR MOST RECENT SYSTOLIC BLOOD PRESSURE < 130 MM HG: ICD-10-PCS | Mod: CPTII,S$GLB,, | Performed by: INTERNAL MEDICINE

## 2020-10-20 PROCEDURE — 99999 PR PBB SHADOW E&M-EST. PATIENT-LVL IV: CPT | Mod: PBBFAC,,, | Performed by: INTERNAL MEDICINE

## 2020-10-20 PROCEDURE — 93010 RHYTHM STRIP: ICD-10-PCS | Mod: S$GLB,,, | Performed by: INTERNAL MEDICINE

## 2020-10-20 PROCEDURE — 1159F MED LIST DOCD IN RCRD: CPT | Mod: S$GLB,,, | Performed by: INTERNAL MEDICINE

## 2020-10-20 PROCEDURE — 93005 ELECTROCARDIOGRAM TRACING: CPT | Mod: S$GLB,,, | Performed by: INTERNAL MEDICINE

## 2020-10-20 PROCEDURE — 3078F DIAST BP <80 MM HG: CPT | Mod: CPTII,S$GLB,, | Performed by: INTERNAL MEDICINE

## 2020-10-20 PROCEDURE — 93005 RHYTHM STRIP: ICD-10-PCS | Mod: S$GLB,,, | Performed by: INTERNAL MEDICINE

## 2020-10-20 RX ORDER — INFLUENZA A VIRUS A/MICHIGAN/45/2015 X-275 (H1N1) ANTIGEN (FORMALDEHYDE INACTIVATED), INFLUENZA A VIRUS A/SINGAPORE/INFIMH-16-0019/2016 IVR-186 (H3N2) ANTIGEN (FORMALDEHYDE INACTIVATED), INFLUENZA B VIRUS B/PHUKET/3073/2013 ANTIGEN (FORMALDEHYDE INACTIVATED), AND INFLUENZA B VIRUS B/MARYLAND/15/2016 BX-69A ANTIGEN (FORMALDEHYDE INACTIVATED) 60; 60; 60; 60 UG/.7ML; UG/.7ML; UG/.7ML; UG/.7ML
INJECTION, SUSPENSION INTRAMUSCULAR
COMMUNITY
Start: 2020-09-10 | End: 2021-03-16 | Stop reason: ALTCHOICE

## 2020-10-20 NOTE — PROGRESS NOTES
Subjective:    Patient ID:  Nick De Anda is a 80 y.o. male who presents for evaluation of Atrial Fibrillation    Primary Cardiologist: Robert Alas MD  Primary Care Physician: Santiago Adler MD    HPI  Prior Hx:  I had the pleasure of seeing Mr. De Anda today in our electrophysiology clinic in consultation for his atrial arrhythmia. As you are aware he is a pleasant 80 year-old man with coronary artery disease s/p CABG with preserved LVEF, type 2 diabetes, hypertension and recently discovered paroxysmal atrial fibrillation and typical atrial flutter. He recently transferred care from [facility] where his AF was diagnosed and he was initiated on xarelto. He was recently admitted for a symptomatic UTI and observed to have a paroxysm of atrial fibrillation with ventricular rates in the 40s-60s (slower rates appeared to correlate during overnight hours). He monitors his vitals at home and for a while notes it indicates he has an irregular heart beat however he feels normal. He has established care with Dr. Dean in cardiology on 2/13/2019 and was noted to be in atrial fibrillation. He has no obvious symptoms. I reviewed all available electrocardiograms in Epic which show sinus rhythm on 2/5/2014 and 2/2/2019. An ECG dated 2/3/2019 notes rate controlled atrial fibrillation. When I saw him in clinic 2/2019 he was in sinus rhythm. At that visit since he had no symptoms with AF and was rate controlled when in AF I recommended no aggressive rhythm control strategy and would continue his current management. He was to see me prn.    Mr. De Anda returned for discussion of his AF 6/2019. He reported he believed AF was causing fatigue. Notes episodes every week or so that correlate to being fatigue. He was admitted for DCCV and sotalol initiation. He converted when propofol was administered and the DEANGELO/DCCV was cancelled. He saw Faina Espinosa in EP clinic 8/1/2019 and was in sinus rhythm. He felt better in sinus  rhythm.    Mr. De Anda was seen in clinic 10/2019 and noted paroxysms of AF since starting sotalol. One episode lasted 2 days and another 3 days. He noted his pulse was irregular in this setting. He came in for an ECG which noted sinus rhythm however he felt he had already converted. We discussed increasing dosage to 120mg bid however he declined.    Mr. De Anda presented for routine AF follow-up 7/2020. He reported he continues to have some paroxysms of AF, at times last 1/2 day- 1 day. We discussed PVI, he desired to think about it.    Interim Hx:  Mr. De Anda returns for follow-up. His AF burden has significantly increased and he feels poorly. He notes he is still paroxysmal.    My interpretation of today's in clinic ECG is AF with an average ventricular rate of 68 bpm      Review of Systems   Constitution: Positive for malaise/fatigue. Negative for fever.   HENT: Negative for congestion and sore throat.    Eyes: Negative for blurred vision and visual disturbance.   Cardiovascular: Positive for irregular heartbeat and palpitations. Negative for chest pain, dyspnea on exertion and leg swelling.   Respiratory: Positive for cough and sputum production. Negative for shortness of breath.    Skin: Negative.    Musculoskeletal: Negative.    Gastrointestinal: Negative for bloating, abdominal pain, hematochezia and melena.   Neurological: Negative for dizziness, light-headedness and weakness.        Objective:    Physical Exam   Constitutional: He is oriented to person, place, and time. He appears well-developed and well-nourished. No distress.   HENT:   Head: Normocephalic and atraumatic.   Eyes: Conjunctivae are normal. Right eye exhibits no discharge. Left eye exhibits no discharge.   Neck: Neck supple. No JVD present.   Cardiovascular: Normal rate and normal heart sounds. An irregularly irregular rhythm present. Exam reveals no gallop and no friction rub.   No murmur heard.  Pulmonary/Chest: Effort normal and breath  sounds normal. No respiratory distress. He has no wheezes. He has no rales.   Abdominal: Soft. Bowel sounds are normal. He exhibits no distension. There is no abdominal tenderness. There is no rebound.   Musculoskeletal:         General: No edema.   Neurological: He is alert and oriented to person, place, and time.   Skin: Skin is warm and dry. He is not diaphoretic.   Psychiatric: He has a normal mood and affect. His behavior is normal. Judgment and thought content normal.   Vitals reviewed.        Assessment:       1. Paroxysmal atrial fibrillation    2. Coronary artery disease involving native coronary artery of native heart without angina pectoris    3. Essential hypertension    4. Hx of CABG    5. Typical atrial flutter    6. Type 2 diabetes mellitus without complication, without long-term current use of insulin         Plan:     In summary, Mr. De Anda is a pleasant 80 year-old man with coronary artery disease s/p CABG with preserved LVEF, type 2 diabetes, hypertension and symptomatic paroxysmal atrial fibrillation/flutter. He is now on sotalol. He feels significantly better and is in sinus rhythm. He is having symptomatic breakthrough on sotalol. His [id number] score is 5 and long-term anticoagulation is recommended. He is interested in PVI (he has been reading about it). I spent about a half hour discussing the nature of PVI including transseptal puncture. We discussed risks and benefits at length. Our discussion included, but was not limited to the risk of death, infection, bleeding, stroke, MI, cardiac perforation, embolism, cardiac tamponade, vascular injury, AE fistula, injury to phrenic nerve, pulmonary vein stenosis and other organic injury including the possibility for need for surgery or pacemaker implantation.  He understood and desires to proceed. Consents signed. He had no further questions regarding the planned procedure.    Plan  RF-PVI and RF-CTI  Carto  Anesthesia  DEANGELO day of, cancel if in  sinus rhythm  Continue protonix and sotalol  Hold irbesartan and metformin AM of procedure  Take Xarelto evening prior  CTA preop to delineate PV anatomy      Thank you for allowing me to participate in the care of this patient. Please do not hesitate to call me with any questions or concerns.    Yung Paez MD, PhD  Cardiac Electrophysiology

## 2020-11-03 ENCOUNTER — PATIENT MESSAGE (OUTPATIENT)
Dept: ELECTROPHYSIOLOGY | Facility: CLINIC | Age: 80
End: 2020-11-03

## 2020-11-03 DIAGNOSIS — I48.0 PAROXYSMAL ATRIAL FIBRILLATION: Primary | ICD-10-CM

## 2020-11-03 DIAGNOSIS — I48.3 TYPICAL ATRIAL FLUTTER: ICD-10-CM

## 2020-11-17 ENCOUNTER — HOSPITAL ENCOUNTER (OUTPATIENT)
Dept: RADIOLOGY | Facility: HOSPITAL | Age: 80
Discharge: HOME | End: 2020-11-17
Attending: INTERNAL MEDICINE
Payer: MEDICARE

## 2020-11-17 DIAGNOSIS — I48.0 PAROXYSMAL ATRIAL FIBRILLATION: ICD-10-CM

## 2020-11-17 LAB
CREAT SERPL-MCNC: 0.8 MG/DL (ref 0.5–1.4)
SAMPLE: NORMAL

## 2020-11-17 PROCEDURE — 71275 CT ANGIOGRAPHY CHEST: CPT | Mod: 26,,, | Performed by: RADIOLOGY

## 2020-11-17 PROCEDURE — 71275 CTA CHEST NON CORONARY: ICD-10-PCS | Mod: 26,,, | Performed by: RADIOLOGY

## 2020-11-17 PROCEDURE — 71275 CT ANGIOGRAPHY CHEST: CPT | Mod: TC

## 2020-11-17 PROCEDURE — 25500020 PHARM REV CODE 255: Performed by: INTERNAL MEDICINE

## 2020-11-17 RX ADMIN — IOHEXOL 100 ML: 350 INJECTION, SOLUTION INTRAVENOUS at 01:11

## 2020-11-23 DIAGNOSIS — E11.9 TYPE 2 DIABETES MELLITUS WITHOUT COMPLICATION, WITHOUT LONG-TERM CURRENT USE OF INSULIN: Primary | ICD-10-CM

## 2020-11-23 NOTE — TELEPHONE ENCOUNTER
According to the pt chart we do not have him using diabetic supplies, is this pt. Supposed to be monitoring his BS at home?

## 2020-11-23 NOTE — TELEPHONE ENCOUNTER
----- Message from Patrick Weiss sent at 11/23/2020  2:53 PM CST -----  [phone]  She requested orders for diabetic supplies to include the most recent clinical notes. Fax to [phone]    Thank you

## 2020-11-24 ENCOUNTER — TELEPHONE (OUTPATIENT)
Dept: PRIMARY CARE CLINIC | Facility: CLINIC | Age: 80
End: 2020-11-24

## 2020-11-24 RX ORDER — INSULIN PUMP SYRINGE, 3 ML
EACH MISCELLANEOUS
Qty: 1 EACH | Refills: 0 | Status: SHIPPED | OUTPATIENT
Start: 2020-11-24 | End: 2021-12-27 | Stop reason: SDUPTHER

## 2020-11-24 RX ORDER — LANCETS
EACH MISCELLANEOUS
Qty: 100 EACH | Refills: 5 | Status: SHIPPED | OUTPATIENT
Start: 2020-11-24 | End: 2021-12-27 | Stop reason: SDUPTHER

## 2020-11-25 ENCOUNTER — LAB VISIT (OUTPATIENT)
Dept: LAB | Facility: HOSPITAL | Age: 80
End: 2020-11-25
Attending: INTERNAL MEDICINE
Payer: MEDICARE

## 2020-11-25 DIAGNOSIS — I48.0 PAROXYSMAL ATRIAL FIBRILLATION: ICD-10-CM

## 2020-11-25 LAB
ANION GAP SERPL CALC-SCNC: 8 MMOL/L (ref 8–16)
BASOPHILS # BLD AUTO: 0.06 K/UL (ref 0–0.2)
BASOPHILS NFR BLD: 0.8 % (ref 0–1.9)
BUN SERPL-MCNC: 19 MG/DL (ref 8–23)
CALCIUM SERPL-MCNC: 10 MG/DL (ref 8.7–10.5)
CHLORIDE SERPL-SCNC: 102 MMOL/L (ref 95–110)
CO2 SERPL-SCNC: 27 MMOL/L (ref 23–29)
CREAT SERPL-MCNC: 1 MG/DL (ref 0.5–1.4)
DIFFERENTIAL METHOD: NORMAL
EOSINOPHIL # BLD AUTO: 0.4 K/UL (ref 0–0.5)
EOSINOPHIL NFR BLD: 4.9 % (ref 0–8)
ERYTHROCYTE [DISTWIDTH] IN BLOOD BY AUTOMATED COUNT: 13.4 % (ref 11.5–14.5)
EST. GFR  (AFRICAN AMERICAN): >60 ML/MIN/1.73 M^2
EST. GFR  (NON AFRICAN AMERICAN): >60 ML/MIN/1.73 M^2
GLUCOSE SERPL-MCNC: 164 MG/DL (ref 70–110)
HCT VFR BLD AUTO: 43.4 % (ref 40–54)
HGB BLD-MCNC: 14.1 G/DL (ref 14–18)
IMM GRANULOCYTES # BLD AUTO: 0.01 K/UL (ref 0–0.04)
IMM GRANULOCYTES NFR BLD AUTO: 0.1 % (ref 0–0.5)
LYMPHOCYTES # BLD AUTO: 2.2 K/UL (ref 1–4.8)
LYMPHOCYTES NFR BLD: 28.7 % (ref 18–48)
MCH RBC QN AUTO: 29.3 PG (ref 27–31)
MCHC RBC AUTO-ENTMCNC: 32.5 G/DL (ref 32–36)
MCV RBC AUTO: 90 FL (ref 82–98)
MONOCYTES # BLD AUTO: 0.9 K/UL (ref 0.3–1)
MONOCYTES NFR BLD: 11.4 % (ref 4–15)
NEUTROPHILS # BLD AUTO: 4.2 K/UL (ref 1.8–7.7)
NEUTROPHILS NFR BLD: 54.1 % (ref 38–73)
NRBC BLD-RTO: 0 /100 WBC
PLATELET # BLD AUTO: 220 K/UL (ref 150–350)
PMV BLD AUTO: 11.5 FL (ref 9.2–12.9)
POTASSIUM SERPL-SCNC: 4.5 MMOL/L (ref 3.5–5.1)
RBC # BLD AUTO: 4.81 M/UL (ref 4.6–6.2)
SODIUM SERPL-SCNC: 137 MMOL/L (ref 136–145)
WBC # BLD AUTO: 7.71 K/UL (ref 3.9–12.7)

## 2020-11-25 PROCEDURE — 85730 THROMBOPLASTIN TIME PARTIAL: CPT

## 2020-11-25 PROCEDURE — 36415 COLL VENOUS BLD VENIPUNCTURE: CPT | Mod: PN

## 2020-11-25 PROCEDURE — 85610 PROTHROMBIN TIME: CPT

## 2020-11-25 PROCEDURE — 85025 COMPLETE CBC W/AUTO DIFF WBC: CPT

## 2020-11-25 PROCEDURE — 80048 BASIC METABOLIC PNL TOTAL CA: CPT

## 2020-11-26 LAB
APTT BLDCRRT: 33.9 SEC (ref 21–32)
INR PPP: 1.1 (ref 0.8–1.2)
PROTHROMBIN TIME: 12.5 SEC (ref 9–12.5)

## 2020-11-28 ENCOUNTER — PATIENT MESSAGE (OUTPATIENT)
Dept: MEDSURG UNIT | Facility: HOSPITAL | Age: 80
End: 2020-11-28

## 2020-11-30 ENCOUNTER — PATIENT MESSAGE (OUTPATIENT)
Dept: CARDIOLOGY | Facility: CLINIC | Age: 80
End: 2020-11-30

## 2020-11-30 NOTE — TELEPHONE ENCOUNTER
Spoke with  to confirm that  has sent a Refill to his local pharmacy on 11/23. Pt verbalized understanding and will the pharmacy to verify.

## 2020-12-01 ENCOUNTER — TELEPHONE (OUTPATIENT)
Dept: ELECTROPHYSIOLOGY | Facility: CLINIC | Age: 80
End: 2020-12-01

## 2020-12-01 DIAGNOSIS — I10 ESSENTIAL HYPERTENSION: ICD-10-CM

## 2020-12-01 RX ORDER — IRBESARTAN 300 MG/1
300 TABLET ORAL NIGHTLY
Qty: 90 TABLET | Refills: 3 | Status: SHIPPED | OUTPATIENT
Start: 2020-12-01 | End: 2021-12-09

## 2020-12-01 NOTE — TELEPHONE ENCOUNTER
Spoke to Mr. De Anda    CONFIRMED procedure arrival time of 10AM    Reiterated instructions including:  -Directions to check in desk  -NPO after midnight night prior to procedure  -High importance of HOLDING Irbesartan, Metformin day of procedure   -Patient confirms takes Xarelto with evening meal  -Pre-procedure LABS done, no alerts  -COVID test negative  -Confirmed no fever, cough, or shortness of breath in the past 30 days  -Confirmed no redness, rash, irritation, or yeast infection to groin area.   -Reviewed current visitor policy    Patient verbalizes understanding of above and appreciates call.

## 2020-12-02 ENCOUNTER — ANESTHESIA (OUTPATIENT)
Dept: MEDSURG UNIT | Facility: HOSPITAL | Age: 80
End: 2020-12-02
Payer: MEDICARE

## 2020-12-02 ENCOUNTER — HOSPITAL ENCOUNTER (OUTPATIENT)
Facility: HOSPITAL | Age: 80
Discharge: HOME | End: 2020-12-03
Attending: INTERNAL MEDICINE | Admitting: INTERNAL MEDICINE
Payer: MEDICARE

## 2020-12-02 ENCOUNTER — ANESTHESIA EVENT (OUTPATIENT)
Dept: MEDSURG UNIT | Facility: HOSPITAL | Age: 80
End: 2020-12-02
Payer: MEDICARE

## 2020-12-02 DIAGNOSIS — I48.91 ATRIAL FIBRILLATION: ICD-10-CM

## 2020-12-02 DIAGNOSIS — I48.0 PAROXYSMAL ATRIAL FIBRILLATION: ICD-10-CM

## 2020-12-02 DIAGNOSIS — I49.9 ARRHYTHMIA: ICD-10-CM

## 2020-12-02 DIAGNOSIS — Z98.890 STATUS POST ABLATION OF ATRIAL FIBRILLATION: ICD-10-CM

## 2020-12-02 DIAGNOSIS — Z86.79 STATUS POST ABLATION OF ATRIAL FIBRILLATION: ICD-10-CM

## 2020-12-02 DIAGNOSIS — I48.3 TYPICAL ATRIAL FLUTTER: ICD-10-CM

## 2020-12-02 LAB
ABO + RH BLD: NORMAL
BLD GP AB SCN CELLS X3 SERPL QL: NORMAL
POCT GLUCOSE: 110 MG/DL (ref 70–110)
POCT GLUCOSE: 130 MG/DL (ref 70–110)

## 2020-12-02 PROCEDURE — 93662 INTRACARDIAC ECG (ICE): CPT | Performed by: INTERNAL MEDICINE

## 2020-12-02 PROCEDURE — 93655 PR ABLATE ARRHYTHMIA ADD ON: ICD-10-PCS | Mod: ,,, | Performed by: INTERNAL MEDICINE

## 2020-12-02 PROCEDURE — C1732 CATH, EP, DIAG/ABL, 3D/VECT: HCPCS | Performed by: INTERNAL MEDICINE

## 2020-12-02 PROCEDURE — C1894 INTRO/SHEATH, NON-LASER: HCPCS | Performed by: INTERNAL MEDICINE

## 2020-12-02 PROCEDURE — 93613 PR INTRACARD ELECTROPHYS 3-DIMENS MAPPING: ICD-10-PCS | Mod: ,,, | Performed by: INTERNAL MEDICINE

## 2020-12-02 PROCEDURE — 25000003 PHARM REV CODE 250: Performed by: INTERNAL MEDICINE

## 2020-12-02 PROCEDURE — 27201423 OPTIME MED/SURG SUP & DEVICES STERILE SUPPLY: Performed by: INTERNAL MEDICINE

## 2020-12-02 PROCEDURE — D9220A PRA ANESTHESIA: ICD-10-PCS | Mod: ANES,,, | Performed by: ANESTHESIOLOGY

## 2020-12-02 PROCEDURE — 37000008 HC ANESTHESIA 1ST 15 MINUTES: Performed by: INTERNAL MEDICINE

## 2020-12-02 PROCEDURE — 25000003 PHARM REV CODE 250: Performed by: NURSE ANESTHETIST, CERTIFIED REGISTERED

## 2020-12-02 PROCEDURE — 93662 INTRACARDIAC ECG (ICE): CPT | Mod: 26,,, | Performed by: INTERNAL MEDICINE

## 2020-12-02 PROCEDURE — 37000009 HC ANESTHESIA EA ADD 15 MINS: Performed by: INTERNAL MEDICINE

## 2020-12-02 PROCEDURE — D9220A PRA ANESTHESIA: ICD-10-PCS | Mod: CRNA,,, | Performed by: NURSE ANESTHETIST, CERTIFIED REGISTERED

## 2020-12-02 PROCEDURE — 25000003 PHARM REV CODE 250: Performed by: REGISTERED NURSE

## 2020-12-02 PROCEDURE — 63600175 PHARM REV CODE 636 W HCPCS: Performed by: INTERNAL MEDICINE

## 2020-12-02 PROCEDURE — 82962 GLUCOSE BLOOD TEST: CPT | Performed by: INTERNAL MEDICINE

## 2020-12-02 PROCEDURE — 63600175 PHARM REV CODE 636 W HCPCS: Performed by: REGISTERED NURSE

## 2020-12-02 PROCEDURE — 93656 COMPRE EP EVAL ABLTJ ATR FIB: CPT | Performed by: INTERNAL MEDICINE

## 2020-12-02 PROCEDURE — 27201037 HC PRESSURE MONITORING SET UP

## 2020-12-02 PROCEDURE — D9220A PRA ANESTHESIA: Mod: ANES,,, | Performed by: ANESTHESIOLOGY

## 2020-12-02 PROCEDURE — 93656 COMPRE EP EVAL ABLTJ ATR FIB: CPT | Mod: ,,, | Performed by: INTERNAL MEDICINE

## 2020-12-02 PROCEDURE — 93655 ICAR CATH ABLTJ DSCRT ARRHYT: CPT | Mod: ,,, | Performed by: INTERNAL MEDICINE

## 2020-12-02 PROCEDURE — 93613 INTRACARDIAC EPHYS 3D MAPG: CPT | Mod: ,,, | Performed by: INTERNAL MEDICINE

## 2020-12-02 PROCEDURE — 93662 PR INTRACARD ECHO, THER/DX INTERVENT: ICD-10-PCS | Mod: 26,,, | Performed by: INTERNAL MEDICINE

## 2020-12-02 PROCEDURE — 93010 ELECTROCARDIOGRAM REPORT: CPT | Mod: ,,, | Performed by: STUDENT IN AN ORGANIZED HEALTH CARE EDUCATION/TRAINING PROGRAM

## 2020-12-02 PROCEDURE — 82962 GLUCOSE BLOOD TEST: CPT | Mod: 91 | Performed by: INTERNAL MEDICINE

## 2020-12-02 PROCEDURE — 94761 N-INVAS EAR/PLS OXIMETRY MLT: CPT

## 2020-12-02 PROCEDURE — 93010 ELECTROCARDIOGRAM REPORT: CPT | Mod: 76,,, | Performed by: STUDENT IN AN ORGANIZED HEALTH CARE EDUCATION/TRAINING PROGRAM

## 2020-12-02 PROCEDURE — 93005 ELECTROCARDIOGRAM TRACING: CPT | Mod: 59

## 2020-12-02 PROCEDURE — 93613 INTRACARDIAC EPHYS 3D MAPG: CPT | Performed by: INTERNAL MEDICINE

## 2020-12-02 PROCEDURE — 93656 PR ELECTROPHYS EVAL, COMPREHEN, W/ABLATION OF ATRIAL FIBR: ICD-10-PCS | Mod: ,,, | Performed by: INTERNAL MEDICINE

## 2020-12-02 PROCEDURE — C1730 CATH, EP, 19 OR FEW ELECT: HCPCS | Performed by: INTERNAL MEDICINE

## 2020-12-02 PROCEDURE — C1766 INTRO/SHEATH,STRBLE,NON-PEEL: HCPCS | Performed by: INTERNAL MEDICINE

## 2020-12-02 PROCEDURE — C1731 CATH, EP, 20 OR MORE ELEC: HCPCS | Performed by: INTERNAL MEDICINE

## 2020-12-02 PROCEDURE — 86850 RBC ANTIBODY SCREEN: CPT

## 2020-12-02 PROCEDURE — 93005 ELECTROCARDIOGRAM TRACING: CPT

## 2020-12-02 PROCEDURE — 63600175 PHARM REV CODE 636 W HCPCS: Performed by: ANESTHESIOLOGY

## 2020-12-02 PROCEDURE — D9220A PRA ANESTHESIA: Mod: CRNA,,, | Performed by: NURSE ANESTHETIST, CERTIFIED REGISTERED

## 2020-12-02 PROCEDURE — 63600175 PHARM REV CODE 636 W HCPCS: Performed by: NURSE ANESTHETIST, CERTIFIED REGISTERED

## 2020-12-02 PROCEDURE — C1753 CATH, INTRAVAS ULTRASOUND: HCPCS | Performed by: INTERNAL MEDICINE

## 2020-12-02 PROCEDURE — 25000003 PHARM REV CODE 250: Performed by: NURSE PRACTITIONER

## 2020-12-02 PROCEDURE — 93655 ICAR CATH ABLTJ DSCRT ARRHYT: CPT | Performed by: INTERNAL MEDICINE

## 2020-12-02 PROCEDURE — 93010 EKG 12-LEAD: ICD-10-PCS | Mod: 76,,, | Performed by: STUDENT IN AN ORGANIZED HEALTH CARE EDUCATION/TRAINING PROGRAM

## 2020-12-02 RX ORDER — HYDROCODONE BITARTRATE AND ACETAMINOPHEN 5; 325 MG/1; MG/1
1 TABLET ORAL EVERY 6 HOURS PRN
Status: DISCONTINUED | OUTPATIENT
Start: 2020-12-02 | End: 2020-12-03 | Stop reason: HOSPADM

## 2020-12-02 RX ORDER — LIDOCAINE HYDROCHLORIDE 20 MG/ML
INJECTION, SOLUTION EPIDURAL; INFILTRATION; INTRACAUDAL; PERINEURAL
Status: DISCONTINUED | OUTPATIENT
Start: 2020-12-02 | End: 2020-12-02 | Stop reason: HOSPADM

## 2020-12-02 RX ORDER — SUCCINYLCHOLINE CHLORIDE 20 MG/ML
INJECTION INTRAMUSCULAR; INTRAVENOUS
Status: DISCONTINUED | OUTPATIENT
Start: 2020-12-02 | End: 2020-12-02

## 2020-12-02 RX ORDER — LIDOCAINE HYDROCHLORIDE 20 MG/ML
INJECTION, SOLUTION EPIDURAL; INFILTRATION; INTRACAUDAL; PERINEURAL
Status: DISCONTINUED | OUTPATIENT
Start: 2020-12-02 | End: 2020-12-02

## 2020-12-02 RX ORDER — FENTANYL CITRATE 50 UG/ML
INJECTION, SOLUTION INTRAMUSCULAR; INTRAVENOUS
Status: DISCONTINUED | OUTPATIENT
Start: 2020-12-02 | End: 2020-12-02

## 2020-12-02 RX ORDER — PROPOFOL 10 MG/ML
VIAL (ML) INTRAVENOUS
Status: DISCONTINUED | OUTPATIENT
Start: 2020-12-02 | End: 2020-12-02

## 2020-12-02 RX ORDER — INSULIN ASPART 100 [IU]/ML
1-10 INJECTION, SOLUTION INTRAVENOUS; SUBCUTANEOUS
Status: DISCONTINUED | OUTPATIENT
Start: 2020-12-02 | End: 2020-12-03 | Stop reason: HOSPADM

## 2020-12-02 RX ORDER — HEPARIN SODIUM 10000 [USP'U]/100ML
INJECTION, SOLUTION INTRAVENOUS CONTINUOUS PRN
Status: DISCONTINUED | OUTPATIENT
Start: 2020-12-02 | End: 2020-12-02

## 2020-12-02 RX ORDER — IBUPROFEN 200 MG
24 TABLET ORAL
Status: DISCONTINUED | OUTPATIENT
Start: 2020-12-02 | End: 2020-12-03 | Stop reason: HOSPADM

## 2020-12-02 RX ORDER — GLUCAGON 1 MG
1 KIT INJECTION
Status: DISCONTINUED | OUTPATIENT
Start: 2020-12-02 | End: 2020-12-03 | Stop reason: HOSPADM

## 2020-12-02 RX ORDER — IRBESARTAN 300 MG/1
300 TABLET ORAL NIGHTLY
Status: DISCONTINUED | OUTPATIENT
Start: 2020-12-02 | End: 2020-12-03 | Stop reason: HOSPADM

## 2020-12-02 RX ORDER — PANTOPRAZOLE SODIUM 40 MG/1
40 TABLET, DELAYED RELEASE ORAL DAILY
Status: DISCONTINUED | OUTPATIENT
Start: 2020-12-03 | End: 2020-12-03 | Stop reason: HOSPADM

## 2020-12-02 RX ORDER — SODIUM CHLORIDE 0.9 % (FLUSH) 0.9 %
10 SYRINGE (ML) INJECTION
Status: ACTIVE | OUTPATIENT
Start: 2020-12-02

## 2020-12-02 RX ORDER — HEPARIN SODIUM 1000 [USP'U]/ML
INJECTION, SOLUTION INTRAVENOUS; SUBCUTANEOUS
Status: DISCONTINUED | OUTPATIENT
Start: 2020-12-02 | End: 2020-12-02

## 2020-12-02 RX ORDER — CETIRIZINE HYDROCHLORIDE 5 MG/1
5 TABLET ORAL NIGHTLY
Status: DISCONTINUED | OUTPATIENT
Start: 2020-12-02 | End: 2020-12-03 | Stop reason: HOSPADM

## 2020-12-02 RX ORDER — SOTALOL HYDROCHLORIDE 80 MG/1
80 TABLET ORAL EVERY 12 HOURS
Status: DISCONTINUED | OUTPATIENT
Start: 2020-12-02 | End: 2020-12-03 | Stop reason: HOSPADM

## 2020-12-02 RX ORDER — CLOPIDOGREL BISULFATE 75 MG/1
75 TABLET ORAL DAILY
Status: DISCONTINUED | OUTPATIENT
Start: 2020-12-03 | End: 2020-12-03 | Stop reason: HOSPADM

## 2020-12-02 RX ORDER — PROTAMINE SULFATE 10 MG/ML
INJECTION, SOLUTION INTRAVENOUS
Status: DISCONTINUED | OUTPATIENT
Start: 2020-12-02 | End: 2020-12-02

## 2020-12-02 RX ORDER — HYDROCHLOROTHIAZIDE 12.5 MG/1
12.5 TABLET ORAL DAILY
Status: DISCONTINUED | OUTPATIENT
Start: 2020-12-03 | End: 2020-12-03 | Stop reason: HOSPADM

## 2020-12-02 RX ORDER — KETAMINE HCL IN 0.9 % NACL 50 MG/5 ML
SYRINGE (ML) INTRAVENOUS
Status: DISCONTINUED | OUTPATIENT
Start: 2020-12-02 | End: 2020-12-02

## 2020-12-02 RX ORDER — ATORVASTATIN CALCIUM 20 MG/1
40 TABLET, FILM COATED ORAL DAILY
Status: DISCONTINUED | OUTPATIENT
Start: 2020-12-03 | End: 2020-12-03 | Stop reason: HOSPADM

## 2020-12-02 RX ORDER — IBUPROFEN 200 MG
16 TABLET ORAL
Status: DISCONTINUED | OUTPATIENT
Start: 2020-12-02 | End: 2020-12-03 | Stop reason: HOSPADM

## 2020-12-02 RX ORDER — EPHEDRINE SULFATE 50 MG/ML
INJECTION, SOLUTION INTRAVENOUS
Status: DISCONTINUED | OUTPATIENT
Start: 2020-12-02 | End: 2020-12-02

## 2020-12-02 RX ORDER — FUROSEMIDE 10 MG/ML
20 INJECTION INTRAMUSCULAR; INTRAVENOUS ONCE
Status: COMPLETED | OUTPATIENT
Start: 2020-12-02 | End: 2020-12-02

## 2020-12-02 RX ORDER — MIDAZOLAM HYDROCHLORIDE 1 MG/ML
INJECTION, SOLUTION INTRAMUSCULAR; INTRAVENOUS
Status: DISCONTINUED | OUTPATIENT
Start: 2020-12-02 | End: 2020-12-02

## 2020-12-02 RX ORDER — SUCRALFATE 1 G/1
1 TABLET ORAL
Status: DISCONTINUED | OUTPATIENT
Start: 2020-12-02 | End: 2020-12-03 | Stop reason: HOSPADM

## 2020-12-02 RX ORDER — FENTANYL CITRATE 50 UG/ML
25 INJECTION, SOLUTION INTRAMUSCULAR; INTRAVENOUS EVERY 5 MIN PRN
Status: COMPLETED | OUTPATIENT
Start: 2020-12-02 | End: 2020-12-02

## 2020-12-02 RX ORDER — ROCURONIUM BROMIDE 10 MG/ML
INJECTION, SOLUTION INTRAVENOUS
Status: DISCONTINUED | OUTPATIENT
Start: 2020-12-02 | End: 2020-12-02

## 2020-12-02 RX ORDER — PHENYLEPHRINE HCL IN 0.9% NACL 1 MG/10 ML
SYRINGE (ML) INTRAVENOUS
Status: DISCONTINUED | OUTPATIENT
Start: 2020-12-02 | End: 2020-12-02

## 2020-12-02 RX ORDER — AMLODIPINE BESYLATE 5 MG/1
5 TABLET ORAL DAILY
Status: DISCONTINUED | OUTPATIENT
Start: 2020-12-03 | End: 2020-12-03 | Stop reason: HOSPADM

## 2020-12-02 RX ADMIN — ROCURONIUM BROMIDE 5 MG: 10 INJECTION, SOLUTION INTRAVENOUS at 01:12

## 2020-12-02 RX ADMIN — FENTANYL CITRATE 25 MCG: 50 INJECTION, SOLUTION INTRAMUSCULAR; INTRAVENOUS at 05:12

## 2020-12-02 RX ADMIN — SUCRALFATE 1 G: 1 TABLET ORAL at 10:12

## 2020-12-02 RX ADMIN — HYDROCODONE BITARTRATE AND ACETAMINOPHEN 1 TABLET: 5; 325 TABLET ORAL at 05:12

## 2020-12-02 RX ADMIN — MIDAZOLAM 2 MG: 1 INJECTION INTRAMUSCULAR; INTRAVENOUS at 12:12

## 2020-12-02 RX ADMIN — IRBESARTAN 300 MG: 300 TABLET, FILM COATED ORAL at 10:12

## 2020-12-02 RX ADMIN — HEPARIN SODIUM AND DEXTROSE 12 UNITS/KG/HR: 10000; 5 INJECTION INTRAVENOUS at 01:12

## 2020-12-02 RX ADMIN — LIDOCAINE HYDROCHLORIDE 100 MG: 20 INJECTION, SOLUTION EPIDURAL; INFILTRATION; INTRACAUDAL at 01:12

## 2020-12-02 RX ADMIN — GLYCOPYRROLATE 0.2 MG: 0.2 INJECTION INTRAMUSCULAR; INTRAVENOUS at 01:12

## 2020-12-02 RX ADMIN — PROTAMINE SULFATE 70 MG: 10 INJECTION, SOLUTION INTRAVENOUS at 04:12

## 2020-12-02 RX ADMIN — HEPARIN SODIUM 3000 UNITS: 1000 INJECTION INTRAVENOUS; SUBCUTANEOUS at 02:12

## 2020-12-02 RX ADMIN — HEPARIN SODIUM 14000 UNITS: 1000 INJECTION INTRAVENOUS; SUBCUTANEOUS at 01:12

## 2020-12-02 RX ADMIN — SODIUM CHLORIDE 0.25 MCG/KG/MIN: 9 INJECTION, SOLUTION INTRAVENOUS at 02:12

## 2020-12-02 RX ADMIN — FENTANYL CITRATE 50 MCG: 50 INJECTION, SOLUTION INTRAMUSCULAR; INTRAVENOUS at 02:12

## 2020-12-02 RX ADMIN — EPHEDRINE SULFATE 10 MG: 50 INJECTION INTRAVENOUS at 01:12

## 2020-12-02 RX ADMIN — SOTALOL HYDROCHLORIDE 80 MG: 80 TABLET ORAL at 10:12

## 2020-12-02 RX ADMIN — EPHEDRINE SULFATE 5 MG: 50 INJECTION INTRAVENOUS at 04:12

## 2020-12-02 RX ADMIN — SODIUM CHLORIDE, SODIUM GLUCONATE, SODIUM ACETATE, POTASSIUM CHLORIDE, MAGNESIUM CHLORIDE, SODIUM PHOSPHATE, DIBASIC, AND POTASSIUM PHOSPHATE: .53; .5; .37; .037; .03; .012; .00082 INJECTION, SOLUTION INTRAVENOUS at 01:12

## 2020-12-02 RX ADMIN — FUROSEMIDE 20 MG: 10 INJECTION, SOLUTION INTRAMUSCULAR; INTRAVENOUS at 10:12

## 2020-12-02 RX ADMIN — EPHEDRINE SULFATE 10 MG: 50 INJECTION INTRAVENOUS at 03:12

## 2020-12-02 RX ADMIN — FENTANYL CITRATE 50 MCG: 50 INJECTION, SOLUTION INTRAMUSCULAR; INTRAVENOUS at 01:12

## 2020-12-02 RX ADMIN — HEPARIN SODIUM 3000 UNITS: 1000 INJECTION INTRAVENOUS; SUBCUTANEOUS at 03:12

## 2020-12-02 RX ADMIN — SODIUM CHLORIDE: 0.9 INJECTION, SOLUTION INTRAVENOUS at 12:12

## 2020-12-02 RX ADMIN — PROPOFOL 50 MG: 10 INJECTION, EMULSION INTRAVENOUS at 03:12

## 2020-12-02 RX ADMIN — RIVAROXABAN 20 MG: 20 TABLET, FILM COATED ORAL at 10:12

## 2020-12-02 RX ADMIN — Medication 10 MG: at 03:12

## 2020-12-02 RX ADMIN — Medication 100 MCG: at 01:12

## 2020-12-02 RX ADMIN — FENTANYL CITRATE 100 MCG: 50 INJECTION, SOLUTION INTRAMUSCULAR; INTRAVENOUS at 03:12

## 2020-12-02 RX ADMIN — Medication 30 MG: at 01:12

## 2020-12-02 RX ADMIN — GLYCOPYRROLATE 0.2 MG: 0.2 INJECTION INTRAMUSCULAR; INTRAVENOUS at 02:12

## 2020-12-02 RX ADMIN — SUCCINYLCHOLINE CHLORIDE 140 MG: 20 INJECTION, SOLUTION INTRAMUSCULAR; INTRAVENOUS; PARENTERAL at 01:12

## 2020-12-02 RX ADMIN — CETIRIZINE HYDROCHLORIDE 5 MG: 5 TABLET ORAL at 10:12

## 2020-12-02 RX ADMIN — Medication 10 MG: at 02:12

## 2020-12-02 RX ADMIN — PROPOFOL 150 MG: 10 INJECTION, EMULSION INTRAVENOUS at 01:12

## 2020-12-02 RX ADMIN — FENTANYL CITRATE 25 MCG: 50 INJECTION, SOLUTION INTRAMUSCULAR; INTRAVENOUS at 06:12

## 2020-12-02 NOTE — ANESTHESIA PREPROCEDURE EVALUATION
12/02/2020  Nick De Anda is a 80 y.o., male   [facility]  Anesthesia Pre-Operative Evaluation       Patient Name: Nick De Anda  YOB: 1940  MRN: [id number]  [facility]: [id number]      Code Status: Prior   Date of Procedure: 12/2/2020  Anesthesia: General Procedure: Procedure(s) (LRB):  Ablation atrial fibrillation (N/A)  Ablation, Atrial Flutter, Typical (N/A)  Transesophageal echo (DEANGELO) intra-procedure log documentation (N/A)  Pre-Operative Diagnosis: Paroxysmal atrial fibrillation [I48.0]  Typical atrial flutter [I48.3]  Proceduralist: Surgeon(s) and Role:  Panel 1:     * Yung Paez MD - Primary  Panel 2:     * [facility] Diagnostic Provider - Primary        SUBJECTIVE:   Nick De Anda is a 80 y.o. male who  has a past medical history of Anticoagulant long-term use, Arthritis, Cancer, Coronary artery disease, Diabetes mellitus, Encounter for blood transfusion, History of staphylococcal infection (2012), Hypertension, and Persistent atrial fibrillation (2/2/2019). No notes on file  Revised cardiac risk index (RCRI) score is **. Estimated Risk of Major Cardiac Event (MACE) is ** within 30d.    (1 point each: CVA/TIA, CAD, HF, IDDM, Cr > 2, high risk surgery [intraperitoneal, intrathoracic, suprainguinal vascular])           he has a current medication list which includes the following long-term medication(s): amlodipine, atorvastatin, blood-glucose meter, clopidogrel, diazepam, hydrochlorothiazide, irbesartan, levalbuterol, levocetirizine, metformin, nitroglycerin, pantoprazole, and sotalol.   ALLERGIES:     Review of patient's allergies indicates:   Allergen Reactions    Penicillins Anaphylaxis     Can take keflex    Amoxicillin Swelling and Rash     LDA:      Lines/Drains/Airways     None                Anesthesia Evaluation      Airway   Mallampati: II  TM  distance: Normal, at least 6 cm  Dental      Pulmonary    Cardiovascular     Rate: Normal    Neuro/Psych      GI/Hepatic/Renal      Endo/Other    Abdominal                     MEDICATIONS:     Antibiotics (From admission, onward)    None        VTE Risk Mitigation (From admission, onward)    None        No current facility-administered medications for this encounter.      Current Outpatient Medications   Medication Sig Dispense Refill    amLODIPine (NORVASC) 5 MG tablet Take 1 tablet (5 mg total) by mouth once daily. One-2 per day or as directed 90 tablet 3    atorvastatin (LIPITOR) 40 MG tablet Take 1 tablet (40 mg total) by mouth once daily. 90 tablet 3    blood sugar diagnostic Strp To check BG 2 times daily, to use with insurance preferred meter 100 strip 5    blood-glucose meter kit To check BG 2 times daily, to use with insurance preferred meter 1 each 0    clopidogreL (PLAVIX) 75 mg tablet Take 1 tablet (75 mg total) by mouth once daily. 90 tablet 3    diazePAM (VALIUM) 10 MG Tab Take 1 tablet (10 mg total) by mouth daily as needed. 30 tablet 1    FLUZONE HIGHDOSE QUAD 20-21  mcg/0.7 mL Syrg PHARMACIST ADMINISTERED IMMUNIZATION ADMINISTERED AT TIME OF DISPENSING      hydroCHLOROthiazide (MICROZIDE) 12.5 mg capsule Take 1 capsule by mouth once daily 90 capsule 0    HYDROcodone-acetaminophen (NORCO) 5-325 mg per tablet Take 1 tablet by mouth every 12 (twelve) hours as needed for Pain. 25 tablet 0    irbesartan (AVAPRO) 300 MG tablet Take 1 tablet (300 mg total) by mouth every evening. 90 tablet 3    lancets Misc To check BG 2 times daily, to use with insurance preferred meter 100 each 5    levalbuterol (XOPENEX) 0.63 mg/3 mL nebulizer solution Take 3 mLs (0.63 mg total) by nebulization 4 (four) times daily as needed for Wheezing. Rescue 1 Box 3    levocetirizine (XYZAL) 5 MG tablet Take 1 tablet (5 mg total) by mouth every evening. For allergy 30 tablet 5    metFORMIN (GLUCOPHAGE) 500 MG  tablet Take 1 tablet (500 mg total) by mouth 2 (two) times daily with meals. 180 tablet 3    MULTIVITAMIN W-MINERALS/LUTEIN (CENTRUM SILVER ORAL) Take by mouth once daily.      nitroGLYCERIN (NITROSTAT) 0.4 MG SL tablet Place 1 tablet (0.4 mg total) under the tongue every 5 (five) minutes as needed for Chest pain. 50 tablet 3    nystatin (MYCOSTATIN) cream Apply 45 g topically 2 (two) times daily.      OMEGA-3-DHA-EPA-FISH OIL ORAL Take 1 tablet by mouth once daily.      pantoprazole (PROTONIX) 40 MG tablet Take 1 tablet (40 mg total) by mouth once daily. 30 tablet 5    sotaloL (BETAPACE) 80 MG tablet Take 1 tablet (80 mg total) by mouth every 12 (twelve) hours. 60 tablet 11    XARELTO 20 mg Tab TAKE 1 TABLET BY MOUTH ONCE DAILY IN THE EVENING 30 tablet 11     Facility-Administered Medications Ordered in Other Encounters   Medication Dose Route Frequency Provider Last Rate Last Dose    0.9%  NaCl infusion   Intravenous Continuous Rebecca Dickinson NP        sodium chloride 0.9% flush 5 mL  5 mL Intravenous PRN Rebecca Dickinson NP              History:   There are no hospital problems to display for this patient.    Surgical History:    has a past surgical history that includes Cardiac surgery; Cystoscopy; Coronary stent placement; Shoulder surgery; Rectoperitoneal fistula closure; Triple bypass; and Foot surgery.   Social History:    reports previously being sexually active.  reports that he quit smoking about 41 years ago. He has never used smokeless tobacco. He reports that he does not drink alcohol or use drugs.     OBJECTIVE:     Vital Signs (Most Recent):    Vital Signs Range (Last 24H):  BP: ()/()   Arterial Line BP: ()/()        There is no height or weight on file to calculate BMI.   Wt Readings from Last 4 Encounters:   10/20/20 115.1 kg (253 lb 12 oz)   10/05/20 132.2 kg (291 lb 7.2 oz)   07/16/20 127.9 kg (281 lb 15.5 oz)   07/01/20 128.4 kg (282 lb 15.4 oz)     Significant Labs:  Lab  Results   Component Value Date    WBC 7.71 11/25/2020    HGB 14.1 11/25/2020    HCT 43.4 11/25/2020     11/25/2020     11/25/2020    K 4.5 11/25/2020     11/25/2020    CREATININE 1.0 11/25/2020    BUN 19 11/25/2020    CO2 27 11/25/2020     (H) 11/25/2020    CALCIUM 10.0 11/25/2020    ALKPHOS 67 10/01/2020    ALT 14 10/01/2020    AST 17 10/01/2020    ALBUMIN 3.5 10/01/2020    INR 1.1 11/25/2020    APTT 33.9 (H) 11/25/2020    HGBA1C 5.8 (H) 10/01/2020     No LMP for male patient.  Recent Results (from the past 72 hour(s))   COVID-19 Routine Screening    Collection Time: 11/29/20  9:53 AM   Result Value Ref Range    SARS-CoV2 (COVID-19) Qualitative PCR Not Detected Not Detected       EKG:   Results for orders placed or performed during the hospital encounter of 07/16/20   EKG 12-lead    Collection Time: 07/16/20  1:49 PM    Narrative    Test Reason : I48.0,    Vent. Rate : 065 BPM     Atrial Rate : 065 BPM     P-R Int : 222 ms          QRS Dur : 076 ms      QT Int : 428 ms       P-R-T Axes : 043 006 059 degrees     QTc Int : 445 ms    Baseline Artifact  Sinus rhythm with 1st degree A-V block  Nonspecific ST abnormality Now present  Abnormal ECG  When compared with ECG of 18-DEC-2019 15:21,    Confirmed by ALEXIS GIMENEZ MD ([id number]) on 7/16/2020 4:11:04 PM    Referred By: LASHELL ESQUIVEL           Confirmed By:ALEXIS GIMENEZ MD       TTE:  Results for orders placed or performed during the hospital encounter of 11/21/19   Echo Color Flow Doppler? Yes   Result Value Ref Range    AORTIC VALVE CUSP SEPERATION 1.82 cm    PV PEAK VELOCITY 1.30 cm/s    LVIDd 5.43 3.5 - 6.0 cm    IVS 1.40 (A) 0.6 - 1.1 cm    Posterior Wall 1.36 (A) 0.6 - 1.1 cm    Ao root annulus 3.08 cm    LVIDs 4.71 (A) 2.1 - 4.0 cm    FS 13 28 - 44 %    LV mass 324.46 g    LA size 4.51 cm    RVDD 3.71 cm    Left Ventricle Relative Wall Thickness 0.50 cm    E/A ratio 0.93     E wave decelartion time 267.57 msec    LVOT diameter 2.13 cm     LVOT area 3.6 cm2    Ao peak fidencio 1.23 m/s    AV peak gradient 6 mmHg    MV Peak E Fidencio 1.00 m/s    TR Max Fidencio 2.30 m/s    MV Peak A Fidencio 1.07 m/s    LV Systolic Volume 103.11 mL    LV Diastolic Volume 142.88 mL    Triscuspid Valve Regurgitation Peak Gradient 21 mmHg    Narrative    · Mild left atrial enlargement.  · Mild right ventricular enlargement.  · Moderate concentric left ventricular hypertrophy.  · Mildly decreased left ventricular systolic function. The estimated   ejection fraction is 45%  · Grade I (mild) left ventricular diastolic dysfunction consistent with   impaired relaxation.  · Local segmental wall motion abnormalities.  · Septal wall has abnormal motion.  · Mild-to-moderate mitral regurgitation.  · Mild to moderate tricuspid regurgitation.        Nuc Stress EF   Date Value Ref Range Status   12/10/2019 78 % Final     Nuc Rest EF   Date Value Ref Range Status   12/10/2019 74  Final      No results found for this or any previous visit.  DEANGELO:  No results found for this or any previous visit.  Stress Test:  Results for orders placed in visit on 12/10/19   Cardiac PET Scan Stress    Narrative   The relative PET images are normal showing no clinically significant   regional resting or stress induced perfusion defects.    Whole heart absolute myocardial perfusion (cc/min/g) averaged 0.63   cc/min/g at rest (which is normal), 1.27 cc/min/g at stress (which is   mildly reduced), and 2.03 CFR (which is mildly reduced).    Gated perfusion images showed an ejection fraction of 74% at rest and   78% during stress. Normal ejection fraction is greater than 51%.    Wall motion was normal at rest and during stress.    LV cavity size is normal at rest and stress.    The EKG portion of this study is negative for ischemia.    There were no arrhythmias during stress.    The patient reported no chest pain during the stress test.    There are no prior studies for comparison.          LHC:  No results found for  "this or any previous visit.   PFT:  No results found for: FEV1, FVC, JWL0SUM, TLC, DLCO   Last 3 sets of Vitals    Vitals - 1 value per visit 7/16/2020 10/5/2020 10/20/2020   SYSTOLIC 114 132 124   DIASTOLIC 60 72 58   PULSE 65 69 68   TEMPERATURE - 98.1 -   RESPIRATIONS - 18 -   SPO2 - 95 -   Weight (lb) 281.97 291.45 253.75   Weight (kg) 127.9 132.2 115.1   HEIGHT 6' 4" 6' 4" 6' 4"   BODY MASS INDEX 34.32 35.48 30.89   VISIT REPORT - - -   Pain Score  0 6 5   Some recent data might be hidden       ASSESSMENT/PLAN:   .    Anesthesia Evaluation    I have reviewed the Patient Summary Reports.    I have reviewed the Nursing Notes. I have reviewed the NPO Status.   I have reviewed the Medications.     Review of Systems    Patient Active Problem List   Diagnosis    Other hammer toe (acquired)    Hammertoe    Calcification of cartilage    Exostosis of right posterior calcaneus    Type 2 diabetes mellitus without complication, without long-term current use of insulin    Chronic pain syndrome    Essential hypertension    Hx of CABG    Urinary tract infection with hematuria    A-fib    Hyponatremia    Thrombocytopenia    Coronary artery disease involving native coronary artery of native heart without angina pectoris    Post PTCA    Dyslipidemia    Typical atrial flutter    Encounter for monitoring sotalol therapy    Long term (current) use of anticoagulants    Peripheral edema    Bronchiectasis without complication    Pleural plaque    Cough, persistent         Physical Exam  General:  Well nourished    Airway/Jaw/Neck:  Airway Findings: Mouth Opening: Normal Tongue: Normal  Mallampati: II  TM Distance: Normal, at least 6 cm      Dental:  Dental Findings:   Chest/Lungs:  Chest/Lungs Findings: Normal Respiratory Rate     Heart/Vascular:  Heart Findings: Rate: Normal        Mental Status:  Mental Status Findings:  Cooperative, Alert and Oriented         Anesthesia Plan  Type of Anesthesia, risks & " benefits discussed:  Anesthesia Type:  general  Patient's Preference: same   Intra-op Monitoring Plan: arterial line and standard ASA monitors  Intra-op Monitoring Plan Comments:   Post Op Pain Control Plan: per primary service following discharge from PACU, IV/PO Opioids PRN and multimodal analgesia  Post Op Pain Control Plan Comments:   Induction:   IV  Beta Blocker:  Patient is on a Beta-Blocker and has received one dose within the past 24 hours (No further documentation required).       Informed Consent: Patient understands risks and agrees with Anesthesia plan.  Questions answered. Anesthesia consent signed with patient.  ASA Score: 3     Day of Surgery Review of History & Physical:    H&P update referred to the surgeon.         Ready For Surgery From Anesthesia Perspective.

## 2020-12-02 NOTE — BRIEF OP NOTE
: Yung Paez M.D  Date of procedure: 12/2/2020  Post-operative Diagnosis: AF, Aflutter    Procedure Performed: Pulmonary vein isolation of all 4 pulmonary veins via radiofrequency ablation. CTI RFA.     Description of Procedure: The patient was brought to the EP lab in the fasting state. Prepped and draped in sterile fashion. Safety timeout was performed. Sedation administered by anesthesia staff. Ultrasound guided venous access of the bilateral femoral veins was performed. ICE and CS catheters placed via left femoral vein access. Pre procedure ICE survey showed no significant pericardial effusion. Long sheaths via right femoral venous access. HALO catheter via right femoral vein. Heparin bolus and continuous infusion per protocol. CTI RFA with bidirectional block. Two transseptal punctures performed using combination of fluoroscopic and ICE guidance. Map created. RFA to isolate all pulmonary veins. ICE confirmed no significant pericardial effusion. All sheaths and catheters withdrawn and patient was transferred to recovery area in good condition.     EBL: <10 mL    Specimens Removed: None  Complications: no immediate      Plan:  Admit overnight for observation with plans to dc tomorrow  Post op bed rest x 6 hours   Resume routine medications including home anticoagulation and sotalol tonight   Continue Protonix and will add sucralfate   Nsaids prn for pain and once dose of IV lasix       Jesús Ramirez MD   [id number]

## 2020-12-02 NOTE — H&P
Electrophysiology Pre Procedure H&P  Reason for visit: Elective PVI/CTI ablation     HPI:   Nick De Anda is a 80 y.o. male  coronary artery disease s/p CABG with preserved LVEF, type 2 diabetes, hypertension and recently discovered paroxysmal atrial fibrillation and typical atrial flutter. He was initially felt to be asymptomatic in afib and was rate controlled but has now developed symptoms of developed fatigue which was attributed to AF. Patient has been maintained on an antiarrythmic, currently on sotalol but reports symptoms due to intermittent breakthrough. He was seen in clinic where PVI /CTI was discussed for control of his atrial dysrhythmia. Today he reports feeling fine and denies any acute changes to his health. He is generally on anticoagulation and sotalol. Last dose of anticoagulation was yesteday and last dose of sotalol was also last night. He is accompanied by his wife today. The procedure was explained to them both and they expressed understanding and willing to proceed.      His ECG today shows sinus bradycardia with average heart rate around 53 bpm and prolonged KS interval of 220 ms.         Past Medical History:   Diagnosis Date    Anticoagulant long-term use     Arthritis     Cancer     skin    Coronary artery disease     Diabetes mellitus     diet    Encounter for blood transfusion     History of staphylococcal infection 2012    after CABG    Hypertension     Persistent atrial fibrillation 2/2/2019        Social History     Socioeconomic History    Marital status:      Spouse name: Not on file    Number of children: Not on file    Years of education: Not on file    Highest education level: Not on file   Occupational History    Not on file   Social Needs    Financial resource strain: Not on file    Food insecurity     Worry: Not on file     Inability: Not on file    Transportation needs     Medical: Not on file     Non-medical: Not on file   Tobacco Use     Smoking status: Former Smoker     Quit date: 10/19/1979     Years since quittin.1    Smokeless tobacco: Never Used   Substance and Sexual Activity    Alcohol use: No    Drug use: Never    Sexual activity: Not Currently   Lifestyle    Physical activity     Days per week: Not on file     Minutes per session: Not on file    Stress: Not on file   Relationships    Social connections     Talks on phone: Not on file     Gets together: Not on file     Attends [employer] service: Not on file     Active member of club or organization: Not on file     Attends meetings of clubs or organizations: Not on file     Relationship status: Not on file   Other Topics Concern    Not on file   Social History Narrative    Not on file        Family History   Family history unknown: Yes        No current facility-administered medications for this encounter.      Current Outpatient Medications   Medication Sig Dispense Refill    amLODIPine (NORVASC) 5 MG tablet Take 1 tablet (5 mg total) by mouth once daily. One-2 per day or as directed 90 tablet 3    atorvastatin (LIPITOR) 40 MG tablet Take 1 tablet (40 mg total) by mouth once daily. 90 tablet 3    blood sugar diagnostic Strp To check BG 2 times daily, to use with insurance preferred meter 100 strip 5    blood-glucose meter kit To check BG 2 times daily, to use with insurance preferred meter 1 each 0    clopidogreL (PLAVIX) 75 mg tablet Take 1 tablet (75 mg total) by mouth once daily. 90 tablet 3    diazePAM (VALIUM) 10 MG Tab Take 1 tablet (10 mg total) by mouth daily as needed. 30 tablet 1    FLUZONE HIGHDOSE QUAD 20-21  mcg/0.7 mL Syrg PHARMACIST ADMINISTERED IMMUNIZATION ADMINISTERED AT TIME OF DISPENSING      hydroCHLOROthiazide (MICROZIDE) 12.5 mg capsule Take 1 capsule by mouth once daily 90 capsule 0    HYDROcodone-acetaminophen (NORCO) 5-325 mg per tablet Take 1 tablet by mouth every 12 (twelve) hours as needed for Pain. 25 tablet 0    irbesartan  (AVAPRO) 300 MG tablet Take 1 tablet (300 mg total) by mouth every evening. 90 tablet 3    lancets Misc To check BG 2 times daily, to use with insurance preferred meter 100 each 5    levalbuterol (XOPENEX) 0.63 mg/3 mL nebulizer solution Take 3 mLs (0.63 mg total) by nebulization 4 (four) times daily as needed for Wheezing. Rescue 1 Box 3    levocetirizine (XYZAL) 5 MG tablet Take 1 tablet (5 mg total) by mouth every evening. For allergy 30 tablet 5    metFORMIN (GLUCOPHAGE) 500 MG tablet Take 1 tablet (500 mg total) by mouth 2 (two) times daily with meals. 180 tablet 3    MULTIVITAMIN W-MINERALS/LUTEIN (CENTRUM SILVER ORAL) Take by mouth once daily.      nitroGLYCERIN (NITROSTAT) 0.4 MG SL tablet Place 1 tablet (0.4 mg total) under the tongue every 5 (five) minutes as needed for Chest pain. 50 tablet 3    nystatin (MYCOSTATIN) cream Apply 45 g topically 2 (two) times daily.      OMEGA-3-DHA-EPA-FISH OIL ORAL Take 1 tablet by mouth once daily.      pantoprazole (PROTONIX) 40 MG tablet Take 1 tablet (40 mg total) by mouth once daily. 30 tablet 5    sotaloL (BETAPACE) 80 MG tablet Take 1 tablet (80 mg total) by mouth every 12 (twelve) hours. 60 tablet 11    XARELTO 20 mg Tab TAKE 1 TABLET BY MOUTH ONCE DAILY IN THE EVENING 30 tablet 11     Facility-Administered Medications Ordered in Other Encounters   Medication Dose Route Frequency Provider Last Rate Last Dose    0.9%  NaCl infusion   Intravenous Continuous Rebecca Dickinson NP        sodium chloride 0.9% flush 5 mL  5 mL Intravenous PRN Rebecca Dickinson NP          ROS:  Constitutional: (-)fevers, (-)chills, (-)night sweats, fatigue   HEENT: (-) headaches (-) lightheadedness (-) blurry vision  Cardiovascular: (-)chest pain (-)paroxysmal nocturnal dyspnea (-)orthopnea  Respiratory: (+)shortness of breath, (+)dyspnea on exertion (-)hemoptysis  Gastrointestinal: (-)abdominal pain (-)nausea (-)vomiting (-)tarry stools  Musculoskeletal:  (-)arthralgias (-)limited range of motion  Neurologic: (-)parasthesias (-)mood disorder (-)anxiety  Endo: (-)polyuria (-)polydipsia (-)heat/cold intolerance  Skin: (-)rash     Physical Exam:   Wt Readings from Last 3 Encounters:   12/02/20 127 kg (280 lb)   10/20/20 115.1 kg (253 lb 12 oz)   10/05/20 132.2 kg (291 lb 7.2 oz)     Temp Readings from Last 3 Encounters:   12/02/20 96.4 °F (35.8 °C) (Oral)   10/05/20 98.1 °F (36.7 °C) (Oral)   07/01/20 97.9 °F (36.6 °C) (Oral)     BP Readings from Last 3 Encounters:   12/02/20 137/76   10/20/20 (!) 124/58   10/05/20 132/72     Pulse Readings from Last 3 Encounters:   12/02/20 (!) 56   10/20/20 68   10/05/20 69       Gen: is in no acute distress, pleasant patient answering questions appropriately  HEENT: extra-ocular muscles intact, normocephalic-atraumatic  Neck: no jugular venous distension, no lymphadenopathy, no thyromegaly, no carotid bruits  CVS: regular rate and rhythm, S1S2 normal,   CHEST: clear to auscultation bilaterally, no wheezes/rales/ronchi  ABD: Soft, non-tender, nondistended, bowel sounds present  EXT: No Edema, 2+ pulses bilaterally  NEURO: awake, alert, and oriented   Skin: No rash     Review of Labs:   Lab Results   Component Value Date    WBC 7.71 11/25/2020    HGB 14.1 11/25/2020    HCT 43.4 11/25/2020    MCV 90 11/25/2020     11/25/2020       CMP  Sodium   Date Value Ref Range Status   11/25/2020 137 136 - 145 mmol/L Final     Potassium   Date Value Ref Range Status   11/25/2020 4.5 3.5 - 5.1 mmol/L Final     Chloride   Date Value Ref Range Status   11/25/2020 102 95 - 110 mmol/L Final     CO2   Date Value Ref Range Status   11/25/2020 27 23 - 29 mmol/L Final     Glucose   Date Value Ref Range Status   11/25/2020 164 (H) 70 - 110 mg/dL Final     BUN   Date Value Ref Range Status   11/25/2020 19 8 - 23 mg/dL Final     Creatinine   Date Value Ref Range Status   11/25/2020 1.0 0.5 - 1.4 mg/dL Final     Calcium   Date Value Ref Range Status    11/25/2020 10.0 8.7 - 10.5 mg/dL Final     Total Protein   Date Value Ref Range Status   10/01/2020 6.4 6.0 - 8.4 g/dL Final     Albumin   Date Value Ref Range Status   10/01/2020 3.5 3.5 - 5.2 g/dL Final     Total Bilirubin   Date Value Ref Range Status   10/01/2020 0.9 0.1 - 1.0 mg/dL Final     Comment:     For infants and newborns, interpretation of results should be based  on gestational age, weight and in agreement with clinical  observations.  Premature Infant recommended reference ranges:  Up to 24 hours.............<8.0 mg/dL  Up to 48 hours............<12.0 mg/dL  3-5 days..................<15.0 mg/dL  6-29 days.................<15.0 mg/dL       Alkaline Phosphatase   Date Value Ref Range Status   10/01/2020 67 55 - 135 U/L Final     AST   Date Value Ref Range Status   10/01/2020 17 10 - 40 U/L Final     ALT   Date Value Ref Range Status   10/01/2020 14 10 - 44 U/L Final     Anion Gap   Date Value Ref Range Status   11/25/2020 8 8 - 16 mmol/L Final     eGFR if    Date Value Ref Range Status   11/25/2020 >60.0 >60 mL/min/1.73 m^2 Final     eGFR if non    Date Value Ref Range Status   11/25/2020 >60.0 >60 mL/min/1.73 m^2 Final     Comment:     Calculation used to obtain the estimated glomerular filtration  rate (eGFR) is the CKD-EPI equation.        Lab Results   Component Value Date    INR 1.1 11/25/2020    INR 1.5 (H) 09/03/2019    INR 1.3 08/30/2019           Most recent ECG  Sinus rhythm with prolonged MA, otherwise normal.     PET 2019  The relative PET images are normal showing no clinically significant regional resting or stress induced perfusion defects.    Whole heart absolute myocardial perfusion (cc/min/g) averaged 0.63 cc/min/g at rest (which is normal), 1.27 cc/min/g at stress (which is mildly reduced), and 2.03 CFR (which is mildly reduced).    Gated perfusion images showed an ejection fraction of 74% at rest and 78% during stress. Normal ejection fraction is  greater than 51%.    Wall motion was normal at rest and during stress.    LV cavity size is normal at rest and stress.    The EKG portion of this study is negative for ischemia.    There were no arrhythmias during stress.    The patient reported no chest pain during the stress test.    There are no prior studies for comparison.      Summary:  Mr. De Anda is a pleasant 79 year-old man with coronary artery disease s/p CABG with preserved LVEF, type 2 diabetes, hypertension and symptomatic paroxysmal atrial fibrillation/flutter. He is now on sotalol for maintainnce of sinus rhythm and feels significantly better when in sinus rhythm. He is having symptomatic breakthrough on sotalol and unable to tolerate increased doses.  His [id number] score is 5 and is on long-term anticoagulation. LVEF is preserved. He has elected to proceed with PVI/ CTI due to failed medical therapy. Procedure was explained to him in detail and he has expressed understanding and has agreed to proceed with the planned procedure.       Plan:  Currently in sinus rhythm, will defer DEANGELO  Proceed with planned PVI/CTI ablation   Admit and monitor overnight post procedure.

## 2020-12-02 NOTE — ANESTHESIA PROCEDURE NOTES
Intubation  Performed by: Megan Nichole CRNA  Authorized by: DELICIA Jaramillo MD     Intubation:     Induction:  Intravenous    Intubated:  Postinduction    Mask Ventilation:  Easy with oral airway    Attempts:  1    Attempted By:  CRNA    Method of Intubation:  Direct    Blade:  Taylor 2    Laryngeal View Grade: Grade I - full view of chords      Difficult Airway Encountered?: No      Complications:  None    Airway Device:  Oral endotracheal tube    Airway Device Size:  7.5    Style/Cuff Inflation:  Cuffed (inflated to minimal occlusive pressure)    Tube secured:  23    Secured at:  The lips    Placement Verified By:  Capnometry    Complicating Factors:  None    Findings Post-Intubation:  BS equal bilateral and atraumatic/condition of teeth unchanged

## 2020-12-02 NOTE — TRANSFER OF CARE
"Anesthesia Transfer of Care Note    Patient: Nick De Anda    Procedure(s) Performed: Procedure(s) (LRB):  Ablation atrial fibrillation (N/A)  Ablation, Atrial Flutter, Typical (N/A)    Patient location: PACU    Anesthesia Type: general    Transport from OR: Transported from OR on 6-10 L/min O2 by face mask with adequate spontaneous ventilation    Post pain: adequate analgesia    Post assessment: no apparent anesthetic complications and tolerated procedure well    Post vital signs: stable    Level of consciousness: sedated and responds to stimulation    Nausea/Vomiting: no nausea/vomiting    Complications: none    Transfer of care protocol was followed      Last vitals:   Visit Vitals  /76 (BP Location: Right arm, Patient Position: Lying)   Pulse (!) 56   Temp 35.8 °C (96.4 °F) (Oral)   Resp 18   Ht 6' 3.5" (1.918 m)   Wt 127 kg (280 lb)   SpO2 95%   BMI 34.54 kg/m²     "

## 2020-12-02 NOTE — Clinical Note
Manual pressure applied to the left femoral vein and right femoral vein sheath insertion site. Until hemostasis achieved; then applied gauze and tegaderms

## 2020-12-03 VITALS
HEART RATE: 53 BPM | OXYGEN SATURATION: 97 % | DIASTOLIC BLOOD PRESSURE: 69 MMHG | WEIGHT: 280 LBS | BODY MASS INDEX: 34.1 KG/M2 | RESPIRATION RATE: 18 BRPM | TEMPERATURE: 98 F | SYSTOLIC BLOOD PRESSURE: 148 MMHG | HEIGHT: 76 IN

## 2020-12-03 LAB
POC ACTIVATED CLOTTING TIME K: 142 SEC (ref 74–137)
POC ACTIVATED CLOTTING TIME K: 307 SEC (ref 74–137)
POC ACTIVATED CLOTTING TIME K: 323 SEC (ref 74–137)
POC ACTIVATED CLOTTING TIME K: 324 SEC (ref 74–137)
POCT GLUCOSE: 108 MG/DL (ref 70–110)
SAMPLE: ABNORMAL

## 2020-12-03 PROCEDURE — 25000003 PHARM REV CODE 250: Performed by: INTERNAL MEDICINE

## 2020-12-03 RX ORDER — SUCRALFATE 1 G/1
1 TABLET ORAL
Qty: 120 TABLET | Refills: 0 | Status: SHIPPED | OUTPATIENT
Start: 2020-12-03 | End: 2021-01-02

## 2020-12-03 RX ADMIN — SUCRALFATE 1 G: 1 TABLET ORAL at 05:12

## 2020-12-03 RX ADMIN — CLOPIDOGREL 75 MG: 75 TABLET, FILM COATED ORAL at 08:12

## 2020-12-03 RX ADMIN — PANTOPRAZOLE SODIUM 40 MG: 40 TABLET, DELAYED RELEASE ORAL at 08:12

## 2020-12-03 RX ADMIN — ATORVASTATIN CALCIUM 40 MG: 20 TABLET, FILM COATED ORAL at 08:12

## 2020-12-03 RX ADMIN — SOTALOL HYDROCHLORIDE 80 MG: 80 TABLET ORAL at 08:12

## 2020-12-03 RX ADMIN — HYDROCHLOROTHIAZIDE 12.5 MG: 12.5 TABLET ORAL at 08:12

## 2020-12-03 RX ADMIN — AMLODIPINE BESYLATE 5 MG: 5 TABLET ORAL at 08:12

## 2020-12-03 NOTE — HPI
Nick De Anda is a 80 y.o. male  coronary artery disease s/p CABG with preserved LVEF, type 2 diabetes, hypertension and recently discovered paroxysmal atrial fibrillation and typical atrial flutter. He presented for an elective catheter ablation.

## 2020-12-03 NOTE — PLAN OF CARE
Pt given discharge instruction, medication reviewed with the patient, follow up appointments reviewed.  All questions and concerns addressed. Pt verbalized understanding. IV, telemetry removed. Groin site dressing removed, with no hematoma. Discharge paperwork given to patient. Pt in the room waiting for transport.

## 2020-12-03 NOTE — NURSING TRANSFER
Nursing Transfer Note      12/2/2020     Transfer To: 330    Transfer via bed    Transfer with cardiac monitoring    Transported by RN    Medicines sent: none     Chart send with patient: Yes    Notified: spouse    Patient reassessed at: 12/2/20

## 2020-12-03 NOTE — PLAN OF CARE
Pt remained free from falls/trauma/injuries. No complaints of CP/SOB/discomfort.  Xarelto and Sotalol restarted. Bilateral groin sites CDI. SB-- 40s (team notified). VSS. Fall bundle in place.POC explained, no questions at this time. Pt tolerating care. WCTM.

## 2020-12-03 NOTE — HOSPITAL COURSE
After consenting to the procedure, the patient was brought to the EP lab in the fasting state. Prepped and draped in sterile fashion. Safety timeout was performed. Sedation administered by anesthesia staff. Ultrasound guided venous access of the bilateral femoral veins was performed. Multiple catheters placed in the heart. Ablation catheter use to able atrial flutter and pulmonary vein isolation performed for treatment of atrial fibrillation.  All sheaths and catheters withdrawn and patient was transferred to recovery area in good condition.     Please remember:  Continue your usual home medications including blood thinner and sotalol  You were started on sucralfate, take one tablet 4 x a day for 30 days  Follow up with Dr. Paez in 3 months

## 2020-12-03 NOTE — DISCHARGE INSTRUCTIONS
Do not strain or lift anything greater than 5 lb for 1 week.  Do not drive or operate any dangerous machinery for 24 hours.   Clean the area with mild soap and water and then cover it with a bandage.   Once the skin has healed, bathing in a tub or swimming is allowed.   Inspect the groin site daily and report to the physician any swelling at the site that   cannot be controlled with manual pressure for 10 minutes, unusual pain at the   access site or affected extremity, unusual swelling at the access site, or signs or   symptoms of infection such as redness, pain, or fever.   Call 911 if you have:   Bleeding from the puncture site that you cannot stop by doing the following:   Relax and lie down right away. Keep your leg flat and apply firm pressure to the site using your fingers and a gauze pad. Keep the pressure on for 20 minutes. Continue this until the bleeding stops. This may take awhile. When bleeding stops, cover the site with a sterile bandage and keep your leg still as much as possible.    You may develop sharp chest discomfort in the next 1-3 days. This is called pericarditis and is common after an ablation procedure. The treatment is over the counter ibuprofen 600 mg by mouth three times daily as needed for 2-3 days. Ibuprofen should be taken with food. Please call the clinic if you experience this.

## 2020-12-03 NOTE — ANESTHESIA POSTPROCEDURE EVALUATION
Anesthesia Post Evaluation    Patient: Nick De Anda    Procedure(s) Performed: Procedure(s) (LRB):  Ablation atrial fibrillation (N/A)  Ablation, Atrial Flutter, Typical (N/A)    Final Anesthesia Type: general    Patient location during evaluation: PACU  Patient participation: Yes- Able to Participate  Level of consciousness: awake and alert and oriented  Post-procedure vital signs: reviewed and stable  Pain management: adequate  Airway patency: patent  BRISEYDA mitigation strategies: Intraoperative administration of CPAP, nasopharyngeal airway, or oral appliance during sedation, Multimodal analgesia, Extubation while patient is awake, Verification of full reversal of neuromuscular block and Extubation and recovery carried out in lateral, semiupright, or other nonsupine position  PONV status at discharge: No PONV  Anesthetic complications: no      Cardiovascular status: hemodynamically stable  Respiratory status: unassisted  Hydration status: euvolemic  Follow-up not needed.          Vitals Value Taken Time   /69 12/03/20 0721   Temp 36.5 °C (97.7 °F) 12/03/20 0721   Pulse 53 12/03/20 0721   Resp 18 12/03/20 0721   SpO2 97 % 12/03/20 0721         Event Time   Out of Recovery 18:20:00         Pain/Laurence Score: Pain Rating Prior to Med Admin: 10 (sleeping) (12/2/2020  6:15 PM)  Pain Rating Post Med Admin: 10 (12/2/2020  6:15 PM)  Laurence Score: 10 (12/2/2020  6:15 PM)

## 2020-12-03 NOTE — DISCHARGE SUMMARY
[facility]  Cardiac Electrophysiology  Discharge Summary      Patient Name: Nick De Anda  MRN: [id number]  Admission Date: 12/2/2020  Hospital Length of Stay: 0 days  Discharge Date and Time:  12/03/2020 8:43 AM  Attending Physician: Yung Paez MD    Discharging Provider: Jesús Ramirez MD  Primary Care Physician: Santiago Adler MD    HPI:   Nick De Anda is a 80 y.o. male  coronary artery disease s/p CABG with preserved LVEF, type 2 diabetes, hypertension and recently discovered paroxysmal atrial fibrillation and typical atrial flutter. He presented for an elective catheter ablation.     Procedure(s) (LRB):  Ablation atrial fibrillation (N/A)  Ablation, Atrial Flutter, Typical (N/A)     Indwelling Lines/Drains at time of discharge:  Lines/Drains/Airways     None                 Hospital Course:  After consenting to the procedure, the patient was brought to the EP lab in the fasting state. Prepped and draped in sterile fashion. Safety timeout was performed. Sedation administered by anesthesia staff. Ultrasound guided venous access of the bilateral femoral veins was performed. Multiple catheters placed in the heart. Ablation catheter use to able atrial flutter and pulmonary vein isolation performed for treatment of atrial fibrillation.  All sheaths and catheters withdrawn and patient was transferred to recovery area in good condition.     Please remember:  Continue your usual home medications including blood thinner and sotalol  You were started on sucralfate, take one tablet 4 x a day for 30 days  Follow up with Dr. Paez in 3 months      Do not strain or lift anything greater than 5 lb for 1 week.  Do not drive or operate any dangerous machinery for 24 hours.   Clean the area with mild soap and water and then cover it with a bandage.   Once the skin has healed, bathing in a tub or swimming is allowed.   Inspect the groin site daily and report to the physician any swelling at the  site that   cannot be controlled with manual pressure for 10 minutes, unusual pain at the   access site or affected extremity, unusual swelling at the access site, or signs or   symptoms of infection such as redness, pain, or fever.   Call 911 if you have:   Bleeding from the puncture site that you cannot stop by doing the following:   Relax and lie down right away. Keep your leg flat and apply firm pressure to the site using your fingers and a gauze pad. Keep the pressure on for 20 minutes. Continue this until the bleeding stops. This may take awhile. When bleeding stops, cover the site with a sterile bandage and keep your leg still as much as possible.      You may develop sharp chest discomfort in the next 1-3 days. This is called pericarditis and is common after an ablation procedure. The treatment is over the counter ibuprofen 600 mg by mouth three times daily as needed for 2-3 days. Ibuprofen should be taken with food. Please call the clinic if you experience this.       Consults:     Significant Diagnostic Studies: Labs: BMP: No results for input(s): GLU, NA, K, CL, CO2, BUN, CREATININE, CALCIUM, MG in the last 48 hours.    Pending Diagnostic Studies:     Procedure Component Value Units Date/Time    Transesophageal echo (DEANGELO) [[id number]]     Order Status: Sent Lab Status: No result           Final Active Diagnoses:    Diagnosis Date Noted POA    A-fib [I48.91] 02/02/2019 Yes      Problems Resolved During this Admission:     No new Assessment & Plan notes have been filed under this hospital service since the last note was generated.  Service: Arrhythmia      Discharged Condition: good    Disposition:     Follow Up:  Follow-up Information     Yung Paez MD In 3 months.    Specialties: Electrophysiology, Cardiology  Why: Post ablation follow up   Contact information:  [address] [facility] [address]  [phone]                 Patient Instructions:   No discharge procedures on  file.  Medications:  Reconciled Home Medications:      Medication List      START taking these medications    sucralfate 1 gram tablet  Commonly known as: CARAFATE  Take 1 tablet (1 g total) by mouth 4 (four) times daily before meals and nightly.        CONTINUE taking these medications    amLODIPine 5 MG tablet  Commonly known as: NORVASC  Take 1 tablet (5 mg total) by mouth once daily. One-2 per day or as directed     atorvastatin 40 MG tablet  Commonly known as: LIPITOR  Take 1 tablet (40 mg total) by mouth once daily.     blood sugar diagnostic Strp  To check BG 2 times daily, to use with insurance preferred meter     blood-glucose meter kit  To check BG 2 times daily, to use with insurance preferred meter     clopidogreL 75 mg tablet  Commonly known as: PLAVIX  Take 1 tablet (75 mg total) by mouth once daily.     diazePAM 10 MG Tab  Commonly known as: VALIUM  Take 1 tablet (10 mg total) by mouth daily as needed.     FLUZONE HIGHDOSE QUAD 20-21  mcg/0.7 mL Syrg  Generic drug: flu vacc [id number](65yr up)-PF  PHARMACIST ADMINISTERED IMMUNIZATION ADMINISTERED AT TIME OF DISPENSING     hydroCHLOROthiazide 12.5 mg capsule  Commonly known as: MICROZIDE  Take 1 capsule by mouth once daily     HYDROcodone-acetaminophen 5-325 mg per tablet  Commonly known as: NORCO  Take 1 tablet by mouth every 12 (twelve) hours as needed for Pain.     irbesartan 300 MG tablet  Commonly known as: AVAPRO  Take 1 tablet (300 mg total) by mouth every evening.     lancets Misc  To check BG 2 times daily, to use with insurance preferred meter     levalbuterol 0.63 mg/3 mL nebulizer solution  Commonly known as: XOPENEX  Take 3 mLs (0.63 mg total) by nebulization 4 (four) times daily as needed for Wheezing. Rescue     levocetirizine 5 MG tablet  Commonly known as: XYZAL  Take 1 tablet (5 mg total) by mouth every evening. For allergy     metFORMIN 500 MG tablet  Commonly known as: GLUCOPHAGE  Take 1 tablet (500 mg total) by mouth 2 (two)  times daily with meals.     nitroGLYCERIN 0.4 MG SL tablet  Commonly known as: NITROSTAT  Place 1 tablet (0.4 mg total) under the tongue every 5 (five) minutes as needed for Chest pain.     nystatin cream  Commonly known as: MYCOSTATIN  Apply 45 g topically 2 (two) times daily.     OMEGA-3-DHA-EPA-FISH OIL ORAL  Take 1 tablet by mouth once daily.     pantoprazole 40 MG tablet  Commonly known as: PROTONIX  Take 1 tablet (40 mg total) by mouth once daily.     sotaloL 80 MG tablet  Commonly known as: BETAPACE  Take 1 tablet (80 mg total) by mouth every 12 (twelve) hours.     XARELTO 20 mg Tab  Generic drug: rivaroxaban  TAKE 1 TABLET BY MOUTH ONCE DAILY IN THE EVENING        STOP taking these medications    CENTRUM SILVER ORAL            Time spent on the discharge of patient: 20 minutes    Jesús Ramirez MD  Cardiac Electrophysiology  [facility]

## 2020-12-04 ENCOUNTER — PATIENT MESSAGE (OUTPATIENT)
Dept: ELECTROPHYSIOLOGY | Facility: CLINIC | Age: 80
End: 2020-12-04

## 2020-12-06 ENCOUNTER — PATIENT MESSAGE (OUTPATIENT)
Dept: PRIMARY CARE CLINIC | Facility: CLINIC | Age: 80
End: 2020-12-06

## 2020-12-06 DIAGNOSIS — E78.5 HYPERLIPIDEMIA, UNSPECIFIED HYPERLIPIDEMIA TYPE: ICD-10-CM

## 2020-12-07 ENCOUNTER — TELEPHONE (OUTPATIENT)
Dept: ELECTROPHYSIOLOGY | Facility: CLINIC | Age: 80
End: 2020-12-07

## 2020-12-07 LAB
POC ACTIVATED CLOTTING TIME K: 153 SEC (ref 74–137)
POC ACTIVATED CLOTTING TIME K: 334 SEC (ref 74–137)
POC ACTIVATED CLOTTING TIME K: 351 SEC (ref 74–137)
SAMPLE: ABNORMAL

## 2020-12-07 RX ORDER — ATORVASTATIN CALCIUM 40 MG/1
40 TABLET, FILM COATED ORAL DAILY
Qty: 90 TABLET | Refills: 3 | Status: SHIPPED | OUTPATIENT
Start: 2020-12-07 | End: 2021-04-16 | Stop reason: SDUPTHER

## 2020-12-07 NOTE — TELEPHONE ENCOUNTER
A Vm was for  to confirm an appointment with  office . ( post procedure follow up)      ----- Message from Khai Bynum MA sent at 12/7/2020  2:43 PM CST -----  Contact: gio Hoover afternoon Veronica  Here is the pt you called to sched an appt for.     MRN: [id number]  ----- Message -----  From: Lauren Veras MA  Sent: 12/7/2020   2:11 PM CST  To: Jesús Eisenberg Staff    Pt is returning your call. Please call about appt. [phone].

## 2020-12-11 ENCOUNTER — OFFICE VISIT (OUTPATIENT)
Dept: PODIATRY | Facility: CLINIC | Age: 80
End: 2020-12-11
Payer: MEDICARE

## 2020-12-11 VITALS
WEIGHT: 280 LBS | HEART RATE: 69 BPM | DIASTOLIC BLOOD PRESSURE: 70 MMHG | BODY MASS INDEX: 34.1 KG/M2 | HEIGHT: 76 IN | SYSTOLIC BLOOD PRESSURE: 146 MMHG | RESPIRATION RATE: 20 BRPM

## 2020-12-11 DIAGNOSIS — M79.671 FOOT PAIN, RIGHT: ICD-10-CM

## 2020-12-11 DIAGNOSIS — E11.42 DIABETIC POLYNEUROPATHY ASSOCIATED WITH TYPE 2 DIABETES MELLITUS: ICD-10-CM

## 2020-12-11 DIAGNOSIS — M20.41 HAMMER TOES OF BOTH FEET: Primary | ICD-10-CM

## 2020-12-11 DIAGNOSIS — R60.0 EDEMA, LOWER EXTREMITY: ICD-10-CM

## 2020-12-11 DIAGNOSIS — I25.10 CORONARY ARTERY DISEASE INVOLVING NATIVE CORONARY ARTERY OF NATIVE HEART WITHOUT ANGINA PECTORIS: ICD-10-CM

## 2020-12-11 DIAGNOSIS — M20.42 HAMMER TOES OF BOTH FEET: Primary | ICD-10-CM

## 2020-12-11 DIAGNOSIS — Z79.01 LONG TERM (CURRENT) USE OF ANTICOAGULANTS: ICD-10-CM

## 2020-12-11 DIAGNOSIS — I10 ESSENTIAL HYPERTENSION: ICD-10-CM

## 2020-12-11 DIAGNOSIS — B35.3 TINEA PEDIS OF BOTH FEET: ICD-10-CM

## 2020-12-11 DIAGNOSIS — B35.1 ONYCHOMYCOSIS DUE TO DERMATOPHYTE: ICD-10-CM

## 2020-12-11 PROCEDURE — 3078F PR MOST RECENT DIASTOLIC BLOOD PRESSURE < 80 MM HG: ICD-10-PCS | Mod: CPTII,S$GLB,, | Performed by: PODIATRIST

## 2020-12-11 PROCEDURE — 1159F MED LIST DOCD IN RCRD: CPT | Mod: S$GLB,,, | Performed by: PODIATRIST

## 2020-12-11 PROCEDURE — 1125F AMNT PAIN NOTED PAIN PRSNT: CPT | Mod: S$GLB,,, | Performed by: PODIATRIST

## 2020-12-11 PROCEDURE — 99203 OFFICE O/P NEW LOW 30 MIN: CPT | Mod: 25,S$GLB,, | Performed by: PODIATRIST

## 2020-12-11 PROCEDURE — 3077F PR MOST RECENT SYSTOLIC BLOOD PRESSURE >= 140 MM HG: ICD-10-PCS | Mod: CPTII,S$GLB,, | Performed by: PODIATRIST

## 2020-12-11 PROCEDURE — 3077F SYST BP >= 140 MM HG: CPT | Mod: CPTII,S$GLB,, | Performed by: PODIATRIST

## 2020-12-11 PROCEDURE — 99999 PR PBB SHADOW E&M-EST. PATIENT-LVL V: CPT | Mod: PBBFAC,,, | Performed by: PODIATRIST

## 2020-12-11 PROCEDURE — 99999 PR PBB SHADOW E&M-EST. PATIENT-LVL V: ICD-10-PCS | Mod: PBBFAC,,, | Performed by: PODIATRIST

## 2020-12-11 PROCEDURE — 99203 PR OFFICE/OUTPT VISIT, NEW, LEVL III, 30-44 MIN: ICD-10-PCS | Mod: 25,S$GLB,, | Performed by: PODIATRIST

## 2020-12-11 PROCEDURE — 1125F PR PAIN SEVERITY QUANTIFIED, PAIN PRESENT: ICD-10-PCS | Mod: S$GLB,,, | Performed by: PODIATRIST

## 2020-12-11 PROCEDURE — 1159F PR MEDICATION LIST DOCUMENTED IN MEDICAL RECORD: ICD-10-PCS | Mod: S$GLB,,, | Performed by: PODIATRIST

## 2020-12-11 PROCEDURE — 3078F DIAST BP <80 MM HG: CPT | Mod: CPTII,S$GLB,, | Performed by: PODIATRIST

## 2020-12-11 PROCEDURE — 11721 DEBRIDE NAIL 6 OR MORE: CPT | Mod: Q9,S$GLB,, | Performed by: PODIATRIST

## 2020-12-11 PROCEDURE — 11721 PR DEBRIDEMENT OF NAILS, 6 OR MORE: ICD-10-PCS | Mod: Q9,S$GLB,, | Performed by: PODIATRIST

## 2020-12-11 RX ORDER — AMLODIPINE BESYLATE 5 MG/1
TABLET ORAL
Qty: 90 TABLET | Refills: 3 | Status: SHIPPED | OUTPATIENT
Start: 2020-12-11 | End: 2021-12-14 | Stop reason: SDUPTHER

## 2020-12-11 NOTE — PROGRESS NOTES
Subjective:      Patient ID: Nick De Anda is a 80 y.o. male.    Chief Complaint:   PCP (Santiago Adler MD    F/U 12/15/20), Diabetic Foot Exam, Hammer Toe (left foot wants a second opinion about surgery, had surgery 3 years ago. ), Nail Problem, and Foot Pain    Nick is a 80 y.o. male who presents to the clinic for evaluation and treatment of high risk feet. Nick has a past medical history of Anticoagulant long-term use, Arthritis, Cancer, Coronary artery disease, Diabetes mellitus, Encounter for blood transfusion, History of staphylococcal infection (2012), Hypertension, and Persistent atrial fibrillation (2/2/2019). The patient's chief complaint is painful toes right 2,3,4. This patient has documented high risk feet requiring routine maintenance secondary to peripheral neuropathy.    Pt relates he has bad neuropathy. He also discusses his recent heart issues... on anticoag/a Fib.     Pt relates he wants a 2nd opnion for his right painful hammertoes. They hurt with walking. He relates that he does use his new CMO and DM shoes. He uses a cane. He is really independent and lives with his wife... he drives with no issues.     Pt had left foot surgery many years ago by Dr. Kaleb Andrade and it is fine. He then had right foot surgery about 3 years ago on the 1st, 2nd and 3rd toes by Dr. Peres and the toes still hurt when walking. Pt relates that Dr. Peres offered to do a re-do surgery with pins but pt was not sure... he then decided to do it but with covid he never went back for pre-op.     Pt relates his wife trys to cut his toes and uses a Dremel type device... also puts lotion between the toes after shower.    ** pt is hard of hearing.      PCP: Santiago Adler MD    Date Last Seen by PCP: 12/5/20    Current shoe gear:  Affected Foot: Extra depth shoes with custome accommodative insoles     Unaffected Foot: Extra depth shoes with custome accommodative insoles    Hemoglobin A1C   Date Value Ref Range  Status   10/01/2020 5.8 (H) 4.0 - 5.6 % Final     Comment:     ADA Screening Guidelines:  5.7-6.4%  Consistent with prediabetes  >or=6.5%  Consistent with diabetes  High levels of fetal hemoglobin interfere with the HbA1C  assay. Heterozygous hemoglobin variants (HbS, HgC, etc)do  not significantly interfere with this assay.   However, presence of multiple variants may affect accuracy.     03/24/2020 6.1 (H) 4.0 - 5.6 % Final     Comment:     ADA Screening Guidelines:  5.7-6.4%  Consistent with prediabetes  >or=6.5%  Consistent with diabetes  High levels of fetal hemoglobin interfere with the HbA1C  assay. Heterozygous hemoglobin variants (HbS, HgC, etc)do  not significantly interfere with this assay.   However, presence of multiple variants may affect accuracy.     09/10/2019 5.6 4.0 - 5.6 % Final     Comment:     ADA Screening Guidelines:  5.7-6.4%  Consistent with prediabetes  >or=6.5%  Consistent with diabetes  High levels of fetal hemoglobin interfere with the HbA1C  assay. Heterozygous hemoglobin variants (HbS, HgC, etc)do  not significantly interfere with this assay.   However, presence of multiple variants may affect accuracy.          Past Medical History:   Diagnosis Date    Anticoagulant long-term use     Arthritis     Cancer     skin    Coronary artery disease     Diabetes mellitus     diet    Encounter for blood transfusion     History of staphylococcal infection 2012    after CABG    Hypertension     Persistent atrial fibrillation 2/2/2019     Past Surgical History:   Procedure Laterality Date    ABLATION OF ARRHYTHMOGENIC FOCUS FOR ATRIAL FIBRILLATION N/A 12/2/2020    Procedure: Ablation atrial fibrillation;  Surgeon: Yung Paez MD;  Location: [facility] EP LAB;  Service: Cardiology;  Laterality: N/A;  PAF, AFl, DEANGELO (Cx if SR), PVI, CTI, RFA, Carto, Gen, IA, 3 Prep    CARDIAC SURGERY      cabg    CORONARY STENT PLACEMENT      CYSTOSCOPY      prostate infection    FOOT SURGERY       RECTOPERITONEAL FISTULA CLOSURE      SHOULDER SURGERY      Triple bypass       Current Outpatient Medications on File Prior to Visit   Medication Sig Dispense Refill    atorvastatin (LIPITOR) 40 MG tablet Take 1 tablet (40 mg total) by mouth once daily. 90 tablet 3    blood sugar diagnostic Strp To check BG 2 times daily, to use with insurance preferred meter 100 strip 5    blood-glucose meter kit To check BG 2 times daily, to use with insurance preferred meter 1 each 0    clopidogreL (PLAVIX) 75 mg tablet Take 1 tablet (75 mg total) by mouth once daily. 90 tablet 3    FLUZONE HIGHDOSE QUAD 20-21  mcg/0.7 mL Syrg PHARMACIST ADMINISTERED IMMUNIZATION ADMINISTERED AT TIME OF DISPENSING      hydroCHLOROthiazide (MICROZIDE) 12.5 mg capsule Take 1 capsule by mouth once daily 90 capsule 0    HYDROcodone-acetaminophen (NORCO) 5-325 mg per tablet Take 1 tablet by mouth every 12 (twelve) hours as needed for Pain. 25 tablet 0    irbesartan (AVAPRO) 300 MG tablet Take 1 tablet (300 mg total) by mouth every evening. 90 tablet 3    lancets Misc To check BG 2 times daily, to use with insurance preferred meter 100 each 5    levalbuterol (XOPENEX) 0.63 mg/3 mL nebulizer solution Take 3 mLs (0.63 mg total) by nebulization 4 (four) times daily as needed for Wheezing. Rescue 1 Box 3    levocetirizine (XYZAL) 5 MG tablet Take 1 tablet (5 mg total) by mouth every evening. For allergy 30 tablet 5    metFORMIN (GLUCOPHAGE) 500 MG tablet Take 1 tablet (500 mg total) by mouth 2 (two) times daily with meals. 180 tablet 3    nystatin (MYCOSTATIN) cream Apply 45 g topically 2 (two) times daily.      OMEGA-3-DHA-EPA-FISH OIL ORAL Take 1 tablet by mouth once daily.      pantoprazole (PROTONIX) 40 MG tablet Take 1 tablet (40 mg total) by mouth once daily. 30 tablet 5    sotaloL (BETAPACE) 80 MG tablet Take 1 tablet (80 mg total) by mouth every 12 (twelve) hours. 60 tablet 11    sucralfate (CARAFATE) 1 gram tablet Take 1  tablet (1 g total) by mouth 4 (four) times daily before meals and nightly. 120 tablet 0    XARELTO 20 mg Tab TAKE 1 TABLET BY MOUTH ONCE DAILY IN THE EVENING 30 tablet 11    diazePAM (VALIUM) 10 MG Tab Take 1 tablet (10 mg total) by mouth daily as needed. 30 tablet 1    nitroGLYCERIN (NITROSTAT) 0.4 MG SL tablet Place 1 tablet (0.4 mg total) under the tongue every 5 (five) minutes as needed for Chest pain. 50 tablet 3    [DISCONTINUED] amLODIPine (NORVASC) 5 MG tablet Take 1 tablet (5 mg total) by mouth once daily. One-2 per day or as directed 90 tablet 3     Current Facility-Administered Medications on File Prior to Visit   Medication Dose Route Frequency Provider Last Rate Last Dose    0.9%  NaCl infusion   Intravenous Continuous Rebecca Dickinson NP   Stopped at 12/02/20 1636    sodium chloride 0.9% bolus 1,000 mL  1,000 mL Intravenous Once Winnie Larkin NP        sodium chloride 0.9% flush 10 mL  10 mL Intravenous PRN DELICIA Jaramillo MD        sodium chloride 0.9% flush 5 mL  5 mL Intravenous PRN Rebecca Dickinson NP         Review of patient's allergies indicates:   Allergen Reactions    Penicillins Anaphylaxis     Can take keflex    Amoxicillin Swelling and Rash       Review of Systems   Constitution: Negative for chills, decreased appetite, fever, malaise/fatigue, night sweats, weight gain and weight loss.   Cardiovascular: Positive for dyspnea on exertion and leg swelling. Negative for chest pain, claudication, palpitations and syncope.   Respiratory: Negative for cough and shortness of breath.    Endocrine: Negative for cold intolerance and heat intolerance.   Hematologic/Lymphatic: Negative for bleeding problem. Bruises/bleeds easily.   Skin: Positive for nail changes. Negative for color change, dry skin, flushing, itching, poor wound healing, rash, skin cancer, suspicious lesions and unusual hair distribution.   Musculoskeletal: Positive for arthritis and stiffness. Negative for  "back pain, falls, gout, joint pain, joint swelling, muscle cramps, muscle weakness, myalgias and neck pain.   Gastrointestinal: Negative for diarrhea, nausea and vomiting.   Neurological: Positive for numbness. Negative for dizziness, focal weakness, light-headedness, paresthesias, tremors, vertigo and weakness.   Psychiatric/Behavioral: Negative for altered mental status and depression. The patient does not have insomnia.    Allergic/Immunologic: Negative.            Objective:       Vitals:    20 1338   BP: (!) 146/70   Pulse: 69   Resp: 20   Weight: 127 kg (280 lb)   Height: 6' 3.5" (1.918 m)   PainSc:   5   PainLoc: Foot   127 kg (280 lb)     Physical Exam  Vitals signs reviewed.   Constitutional:       General: He is not in acute distress.     Appearance: He is well-developed. He is not ill-appearing, toxic-appearing or diaphoretic.      Comments: Proper supportive shoegear   Cardiovascular:      Pulses:           Dorsalis pedis pulses are 1+ on the right side and 1+ on the left side.        Posterior tibial pulses are 2+ on the right side and 2+ on the left side.      Comments: Decreased hair growth to lower shins with evidence of chronic venous stasis dz.   Musculoskeletal:         General: No tenderness.      Right lower le+ Edema present.      Left lower le+ Edema present.      Right foot: Decreased range of motion. Deformity and bunion present.      Left foot: Decreased range of motion. Deformity and bunion present.      Comments: Flexible pes planus foot type w/ medial arch collapse and mild gastroc equinus      Semi-Reducible and non-reducable extensor and flexor contractures at the MTPJ and PIPJ of toes 2-5, bilat.      Hallux malleus contractures with mild lateral deviation    + pop with rom of 3rd and 4th PIPJ right. No pop to hallux, 2nd, 5th digit right. No pop to MTPJs right.     No pop left foot    Feet:      Right foot:      Protective Sensation: 10 sites tested. 0 sites sensed.     "  Skin integrity: Dry skin present. No ulcer, blister, skin breakdown, erythema, warmth or callus.      Toenail Condition: Right toenails are abnormally thick and long. Fungal disease present.     Left foot:      Protective Sensation: 10 sites tested. 0 sites sensed.      Skin integrity: Dry skin and fissure present. No ulcer, blister, skin breakdown, erythema, warmth or callus.      Toenail Condition: Left toenails are abnormally thick and long. Fungal disease present.     Comments: Right 4th digit mild subungal hematoma. IS macerations to all IS . + mild bleeding fissure right 4th IS.     Toenails 1-5 bilaterally are elongated by 2-3 mm, thickened by 2-3 mm, discolored/yellowed, dystrophic, brittle with subungual debris.  Skin:     General: Skin is warm.      Capillary Refill: Capillary refill takes 2 to 3 seconds.      Coloration: Skin is pale.      Findings: No erythema.      Nails: There is no clubbing.     Neurological:      Mental Status: He is alert and oriented to person, place, and time.      Gait: Gait abnormal.   Psychiatric:         Attention and Perception: Attention normal.         Mood and Affect: Mood normal.         Speech: Speech normal.         Behavior: Behavior normal.         Thought Content: Thought content normal.         Judgment: Judgment normal.      Comments: ** hard of hearing               Assessment:       Encounter Diagnoses   Name Primary?    Hammer toes of both feet Yes    Long term (current) use of anticoagulants     Diabetic polyneuropathy associated with type 2 diabetes mellitus     Edema, lower extremity     Foot pain, right     Tinea pedis of both feet          Plan:       Nick was seen today for pcp, diabetic foot exam, hammer toe, nail problem and foot pain.    Diagnoses and all orders for this visit:    Hammer toes of both feet  -     X-Ray Foot Complete Right; Future    Long term (current) use of anticoagulants    Diabetic polyneuropathy associated with type 2  diabetes mellitus    Edema, lower extremity    Foot pain, right  -     X-Ray Foot Complete Right; Future    Tinea pedis of both feet      I counseled the patient on his conditions, their implications and medical management.    - reviewed photos of old foot surgery pt has brought on his ipad.     - Shoe inspection. Diabetic Foot Education. Patient reminded of the importance of good nutrition and blood sugar control to help prevent podiatric complications of diabetes. Patient instructed on proper foot hygeine. We discussed wearing proper shoe gear, daily foot inspections, never walking without protective shoe gear, never putting sharp instruments to feet, routine podiatric nail visits every 2-3 months.      - With patient's permission, nails were aggressively reduced and debrided x 10 to their soft tissue attachment mechanically and with electric , removing all offending nail and debris. Patient relates relief following the procedure. He will continue to monitor the areas daily, inspect his feet, wear protective shoe gear when ambulatory, moisturizer to maintain skin integrity and follow in this office in approximately 2-3 months, sooner p.r.n.      - cleaned IS with alcohol. Applied gentin violet    - d/w pt recommend avoiding surgical intervention as the right foot pain is only occasional with walking and not affecting his daily activities at home (reading is main hobby) and driving.   D/w pt pain could be worse after a revision surgery. D/w pt neuropathy, anticoag, and gait instabiltiy increase risk post-op    - dispensed toe crest pad and discussed use.     - f/u in 2-4 weeks to check Interspaces for improvement.     - > 30 min spent with pt.

## 2020-12-15 ENCOUNTER — OFFICE VISIT (OUTPATIENT)
Dept: PRIMARY CARE CLINIC | Facility: CLINIC | Age: 80
End: 2020-12-15
Payer: MEDICARE

## 2020-12-15 VITALS
TEMPERATURE: 98 F | BODY MASS INDEX: 34.59 KG/M2 | RESPIRATION RATE: 20 BRPM | HEART RATE: 57 BPM | OXYGEN SATURATION: 98 % | SYSTOLIC BLOOD PRESSURE: 134 MMHG | DIASTOLIC BLOOD PRESSURE: 62 MMHG | HEIGHT: 76 IN | WEIGHT: 284.06 LBS

## 2020-12-15 DIAGNOSIS — F41.9 ANXIETY: ICD-10-CM

## 2020-12-15 DIAGNOSIS — N39.0 RECURRENT UTI: Primary | ICD-10-CM

## 2020-12-15 DIAGNOSIS — I48.0 PAROXYSMAL ATRIAL FIBRILLATION: ICD-10-CM

## 2020-12-15 DIAGNOSIS — I10 ESSENTIAL HYPERTENSION: ICD-10-CM

## 2020-12-15 LAB
BILIRUB SERPL-MCNC: NORMAL MG/DL
BLOOD URINE, POC: NORMAL
CLARITY, POC UA: CLEAR
COLOR, POC UA: YELLOW
GLUCOSE UR QL STRIP: NORMAL
KETONES UR QL STRIP: NORMAL
LEUKOCYTE ESTERASE URINE, POC: NORMAL
NITRITE, POC UA: NORMAL
PH, POC UA: 5
PROTEIN, POC: NORMAL
SPECIFIC GRAVITY, POC UA: 1.01
UROBILINOGEN, POC UA: NORMAL

## 2020-12-15 PROCEDURE — 1125F PR PAIN SEVERITY QUANTIFIED, PAIN PRESENT: ICD-10-PCS | Mod: S$GLB,,, | Performed by: INTERNAL MEDICINE

## 2020-12-15 PROCEDURE — 99499 UNLISTED E&M SERVICE: CPT | Mod: S$GLB,,, | Performed by: INTERNAL MEDICINE

## 2020-12-15 PROCEDURE — 81002 POCT URINE DIPSTICK WITHOUT MICROSCOPE: ICD-10-PCS | Mod: S$GLB,,, | Performed by: INTERNAL MEDICINE

## 2020-12-15 PROCEDURE — 1159F MED LIST DOCD IN RCRD: CPT | Mod: S$GLB,,, | Performed by: INTERNAL MEDICINE

## 2020-12-15 PROCEDURE — 99999 PR PBB SHADOW E&M-EST. PATIENT-LVL V: ICD-10-PCS | Mod: PBBFAC,,, | Performed by: INTERNAL MEDICINE

## 2020-12-15 PROCEDURE — 3288F PR FALLS RISK ASSESSMENT DOCUMENTED: ICD-10-PCS | Mod: CPTII,S$GLB,, | Performed by: INTERNAL MEDICINE

## 2020-12-15 PROCEDURE — 99213 OFFICE O/P EST LOW 20 MIN: CPT | Mod: 25,S$GLB,, | Performed by: INTERNAL MEDICINE

## 2020-12-15 PROCEDURE — 1101F PR PT FALLS ASSESS DOC 0-1 FALLS W/OUT INJ PAST YR: ICD-10-PCS | Mod: CPTII,S$GLB,, | Performed by: INTERNAL MEDICINE

## 2020-12-15 PROCEDURE — 99999 PR PBB SHADOW E&M-EST. PATIENT-LVL V: CPT | Mod: PBBFAC,,, | Performed by: INTERNAL MEDICINE

## 2020-12-15 PROCEDURE — 99499 RISK ADDL DX/OHS AUDIT: ICD-10-PCS | Mod: S$GLB,,, | Performed by: INTERNAL MEDICINE

## 2020-12-15 PROCEDURE — 3078F DIAST BP <80 MM HG: CPT | Mod: CPTII,S$GLB,, | Performed by: INTERNAL MEDICINE

## 2020-12-15 PROCEDURE — 99213 PR OFFICE/OUTPT VISIT, EST, LEVL III, 20-29 MIN: ICD-10-PCS | Mod: 25,S$GLB,, | Performed by: INTERNAL MEDICINE

## 2020-12-15 PROCEDURE — 1159F PR MEDICATION LIST DOCUMENTED IN MEDICAL RECORD: ICD-10-PCS | Mod: S$GLB,,, | Performed by: INTERNAL MEDICINE

## 2020-12-15 PROCEDURE — 3288F FALL RISK ASSESSMENT DOCD: CPT | Mod: CPTII,S$GLB,, | Performed by: INTERNAL MEDICINE

## 2020-12-15 PROCEDURE — 1101F PT FALLS ASSESS-DOCD LE1/YR: CPT | Mod: CPTII,S$GLB,, | Performed by: INTERNAL MEDICINE

## 2020-12-15 PROCEDURE — 3078F PR MOST RECENT DIASTOLIC BLOOD PRESSURE < 80 MM HG: ICD-10-PCS | Mod: CPTII,S$GLB,, | Performed by: INTERNAL MEDICINE

## 2020-12-15 PROCEDURE — 3075F PR MOST RECENT SYSTOLIC BLOOD PRESS GE 130-139MM HG: ICD-10-PCS | Mod: CPTII,S$GLB,, | Performed by: INTERNAL MEDICINE

## 2020-12-15 PROCEDURE — 81002 URINALYSIS NONAUTO W/O SCOPE: CPT | Mod: S$GLB,,, | Performed by: INTERNAL MEDICINE

## 2020-12-15 PROCEDURE — 3075F SYST BP GE 130 - 139MM HG: CPT | Mod: CPTII,S$GLB,, | Performed by: INTERNAL MEDICINE

## 2020-12-15 PROCEDURE — 1125F AMNT PAIN NOTED PAIN PRSNT: CPT | Mod: S$GLB,,, | Performed by: INTERNAL MEDICINE

## 2020-12-15 RX ORDER — DIAZEPAM 10 MG/1
10 TABLET ORAL DAILY PRN
Qty: 30 TABLET | Refills: 1 | Status: SHIPPED | OUTPATIENT
Start: 2020-12-15 | End: 2021-09-21

## 2020-12-15 NOTE — PROGRESS NOTES
Subjective:       Patient ID: Nick De Anda is a 80 y.o. male.    Chief Complaint: Follow-up (3 month follow up)    HPI  patient visit today for follow-up from hospitalization for ablation treatment of paroxysmal atrial fibrillation the the procedure went well without complication successful denies any trauma tenderness at the site of catheterization patient tolerated procedure well without any complication.  For his CHF he had ejection fraction of 45% hopefully will get better after this procedure his still had chronic cough with white phlegm has been follow by pulmonologist no further workup needed at this time treat with has sore treatment and metered-dose inhaler  Review of Systems    Objective:      Physical Exam  Vitals signs and nursing note reviewed.   Constitutional:       General: He is not in acute distress.     Appearance: He is well-developed.   HENT:      Head: Normocephalic and atraumatic.      Right Ear: External ear normal.      Left Ear: External ear normal.      Nose: Nose normal.      Mouth/Throat:      Mouth: Mucous membranes are moist.      Pharynx: No oropharyngeal exudate.   Eyes:      Extraocular Movements: Extraocular movements intact.      Conjunctiva/sclera: Conjunctivae normal.      Pupils: Pupils are equal, round, and reactive to light.   Neck:      Musculoskeletal: Normal range of motion and neck supple.      Thyroid: No thyromegaly.   Cardiovascular:      Rate and Rhythm: Normal rate and regular rhythm.      Heart sounds: Normal heart sounds. No murmur. No friction rub. No gallop.    Pulmonary:      Effort: Pulmonary effort is normal. No respiratory distress.      Breath sounds: Normal breath sounds. No wheezing or rales.   Abdominal:      General: Bowel sounds are normal. There is no distension.      Palpations: Abdomen is soft.      Tenderness: There is no abdominal tenderness. There is no guarding.   Musculoskeletal: Normal range of motion.         General: No tenderness or  deformity.   Lymphadenopathy:      Cervical: No cervical adenopathy.   Skin:     General: Skin is warm and dry.      Capillary Refill: Capillary refill takes less than 2 seconds.      Findings: No erythema or rash.   Neurological:      Mental Status: He is alert and oriented to person, place, and time.   Psychiatric:         Thought Content: Thought content normal.         Judgment: Judgment normal.         Assessment:       1. Recurrent UTI    2. Paroxysmal atrial fibrillation    3. Anxiety    4. Anxiety    5. Essential hypertension        Plan:       Recurrent UTI  -     POCT URINE DIPSTICK WITHOUT MICROSCOPE    Paroxysmal atrial fibrillation  Comments:  Status post ablation hopefully remained in sinus rhythm repeat echocardiogram in the near future    Anxiety  -     diazePAM (VALIUM) 10 MG Tab; Take 1 tablet (10 mg total) by mouth daily as needed.  Dispense: 30 tablet; Refill: 1    Anxiety  Comments:  will try valium again since pt did well discuss SSRI but also concern with cardiac drug interactions  Orders:  -     diazePAM (VALIUM) 10 MG Tab; Take 1 tablet (10 mg total) by mouth daily as needed.  Dispense: 30 tablet; Refill: 1    Essential hypertension  Comments:  Currently on medication blood pressure stable        Medication List with Changes/Refills   Current Medications    AMLODIPINE (NORVASC) 5 MG TABLET    One-2 per day or as directed    ATORVASTATIN (LIPITOR) 40 MG TABLET    Take 1 tablet (40 mg total) by mouth once daily.    BLOOD SUGAR DIAGNOSTIC STRP    To check BG 2 times daily, to use with insurance preferred meter    BLOOD-GLUCOSE METER KIT    To check BG 2 times daily, to use with insurance preferred meter    CLOPIDOGREL (PLAVIX) 75 MG TABLET    Take 1 tablet (75 mg total) by mouth once daily.    FLUZONE HIGHDOSE QUAD 20-21  MCG/0.7 ML SYRG    PHARMACIST ADMINISTERED IMMUNIZATION ADMINISTERED AT TIME OF DISPENSING    HYDROCHLOROTHIAZIDE (MICROZIDE) 12.5 MG CAPSULE    Take 1 capsule by mouth  once daily    HYDROCODONE-ACETAMINOPHEN (NORCO) 5-325 MG PER TABLET    Take 1 tablet by mouth every 12 (twelve) hours as needed for Pain.    IRBESARTAN (AVAPRO) 300 MG TABLET    Take 1 tablet (300 mg total) by mouth every evening.    LANCETS MISC    To check BG 2 times daily, to use with insurance preferred meter    LEVALBUTEROL (XOPENEX) 0.63 MG/3 ML NEBULIZER SOLUTION    Take 3 mLs (0.63 mg total) by nebulization 4 (four) times daily as needed for Wheezing. Rescue    LEVOCETIRIZINE (XYZAL) 5 MG TABLET    Take 1 tablet (5 mg total) by mouth every evening. For allergy    METFORMIN (GLUCOPHAGE) 500 MG TABLET    Take 1 tablet (500 mg total) by mouth 2 (two) times daily with meals.    NITROGLYCERIN (NITROSTAT) 0.4 MG SL TABLET    Place 1 tablet (0.4 mg total) under the tongue every 5 (five) minutes as needed for Chest pain.    NYSTATIN (MYCOSTATIN) CREAM    Apply 45 g topically 2 (two) times daily.    OMEGA-3-DHA-EPA-FISH OIL ORAL    Take 1 tablet by mouth once daily.    PANTOPRAZOLE (PROTONIX) 40 MG TABLET    Take 1 tablet (40 mg total) by mouth once daily.    SOTALOL (BETAPACE) 80 MG TABLET    Take 1 tablet (80 mg total) by mouth every 12 (twelve) hours.    SUCRALFATE (CARAFATE) 1 GRAM TABLET    Take 1 tablet (1 g total) by mouth 4 (four) times daily before meals and nightly.    XARELTO 20 MG TAB    TAKE 1 TABLET BY MOUTH ONCE DAILY IN THE EVENING   Changed and/or Refilled Medications    Modified Medication Previous Medication    DIAZEPAM (VALIUM) 10 MG TAB diazePAM (VALIUM) 10 MG Tab       Take 1 tablet (10 mg total) by mouth daily as needed.    Take 1 tablet (10 mg total) by mouth daily as needed.

## 2020-12-30 ENCOUNTER — OFFICE VISIT (OUTPATIENT)
Dept: PODIATRY | Facility: CLINIC | Age: 80
End: 2020-12-30
Payer: MEDICARE

## 2020-12-30 VITALS
BODY MASS INDEX: 34.58 KG/M2 | DIASTOLIC BLOOD PRESSURE: 63 MMHG | HEIGHT: 76 IN | SYSTOLIC BLOOD PRESSURE: 126 MMHG | HEART RATE: 59 BPM

## 2020-12-30 DIAGNOSIS — M20.41 HAMMER TOES OF BOTH FEET: Primary | ICD-10-CM

## 2020-12-30 DIAGNOSIS — M20.42 HAMMER TOES OF BOTH FEET: Primary | ICD-10-CM

## 2020-12-30 DIAGNOSIS — E11.42 DIABETIC POLYNEUROPATHY ASSOCIATED WITH TYPE 2 DIABETES MELLITUS: ICD-10-CM

## 2020-12-30 DIAGNOSIS — Z79.01 LONG TERM (CURRENT) USE OF ANTICOAGULANTS: ICD-10-CM

## 2020-12-30 DIAGNOSIS — M79.671 FOOT PAIN, RIGHT: ICD-10-CM

## 2020-12-30 PROCEDURE — 3078F DIAST BP <80 MM HG: CPT | Mod: CPTII,S$GLB,, | Performed by: PODIATRIST

## 2020-12-30 PROCEDURE — 3074F PR MOST RECENT SYSTOLIC BLOOD PRESSURE < 130 MM HG: ICD-10-PCS | Mod: CPTII,S$GLB,, | Performed by: PODIATRIST

## 2020-12-30 PROCEDURE — 99999 PR PBB SHADOW E&M-EST. PATIENT-LVL IV: CPT | Mod: PBBFAC,,, | Performed by: PODIATRIST

## 2020-12-30 PROCEDURE — 3078F PR MOST RECENT DIASTOLIC BLOOD PRESSURE < 80 MM HG: ICD-10-PCS | Mod: CPTII,S$GLB,, | Performed by: PODIATRIST

## 2020-12-30 PROCEDURE — 3074F SYST BP LT 130 MM HG: CPT | Mod: CPTII,S$GLB,, | Performed by: PODIATRIST

## 2020-12-30 PROCEDURE — 1125F AMNT PAIN NOTED PAIN PRSNT: CPT | Mod: S$GLB,,, | Performed by: PODIATRIST

## 2020-12-30 PROCEDURE — 1159F PR MEDICATION LIST DOCUMENTED IN MEDICAL RECORD: ICD-10-PCS | Mod: S$GLB,,, | Performed by: PODIATRIST

## 2020-12-30 PROCEDURE — 99499 RISK ADDL DX/OHS AUDIT: ICD-10-PCS | Mod: S$GLB,,, | Performed by: PODIATRIST

## 2020-12-30 PROCEDURE — 99212 OFFICE O/P EST SF 10 MIN: CPT | Mod: S$GLB,,, | Performed by: PODIATRIST

## 2020-12-30 PROCEDURE — 99499 UNLISTED E&M SERVICE: CPT | Mod: S$GLB,,, | Performed by: PODIATRIST

## 2020-12-30 PROCEDURE — 99212 PR OFFICE/OUTPT VISIT, EST, LEVL II, 10-19 MIN: ICD-10-PCS | Mod: S$GLB,,, | Performed by: PODIATRIST

## 2020-12-30 PROCEDURE — 1125F PR PAIN SEVERITY QUANTIFIED, PAIN PRESENT: ICD-10-PCS | Mod: S$GLB,,, | Performed by: PODIATRIST

## 2020-12-30 PROCEDURE — 99999 PR PBB SHADOW E&M-EST. PATIENT-LVL IV: ICD-10-PCS | Mod: PBBFAC,,, | Performed by: PODIATRIST

## 2020-12-30 PROCEDURE — 1159F MED LIST DOCD IN RCRD: CPT | Mod: S$GLB,,, | Performed by: PODIATRIST

## 2020-12-31 NOTE — PROGRESS NOTES
Subjective:      Patient ID: Nick De Anda is a 80 y.o. male.    Chief Complaint:   Foot Problem (got xray done, is her for result and f/u right ft. 12/15/20 PCP Dr. Adler), Foot Pain, Follow-up, and Diabetes Mellitus    Nick is a 80 y.o. male who returns to the clinic for evaluation and treatment of high risk feet. Nick has a past medical history of Anticoagulant long-term use, Arthritis, Cancer, Coronary artery disease, Diabetes mellitus, Encounter for blood transfusion, History of staphylococcal infection (2012), Hypertension, and Persistent atrial fibrillation (2/2/2019). The patient's chief complaint is f/u xray and  painful toes right foot. This patient has documented high risk feet requiring routine maintenance secondary to peripheral neuropathy.    Pt relates he liked the crest pads but they would slid off his toes and is unable to wear. He c/o foot pain with walking. He is inquiring about any antifungal he needs to apply. He is using powder and a toothbrush to clean in between toes.   ** pt is hard of hearing.      PCP: Santiago Adler MD    Date Last Seen by PCP: 12/5/20    Current shoe gear:  Affected Foot: Extra depth shoes with custome accommodative insoles     Unaffected Foot: Extra depth shoes with custome accommodative insoles    Hemoglobin A1C   Date Value Ref Range Status   10/01/2020 5.8 (H) 4.0 - 5.6 % Final     Comment:     ADA Screening Guidelines:  5.7-6.4%  Consistent with prediabetes  >or=6.5%  Consistent with diabetes  High levels of fetal hemoglobin interfere with the HbA1C  assay. Heterozygous hemoglobin variants (HbS, HgC, etc)do  not significantly interfere with this assay.   However, presence of multiple variants may affect accuracy.     03/24/2020 6.1 (H) 4.0 - 5.6 % Final     Comment:     ADA Screening Guidelines:  5.7-6.4%  Consistent with prediabetes  >or=6.5%  Consistent with diabetes  High levels of fetal hemoglobin interfere with the HbA1C  assay. Heterozygous  hemoglobin variants (HbS, HgC, etc)do  not significantly interfere with this assay.   However, presence of multiple variants may affect accuracy.     09/10/2019 5.6 4.0 - 5.6 % Final     Comment:     ADA Screening Guidelines:  5.7-6.4%  Consistent with prediabetes  >or=6.5%  Consistent with diabetes  High levels of fetal hemoglobin interfere with the HbA1C  assay. Heterozygous hemoglobin variants (HbS, HgC, etc)do  not significantly interfere with this assay.   However, presence of multiple variants may affect accuracy.          Past Medical History:   Diagnosis Date    Anticoagulant long-term use     Arthritis     Cancer     skin    Coronary artery disease     Diabetes mellitus     diet    Encounter for blood transfusion     History of staphylococcal infection 2012    after CABG    Hypertension     Persistent atrial fibrillation 2/2/2019     Past Surgical History:   Procedure Laterality Date    ABLATION OF ARRHYTHMOGENIC FOCUS FOR ATRIAL FIBRILLATION N/A 12/2/2020    Procedure: Ablation atrial fibrillation;  Surgeon: Yung Paez MD;  Location: [facility] EP LAB;  Service: Cardiology;  Laterality: N/A;  PAF, AFl, DEANGELO (Cx if SR), PVI, CTI, RFA, Carto, Gen, MN, 3 Prep    CARDIAC SURGERY      cabg    CORONARY STENT PLACEMENT      CYSTOSCOPY      prostate infection    FOOT SURGERY      RECTOPERITONEAL FISTULA CLOSURE      SHOULDER SURGERY      Triple bypass       Current Outpatient Medications on File Prior to Visit   Medication Sig Dispense Refill    amLODIPine (NORVASC) 5 MG tablet One-2 per day or as directed 90 tablet 3    atorvastatin (LIPITOR) 40 MG tablet Take 1 tablet (40 mg total) by mouth once daily. 90 tablet 3    blood sugar diagnostic Strp To check BG 2 times daily, to use with insurance preferred meter 100 strip 5    blood-glucose meter kit To check BG 2 times daily, to use with insurance preferred meter 1 each 0    clopidogreL (PLAVIX) 75 mg tablet Take 1 tablet (75 mg total) by mouth  once daily. 90 tablet 3    diazePAM (VALIUM) 10 MG Tab Take 1 tablet (10 mg total) by mouth daily as needed. 30 tablet 1    FLUZONE HIGHDOSE QUAD 20-21  mcg/0.7 mL Syrg PHARMACIST ADMINISTERED IMMUNIZATION ADMINISTERED AT TIME OF DISPENSING      hydroCHLOROthiazide (MICROZIDE) 12.5 mg capsule Take 1 capsule by mouth once daily 90 capsule 0    HYDROcodone-acetaminophen (NORCO) 5-325 mg per tablet Take 1 tablet by mouth every 12 (twelve) hours as needed for Pain. 25 tablet 0    irbesartan (AVAPRO) 300 MG tablet Take 1 tablet (300 mg total) by mouth every evening. 90 tablet 3    lancets Misc To check BG 2 times daily, to use with insurance preferred meter 100 each 5    levalbuterol (XOPENEX) 0.63 mg/3 mL nebulizer solution Take 3 mLs (0.63 mg total) by nebulization 4 (four) times daily as needed for Wheezing. Rescue 1 Box 3    levocetirizine (XYZAL) 5 MG tablet Take 1 tablet (5 mg total) by mouth every evening. For allergy 30 tablet 5    metFORMIN (GLUCOPHAGE) 500 MG tablet Take 1 tablet (500 mg total) by mouth 2 (two) times daily with meals. 180 tablet 3    nystatin (MYCOSTATIN) cream Apply 45 g topically 2 (two) times daily.      OMEGA-3-DHA-EPA-FISH OIL ORAL Take 1 tablet by mouth once daily.      pantoprazole (PROTONIX) 40 MG tablet Take 1 tablet (40 mg total) by mouth once daily. 30 tablet 5    sotaloL (BETAPACE) 80 MG tablet Take 1 tablet (80 mg total) by mouth every 12 (twelve) hours. 60 tablet 11    XARELTO 20 mg Tab TAKE 1 TABLET BY MOUTH ONCE DAILY IN THE EVENING 30 tablet 11    nitroGLYCERIN (NITROSTAT) 0.4 MG SL tablet Place 1 tablet (0.4 mg total) under the tongue every 5 (five) minutes as needed for Chest pain. 50 tablet 3    sucralfate (CARAFATE) 1 gram tablet Take 1 tablet (1 g total) by mouth 4 (four) times daily before meals and nightly. 120 tablet 0     Current Facility-Administered Medications on File Prior to Visit   Medication Dose Route Frequency Provider Last Rate Last Dose  "   0.9%  NaCl infusion   Intravenous Continuous Rebecca Dickinson NP   Stopped at 12/02/20 1636    sodium chloride 0.9% bolus 1,000 mL  1,000 mL Intravenous Once Winnie Larkin NP        sodium chloride 0.9% flush 10 mL  10 mL Intravenous PRN BALAJI. Javier Jaramillo MD        sodium chloride 0.9% flush 5 mL  5 mL Intravenous PRN Rebecca Dickinson NP         Review of patient's allergies indicates:   Allergen Reactions    Penicillins Anaphylaxis     Can take keflex    Amoxicillin Swelling and Rash       Review of Systems   Constitution: Negative for chills, decreased appetite, fever, malaise/fatigue, night sweats, weight gain and weight loss.   Cardiovascular: Positive for dyspnea on exertion and leg swelling. Negative for chest pain, claudication, palpitations and syncope.   Respiratory: Negative for cough and shortness of breath.    Endocrine: Negative for cold intolerance and heat intolerance.   Hematologic/Lymphatic: Negative for bleeding problem. Bruises/bleeds easily.   Skin: Positive for nail changes. Negative for color change, dry skin, flushing, itching, poor wound healing, rash, skin cancer, suspicious lesions and unusual hair distribution.   Musculoskeletal: Positive for arthritis and stiffness. Negative for back pain, falls, gout, joint pain, joint swelling, muscle cramps, muscle weakness, myalgias and neck pain.   Gastrointestinal: Negative for diarrhea, nausea and vomiting.   Neurological: Positive for numbness. Negative for dizziness, focal weakness, light-headedness, paresthesias, tremors, vertigo and weakness.   Psychiatric/Behavioral: Negative for altered mental status and depression. The patient does not have insomnia.    Allergic/Immunologic: Negative.            Objective:       Vitals:    12/30/20 1435   BP: 126/63   Pulse: (!) 59   Height: 6' 4" (1.93 m)   PainSc:   6   PainLoc: Foot         Physical Exam  Vitals signs reviewed.   Constitutional:       General: He is not in acute " distress.     Appearance: He is well-developed. He is not ill-appearing, toxic-appearing or diaphoretic.      Comments: Proper supportive shoegear   Cardiovascular:      Pulses:           Dorsalis pedis pulses are 1+ on the right side and 1+ on the left side.        Posterior tibial pulses are 2+ on the right side and 2+ on the left side.      Comments: Decreased hair growth to lower shins with evidence of chronic venous stasis dz.   Musculoskeletal:         General: No tenderness.      Right lower le+ Edema present.      Left lower le+ Edema present.      Right foot: Decreased range of motion. Deformity and bunion present.      Left foot: Decreased range of motion. Deformity and bunion present.      Comments: Flexible pes planus foot type w/ medial arch collapse and mild gastroc equinus      Semi-Reducible and non-reducable extensor and flexor contractures at the MTPJ and PIPJ of toes 2-5, bilat.      Hallux malleus contractures with mild lateral deviation    + pop with rom of right hallux, 3rd and 4th PIPJ right. No pop to, 2nd, 5th digit right. No pop to MTPJs right.     No pop left foot    Feet:      Right foot:      Protective Sensation: 10 sites tested. 0 sites sensed.      Skin integrity: Dry skin present. No ulcer, blister, skin breakdown, erythema, warmth or callus.      Toenail Condition: Right toenails are abnormally thick. Fungal disease present.     Left foot:      Protective Sensation: 10 sites tested. 0 sites sensed.      Skin integrity: Dry skin present. No ulcer, blister, skin breakdown, erythema, warmth or callus.      Toenail Condition: Left toenails are abnormally thick. Fungal disease present.     Comments: Right 4th digit mild  IS macerations to all IS . No acute soi    Toenails 1-5 bilaterally are short  Skin:     General: Skin is warm.      Capillary Refill: Capillary refill takes 2 to 3 seconds.      Coloration: Skin is pale.      Findings: No erythema.      Nails: There is no  clubbing.     Neurological:      Mental Status: He is alert and oriented to person, place, and time.      Gait: Gait abnormal.   Psychiatric:         Attention and Perception: Attention normal.         Mood and Affect: Mood normal.         Speech: Speech normal.         Behavior: Behavior normal.         Thought Content: Thought content normal.         Judgment: Judgment normal.      Comments: ** hard of hearing         X-Ray Foot Complete Right  Narrative: EXAMINATION:  XR FOOT COMPLETE 3 VIEW RIGHT    CLINICAL HISTORY:  painfu right 2nd, 3rd, 4th hammertoes. hx surgery;. Other hammer toe(s) (acquired), right foot    TECHNIQUE:  AP, lateral, and oblique views of the right foot were performed.    COMPARISON:  None available    FINDINGS:  Multiple hammertoe deformities.  There is abnormality with slight irregularity and asymmetric joint space narrowing of the great toe interphalangeal joint which could relate to combination of degenerative change, postprocedural state, and/or chronic infection.  Mild midfoot DJD.  No acute fracture or dislocation.  Lisfranc interval appears congruent.  Calcaneal enthesopathic spurring at the distal Achilles insertion and plantar fascial attachment.  Scattered peripheral vascular atherosclerosis of the distal lower extremity.  Impression: As above.    Electronically signed by: Christopher Coffey  Date:    12/15/2020  Time:    12:16        Assessment:       Encounter Diagnoses   Name Primary?    Hammer toes of both feet Yes    Long term (current) use of anticoagulants     Diabetic polyneuropathy associated with type 2 diabetes mellitus     Foot pain, right     Onychomycosis due to dermatophyte          Plan:       Nick was seen today for foot problem, foot pain, follow-up and diabetes mellitus.    Diagnoses and all orders for this visit:    Hammer toes of both feet    Long term (current) use of anticoagulants    Diabetic polyneuropathy associated with type 2 diabetes  mellitus    Foot pain, right    Onychomycosis due to dermatophyte      I counseled the patient on his conditions, their implications and medical management.    - reviewed  Xray     - Shoe inspection. Diabetic Foot Education. Patient reminded of the importance of good nutrition and blood sugar control to help prevent podiatric complications of diabetes. Patient instructed on proper foot hygeine. We discussed wearing proper shoe gear, daily foot inspections, never walking without protective shoe gear, never putting sharp instruments to feet, routine podiatric nail visits every 2-3 months.        - cleaned IS with alcohol. Applied gentin violet; recommend stopping foot creams and powder for now . May consider resuming in the future    - re- d/w pt recommend avoiding surgical intervention as the right foot pain is only occasional with walking and not affecting his daily activities at home (reading is main hobby) and driving.   D/w pt pain could be worse after a revision surgery. D/w pt neuropathy, anticoag, and gait instabiltiy increase risk post-op    - dispensed and applied MT sleeve pad and discussed use. Pt can not tolerate the crest pads as they would not stay in place    - f/u in 3 months or sooner if needed

## 2021-01-15 ENCOUNTER — IMMUNIZATION (OUTPATIENT)
Dept: PHARMACY | Facility: CLINIC | Age: 81
End: 2021-01-15
Payer: MEDICARE

## 2021-01-15 DIAGNOSIS — Z23 NEED FOR VACCINATION: Primary | ICD-10-CM

## 2021-01-20 ENCOUNTER — PATIENT MESSAGE (OUTPATIENT)
Dept: PRIMARY CARE CLINIC | Facility: CLINIC | Age: 81
End: 2021-01-20

## 2021-01-21 RX ORDER — SULFACETAMIDE SODIUM 100 MG/ML
1 SOLUTION/ DROPS OPHTHALMIC 4 TIMES DAILY
Qty: 5 ML | Refills: 0 | Status: SHIPPED | OUTPATIENT
Start: 2021-01-21 | End: 2021-02-04

## 2021-01-21 RX ORDER — MINOCYCLINE HYDROCHLORIDE 100 MG/1
100 CAPSULE ORAL 2 TIMES DAILY
Qty: 20 CAPSULE | Refills: 0 | Status: SHIPPED | OUTPATIENT
Start: 2021-01-21 | End: 2021-01-31

## 2021-02-01 ENCOUNTER — PATIENT MESSAGE (OUTPATIENT)
Dept: PRIMARY CARE CLINIC | Facility: CLINIC | Age: 81
End: 2021-02-01

## 2021-02-01 ENCOUNTER — TELEPHONE (OUTPATIENT)
Dept: PRIMARY CARE CLINIC | Facility: CLINIC | Age: 81
End: 2021-02-01

## 2021-02-02 ENCOUNTER — TELEPHONE (OUTPATIENT)
Dept: PRIMARY CARE CLINIC | Facility: CLINIC | Age: 81
End: 2021-02-02

## 2021-02-02 ENCOUNTER — HOSPITAL ENCOUNTER (OUTPATIENT)
Dept: RADIOLOGY | Facility: HOSPITAL | Age: 81
Discharge: HOME | End: 2021-02-02
Attending: INTERNAL MEDICINE
Payer: MEDICARE

## 2021-02-02 DIAGNOSIS — M25.561 ACUTE PAIN OF RIGHT KNEE: ICD-10-CM

## 2021-02-02 DIAGNOSIS — M25.512 ACUTE PAIN OF LEFT SHOULDER: ICD-10-CM

## 2021-02-02 DIAGNOSIS — M25.512 ACUTE PAIN OF LEFT SHOULDER: Primary | ICD-10-CM

## 2021-02-02 PROCEDURE — 73030 XR SHOULDER COMPLETE 2 OR MORE VIEWS LEFT: ICD-10-PCS | Mod: 26,LT,, | Performed by: RADIOLOGY

## 2021-02-02 PROCEDURE — 73562 X-RAY EXAM OF KNEE 3: CPT | Mod: 26,RT,, | Performed by: RADIOLOGY

## 2021-02-02 PROCEDURE — 73562 X-RAY EXAM OF KNEE 3: CPT | Mod: TC,PN,RT

## 2021-02-02 PROCEDURE — 73030 X-RAY EXAM OF SHOULDER: CPT | Mod: TC,PN,LT

## 2021-02-02 PROCEDURE — 73562 XR KNEE 3 VIEW RIGHT: ICD-10-PCS | Mod: 26,RT,, | Performed by: RADIOLOGY

## 2021-02-02 PROCEDURE — 73030 X-RAY EXAM OF SHOULDER: CPT | Mod: 26,LT,, | Performed by: RADIOLOGY

## 2021-02-04 ENCOUNTER — OFFICE VISIT (OUTPATIENT)
Dept: PRIMARY CARE CLINIC | Facility: CLINIC | Age: 81
End: 2021-02-04
Payer: MEDICARE

## 2021-02-04 DIAGNOSIS — M54.31 SCIATICA OF RIGHT SIDE: ICD-10-CM

## 2021-02-04 DIAGNOSIS — H10.9 CONJUNCTIVITIS OF BOTH EYES, UNSPECIFIED CONJUNCTIVITIS TYPE: ICD-10-CM

## 2021-02-04 DIAGNOSIS — S43.402A SPRAIN OF LEFT SHOULDER, UNSPECIFIED SHOULDER SPRAIN TYPE, INITIAL ENCOUNTER: ICD-10-CM

## 2021-02-04 DIAGNOSIS — G89.29 CHRONIC PAIN OF RIGHT KNEE: ICD-10-CM

## 2021-02-04 DIAGNOSIS — S63.642A SPRAIN OF METACARPOPHALANGEAL (MCP) JOINT OF LEFT THUMB, INITIAL ENCOUNTER: ICD-10-CM

## 2021-02-04 DIAGNOSIS — S80.01XA CONTUSION OF RIGHT KNEE, INITIAL ENCOUNTER: Primary | ICD-10-CM

## 2021-02-04 DIAGNOSIS — M25.561 CHRONIC PAIN OF RIGHT KNEE: ICD-10-CM

## 2021-02-04 PROCEDURE — 99442 PR PHYSICIAN TELEPHONE EVALUATION 11-20 MIN: ICD-10-PCS | Mod: 95,,, | Performed by: INTERNAL MEDICINE

## 2021-02-04 PROCEDURE — 99442 PR PHYSICIAN TELEPHONE EVALUATION 11-20 MIN: CPT | Mod: 95,,, | Performed by: INTERNAL MEDICINE

## 2021-02-04 PROCEDURE — 1159F MED LIST DOCD IN RCRD: CPT | Mod: 95,,, | Performed by: INTERNAL MEDICINE

## 2021-02-04 PROCEDURE — 1159F PR MEDICATION LIST DOCUMENTED IN MEDICAL RECORD: ICD-10-PCS | Mod: 95,,, | Performed by: INTERNAL MEDICINE

## 2021-02-04 RX ORDER — TOBRAMYCIN AND DEXAMETHASONE 3; 1 MG/ML; MG/ML
1 SUSPENSION/ DROPS OPHTHALMIC EVERY 6 HOURS
Qty: 5 ML | Refills: 0 | Status: SHIPPED | OUTPATIENT
Start: 2021-02-04 | End: 2021-03-09

## 2021-02-04 RX ORDER — HYDROCODONE BITARTRATE AND ACETAMINOPHEN 5; 325 MG/1; MG/1
1 TABLET ORAL EVERY 12 HOURS PRN
Qty: 25 TABLET | Refills: 0 | Status: SHIPPED | OUTPATIENT
Start: 2021-02-04 | End: 2021-05-20 | Stop reason: SDUPTHER

## 2021-02-04 RX ORDER — MELOXICAM 7.5 MG/1
7.5 TABLET ORAL DAILY
Qty: 30 TABLET | Refills: 0 | Status: SHIPPED | OUTPATIENT
Start: 2021-02-04 | End: 2021-03-09

## 2021-02-08 ENCOUNTER — TELEPHONE (OUTPATIENT)
Dept: PRIMARY CARE CLINIC | Facility: CLINIC | Age: 81
End: 2021-02-08

## 2021-02-08 ENCOUNTER — PATIENT MESSAGE (OUTPATIENT)
Dept: PRIMARY CARE CLINIC | Facility: CLINIC | Age: 81
End: 2021-02-08

## 2021-02-09 ENCOUNTER — PATIENT MESSAGE (OUTPATIENT)
Dept: PODIATRY | Facility: CLINIC | Age: 81
End: 2021-02-09

## 2021-02-10 ENCOUNTER — PATIENT MESSAGE (OUTPATIENT)
Dept: PRIMARY CARE CLINIC | Facility: CLINIC | Age: 81
End: 2021-02-10

## 2021-02-12 ENCOUNTER — IMMUNIZATION (OUTPATIENT)
Dept: PHARMACY | Facility: CLINIC | Age: 81
End: 2021-02-12
Payer: MEDICARE

## 2021-02-12 DIAGNOSIS — Z23 NEED FOR VACCINATION: Primary | ICD-10-CM

## 2021-02-17 ENCOUNTER — OFFICE VISIT (OUTPATIENT)
Dept: PODIATRY | Facility: CLINIC | Age: 81
End: 2021-02-17
Payer: MEDICARE

## 2021-02-17 VITALS
SYSTOLIC BLOOD PRESSURE: 137 MMHG | HEIGHT: 76 IN | BODY MASS INDEX: 34.58 KG/M2 | HEART RATE: 69 BPM | DIASTOLIC BLOOD PRESSURE: 71 MMHG

## 2021-02-17 DIAGNOSIS — E11.42 DIABETIC POLYNEUROPATHY ASSOCIATED WITH TYPE 2 DIABETES MELLITUS: ICD-10-CM

## 2021-02-17 DIAGNOSIS — M20.41 HAMMER TOES OF BOTH FEET: Primary | ICD-10-CM

## 2021-02-17 DIAGNOSIS — B35.1 ONYCHOMYCOSIS DUE TO DERMATOPHYTE: ICD-10-CM

## 2021-02-17 DIAGNOSIS — M20.42 HAMMER TOES OF BOTH FEET: Primary | ICD-10-CM

## 2021-02-17 DIAGNOSIS — Z79.01 LONG TERM (CURRENT) USE OF ANTICOAGULANTS: ICD-10-CM

## 2021-02-17 DIAGNOSIS — B35.3 TINEA PEDIS OF BOTH FEET: ICD-10-CM

## 2021-02-17 PROCEDURE — 1125F AMNT PAIN NOTED PAIN PRSNT: CPT | Mod: S$GLB,,, | Performed by: PODIATRIST

## 2021-02-17 PROCEDURE — 1159F MED LIST DOCD IN RCRD: CPT | Mod: S$GLB,,, | Performed by: PODIATRIST

## 2021-02-17 PROCEDURE — 1125F PR PAIN SEVERITY QUANTIFIED, PAIN PRESENT: ICD-10-PCS | Mod: S$GLB,,, | Performed by: PODIATRIST

## 2021-02-17 PROCEDURE — 1159F PR MEDICATION LIST DOCUMENTED IN MEDICAL RECORD: ICD-10-PCS | Mod: S$GLB,,, | Performed by: PODIATRIST

## 2021-02-17 PROCEDURE — 11721 DEBRIDE NAIL 6 OR MORE: CPT | Mod: Q9,S$GLB,, | Performed by: PODIATRIST

## 2021-02-17 PROCEDURE — 99212 PR OFFICE/OUTPT VISIT, EST, LEVL II, 10-19 MIN: ICD-10-PCS | Mod: 25,S$GLB,, | Performed by: PODIATRIST

## 2021-02-17 PROCEDURE — 11721 PR DEBRIDEMENT OF NAILS, 6 OR MORE: ICD-10-PCS | Mod: Q9,S$GLB,, | Performed by: PODIATRIST

## 2021-02-17 PROCEDURE — 99999 PR PBB SHADOW E&M-EST. PATIENT-LVL IV: CPT | Mod: PBBFAC,,, | Performed by: PODIATRIST

## 2021-02-17 PROCEDURE — 99212 OFFICE O/P EST SF 10 MIN: CPT | Mod: 25,S$GLB,, | Performed by: PODIATRIST

## 2021-02-17 PROCEDURE — 3078F DIAST BP <80 MM HG: CPT | Mod: CPTII,S$GLB,, | Performed by: PODIATRIST

## 2021-02-17 PROCEDURE — 3078F PR MOST RECENT DIASTOLIC BLOOD PRESSURE < 80 MM HG: ICD-10-PCS | Mod: CPTII,S$GLB,, | Performed by: PODIATRIST

## 2021-02-17 PROCEDURE — 3075F SYST BP GE 130 - 139MM HG: CPT | Mod: CPTII,S$GLB,, | Performed by: PODIATRIST

## 2021-02-17 PROCEDURE — 99999 PR PBB SHADOW E&M-EST. PATIENT-LVL IV: ICD-10-PCS | Mod: PBBFAC,,, | Performed by: PODIATRIST

## 2021-02-17 PROCEDURE — 3075F PR MOST RECENT SYSTOLIC BLOOD PRESS GE 130-139MM HG: ICD-10-PCS | Mod: CPTII,S$GLB,, | Performed by: PODIATRIST

## 2021-03-07 DIAGNOSIS — Z98.61 POST PTCA: ICD-10-CM

## 2021-03-08 RX ORDER — CLOPIDOGREL BISULFATE 75 MG/1
TABLET ORAL
Qty: 90 TABLET | Refills: 0 | Status: SHIPPED | OUTPATIENT
Start: 2021-03-08 | End: 2021-06-11

## 2021-03-09 ENCOUNTER — OFFICE VISIT (OUTPATIENT)
Dept: ELECTROPHYSIOLOGY | Facility: CLINIC | Age: 81
End: 2021-03-09
Payer: MEDICARE

## 2021-03-09 ENCOUNTER — HOSPITAL ENCOUNTER (OUTPATIENT)
Dept: CARDIOLOGY | Facility: CLINIC | Age: 81
Discharge: HOME | End: 2021-03-09
Payer: MEDICARE

## 2021-03-09 VITALS
WEIGHT: 249.31 LBS | HEART RATE: 65 BPM | HEIGHT: 76 IN | BODY MASS INDEX: 30.36 KG/M2 | SYSTOLIC BLOOD PRESSURE: 132 MMHG | DIASTOLIC BLOOD PRESSURE: 60 MMHG

## 2021-03-09 DIAGNOSIS — Z51.81 ENCOUNTER FOR MONITORING SOTALOL THERAPY: ICD-10-CM

## 2021-03-09 DIAGNOSIS — I10 ESSENTIAL HYPERTENSION: ICD-10-CM

## 2021-03-09 DIAGNOSIS — I48.3 TYPICAL ATRIAL FLUTTER: ICD-10-CM

## 2021-03-09 DIAGNOSIS — Z79.899 ENCOUNTER FOR MONITORING SOTALOL THERAPY: ICD-10-CM

## 2021-03-09 DIAGNOSIS — I48.0 PAROXYSMAL ATRIAL FIBRILLATION: Primary | ICD-10-CM

## 2021-03-09 DIAGNOSIS — I49.8 OTHER SPECIFIED CARDIAC ARRHYTHMIAS: ICD-10-CM

## 2021-03-09 DIAGNOSIS — Z79.01 LONG TERM (CURRENT) USE OF ANTICOAGULANTS: ICD-10-CM

## 2021-03-09 PROCEDURE — 1100F PR PT FALLS ASSESS DOC 2+ FALLS/FALL W/INJURY/YR: ICD-10-PCS | Mod: CPTII,S$GLB,, | Performed by: NURSE PRACTITIONER

## 2021-03-09 PROCEDURE — 99999 PR PBB SHADOW E&M-EST. PATIENT-LVL IV: ICD-10-PCS | Mod: PBBFAC,,, | Performed by: NURSE PRACTITIONER

## 2021-03-09 PROCEDURE — 3288F FALL RISK ASSESSMENT DOCD: CPT | Mod: CPTII,S$GLB,, | Performed by: NURSE PRACTITIONER

## 2021-03-09 PROCEDURE — 99214 PR OFFICE/OUTPT VISIT, EST, LEVL IV, 30-39 MIN: ICD-10-PCS | Mod: S$GLB,,, | Performed by: NURSE PRACTITIONER

## 2021-03-09 PROCEDURE — 93010 RHYTHM STRIP: ICD-10-PCS | Mod: S$GLB,,, | Performed by: INTERNAL MEDICINE

## 2021-03-09 PROCEDURE — 3288F PR FALLS RISK ASSESSMENT DOCUMENTED: ICD-10-PCS | Mod: CPTII,S$GLB,, | Performed by: NURSE PRACTITIONER

## 2021-03-09 PROCEDURE — 99999 PR PBB SHADOW E&M-EST. PATIENT-LVL IV: CPT | Mod: PBBFAC,,, | Performed by: NURSE PRACTITIONER

## 2021-03-09 PROCEDURE — 3075F SYST BP GE 130 - 139MM HG: CPT | Mod: CPTII,S$GLB,, | Performed by: NURSE PRACTITIONER

## 2021-03-09 PROCEDURE — 1159F MED LIST DOCD IN RCRD: CPT | Mod: S$GLB,,, | Performed by: NURSE PRACTITIONER

## 2021-03-09 PROCEDURE — 3075F PR MOST RECENT SYSTOLIC BLOOD PRESS GE 130-139MM HG: ICD-10-PCS | Mod: CPTII,S$GLB,, | Performed by: NURSE PRACTITIONER

## 2021-03-09 PROCEDURE — 1125F PR PAIN SEVERITY QUANTIFIED, PAIN PRESENT: ICD-10-PCS | Mod: S$GLB,,, | Performed by: NURSE PRACTITIONER

## 2021-03-09 PROCEDURE — 93005 RHYTHM STRIP: ICD-10-PCS | Mod: S$GLB,,, | Performed by: INTERNAL MEDICINE

## 2021-03-09 PROCEDURE — 3078F DIAST BP <80 MM HG: CPT | Mod: CPTII,S$GLB,, | Performed by: NURSE PRACTITIONER

## 2021-03-09 PROCEDURE — 1100F PTFALLS ASSESS-DOCD GE2>/YR: CPT | Mod: CPTII,S$GLB,, | Performed by: NURSE PRACTITIONER

## 2021-03-09 PROCEDURE — 1125F AMNT PAIN NOTED PAIN PRSNT: CPT | Mod: S$GLB,,, | Performed by: NURSE PRACTITIONER

## 2021-03-09 PROCEDURE — 99214 OFFICE O/P EST MOD 30 MIN: CPT | Mod: S$GLB,,, | Performed by: NURSE PRACTITIONER

## 2021-03-09 PROCEDURE — 93005 ELECTROCARDIOGRAM TRACING: CPT | Mod: S$GLB,,, | Performed by: INTERNAL MEDICINE

## 2021-03-09 PROCEDURE — 93010 ELECTROCARDIOGRAM REPORT: CPT | Mod: S$GLB,,, | Performed by: INTERNAL MEDICINE

## 2021-03-09 PROCEDURE — 3078F PR MOST RECENT DIASTOLIC BLOOD PRESSURE < 80 MM HG: ICD-10-PCS | Mod: CPTII,S$GLB,, | Performed by: NURSE PRACTITIONER

## 2021-03-09 PROCEDURE — 1159F PR MEDICATION LIST DOCUMENTED IN MEDICAL RECORD: ICD-10-PCS | Mod: S$GLB,,, | Performed by: NURSE PRACTITIONER

## 2021-03-09 RX ORDER — SOTALOL HYDROCHLORIDE 80 MG/1
40 TABLET ORAL EVERY 12 HOURS
Qty: 30 TABLET | Refills: 6 | Status: SHIPPED | OUTPATIENT
Start: 2021-03-09 | End: 2021-09-30 | Stop reason: SDUPTHER

## 2021-03-16 ENCOUNTER — OFFICE VISIT (OUTPATIENT)
Dept: PRIMARY CARE CLINIC | Facility: CLINIC | Age: 81
End: 2021-03-16
Payer: MEDICARE

## 2021-03-16 VITALS
WEIGHT: 282.75 LBS | DIASTOLIC BLOOD PRESSURE: 64 MMHG | HEART RATE: 64 BPM | TEMPERATURE: 98 F | HEIGHT: 76 IN | SYSTOLIC BLOOD PRESSURE: 132 MMHG | RESPIRATION RATE: 20 BRPM | OXYGEN SATURATION: 97 % | BODY MASS INDEX: 34.43 KG/M2

## 2021-03-16 DIAGNOSIS — I11.0 HYPERTENSIVE HEART DISEASE WITH HEART FAILURE: ICD-10-CM

## 2021-03-16 DIAGNOSIS — E66.01 MORBID (SEVERE) OBESITY DUE TO EXCESS CALORIES: ICD-10-CM

## 2021-03-16 DIAGNOSIS — I70.201 ATHEROSCLEROSIS OF NATIVE ARTERY OF RIGHT LOWER EXTREMITY, WITH UNSPECIFIED PRESENCE OF CLINICAL MANIFESTATION: ICD-10-CM

## 2021-03-16 DIAGNOSIS — D69.6 THROMBOCYTOPENIA: ICD-10-CM

## 2021-03-16 DIAGNOSIS — S43.402A SPRAIN OF LEFT SHOULDER, UNSPECIFIED SHOULDER SPRAIN TYPE, INITIAL ENCOUNTER: Primary | ICD-10-CM

## 2021-03-16 DIAGNOSIS — J47.9 BRONCHIECTASIS WITHOUT COMPLICATION: ICD-10-CM

## 2021-03-16 PROCEDURE — 1100F PR PT FALLS ASSESS DOC 2+ FALLS/FALL W/INJURY/YR: ICD-10-PCS | Mod: CPTII,S$GLB,, | Performed by: INTERNAL MEDICINE

## 2021-03-16 PROCEDURE — 3075F SYST BP GE 130 - 139MM HG: CPT | Mod: CPTII,S$GLB,, | Performed by: INTERNAL MEDICINE

## 2021-03-16 PROCEDURE — 1125F AMNT PAIN NOTED PAIN PRSNT: CPT | Mod: S$GLB,,, | Performed by: INTERNAL MEDICINE

## 2021-03-16 PROCEDURE — 99213 PR OFFICE/OUTPT VISIT, EST, LEVL III, 20-29 MIN: ICD-10-PCS | Mod: S$GLB,,, | Performed by: INTERNAL MEDICINE

## 2021-03-16 PROCEDURE — 3075F PR MOST RECENT SYSTOLIC BLOOD PRESS GE 130-139MM HG: ICD-10-PCS | Mod: CPTII,S$GLB,, | Performed by: INTERNAL MEDICINE

## 2021-03-16 PROCEDURE — 1125F PR PAIN SEVERITY QUANTIFIED, PAIN PRESENT: ICD-10-PCS | Mod: S$GLB,,, | Performed by: INTERNAL MEDICINE

## 2021-03-16 PROCEDURE — 3288F PR FALLS RISK ASSESSMENT DOCUMENTED: ICD-10-PCS | Mod: CPTII,S$GLB,, | Performed by: INTERNAL MEDICINE

## 2021-03-16 PROCEDURE — 3288F FALL RISK ASSESSMENT DOCD: CPT | Mod: CPTII,S$GLB,, | Performed by: INTERNAL MEDICINE

## 2021-03-16 PROCEDURE — 1100F PTFALLS ASSESS-DOCD GE2>/YR: CPT | Mod: CPTII,S$GLB,, | Performed by: INTERNAL MEDICINE

## 2021-03-16 PROCEDURE — 3078F PR MOST RECENT DIASTOLIC BLOOD PRESSURE < 80 MM HG: ICD-10-PCS | Mod: CPTII,S$GLB,, | Performed by: INTERNAL MEDICINE

## 2021-03-16 PROCEDURE — 1159F MED LIST DOCD IN RCRD: CPT | Mod: S$GLB,,, | Performed by: INTERNAL MEDICINE

## 2021-03-16 PROCEDURE — 99999 PR PBB SHADOW E&M-EST. PATIENT-LVL V: ICD-10-PCS | Mod: PBBFAC,,, | Performed by: INTERNAL MEDICINE

## 2021-03-16 PROCEDURE — 1159F PR MEDICATION LIST DOCUMENTED IN MEDICAL RECORD: ICD-10-PCS | Mod: S$GLB,,, | Performed by: INTERNAL MEDICINE

## 2021-03-16 PROCEDURE — 99213 OFFICE O/P EST LOW 20 MIN: CPT | Mod: S$GLB,,, | Performed by: INTERNAL MEDICINE

## 2021-03-16 PROCEDURE — 3078F DIAST BP <80 MM HG: CPT | Mod: CPTII,S$GLB,, | Performed by: INTERNAL MEDICINE

## 2021-03-16 PROCEDURE — 99999 PR PBB SHADOW E&M-EST. PATIENT-LVL V: CPT | Mod: PBBFAC,,, | Performed by: INTERNAL MEDICINE

## 2021-03-16 RX ORDER — SPIRONOLACTONE 50 MG/1
50 TABLET, FILM COATED ORAL DAILY
COMMUNITY
End: 2021-04-16

## 2021-03-16 RX ORDER — OMEPRAZOLE 40 MG/1
40 CAPSULE, DELAYED RELEASE ORAL DAILY
COMMUNITY
End: 2021-05-20

## 2021-03-16 RX ORDER — LEVOCETIRIZINE DIHYDROCHLORIDE 5 MG/1
5 TABLET, FILM COATED ORAL NIGHTLY
Qty: 90 TABLET | Refills: 3 | Status: CANCELLED | OUTPATIENT
Start: 2021-03-16 | End: 2022-03-16

## 2021-03-30 ENCOUNTER — OFFICE VISIT (OUTPATIENT)
Dept: DERMATOLOGY | Facility: CLINIC | Age: 81
End: 2021-03-30
Payer: MEDICARE

## 2021-03-30 VITALS — WEIGHT: 282 LBS | BODY MASS INDEX: 34.33 KG/M2

## 2021-03-30 DIAGNOSIS — L82.1 SEBORRHEIC KERATOSES: ICD-10-CM

## 2021-03-30 DIAGNOSIS — L81.4 LENTIGINES: ICD-10-CM

## 2021-03-30 DIAGNOSIS — L57.0 MULTIPLE ACTINIC KERATOSES: Primary | ICD-10-CM

## 2021-03-30 DIAGNOSIS — Z85.828 HISTORY OF SKIN CANCER: ICD-10-CM

## 2021-03-30 PROCEDURE — 99999 PR PBB SHADOW E&M-EST. PATIENT-LVL III: ICD-10-PCS | Mod: PBBFAC,,, | Performed by: DERMATOLOGY

## 2021-03-30 PROCEDURE — 1126F PR PAIN SEVERITY QUANTIFIED, NO PAIN PRESENT: ICD-10-PCS | Mod: S$GLB,,, | Performed by: DERMATOLOGY

## 2021-03-30 PROCEDURE — 1101F PR PT FALLS ASSESS DOC 0-1 FALLS W/OUT INJ PAST YR: ICD-10-PCS | Mod: CPTII,S$GLB,, | Performed by: DERMATOLOGY

## 2021-03-30 PROCEDURE — 1159F MED LIST DOCD IN RCRD: CPT | Mod: S$GLB,,, | Performed by: DERMATOLOGY

## 2021-03-30 PROCEDURE — 99999 PR PBB SHADOW E&M-EST. PATIENT-LVL III: CPT | Mod: PBBFAC,,, | Performed by: DERMATOLOGY

## 2021-03-30 PROCEDURE — 1101F PT FALLS ASSESS-DOCD LE1/YR: CPT | Mod: CPTII,S$GLB,, | Performed by: DERMATOLOGY

## 2021-03-30 PROCEDURE — 1159F PR MEDICATION LIST DOCUMENTED IN MEDICAL RECORD: ICD-10-PCS | Mod: S$GLB,,, | Performed by: DERMATOLOGY

## 2021-03-30 PROCEDURE — 3288F FALL RISK ASSESSMENT DOCD: CPT | Mod: CPTII,S$GLB,, | Performed by: DERMATOLOGY

## 2021-03-30 PROCEDURE — 1126F AMNT PAIN NOTED NONE PRSNT: CPT | Mod: S$GLB,,, | Performed by: DERMATOLOGY

## 2021-03-30 PROCEDURE — 3288F PR FALLS RISK ASSESSMENT DOCUMENTED: ICD-10-PCS | Mod: CPTII,S$GLB,, | Performed by: DERMATOLOGY

## 2021-03-30 PROCEDURE — 99203 OFFICE O/P NEW LOW 30 MIN: CPT | Mod: 25,S$GLB,, | Performed by: DERMATOLOGY

## 2021-03-30 PROCEDURE — 17000 PR DESTRUCTION(LASER SURGERY,CRYOSURGERY,CHEMOSURGERY),PREMALIGNANT LESIONS,FIRST LESION: ICD-10-PCS | Mod: S$GLB,,, | Performed by: DERMATOLOGY

## 2021-03-30 PROCEDURE — 17000 DESTRUCT PREMALG LESION: CPT | Mod: S$GLB,,, | Performed by: DERMATOLOGY

## 2021-03-30 PROCEDURE — 17003 DESTRUCT PREMALG LES 2-14: CPT | Mod: S$GLB,,, | Performed by: DERMATOLOGY

## 2021-03-30 PROCEDURE — 99203 PR OFFICE/OUTPT VISIT, NEW, LEVL III, 30-44 MIN: ICD-10-PCS | Mod: 25,S$GLB,, | Performed by: DERMATOLOGY

## 2021-03-30 PROCEDURE — 17003 DESTRUCTION, PREMALIGNANT LESIONS; SECOND THROUGH 14 LESIONS: ICD-10-PCS | Mod: S$GLB,,, | Performed by: DERMATOLOGY

## 2021-03-31 ENCOUNTER — TELEPHONE (OUTPATIENT)
Dept: OPHTHALMOLOGY | Facility: CLINIC | Age: 81
End: 2021-03-31

## 2021-04-13 ENCOUNTER — PATIENT MESSAGE (OUTPATIENT)
Dept: PRIMARY CARE CLINIC | Facility: CLINIC | Age: 81
End: 2021-04-13

## 2021-04-14 ENCOUNTER — PATIENT MESSAGE (OUTPATIENT)
Dept: CARDIOLOGY | Facility: CLINIC | Age: 81
End: 2021-04-14

## 2021-04-14 ENCOUNTER — PATIENT OUTREACH (OUTPATIENT)
Dept: ADMINISTRATIVE | Facility: OTHER | Age: 81
End: 2021-04-14

## 2021-04-14 ENCOUNTER — PATIENT MESSAGE (OUTPATIENT)
Dept: ELECTROPHYSIOLOGY | Facility: CLINIC | Age: 81
End: 2021-04-14

## 2021-04-14 ENCOUNTER — TELEPHONE (OUTPATIENT)
Dept: PRIMARY CARE CLINIC | Facility: CLINIC | Age: 81
End: 2021-04-14

## 2021-04-14 DIAGNOSIS — E78.5 HYPERLIPIDEMIA, UNSPECIFIED HYPERLIPIDEMIA TYPE: ICD-10-CM

## 2021-04-14 DIAGNOSIS — I10 ESSENTIAL HYPERTENSION: Primary | ICD-10-CM

## 2021-04-14 DIAGNOSIS — E11.9 TYPE 2 DIABETES MELLITUS WITHOUT COMPLICATION, WITHOUT LONG-TERM CURRENT USE OF INSULIN: ICD-10-CM

## 2021-04-14 DIAGNOSIS — Z12.5 SCREENING FOR PROSTATE CANCER: ICD-10-CM

## 2021-04-14 DIAGNOSIS — Z01.810 PREOP CARDIOVASCULAR EXAM: Primary | ICD-10-CM

## 2021-04-15 ENCOUNTER — LAB VISIT (OUTPATIENT)
Dept: LAB | Facility: HOSPITAL | Age: 81
End: 2021-04-15
Attending: INTERNAL MEDICINE
Payer: MEDICARE

## 2021-04-15 DIAGNOSIS — E11.9 TYPE 2 DIABETES MELLITUS WITHOUT COMPLICATION, WITHOUT LONG-TERM CURRENT USE OF INSULIN: ICD-10-CM

## 2021-04-15 DIAGNOSIS — I10 ESSENTIAL HYPERTENSION: ICD-10-CM

## 2021-04-15 DIAGNOSIS — E78.5 HYPERLIPIDEMIA, UNSPECIFIED HYPERLIPIDEMIA TYPE: ICD-10-CM

## 2021-04-15 LAB
ALBUMIN SERPL BCP-MCNC: 3.4 G/DL (ref 3.5–5.2)
ALP SERPL-CCNC: 88 U/L (ref 55–135)
ALT SERPL W/O P-5'-P-CCNC: 17 U/L (ref 10–44)
ANION GAP SERPL CALC-SCNC: 9 MMOL/L (ref 8–16)
AST SERPL-CCNC: 18 U/L (ref 10–40)
BASOPHILS # BLD AUTO: 0.04 K/UL (ref 0–0.2)
BASOPHILS NFR BLD: 0.7 % (ref 0–1.9)
BILIRUB SERPL-MCNC: 0.7 MG/DL (ref 0.1–1)
BUN SERPL-MCNC: 15 MG/DL (ref 8–23)
CALCIUM SERPL-MCNC: 9 MG/DL (ref 8.7–10.5)
CHLORIDE SERPL-SCNC: 105 MMOL/L (ref 95–110)
CHOLEST SERPL-MCNC: 160 MG/DL (ref 120–199)
CHOLEST/HDLC SERPL: 4 {RATIO} (ref 2–5)
CO2 SERPL-SCNC: 24 MMOL/L (ref 23–29)
CREAT SERPL-MCNC: 0.8 MG/DL (ref 0.5–1.4)
DIFFERENTIAL METHOD: ABNORMAL
EOSINOPHIL # BLD AUTO: 0.3 K/UL (ref 0–0.5)
EOSINOPHIL NFR BLD: 4.8 % (ref 0–8)
ERYTHROCYTE [DISTWIDTH] IN BLOOD BY AUTOMATED COUNT: 13.8 % (ref 11.5–14.5)
EST. GFR  (AFRICAN AMERICAN): >60 ML/MIN/1.73 M^2
EST. GFR  (NON AFRICAN AMERICAN): >60 ML/MIN/1.73 M^2
ESTIMATED AVG GLUCOSE: 123 MG/DL (ref 68–131)
GLUCOSE SERPL-MCNC: 128 MG/DL (ref 70–110)
HBA1C MFR BLD: 5.9 % (ref 4–5.6)
HCT VFR BLD AUTO: 41.4 % (ref 40–54)
HDLC SERPL-MCNC: 40 MG/DL (ref 40–75)
HDLC SERPL: 25 % (ref 20–50)
HGB BLD-MCNC: 13.4 G/DL (ref 14–18)
IMM GRANULOCYTES # BLD AUTO: 0.01 K/UL (ref 0–0.04)
IMM GRANULOCYTES NFR BLD AUTO: 0.2 % (ref 0–0.5)
LDLC SERPL CALC-MCNC: 90.4 MG/DL (ref 63–159)
LYMPHOCYTES # BLD AUTO: 1.7 K/UL (ref 1–4.8)
LYMPHOCYTES NFR BLD: 27.5 % (ref 18–48)
MCH RBC QN AUTO: 28 PG (ref 27–31)
MCHC RBC AUTO-ENTMCNC: 32.4 G/DL (ref 32–36)
MCV RBC AUTO: 86 FL (ref 82–98)
MONOCYTES # BLD AUTO: 0.6 K/UL (ref 0.3–1)
MONOCYTES NFR BLD: 10.2 % (ref 4–15)
NEUTROPHILS # BLD AUTO: 3.5 K/UL (ref 1.8–7.7)
NEUTROPHILS NFR BLD: 56.6 % (ref 38–73)
NONHDLC SERPL-MCNC: 120 MG/DL
NRBC BLD-RTO: 0 /100 WBC
PLATELET # BLD AUTO: 210 K/UL (ref 150–450)
PMV BLD AUTO: 11.5 FL (ref 9.2–12.9)
POTASSIUM SERPL-SCNC: 4.3 MMOL/L (ref 3.5–5.1)
PROT SERPL-MCNC: 6.7 G/DL (ref 6–8.4)
RBC # BLD AUTO: 4.79 M/UL (ref 4.6–6.2)
SODIUM SERPL-SCNC: 138 MMOL/L (ref 136–145)
TRIGL SERPL-MCNC: 148 MG/DL (ref 30–150)
WBC # BLD AUTO: 6.08 K/UL (ref 3.9–12.7)

## 2021-04-15 PROCEDURE — 85025 COMPLETE CBC W/AUTO DIFF WBC: CPT | Performed by: INTERNAL MEDICINE

## 2021-04-15 PROCEDURE — 80053 COMPREHEN METABOLIC PANEL: CPT | Performed by: INTERNAL MEDICINE

## 2021-04-15 PROCEDURE — 80061 LIPID PANEL: CPT | Performed by: INTERNAL MEDICINE

## 2021-04-15 PROCEDURE — 36415 COLL VENOUS BLD VENIPUNCTURE: CPT | Mod: PN | Performed by: INTERNAL MEDICINE

## 2021-04-15 PROCEDURE — 83036 HEMOGLOBIN GLYCOSYLATED A1C: CPT | Performed by: INTERNAL MEDICINE

## 2021-04-16 ENCOUNTER — OFFICE VISIT (OUTPATIENT)
Dept: CARDIOLOGY | Facility: CLINIC | Age: 81
End: 2021-04-16
Payer: MEDICARE

## 2021-04-16 ENCOUNTER — PATIENT MESSAGE (OUTPATIENT)
Dept: CARDIOLOGY | Facility: CLINIC | Age: 81
End: 2021-04-16

## 2021-04-16 ENCOUNTER — HOSPITAL ENCOUNTER (OUTPATIENT)
Dept: CARDIOLOGY | Facility: CLINIC | Age: 81
Discharge: HOME | End: 2021-04-16
Payer: MEDICARE

## 2021-04-16 VITALS
HEART RATE: 62 BPM | OXYGEN SATURATION: 97 % | HEIGHT: 76 IN | WEIGHT: 282.63 LBS | DIASTOLIC BLOOD PRESSURE: 58 MMHG | SYSTOLIC BLOOD PRESSURE: 136 MMHG | BODY MASS INDEX: 34.42 KG/M2

## 2021-04-16 DIAGNOSIS — Z98.61 POST PTCA: ICD-10-CM

## 2021-04-16 DIAGNOSIS — I11.0 HYPERTENSIVE HEART DISEASE WITH HEART FAILURE: ICD-10-CM

## 2021-04-16 DIAGNOSIS — I10 ESSENTIAL HYPERTENSION: ICD-10-CM

## 2021-04-16 DIAGNOSIS — E11.9 TYPE 2 DIABETES MELLITUS WITHOUT COMPLICATION, WITHOUT LONG-TERM CURRENT USE OF INSULIN: ICD-10-CM

## 2021-04-16 DIAGNOSIS — E66.01 MORBID (SEVERE) OBESITY DUE TO EXCESS CALORIES: ICD-10-CM

## 2021-04-16 DIAGNOSIS — I25.10 CORONARY ARTERY DISEASE INVOLVING NATIVE CORONARY ARTERY OF NATIVE HEART WITHOUT ANGINA PECTORIS: ICD-10-CM

## 2021-04-16 DIAGNOSIS — Z01.810 PRE-OPERATIVE CARDIOVASCULAR EXAMINATION: Primary | ICD-10-CM

## 2021-04-16 DIAGNOSIS — Z01.810 PREOP CARDIOVASCULAR EXAM: ICD-10-CM

## 2021-04-16 DIAGNOSIS — E78.5 DYSLIPIDEMIA: ICD-10-CM

## 2021-04-16 DIAGNOSIS — I48.0 PAROXYSMAL ATRIAL FIBRILLATION: ICD-10-CM

## 2021-04-16 DIAGNOSIS — I48.3 TYPICAL ATRIAL FLUTTER: ICD-10-CM

## 2021-04-16 DIAGNOSIS — Z95.1 HX OF CABG: ICD-10-CM

## 2021-04-16 DIAGNOSIS — Z79.01 LONG TERM (CURRENT) USE OF ANTICOAGULANTS: ICD-10-CM

## 2021-04-16 DIAGNOSIS — E78.5 HYPERLIPIDEMIA, UNSPECIFIED HYPERLIPIDEMIA TYPE: ICD-10-CM

## 2021-04-16 PROCEDURE — 99214 PR OFFICE/OUTPT VISIT, EST, LEVL IV, 30-39 MIN: ICD-10-PCS | Mod: 25,S$GLB,, | Performed by: PHYSICIAN ASSISTANT

## 2021-04-16 PROCEDURE — 99214 OFFICE O/P EST MOD 30 MIN: CPT | Mod: 25,S$GLB,, | Performed by: PHYSICIAN ASSISTANT

## 2021-04-16 PROCEDURE — 99999 PR PBB SHADOW E&M-EST. PATIENT-LVL IV: CPT | Mod: PBBFAC,,, | Performed by: PHYSICIAN ASSISTANT

## 2021-04-16 PROCEDURE — 93000 EKG 12-LEAD: ICD-10-PCS | Mod: S$GLB,,, | Performed by: INTERNAL MEDICINE

## 2021-04-16 PROCEDURE — 99499 UNLISTED E&M SERVICE: CPT | Mod: S$GLB,,, | Performed by: PHYSICIAN ASSISTANT

## 2021-04-16 PROCEDURE — 99499 RISK ADDL DX/OHS AUDIT: ICD-10-PCS | Mod: S$GLB,,, | Performed by: PHYSICIAN ASSISTANT

## 2021-04-16 PROCEDURE — 1126F AMNT PAIN NOTED NONE PRSNT: CPT | Mod: S$GLB,,, | Performed by: PHYSICIAN ASSISTANT

## 2021-04-16 PROCEDURE — 93000 ELECTROCARDIOGRAM COMPLETE: CPT | Mod: S$GLB,,, | Performed by: INTERNAL MEDICINE

## 2021-04-16 PROCEDURE — 1159F MED LIST DOCD IN RCRD: CPT | Mod: S$GLB,,, | Performed by: PHYSICIAN ASSISTANT

## 2021-04-16 PROCEDURE — 99999 PR PBB SHADOW E&M-EST. PATIENT-LVL IV: ICD-10-PCS | Mod: PBBFAC,,, | Performed by: PHYSICIAN ASSISTANT

## 2021-04-16 PROCEDURE — 1126F PR PAIN SEVERITY QUANTIFIED, NO PAIN PRESENT: ICD-10-PCS | Mod: S$GLB,,, | Performed by: PHYSICIAN ASSISTANT

## 2021-04-16 PROCEDURE — 1159F PR MEDICATION LIST DOCUMENTED IN MEDICAL RECORD: ICD-10-PCS | Mod: S$GLB,,, | Performed by: PHYSICIAN ASSISTANT

## 2021-04-16 RX ORDER — ATORVASTATIN CALCIUM 80 MG/1
80 TABLET, FILM COATED ORAL NIGHTLY
Qty: 90 TABLET | Refills: 3
Start: 2021-04-16 | End: 2021-12-14 | Stop reason: SDUPTHER

## 2021-04-16 RX ORDER — EZETIMIBE 10 MG/1
5 TABLET ORAL DAILY
Qty: 45 TABLET | Refills: 3 | Status: SHIPPED | OUTPATIENT
Start: 2021-04-16 | End: 2021-09-21 | Stop reason: CLARIF

## 2021-04-19 ENCOUNTER — HOSPITAL ENCOUNTER (OUTPATIENT)
Dept: RADIOLOGY | Facility: OTHER | Age: 81
Discharge: HOME | End: 2021-04-19
Attending: ORTHOPAEDIC SURGERY
Payer: MEDICARE

## 2021-04-19 DIAGNOSIS — M75.102 ROTATOR CUFF SYNDROME OF LEFT SHOULDER: ICD-10-CM

## 2021-04-19 PROCEDURE — 73221 MRI JOINT UPR EXTREM W/O DYE: CPT | Mod: 26,LT,, | Performed by: RADIOLOGY

## 2021-04-19 PROCEDURE — 73221 MRI JOINT UPR EXTREM W/O DYE: CPT | Mod: TC,LT

## 2021-04-19 PROCEDURE — 73221 MRI SHOULDER WITHOUT CONTRAST LEFT: ICD-10-PCS | Mod: 26,LT,, | Performed by: RADIOLOGY

## 2021-05-17 ENCOUNTER — TELEPHONE (OUTPATIENT)
Dept: PRIMARY CARE CLINIC | Facility: CLINIC | Age: 81
End: 2021-05-17

## 2021-05-17 ENCOUNTER — PATIENT MESSAGE (OUTPATIENT)
Dept: PRIMARY CARE CLINIC | Facility: CLINIC | Age: 81
End: 2021-05-17

## 2021-05-17 DIAGNOSIS — S43.402A SPRAIN OF LEFT SHOULDER, UNSPECIFIED SHOULDER SPRAIN TYPE, INITIAL ENCOUNTER: ICD-10-CM

## 2021-05-17 DIAGNOSIS — S80.01XA CONTUSION OF RIGHT KNEE, INITIAL ENCOUNTER: ICD-10-CM

## 2021-05-17 DIAGNOSIS — F41.9 ANXIETY: ICD-10-CM

## 2021-05-18 ENCOUNTER — PATIENT MESSAGE (OUTPATIENT)
Dept: PRIMARY CARE CLINIC | Facility: CLINIC | Age: 81
End: 2021-05-18

## 2021-05-18 RX ORDER — HYDROCODONE BITARTRATE AND ACETAMINOPHEN 5; 325 MG/1; MG/1
1 TABLET ORAL EVERY 12 HOURS PRN
Qty: 25 TABLET | Refills: 0 | OUTPATIENT
Start: 2021-05-18

## 2021-05-18 RX ORDER — DIAZEPAM 10 MG/1
10 TABLET ORAL DAILY PRN
Qty: 30 TABLET | Refills: 1 | OUTPATIENT
Start: 2021-05-18 | End: 2021-06-17

## 2021-05-19 ENCOUNTER — PATIENT MESSAGE (OUTPATIENT)
Dept: PRIMARY CARE CLINIC | Facility: CLINIC | Age: 81
End: 2021-05-19

## 2021-05-20 ENCOUNTER — OFFICE VISIT (OUTPATIENT)
Dept: PRIMARY CARE CLINIC | Facility: CLINIC | Age: 81
End: 2021-05-20
Payer: MEDICARE

## 2021-05-20 ENCOUNTER — PATIENT MESSAGE (OUTPATIENT)
Dept: PRIMARY CARE CLINIC | Facility: CLINIC | Age: 81
End: 2021-05-20

## 2021-05-20 DIAGNOSIS — L08.9 INFECTED ABRASION OF RIGHT ELBOW, INITIAL ENCOUNTER: ICD-10-CM

## 2021-05-20 DIAGNOSIS — S43.402A SPRAIN OF LEFT SHOULDER, UNSPECIFIED SHOULDER SPRAIN TYPE, INITIAL ENCOUNTER: ICD-10-CM

## 2021-05-20 DIAGNOSIS — S50.01XD CONTUSION OF RIGHT ELBOW, SUBSEQUENT ENCOUNTER: Primary | ICD-10-CM

## 2021-05-20 DIAGNOSIS — S50.311A INFECTED ABRASION OF RIGHT ELBOW, INITIAL ENCOUNTER: ICD-10-CM
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
## 2021-05-20 DIAGNOSIS — S43.401D SPRAIN OF RIGHT SHOULDER, UNSPECIFIED SHOULDER SPRAIN TYPE, SUBSEQUENT ENCOUNTER: ICD-10-CM

## 2021-05-20 PROCEDURE — 99442 PR PHYSICIAN TELEPHONE EVALUATION 11-20 MIN: CPT | Mod: 95,,, | Performed by: INTERNAL MEDICINE

## 2021-05-20 PROCEDURE — 1159F PR MEDICATION LIST DOCUMENTED IN MEDICAL RECORD: ICD-10-PCS | Mod: 95,,, | Performed by: INTERNAL MEDICINE

## 2021-05-20 PROCEDURE — 1159F MED LIST DOCD IN RCRD: CPT | Mod: 95,,, | Performed by: INTERNAL MEDICINE

## 2021-05-20 PROCEDURE — 99442 PR PHYSICIAN TELEPHONE EVALUATION 11-20 MIN: ICD-10-PCS | Mod: 95,,, | Performed by: INTERNAL MEDICINE

## 2021-05-20 RX ORDER — HYDROCODONE BITARTRATE AND ACETAMINOPHEN 5; 325 MG/1; MG/1
1 TABLET ORAL EVERY 12 HOURS PRN
Qty: 25 TABLET | Refills: 0 | Status: SHIPPED | OUTPATIENT
Start: 2021-05-20 | End: 2021-12-20 | Stop reason: SDUPTHER

## 2021-05-20 RX ORDER — DOXYCYCLINE 100 MG/1
100 CAPSULE ORAL EVERY 12 HOURS
Qty: 20 CAPSULE | Refills: 0 | Status: SHIPPED | OUTPATIENT
Start: 2021-05-20 | End: 2021-05-30

## 2021-05-20 RX ORDER — MUPIROCIN 20 MG/G
OINTMENT TOPICAL 2 TIMES DAILY
Qty: 22 G | Refills: 0 | Status: SHIPPED | OUTPATIENT
Start: 2021-05-20 | End: 2021-09-21 | Stop reason: CLARIF

## 2021-06-09 ENCOUNTER — HOSPITAL ENCOUNTER (OUTPATIENT)
Dept: CARDIOLOGY | Facility: CLINIC | Age: 81
Discharge: HOME | End: 2021-06-09
Attending: NURSE PRACTITIONER
Payer: MEDICARE

## 2021-06-09 ENCOUNTER — OFFICE VISIT (OUTPATIENT)
Dept: ELECTROPHYSIOLOGY | Facility: CLINIC | Age: 81
End: 2021-06-09
Attending: NURSE PRACTITIONER
Payer: MEDICARE

## 2021-06-09 VITALS
SYSTOLIC BLOOD PRESSURE: 136 MMHG | OXYGEN SATURATION: 96 % | HEIGHT: 76 IN | DIASTOLIC BLOOD PRESSURE: 62 MMHG | HEART RATE: 72 BPM | WEIGHT: 271.19 LBS | BODY MASS INDEX: 33.02 KG/M2

## 2021-06-09 DIAGNOSIS — I49.8 OTHER SPECIFIED CARDIAC ARRHYTHMIAS: ICD-10-CM

## 2021-06-09 DIAGNOSIS — Z79.01 LONG TERM (CURRENT) USE OF ANTICOAGULANTS: ICD-10-CM

## 2021-06-09 DIAGNOSIS — Z51.81 ENCOUNTER FOR MONITORING SOTALOL THERAPY: ICD-10-CM

## 2021-06-09 DIAGNOSIS — I48.3 TYPICAL ATRIAL FLUTTER: ICD-10-CM

## 2021-06-09 DIAGNOSIS — I48.0 PAROXYSMAL ATRIAL FIBRILLATION: Primary | ICD-10-CM

## 2021-06-09 DIAGNOSIS — I10 ESSENTIAL HYPERTENSION: ICD-10-CM

## 2021-06-09 DIAGNOSIS — Z79.899 ENCOUNTER FOR MONITORING SOTALOL THERAPY: ICD-10-CM

## 2021-06-09 PROCEDURE — 99214 OFFICE O/P EST MOD 30 MIN: CPT | Mod: S$GLB,,, | Performed by: NURSE PRACTITIONER

## 2021-06-09 PROCEDURE — 1100F PTFALLS ASSESS-DOCD GE2>/YR: CPT | Mod: CPTII,S$GLB,, | Performed by: NURSE PRACTITIONER

## 2021-06-09 PROCEDURE — 99999 PR PBB SHADOW E&M-EST. PATIENT-LVL IV: ICD-10-PCS | Mod: PBBFAC,,, | Performed by: NURSE PRACTITIONER

## 2021-06-09 PROCEDURE — 99999 PR PBB SHADOW E&M-EST. PATIENT-LVL IV: CPT | Mod: PBBFAC,,, | Performed by: NURSE PRACTITIONER

## 2021-06-09 PROCEDURE — 93005 ELECTROCARDIOGRAM TRACING: CPT | Mod: S$GLB,,, | Performed by: INTERNAL MEDICINE

## 2021-06-09 PROCEDURE — 1126F AMNT PAIN NOTED NONE PRSNT: CPT | Mod: S$GLB,,, | Performed by: NURSE PRACTITIONER

## 2021-06-09 PROCEDURE — 93010 ELECTROCARDIOGRAM REPORT: CPT | Mod: S$GLB,,, | Performed by: INTERNAL MEDICINE

## 2021-06-09 PROCEDURE — 1126F PR PAIN SEVERITY QUANTIFIED, NO PAIN PRESENT: ICD-10-PCS | Mod: S$GLB,,, | Performed by: NURSE PRACTITIONER

## 2021-06-09 PROCEDURE — 93005 RHYTHM STRIP: ICD-10-PCS | Mod: S$GLB,,, | Performed by: INTERNAL MEDICINE

## 2021-06-09 PROCEDURE — 1159F PR MEDICATION LIST DOCUMENTED IN MEDICAL RECORD: ICD-10-PCS | Mod: S$GLB,,, | Performed by: NURSE PRACTITIONER

## 2021-06-09 PROCEDURE — 99214 PR OFFICE/OUTPT VISIT, EST, LEVL IV, 30-39 MIN: ICD-10-PCS | Mod: S$GLB,,, | Performed by: NURSE PRACTITIONER

## 2021-06-09 PROCEDURE — 3288F PR FALLS RISK ASSESSMENT DOCUMENTED: ICD-10-PCS | Mod: CPTII,S$GLB,, | Performed by: NURSE PRACTITIONER

## 2021-06-09 PROCEDURE — 93010 RHYTHM STRIP: ICD-10-PCS | Mod: S$GLB,,, | Performed by: INTERNAL MEDICINE

## 2021-06-09 PROCEDURE — 1159F MED LIST DOCD IN RCRD: CPT | Mod: S$GLB,,, | Performed by: NURSE PRACTITIONER

## 2021-06-09 PROCEDURE — 1100F PR PT FALLS ASSESS DOC 2+ FALLS/FALL W/INJURY/YR: ICD-10-PCS | Mod: CPTII,S$GLB,, | Performed by: NURSE PRACTITIONER

## 2021-06-09 PROCEDURE — 3288F FALL RISK ASSESSMENT DOCD: CPT | Mod: CPTII,S$GLB,, | Performed by: NURSE PRACTITIONER

## 2021-06-10 ENCOUNTER — PATIENT MESSAGE (OUTPATIENT)
Dept: ELECTROPHYSIOLOGY | Facility: CLINIC | Age: 81
End: 2021-06-10

## 2021-07-15 ENCOUNTER — TELEPHONE (OUTPATIENT)
Dept: PODIATRY | Facility: CLINIC | Age: 81
End: 2021-07-15

## 2021-07-15 ENCOUNTER — OFFICE VISIT (OUTPATIENT)
Dept: PODIATRY | Facility: CLINIC | Age: 81
End: 2021-07-15
Payer: MEDICARE

## 2021-07-15 VITALS
HEART RATE: 63 BPM | BODY MASS INDEX: 33.01 KG/M2 | DIASTOLIC BLOOD PRESSURE: 63 MMHG | SYSTOLIC BLOOD PRESSURE: 124 MMHG | HEIGHT: 76 IN

## 2021-07-15 DIAGNOSIS — E11.42 DIABETIC POLYNEUROPATHY ASSOCIATED WITH TYPE 2 DIABETES MELLITUS: ICD-10-CM

## 2021-07-15 DIAGNOSIS — Z79.01 LONG TERM (CURRENT) USE OF ANTICOAGULANTS: ICD-10-CM

## 2021-07-15 DIAGNOSIS — M20.42 HAMMER TOES OF BOTH FEET: Primary | ICD-10-CM

## 2021-07-15 DIAGNOSIS — R60.0 EDEMA, LOWER EXTREMITY: ICD-10-CM

## 2021-07-15 DIAGNOSIS — M20.30 HALLUX, MALLEUS ACQUIRED: ICD-10-CM

## 2021-07-15 DIAGNOSIS — M20.41 HAMMER TOES OF BOTH FEET: Primary | ICD-10-CM

## 2021-07-15 PROCEDURE — 1125F AMNT PAIN NOTED PAIN PRSNT: CPT | Mod: S$GLB,,, | Performed by: PODIATRIST

## 2021-07-15 PROCEDURE — 99499 UNLISTED E&M SERVICE: CPT | Mod: S$GLB,,, | Performed by: PODIATRIST

## 2021-07-15 PROCEDURE — 1125F PR PAIN SEVERITY QUANTIFIED, PAIN PRESENT: ICD-10-PCS | Mod: S$GLB,,, | Performed by: PODIATRIST

## 2021-07-15 PROCEDURE — 99999 PR PBB SHADOW E&M-EST. PATIENT-LVL III: ICD-10-PCS | Mod: PBBFAC,,, | Performed by: PODIATRIST

## 2021-07-15 PROCEDURE — 99213 OFFICE O/P EST LOW 20 MIN: CPT | Mod: S$GLB,,, | Performed by: PODIATRIST

## 2021-07-15 PROCEDURE — 1159F MED LIST DOCD IN RCRD: CPT | Mod: S$GLB,,, | Performed by: PODIATRIST

## 2021-07-15 PROCEDURE — 99999 PR PBB SHADOW E&M-EST. PATIENT-LVL III: CPT | Mod: PBBFAC,,, | Performed by: PODIATRIST

## 2021-07-15 PROCEDURE — 99499 RISK ADDL DX/OHS AUDIT: ICD-10-PCS | Mod: S$GLB,,, | Performed by: PODIATRIST

## 2021-07-15 PROCEDURE — 99213 PR OFFICE/OUTPT VISIT, EST, LEVL III, 20-29 MIN: ICD-10-PCS | Mod: S$GLB,,, | Performed by: PODIATRIST

## 2021-07-15 PROCEDURE — 1159F PR MEDICATION LIST DOCUMENTED IN MEDICAL RECORD: ICD-10-PCS | Mod: S$GLB,,, | Performed by: PODIATRIST

## 2021-07-21 ENCOUNTER — TELEPHONE (OUTPATIENT)
Dept: PODIATRY | Facility: CLINIC | Age: 81
End: 2021-07-21

## 2021-09-15 ENCOUNTER — TELEPHONE (OUTPATIENT)
Dept: CARDIOLOGY | Facility: CLINIC | Age: 81
End: 2021-09-15

## 2021-09-15 ENCOUNTER — OFFICE VISIT (OUTPATIENT)
Dept: OTOLARYNGOLOGY | Facility: CLINIC | Age: 81
End: 2021-09-15
Payer: MEDICARE

## 2021-09-15 VITALS — HEART RATE: 79 BPM | DIASTOLIC BLOOD PRESSURE: 75 MMHG | SYSTOLIC BLOOD PRESSURE: 129 MMHG

## 2021-09-15 DIAGNOSIS — D38.0 NEOPLASM OF UNCERTAIN BEHAVIOR OF LARYNX: Primary | ICD-10-CM

## 2021-09-15 DIAGNOSIS — E11.9 DIABETES MELLITUS WITHOUT COMPLICATION: ICD-10-CM

## 2021-09-15 DIAGNOSIS — C32.9 LARYNGEAL CANCER: ICD-10-CM

## 2021-09-15 DIAGNOSIS — R49.0 DYSPHONIA: ICD-10-CM

## 2021-09-15 DIAGNOSIS — R06.00 DYSPNEA, UNSPECIFIED TYPE: ICD-10-CM

## 2021-09-15 DIAGNOSIS — I10 HYPERTENSION, UNSPECIFIED TYPE: ICD-10-CM

## 2021-09-15 DIAGNOSIS — I25.10 CORONARY ARTERY DISEASE, ANGINA PRESENCE UNSPECIFIED, UNSPECIFIED VESSEL OR LESION TYPE, UNSPECIFIED WHETHER NATIVE OR TRANSPLANTED HEART: Primary | ICD-10-CM

## 2021-09-15 LAB
CTP QC/QA: YES
SARS-COV-2 RDRP RESP QL NAA+PROBE: NEGATIVE

## 2021-09-15 PROCEDURE — 1126F PR PAIN SEVERITY QUANTIFIED, NO PAIN PRESENT: ICD-10-PCS | Mod: CPTII,S$GLB,, | Performed by: OTOLARYNGOLOGY

## 2021-09-15 PROCEDURE — 1159F PR MEDICATION LIST DOCUMENTED IN MEDICAL RECORD: ICD-10-PCS | Mod: CPTII,S$GLB,, | Performed by: OTOLARYNGOLOGY

## 2021-09-15 PROCEDURE — 3074F SYST BP LT 130 MM HG: CPT | Mod: CPTII,S$GLB,, | Performed by: OTOLARYNGOLOGY

## 2021-09-15 PROCEDURE — 3078F DIAST BP <80 MM HG: CPT | Mod: CPTII,S$GLB,, | Performed by: OTOLARYNGOLOGY

## 2021-09-15 PROCEDURE — 3288F FALL RISK ASSESSMENT DOCD: CPT | Mod: CPTII,S$GLB,, | Performed by: OTOLARYNGOLOGY

## 2021-09-15 PROCEDURE — 99499 RISK ADDL DX/OHS AUDIT: ICD-10-PCS | Mod: S$GLB,,, | Performed by: OTOLARYNGOLOGY

## 2021-09-15 PROCEDURE — U0002 COVID-19 LAB TEST NON-CDC: HCPCS | Mod: QW,S$GLB,, | Performed by: OTOLARYNGOLOGY

## 2021-09-15 PROCEDURE — 3288F PR FALLS RISK ASSESSMENT DOCUMENTED: ICD-10-PCS | Mod: CPTII,S$GLB,, | Performed by: OTOLARYNGOLOGY

## 2021-09-15 PROCEDURE — 99499 UNLISTED E&M SERVICE: CPT | Mod: S$GLB,,, | Performed by: OTOLARYNGOLOGY

## 2021-09-15 PROCEDURE — 3074F PR MOST RECENT SYSTOLIC BLOOD PRESSURE < 130 MM HG: ICD-10-PCS | Mod: CPTII,S$GLB,, | Performed by: OTOLARYNGOLOGY

## 2021-09-15 PROCEDURE — 99999 PR PBB SHADOW E&M-EST. PATIENT-LVL V: ICD-10-PCS | Mod: PBBFAC,,, | Performed by: OTOLARYNGOLOGY

## 2021-09-15 PROCEDURE — 31579 PR LARYNGOSCOPY, FLEX/RIGID TELESCOPIC, W/STROBOSCOPY: ICD-10-PCS | Mod: S$GLB,,, | Performed by: OTOLARYNGOLOGY

## 2021-09-15 PROCEDURE — U0002: ICD-10-PCS | Mod: QW,S$GLB,, | Performed by: OTOLARYNGOLOGY

## 2021-09-15 PROCEDURE — 3078F PR MOST RECENT DIASTOLIC BLOOD PRESSURE < 80 MM HG: ICD-10-PCS | Mod: CPTII,S$GLB,, | Performed by: OTOLARYNGOLOGY

## 2021-09-15 PROCEDURE — 99204 PR OFFICE/OUTPT VISIT, NEW, LEVL IV, 45-59 MIN: ICD-10-PCS | Mod: 25,S$GLB,, | Performed by: OTOLARYNGOLOGY

## 2021-09-15 PROCEDURE — 31579 LARYNGOSCOPY TELESCOPIC: CPT | Mod: S$GLB,,, | Performed by: OTOLARYNGOLOGY

## 2021-09-15 PROCEDURE — 1101F PT FALLS ASSESS-DOCD LE1/YR: CPT | Mod: CPTII,S$GLB,, | Performed by: OTOLARYNGOLOGY

## 2021-09-15 PROCEDURE — 1160F RVW MEDS BY RX/DR IN RCRD: CPT | Mod: CPTII,S$GLB,, | Performed by: OTOLARYNGOLOGY

## 2021-09-15 PROCEDURE — 1101F PR PT FALLS ASSESS DOC 0-1 FALLS W/OUT INJ PAST YR: ICD-10-PCS | Mod: CPTII,S$GLB,, | Performed by: OTOLARYNGOLOGY

## 2021-09-15 PROCEDURE — 1126F AMNT PAIN NOTED NONE PRSNT: CPT | Mod: CPTII,S$GLB,, | Performed by: OTOLARYNGOLOGY

## 2021-09-15 PROCEDURE — 1159F MED LIST DOCD IN RCRD: CPT | Mod: CPTII,S$GLB,, | Performed by: OTOLARYNGOLOGY

## 2021-09-15 PROCEDURE — 1160F PR REVIEW ALL MEDS BY PRESCRIBER/CLIN PHARMACIST DOCUMENTED: ICD-10-PCS | Mod: CPTII,S$GLB,, | Performed by: OTOLARYNGOLOGY

## 2021-09-15 PROCEDURE — 99204 OFFICE O/P NEW MOD 45 MIN: CPT | Mod: 25,S$GLB,, | Performed by: OTOLARYNGOLOGY

## 2021-09-15 PROCEDURE — 99999 PR PBB SHADOW E&M-EST. PATIENT-LVL V: CPT | Mod: PBBFAC,,, | Performed by: OTOLARYNGOLOGY

## 2021-09-15 RX ORDER — LIDOCAINE HYDROCHLORIDE 10 MG/ML
1 INJECTION, SOLUTION EPIDURAL; INFILTRATION; INTRACAUDAL; PERINEURAL ONCE
Status: CANCELLED | OUTPATIENT
Start: 2021-09-15 | End: 2021-09-15

## 2021-09-15 RX ORDER — DEXAMETHASONE SODIUM PHOSPHATE 4 MG/ML
8 INJECTION, SOLUTION INTRA-ARTICULAR; INTRALESIONAL; INTRAMUSCULAR; INTRAVENOUS; SOFT TISSUE
Status: CANCELLED | OUTPATIENT
Start: 2021-09-15

## 2021-09-15 RX ORDER — OFLOXACIN 3 MG/ML
SOLUTION/ DROPS OPHTHALMIC
COMMUNITY
Start: 2021-04-23 | End: 2021-09-21

## 2021-09-17 ENCOUNTER — TELEPHONE (OUTPATIENT)
Dept: CARDIOLOGY | Facility: CLINIC | Age: 81
End: 2021-09-17

## 2021-09-17 ENCOUNTER — TELEPHONE (OUTPATIENT)
Dept: ELECTROPHYSIOLOGY | Facility: CLINIC | Age: 81
End: 2021-09-17

## 2021-09-17 ENCOUNTER — LAB VISIT (OUTPATIENT)
Dept: LAB | Facility: HOSPITAL | Age: 81
End: 2021-09-17
Attending: INTERNAL MEDICINE
Payer: MEDICARE

## 2021-09-17 DIAGNOSIS — I25.10 CORONARY ARTERY DISEASE, ANGINA PRESENCE UNSPECIFIED, UNSPECIFIED VESSEL OR LESION TYPE, UNSPECIFIED WHETHER NATIVE OR TRANSPLANTED HEART: ICD-10-CM

## 2021-09-17 DIAGNOSIS — R06.00 DYSPNEA, UNSPECIFIED TYPE: ICD-10-CM

## 2021-09-17 DIAGNOSIS — E11.9 DIABETES MELLITUS WITHOUT COMPLICATION: ICD-10-CM

## 2021-09-17 DIAGNOSIS — I10 HYPERTENSION, UNSPECIFIED TYPE: ICD-10-CM

## 2021-09-17 LAB
ALBUMIN SERPL BCP-MCNC: 3.5 G/DL (ref 3.5–5.2)
ALP SERPL-CCNC: 82 U/L (ref 55–135)
ALT SERPL W/O P-5'-P-CCNC: 15 U/L (ref 10–44)
ANION GAP SERPL CALC-SCNC: 12 MMOL/L (ref 8–16)
AST SERPL-CCNC: 19 U/L (ref 10–40)
BILIRUB SERPL-MCNC: 1.2 MG/DL (ref 0.1–1)
BNP SERPL-MCNC: 119 PG/ML (ref 0–99)
BUN SERPL-MCNC: 13 MG/DL (ref 8–23)
CALCIUM SERPL-MCNC: 9.6 MG/DL (ref 8.7–10.5)
CHLORIDE SERPL-SCNC: 99 MMOL/L (ref 95–110)
CHOLEST SERPL-MCNC: 103 MG/DL (ref 120–199)
CHOLEST/HDLC SERPL: 2.6 {RATIO} (ref 2–5)
CO2 SERPL-SCNC: 22 MMOL/L (ref 23–29)
CREAT SERPL-MCNC: 0.8 MG/DL (ref 0.5–1.4)
EST. GFR  (AFRICAN AMERICAN): >60 ML/MIN/1.73 M^2
EST. GFR  (NON AFRICAN AMERICAN): >60 ML/MIN/1.73 M^2
ESTIMATED AVG GLUCOSE: 105 MG/DL (ref 68–131)
GLUCOSE SERPL-MCNC: 95 MG/DL (ref 70–110)
HBA1C MFR BLD: 5.3 % (ref 4–5.6)
HDLC SERPL-MCNC: 39 MG/DL (ref 40–75)
HDLC SERPL: 37.9 % (ref 20–50)
LDLC SERPL CALC-MCNC: 48.2 MG/DL (ref 63–159)
NONHDLC SERPL-MCNC: 64 MG/DL
POTASSIUM SERPL-SCNC: 4.1 MMOL/L (ref 3.5–5.1)
PROT SERPL-MCNC: 6.5 G/DL (ref 6–8.4)
SODIUM SERPL-SCNC: 133 MMOL/L (ref 136–145)
TRIGL SERPL-MCNC: 79 MG/DL (ref 30–150)
TSH SERPL DL<=0.005 MIU/L-ACNC: 1.78 UIU/ML (ref 0.4–4)

## 2021-09-17 PROCEDURE — 83880 ASSAY OF NATRIURETIC PEPTIDE: CPT | Performed by: INTERNAL MEDICINE

## 2021-09-17 PROCEDURE — 80053 COMPREHEN METABOLIC PANEL: CPT | Performed by: INTERNAL MEDICINE

## 2021-09-17 PROCEDURE — 36415 COLL VENOUS BLD VENIPUNCTURE: CPT | Mod: PN | Performed by: INTERNAL MEDICINE

## 2021-09-17 PROCEDURE — 80061 LIPID PANEL: CPT | Performed by: INTERNAL MEDICINE

## 2021-09-17 PROCEDURE — 84443 ASSAY THYROID STIM HORMONE: CPT | Performed by: INTERNAL MEDICINE

## 2021-09-17 PROCEDURE — 83036 HEMOGLOBIN GLYCOSYLATED A1C: CPT | Performed by: INTERNAL MEDICINE

## 2021-09-20 ENCOUNTER — TELEPHONE (OUTPATIENT)
Dept: PREADMISSION TESTING | Facility: HOSPITAL | Age: 81
End: 2021-09-20

## 2021-09-20 ENCOUNTER — PATIENT MESSAGE (OUTPATIENT)
Dept: SURGERY | Facility: HOSPITAL | Age: 81
End: 2021-09-20

## 2021-09-20 DIAGNOSIS — Z01.818 PRE-OP TESTING: Primary | ICD-10-CM

## 2021-09-21 ENCOUNTER — PATIENT MESSAGE (OUTPATIENT)
Dept: CARDIOLOGY | Facility: CLINIC | Age: 81
End: 2021-09-21

## 2021-09-21 ENCOUNTER — HOSPITAL ENCOUNTER (OUTPATIENT)
Dept: CARDIOLOGY | Facility: CLINIC | Age: 81
Discharge: HOME | End: 2021-09-21
Payer: MEDICARE

## 2021-09-21 ENCOUNTER — OFFICE VISIT (OUTPATIENT)
Dept: CARDIOLOGY | Facility: CLINIC | Age: 81
End: 2021-09-21
Payer: MEDICARE

## 2021-09-21 VITALS
DIASTOLIC BLOOD PRESSURE: 74 MMHG | WEIGHT: 248.69 LBS | HEIGHT: 76 IN | SYSTOLIC BLOOD PRESSURE: 155 MMHG | BODY MASS INDEX: 30.28 KG/M2 | HEART RATE: 67 BPM

## 2021-09-21 DIAGNOSIS — Z95.1 HX OF CABG: ICD-10-CM

## 2021-09-21 DIAGNOSIS — I48.3 TYPICAL ATRIAL FLUTTER: ICD-10-CM

## 2021-09-21 DIAGNOSIS — I48.0 PAROXYSMAL ATRIAL FIBRILLATION: ICD-10-CM

## 2021-09-21 DIAGNOSIS — F41.9 ANXIETY: ICD-10-CM

## 2021-09-21 DIAGNOSIS — I70.201 ATHEROSCLEROSIS OF NATIVE ARTERY OF RIGHT LOWER EXTREMITY, WITH UNSPECIFIED PRESENCE OF CLINICAL MANIFESTATION: ICD-10-CM

## 2021-09-21 DIAGNOSIS — Z79.01 LONG TERM (CURRENT) USE OF ANTICOAGULANTS: ICD-10-CM

## 2021-09-21 DIAGNOSIS — D69.6 THROMBOCYTOPENIA: ICD-10-CM

## 2021-09-21 DIAGNOSIS — I25.10 CORONARY ARTERY DISEASE INVOLVING NATIVE CORONARY ARTERY OF NATIVE HEART WITHOUT ANGINA PECTORIS: ICD-10-CM

## 2021-09-21 DIAGNOSIS — I25.10 CORONARY ARTERY DISEASE INVOLVING NATIVE CORONARY ARTERY OF NATIVE HEART WITHOUT ANGINA PECTORIS: Primary | ICD-10-CM

## 2021-09-21 DIAGNOSIS — E87.1 HYPONATREMIA: ICD-10-CM

## 2021-09-21 DIAGNOSIS — Z98.61 POST PTCA: ICD-10-CM

## 2021-09-21 DIAGNOSIS — E11.9 TYPE 2 DIABETES MELLITUS WITHOUT COMPLICATION, WITHOUT LONG-TERM CURRENT USE OF INSULIN: ICD-10-CM

## 2021-09-21 DIAGNOSIS — I11.0 HYPERTENSIVE HEART DISEASE WITH HEART FAILURE: ICD-10-CM

## 2021-09-21 DIAGNOSIS — E78.5 DYSLIPIDEMIA: ICD-10-CM

## 2021-09-21 PROCEDURE — 1159F PR MEDICATION LIST DOCUMENTED IN MEDICAL RECORD: ICD-10-PCS | Mod: CPTII,S$GLB,, | Performed by: INTERNAL MEDICINE

## 2021-09-21 PROCEDURE — 3078F PR MOST RECENT DIASTOLIC BLOOD PRESSURE < 80 MM HG: ICD-10-PCS | Mod: CPTII,S$GLB,, | Performed by: INTERNAL MEDICINE

## 2021-09-21 PROCEDURE — 1125F PR PAIN SEVERITY QUANTIFIED, PAIN PRESENT: ICD-10-PCS | Mod: CPTII,S$GLB,, | Performed by: INTERNAL MEDICINE

## 2021-09-21 PROCEDURE — 1125F AMNT PAIN NOTED PAIN PRSNT: CPT | Mod: CPTII,S$GLB,, | Performed by: INTERNAL MEDICINE

## 2021-09-21 PROCEDURE — 99215 PR OFFICE/OUTPT VISIT, EST, LEVL V, 40-54 MIN: ICD-10-PCS | Mod: S$GLB,,, | Performed by: INTERNAL MEDICINE

## 2021-09-21 PROCEDURE — 99215 OFFICE O/P EST HI 40 MIN: CPT | Mod: S$GLB,,, | Performed by: INTERNAL MEDICINE

## 2021-09-21 PROCEDURE — 3077F PR MOST RECENT SYSTOLIC BLOOD PRESSURE >= 140 MM HG: ICD-10-PCS | Mod: CPTII,S$GLB,, | Performed by: INTERNAL MEDICINE

## 2021-09-21 PROCEDURE — 99999 PR PBB SHADOW E&M-EST. PATIENT-LVL V: ICD-10-PCS | Mod: PBBFAC,,, | Performed by: INTERNAL MEDICINE

## 2021-09-21 PROCEDURE — 99499 RISK ADDL DX/OHS AUDIT: ICD-10-PCS | Mod: S$GLB,,, | Performed by: INTERNAL MEDICINE

## 2021-09-21 PROCEDURE — 99999 PR PBB SHADOW E&M-EST. PATIENT-LVL V: CPT | Mod: PBBFAC,,, | Performed by: INTERNAL MEDICINE

## 2021-09-21 PROCEDURE — 3078F DIAST BP <80 MM HG: CPT | Mod: CPTII,S$GLB,, | Performed by: INTERNAL MEDICINE

## 2021-09-21 PROCEDURE — 99499 UNLISTED E&M SERVICE: CPT | Mod: S$GLB,,, | Performed by: INTERNAL MEDICINE

## 2021-09-21 PROCEDURE — 93005 ELECTROCARDIOGRAM TRACING: CPT | Mod: S$GLB,,, | Performed by: INTERNAL MEDICINE

## 2021-09-21 PROCEDURE — 93005 EKG 12-LEAD: ICD-10-PCS | Mod: S$GLB,,, | Performed by: INTERNAL MEDICINE

## 2021-09-21 PROCEDURE — 3077F SYST BP >= 140 MM HG: CPT | Mod: CPTII,S$GLB,, | Performed by: INTERNAL MEDICINE

## 2021-09-21 PROCEDURE — 93010 EKG 12-LEAD: ICD-10-PCS | Mod: S$GLB,,, | Performed by: INTERNAL MEDICINE

## 2021-09-21 PROCEDURE — 1159F MED LIST DOCD IN RCRD: CPT | Mod: CPTII,S$GLB,, | Performed by: INTERNAL MEDICINE

## 2021-09-21 PROCEDURE — 93010 ELECTROCARDIOGRAM REPORT: CPT | Mod: S$GLB,,, | Performed by: INTERNAL MEDICINE

## 2021-09-21 RX ORDER — DIAZEPAM 10 MG/1
10 TABLET ORAL
Qty: 20 TABLET | Refills: 0 | Status: SHIPPED | OUTPATIENT
Start: 2021-09-21 | End: 2021-12-20 | Stop reason: SDUPTHER

## 2021-09-21 RX ORDER — MULTIVITAMIN
1 TABLET ORAL DAILY
COMMUNITY

## 2021-09-22 ENCOUNTER — HOSPITAL ENCOUNTER (OUTPATIENT)
Dept: RADIOLOGY | Facility: HOSPITAL | Age: 81
Discharge: HOME | End: 2021-09-22
Attending: OTOLARYNGOLOGY
Payer: MEDICARE

## 2021-09-22 ENCOUNTER — TELEPHONE (OUTPATIENT)
Dept: ELECTROPHYSIOLOGY | Facility: CLINIC | Age: 81
End: 2021-09-22

## 2021-09-22 DIAGNOSIS — D38.0 NEOPLASM OF UNCERTAIN BEHAVIOR OF LARYNX: ICD-10-CM

## 2021-09-22 DIAGNOSIS — R49.0 DYSPHONIA: ICD-10-CM

## 2021-09-22 DIAGNOSIS — C32.9 LARYNGEAL CANCER: ICD-10-CM

## 2021-09-22 PROCEDURE — 71260 CT THORAX DX C+: CPT | Mod: TC

## 2021-09-22 PROCEDURE — 70491 CT SOFT TISSUE NECK W/DYE: CPT | Mod: 26,,, | Performed by: RADIOLOGY

## 2021-09-22 PROCEDURE — 25500020 PHARM REV CODE 255: Performed by: OTOLARYNGOLOGY

## 2021-09-22 PROCEDURE — 71260 CT THORAX DX C+: CPT | Mod: 26,,, | Performed by: RADIOLOGY

## 2021-09-22 PROCEDURE — 71260 CT CHEST WITH CONTRAST: ICD-10-PCS | Mod: 26,,, | Performed by: RADIOLOGY

## 2021-09-22 PROCEDURE — 70491 CT SOFT TISSUE NECK WITH CONTRAST: ICD-10-PCS | Mod: 26,,, | Performed by: RADIOLOGY

## 2021-09-22 PROCEDURE — 70491 CT SOFT TISSUE NECK W/DYE: CPT | Mod: TC

## 2021-09-22 RX ADMIN — IOHEXOL 100 ML: 350 INJECTION, SOLUTION INTRAVENOUS at 08:09

## 2021-09-23 ENCOUNTER — HOSPITAL ENCOUNTER (OUTPATIENT)
Dept: PREADMISSION TESTING | Facility: HOSPITAL | Age: 81
Discharge: HOME | End: 2021-09-23
Attending: OTOLARYNGOLOGY
Payer: MEDICARE

## 2021-09-23 VITALS
HEIGHT: 76 IN | HEART RATE: 64 BPM | WEIGHT: 250 LBS | OXYGEN SATURATION: 96 % | RESPIRATION RATE: 18 BRPM | DIASTOLIC BLOOD PRESSURE: 78 MMHG | TEMPERATURE: 99 F | BODY MASS INDEX: 30.44 KG/M2 | SYSTOLIC BLOOD PRESSURE: 180 MMHG

## 2021-09-27 ENCOUNTER — ANESTHESIA EVENT (OUTPATIENT)
Dept: SURGERY | Facility: HOSPITAL | Age: 81
End: 2021-09-27
Payer: MEDICARE

## 2021-09-28 ENCOUNTER — HOSPITAL ENCOUNTER (OUTPATIENT)
Facility: HOSPITAL | Age: 81
Discharge: HOME | End: 2021-09-28
Attending: OTOLARYNGOLOGY | Admitting: OTOLARYNGOLOGY
Payer: MEDICARE

## 2021-09-28 ENCOUNTER — ANESTHESIA (OUTPATIENT)
Dept: SURGERY | Facility: HOSPITAL | Age: 81
End: 2021-09-28
Payer: MEDICARE

## 2021-09-28 VITALS
RESPIRATION RATE: 20 BRPM | BODY MASS INDEX: 30.44 KG/M2 | HEIGHT: 76 IN | OXYGEN SATURATION: 97 % | SYSTOLIC BLOOD PRESSURE: 137 MMHG | DIASTOLIC BLOOD PRESSURE: 62 MMHG | HEART RATE: 51 BPM | WEIGHT: 250 LBS | TEMPERATURE: 98 F

## 2021-09-28 DIAGNOSIS — C32.9 LARYNGEAL CANCER: ICD-10-CM

## 2021-09-28 DIAGNOSIS — D38.0 NEOPLASM OF UNCERTAIN BEHAVIOR OF LARYNX: Primary | ICD-10-CM

## 2021-09-28 DIAGNOSIS — R49.0 DYSPHONIA: ICD-10-CM

## 2021-09-28 LAB
POCT GLUCOSE: 117 MG/DL (ref 70–110)
POCT GLUCOSE: 81 MG/DL (ref 70–110)
SARS-COV-2 RDRP RESP QL NAA+PROBE: NEGATIVE

## 2021-09-28 PROCEDURE — 82962 GLUCOSE BLOOD TEST: CPT | Performed by: OTOLARYNGOLOGY

## 2021-09-28 PROCEDURE — 71000015 HC POSTOP RECOV 1ST HR: Performed by: OTOLARYNGOLOGY

## 2021-09-28 PROCEDURE — 88305 TISSUE EXAM BY PATHOLOGIST: CPT | Mod: 26,,, | Performed by: PATHOLOGY

## 2021-09-28 PROCEDURE — 88331 PATH CONSLTJ SURG 1 BLK 1SPC: CPT | Performed by: PATHOLOGY

## 2021-09-28 PROCEDURE — 25000003 PHARM REV CODE 250: Performed by: STUDENT IN AN ORGANIZED HEALTH CARE EDUCATION/TRAINING PROGRAM

## 2021-09-28 PROCEDURE — 31541 PR LARYNGOSCOPY,DIRCT,OP SCOP,EXC TUMR: ICD-10-PCS | Mod: ,,, | Performed by: OTOLARYNGOLOGY

## 2021-09-28 PROCEDURE — D9220A PRA ANESTHESIA: ICD-10-PCS | Mod: ,,, | Performed by: ANESTHESIOLOGY

## 2021-09-28 PROCEDURE — 88331 PR  PATH CONSULT IN SURG,W FRZ SEC: ICD-10-PCS | Mod: 26,,, | Performed by: PATHOLOGY

## 2021-09-28 PROCEDURE — 88305 TISSUE EXAM BY PATHOLOGIST: ICD-10-PCS | Mod: 26,,, | Performed by: PATHOLOGY

## 2021-09-28 PROCEDURE — 27201423 OPTIME MED/SURG SUP & DEVICES STERILE SUPPLY: Performed by: OTOLARYNGOLOGY

## 2021-09-28 PROCEDURE — D9220A PRA ANESTHESIA: Mod: ,,, | Performed by: ANESTHESIOLOGY

## 2021-09-28 PROCEDURE — 37000008 HC ANESTHESIA 1ST 15 MINUTES: Performed by: OTOLARYNGOLOGY

## 2021-09-28 PROCEDURE — 88305 TISSUE EXAM BY PATHOLOGIST: CPT | Mod: 59 | Performed by: PATHOLOGY

## 2021-09-28 PROCEDURE — 25000003 PHARM REV CODE 250: Performed by: OTOLARYNGOLOGY

## 2021-09-28 PROCEDURE — 63600175 PHARM REV CODE 636 W HCPCS: Performed by: OTOLARYNGOLOGY

## 2021-09-28 PROCEDURE — 71000044 HC DOSC ROUTINE RECOVERY FIRST HOUR: Performed by: OTOLARYNGOLOGY

## 2021-09-28 PROCEDURE — U0002 COVID-19 LAB TEST NON-CDC: HCPCS | Performed by: OTOLARYNGOLOGY

## 2021-09-28 PROCEDURE — 63600175 PHARM REV CODE 636 W HCPCS: Performed by: STUDENT IN AN ORGANIZED HEALTH CARE EDUCATION/TRAINING PROGRAM

## 2021-09-28 PROCEDURE — 88331 PATH CONSLTJ SURG 1 BLK 1SPC: CPT | Mod: 26,,, | Performed by: PATHOLOGY

## 2021-09-28 PROCEDURE — 31541 LARYNSCOP W/TUMR EXC + SCOPE: CPT | Mod: ,,, | Performed by: OTOLARYNGOLOGY

## 2021-09-28 PROCEDURE — 36000708 HC OR TIME LEV III 1ST 15 MIN: Performed by: OTOLARYNGOLOGY

## 2021-09-28 PROCEDURE — 37000009 HC ANESTHESIA EA ADD 15 MINS: Performed by: OTOLARYNGOLOGY

## 2021-09-28 PROCEDURE — 36000709 HC OR TIME LEV III EA ADD 15 MIN: Performed by: OTOLARYNGOLOGY

## 2021-09-28 RX ORDER — FAMOTIDINE 40 MG/1
40 TABLET, FILM COATED ORAL NIGHTLY
Qty: 30 TABLET | Refills: 11 | Status: SHIPPED | OUTPATIENT
Start: 2021-09-28 | End: 2022-09-27

## 2021-09-28 RX ORDER — DEXAMETHASONE SODIUM PHOSPHATE 4 MG/ML
INJECTION, SOLUTION INTRA-ARTICULAR; INTRALESIONAL; INTRAMUSCULAR; INTRAVENOUS; SOFT TISSUE
Status: DISCONTINUED | OUTPATIENT
Start: 2021-09-28 | End: 2021-09-28

## 2021-09-28 RX ORDER — ROCURONIUM BROMIDE 10 MG/ML
INJECTION, SOLUTION INTRAVENOUS
Status: DISCONTINUED | OUTPATIENT
Start: 2021-09-28 | End: 2021-09-28

## 2021-09-28 RX ORDER — ONDANSETRON 2 MG/ML
4 INJECTION INTRAMUSCULAR; INTRAVENOUS ONCE AS NEEDED
Status: DISCONTINUED | OUTPATIENT
Start: 2021-09-28 | End: 2021-09-28 | Stop reason: HOSPADM

## 2021-09-28 RX ORDER — LIDOCAINE HYDROCHLORIDE 20 MG/ML
INJECTION, SOLUTION EPIDURAL; INFILTRATION; INTRACAUDAL; PERINEURAL
Status: DISCONTINUED | OUTPATIENT
Start: 2021-09-28 | End: 2021-09-28

## 2021-09-28 RX ORDER — DEXMEDETOMIDINE HYDROCHLORIDE 100 UG/ML
INJECTION, SOLUTION INTRAVENOUS
Status: DISCONTINUED | OUTPATIENT
Start: 2021-09-28 | End: 2021-09-28

## 2021-09-28 RX ORDER — LIDOCAINE HYDROCHLORIDE 10 MG/ML
1 INJECTION, SOLUTION EPIDURAL; INFILTRATION; INTRACAUDAL; PERINEURAL ONCE
Status: DISCONTINUED | OUTPATIENT
Start: 2021-09-28 | End: 2021-09-28 | Stop reason: HOSPADM

## 2021-09-28 RX ORDER — ONDANSETRON 2 MG/ML
INJECTION INTRAMUSCULAR; INTRAVENOUS
Status: DISCONTINUED | OUTPATIENT
Start: 2021-09-28 | End: 2021-09-28

## 2021-09-28 RX ORDER — LIDOCAINE HYDROCHLORIDE 40 MG/ML
INJECTION, SOLUTION RETROBULBAR
Status: DISCONTINUED | OUTPATIENT
Start: 2021-09-28 | End: 2021-09-28 | Stop reason: HOSPADM

## 2021-09-28 RX ORDER — HYDROMORPHONE HYDROCHLORIDE 1 MG/ML
0.2 INJECTION, SOLUTION INTRAMUSCULAR; INTRAVENOUS; SUBCUTANEOUS EVERY 5 MIN PRN
Status: DISCONTINUED | OUTPATIENT
Start: 2021-09-28 | End: 2021-09-28 | Stop reason: HOSPADM

## 2021-09-28 RX ORDER — FENTANYL CITRATE 50 UG/ML
INJECTION, SOLUTION INTRAMUSCULAR; INTRAVENOUS
Status: DISCONTINUED | OUTPATIENT
Start: 2021-09-28 | End: 2021-09-28

## 2021-09-28 RX ORDER — MIDAZOLAM HYDROCHLORIDE 1 MG/ML
INJECTION, SOLUTION INTRAMUSCULAR; INTRAVENOUS
Status: DISCONTINUED | OUTPATIENT
Start: 2021-09-28 | End: 2021-09-28

## 2021-09-28 RX ORDER — CLINDAMYCIN HYDROCHLORIDE 300 MG/1
300 CAPSULE ORAL 3 TIMES DAILY
Qty: 15 CAPSULE | Refills: 0 | Status: SHIPPED | OUTPATIENT
Start: 2021-09-28 | End: 2021-10-03

## 2021-09-28 RX ORDER — SODIUM CHLORIDE 0.9 % (FLUSH) 0.9 %
10 SYRINGE (ML) INJECTION
Status: DISCONTINUED | OUTPATIENT
Start: 2021-09-28 | End: 2021-09-28 | Stop reason: HOSPADM

## 2021-09-28 RX ORDER — EPINEPHRINE 1 MG/ML
INJECTION, SOLUTION INTRACARDIAC; INTRAMUSCULAR; INTRAVENOUS; SUBCUTANEOUS
Status: DISCONTINUED | OUTPATIENT
Start: 2021-09-28 | End: 2021-09-28 | Stop reason: HOSPADM

## 2021-09-28 RX ORDER — HYDROCODONE BITARTRATE AND ACETAMINOPHEN 5; 325 MG/1; MG/1
1 TABLET ORAL ONCE AS NEEDED
Status: COMPLETED | OUTPATIENT
Start: 2021-09-28 | End: 2021-09-28

## 2021-09-28 RX ORDER — PHENYLEPHRINE HYDROCHLORIDE 10 MG/ML
INJECTION INTRAVENOUS
Status: DISCONTINUED | OUTPATIENT
Start: 2021-09-28 | End: 2021-09-28

## 2021-09-28 RX ORDER — PROPOFOL 10 MG/ML
VIAL (ML) INTRAVENOUS
Status: DISCONTINUED | OUTPATIENT
Start: 2021-09-28 | End: 2021-09-28

## 2021-09-28 RX ORDER — OXYMETAZOLINE HCL 0.05 %
SPRAY, NON-AEROSOL (ML) NASAL
Status: DISCONTINUED | OUTPATIENT
Start: 2021-09-28 | End: 2021-09-28 | Stop reason: HOSPADM

## 2021-09-28 RX ADMIN — GLYCOPYRROLATE 0.1 MG: 0.2 INJECTION, SOLUTION INTRAMUSCULAR; INTRAVITREAL at 08:09

## 2021-09-28 RX ADMIN — SUGAMMADEX 200 MG: 100 INJECTION, SOLUTION INTRAVENOUS at 09:09

## 2021-09-28 RX ADMIN — FENTANYL CITRATE 50 MCG: 50 INJECTION INTRAMUSCULAR; INTRAVENOUS at 07:09

## 2021-09-28 RX ADMIN — MIDAZOLAM HYDROCHLORIDE 2 MG: 1 INJECTION, SOLUTION INTRAMUSCULAR; INTRAVENOUS at 07:09

## 2021-09-28 RX ADMIN — ONDANSETRON 4 MG: 2 INJECTION INTRAMUSCULAR; INTRAVENOUS at 08:09

## 2021-09-28 RX ADMIN — HYDROCODONE BITARTRATE AND ACETAMINOPHEN 1 TABLET: 5; 325 TABLET ORAL at 09:09

## 2021-09-28 RX ADMIN — DEXMEDETOMIDINE HYDROCHLORIDE 12 MCG: 100 INJECTION, SOLUTION INTRAVENOUS at 08:09

## 2021-09-28 RX ADMIN — LIDOCAINE HYDROCHLORIDE 80 MG: 20 INJECTION, SOLUTION EPIDURAL; INFILTRATION; INTRACAUDAL at 07:09

## 2021-09-28 RX ADMIN — SODIUM CHLORIDE: 0.9 INJECTION, SOLUTION INTRAVENOUS at 06:09

## 2021-09-28 RX ADMIN — ROCURONIUM BROMIDE 20 MG: 10 INJECTION, SOLUTION INTRAVENOUS at 07:09

## 2021-09-28 RX ADMIN — DEXMEDETOMIDINE HYDROCHLORIDE 12 MCG: 100 INJECTION, SOLUTION INTRAVENOUS at 07:09

## 2021-09-28 RX ADMIN — DEXAMETHASONE SODIUM PHOSPHATE 4 MG: 4 INJECTION INTRA-ARTICULAR; INTRALESIONAL; INTRAMUSCULAR; INTRAVENOUS; SOFT TISSUE at 07:09

## 2021-09-28 RX ADMIN — PROPOFOL 170 MG: 10 INJECTION, EMULSION INTRAVENOUS at 07:09

## 2021-09-28 RX ADMIN — ROCURONIUM BROMIDE 40 MG: 10 INJECTION, SOLUTION INTRAVENOUS at 07:09

## 2021-09-28 RX ADMIN — PHENYLEPHRINE HYDROCHLORIDE 50 MCG: 10 INJECTION INTRAVENOUS at 08:09

## 2021-09-28 RX ADMIN — ROCURONIUM BROMIDE 20 MG: 10 INJECTION, SOLUTION INTRAVENOUS at 08:09

## 2021-09-28 RX ADMIN — PHENYLEPHRINE HYDROCHLORIDE 100 MCG: 10 INJECTION INTRAVENOUS at 08:09

## 2021-09-30 ENCOUNTER — PATIENT MESSAGE (OUTPATIENT)
Dept: ELECTROPHYSIOLOGY | Facility: CLINIC | Age: 81
End: 2021-09-30

## 2021-09-30 RX ORDER — SOTALOL HYDROCHLORIDE 80 MG/1
40 TABLET ORAL EVERY 12 HOURS
Qty: 30 TABLET | Refills: 6 | Status: SHIPPED | OUTPATIENT
Start: 2021-09-30 | End: 2021-11-05

## 2021-10-01 LAB
FINAL PATHOLOGIC DIAGNOSIS: NORMAL
FROZEN SECTION DIAGNOSIS: NORMAL
FROZEN SECTION FOOTNOTE: NORMAL
GROSS: NORMAL
Lab: NORMAL

## 2021-10-06 ENCOUNTER — OFFICE VISIT (OUTPATIENT)
Dept: ELECTROPHYSIOLOGY | Facility: CLINIC | Age: 81
End: 2021-10-06
Payer: MEDICARE

## 2021-10-06 ENCOUNTER — HOSPITAL ENCOUNTER (OUTPATIENT)
Dept: CARDIOLOGY | Facility: CLINIC | Age: 81
Discharge: HOME | End: 2021-10-06
Payer: MEDICARE

## 2021-10-06 VITALS
HEART RATE: 67 BPM | BODY MASS INDEX: 28.12 KG/M2 | WEIGHT: 226.19 LBS | DIASTOLIC BLOOD PRESSURE: 65 MMHG | SYSTOLIC BLOOD PRESSURE: 150 MMHG | HEIGHT: 75 IN

## 2021-10-06 DIAGNOSIS — Z79.01 LONG TERM (CURRENT) USE OF ANTICOAGULANTS: ICD-10-CM

## 2021-10-06 DIAGNOSIS — Z51.81 ENCOUNTER FOR MONITORING SOTALOL THERAPY: ICD-10-CM

## 2021-10-06 DIAGNOSIS — I48.0 PAROXYSMAL ATRIAL FIBRILLATION: ICD-10-CM

## 2021-10-06 DIAGNOSIS — I49.8 OTHER SPECIFIED CARDIAC ARRHYTHMIAS: ICD-10-CM

## 2021-10-06 DIAGNOSIS — I10 ESSENTIAL HYPERTENSION: ICD-10-CM

## 2021-10-06 DIAGNOSIS — Z79.899 ENCOUNTER FOR MONITORING SOTALOL THERAPY: ICD-10-CM

## 2021-10-06 DIAGNOSIS — I48.3 TYPICAL ATRIAL FLUTTER: Primary | ICD-10-CM

## 2021-10-06 PROCEDURE — 99999 PR PBB SHADOW E&M-EST. PATIENT-LVL IV: ICD-10-PCS | Mod: PBBFAC,,, | Performed by: NURSE PRACTITIONER

## 2021-10-06 PROCEDURE — 3077F PR MOST RECENT SYSTOLIC BLOOD PRESSURE >= 140 MM HG: ICD-10-PCS | Mod: CPTII,S$GLB,, | Performed by: NURSE PRACTITIONER

## 2021-10-06 PROCEDURE — 3077F SYST BP >= 140 MM HG: CPT | Mod: CPTII,S$GLB,, | Performed by: NURSE PRACTITIONER

## 2021-10-06 PROCEDURE — 93005 ELECTROCARDIOGRAM TRACING: CPT | Mod: S$GLB,,, | Performed by: INTERNAL MEDICINE

## 2021-10-06 PROCEDURE — 99214 PR OFFICE/OUTPT VISIT, EST, LEVL IV, 30-39 MIN: ICD-10-PCS | Mod: S$GLB,,, | Performed by: NURSE PRACTITIONER

## 2021-10-06 PROCEDURE — 99214 OFFICE O/P EST MOD 30 MIN: CPT | Mod: S$GLB,,, | Performed by: NURSE PRACTITIONER

## 2021-10-06 PROCEDURE — 99499 RISK ADDL DX/OHS AUDIT: ICD-10-PCS | Mod: S$GLB,,, | Performed by: NURSE PRACTITIONER

## 2021-10-06 PROCEDURE — 93010 ELECTROCARDIOGRAM REPORT: CPT | Mod: S$GLB,,, | Performed by: INTERNAL MEDICINE

## 2021-10-06 PROCEDURE — 3078F DIAST BP <80 MM HG: CPT | Mod: CPTII,S$GLB,, | Performed by: NURSE PRACTITIONER

## 2021-10-06 PROCEDURE — 1160F RVW MEDS BY RX/DR IN RCRD: CPT | Mod: CPTII,S$GLB,, | Performed by: NURSE PRACTITIONER

## 2021-10-06 PROCEDURE — 99999 PR PBB SHADOW E&M-EST. PATIENT-LVL IV: CPT | Mod: PBBFAC,,, | Performed by: NURSE PRACTITIONER

## 2021-10-06 PROCEDURE — 1125F AMNT PAIN NOTED PAIN PRSNT: CPT | Mod: CPTII,S$GLB,, | Performed by: NURSE PRACTITIONER

## 2021-10-06 PROCEDURE — 93005 RHYTHM STRIP: ICD-10-PCS | Mod: S$GLB,,, | Performed by: INTERNAL MEDICINE

## 2021-10-06 PROCEDURE — 1159F PR MEDICATION LIST DOCUMENTED IN MEDICAL RECORD: ICD-10-PCS | Mod: CPTII,S$GLB,, | Performed by: NURSE PRACTITIONER

## 2021-10-06 PROCEDURE — 99499 UNLISTED E&M SERVICE: CPT | Mod: S$GLB,,, | Performed by: NURSE PRACTITIONER

## 2021-10-06 PROCEDURE — 1160F PR REVIEW ALL MEDS BY PRESCRIBER/CLIN PHARMACIST DOCUMENTED: ICD-10-PCS | Mod: CPTII,S$GLB,, | Performed by: NURSE PRACTITIONER

## 2021-10-06 PROCEDURE — 93010 RHYTHM STRIP: ICD-10-PCS | Mod: S$GLB,,, | Performed by: INTERNAL MEDICINE

## 2021-10-06 PROCEDURE — 1125F PR PAIN SEVERITY QUANTIFIED, PAIN PRESENT: ICD-10-PCS | Mod: CPTII,S$GLB,, | Performed by: NURSE PRACTITIONER

## 2021-10-06 PROCEDURE — 3078F PR MOST RECENT DIASTOLIC BLOOD PRESSURE < 80 MM HG: ICD-10-PCS | Mod: CPTII,S$GLB,, | Performed by: NURSE PRACTITIONER

## 2021-10-06 PROCEDURE — 1159F MED LIST DOCD IN RCRD: CPT | Mod: CPTII,S$GLB,, | Performed by: NURSE PRACTITIONER

## 2021-10-20 ENCOUNTER — CLINICAL SUPPORT (OUTPATIENT)
Dept: CARDIOLOGY | Facility: HOSPITAL | Age: 81
End: 2021-10-20
Attending: NURSE PRACTITIONER
Payer: MEDICARE

## 2021-10-20 ENCOUNTER — OFFICE VISIT (OUTPATIENT)
Dept: OTOLARYNGOLOGY | Facility: CLINIC | Age: 81
End: 2021-10-20
Payer: MEDICARE

## 2021-10-20 DIAGNOSIS — I48.0 PAROXYSMAL ATRIAL FIBRILLATION: ICD-10-CM

## 2021-10-20 DIAGNOSIS — Q31.9 DYSPLASIA OF LARYNX: ICD-10-CM

## 2021-10-20 DIAGNOSIS — R49.0 DYSPHONIA: Primary | ICD-10-CM

## 2021-10-20 DIAGNOSIS — D38.0 NEOPLASM OF UNCERTAIN BEHAVIOR OF LARYNX: ICD-10-CM

## 2021-10-20 PROCEDURE — 99999 PR PBB SHADOW E&M-EST. PATIENT-LVL III: CPT | Mod: PBBFAC,,, | Performed by: OTOLARYNGOLOGY

## 2021-10-20 PROCEDURE — 3288F PR FALLS RISK ASSESSMENT DOCUMENTED: ICD-10-PCS | Mod: CPTII,S$GLB,, | Performed by: OTOLARYNGOLOGY

## 2021-10-20 PROCEDURE — 1160F RVW MEDS BY RX/DR IN RCRD: CPT | Mod: CPTII,S$GLB,, | Performed by: OTOLARYNGOLOGY

## 2021-10-20 PROCEDURE — 93248 CV CARDIAC MONITOR - 3-15 DAY ADULT (CUPID ONLY): ICD-10-PCS | Mod: ,,, | Performed by: INTERNAL MEDICINE

## 2021-10-20 PROCEDURE — 93247 EXT ECG>7D<15D SCAN A/R: CPT

## 2021-10-20 PROCEDURE — 1160F PR REVIEW ALL MEDS BY PRESCRIBER/CLIN PHARMACIST DOCUMENTED: ICD-10-PCS | Mod: CPTII,S$GLB,, | Performed by: OTOLARYNGOLOGY

## 2021-10-20 PROCEDURE — 99213 OFFICE O/P EST LOW 20 MIN: CPT | Mod: 25,S$GLB,, | Performed by: OTOLARYNGOLOGY

## 2021-10-20 PROCEDURE — 93246 EXT ECG>7D<15D RECORDING: CPT

## 2021-10-20 PROCEDURE — 31579 PR LARYNGOSCOPY, FLEX/RIGID TELESCOPIC, W/STROBOSCOPY: ICD-10-PCS | Mod: S$GLB,,, | Performed by: OTOLARYNGOLOGY

## 2021-10-20 PROCEDURE — 99999 PR PBB SHADOW E&M-EST. PATIENT-LVL III: ICD-10-PCS | Mod: PBBFAC,,, | Performed by: OTOLARYNGOLOGY

## 2021-10-20 PROCEDURE — 99213 PR OFFICE/OUTPT VISIT, EST, LEVL III, 20-29 MIN: ICD-10-PCS | Mod: 25,S$GLB,, | Performed by: OTOLARYNGOLOGY

## 2021-10-20 PROCEDURE — 31579 LARYNGOSCOPY TELESCOPIC: CPT | Mod: S$GLB,,, | Performed by: OTOLARYNGOLOGY

## 2021-10-20 PROCEDURE — 93248 EXT ECG>7D<15D REV&INTERPJ: CPT | Mod: ,,, | Performed by: INTERNAL MEDICINE

## 2021-10-20 PROCEDURE — 1101F PR PT FALLS ASSESS DOC 0-1 FALLS W/OUT INJ PAST YR: ICD-10-PCS | Mod: CPTII,S$GLB,, | Performed by: OTOLARYNGOLOGY

## 2021-10-20 PROCEDURE — 1159F MED LIST DOCD IN RCRD: CPT | Mod: CPTII,S$GLB,, | Performed by: OTOLARYNGOLOGY

## 2021-10-20 PROCEDURE — 3288F FALL RISK ASSESSMENT DOCD: CPT | Mod: CPTII,S$GLB,, | Performed by: OTOLARYNGOLOGY

## 2021-10-20 PROCEDURE — 1159F PR MEDICATION LIST DOCUMENTED IN MEDICAL RECORD: ICD-10-PCS | Mod: CPTII,S$GLB,, | Performed by: OTOLARYNGOLOGY

## 2021-10-20 PROCEDURE — 1101F PT FALLS ASSESS-DOCD LE1/YR: CPT | Mod: CPTII,S$GLB,, | Performed by: OTOLARYNGOLOGY

## 2021-10-26 ENCOUNTER — OFFICE VISIT (OUTPATIENT)
Dept: OTOLARYNGOLOGY | Facility: CLINIC | Age: 81
End: 2021-10-26
Payer: MEDICARE

## 2021-10-26 VITALS
SYSTOLIC BLOOD PRESSURE: 128 MMHG | DIASTOLIC BLOOD PRESSURE: 67 MMHG | WEIGHT: 241.19 LBS | BODY MASS INDEX: 30.15 KG/M2 | HEART RATE: 56 BPM

## 2021-10-26 DIAGNOSIS — J30.1 ALLERGIC RHINITIS DUE TO POLLEN, UNSPECIFIED SEASONALITY: ICD-10-CM

## 2021-10-26 DIAGNOSIS — R09.89 CHRONIC SINUS COMPLAINTS: Primary | ICD-10-CM

## 2021-10-26 PROCEDURE — 3078F PR MOST RECENT DIASTOLIC BLOOD PRESSURE < 80 MM HG: ICD-10-PCS | Mod: CPTII,S$GLB,, | Performed by: STUDENT IN AN ORGANIZED HEALTH CARE EDUCATION/TRAINING PROGRAM

## 2021-10-26 PROCEDURE — 3078F DIAST BP <80 MM HG: CPT | Mod: CPTII,S$GLB,, | Performed by: STUDENT IN AN ORGANIZED HEALTH CARE EDUCATION/TRAINING PROGRAM

## 2021-10-26 PROCEDURE — 1126F PR PAIN SEVERITY QUANTIFIED, NO PAIN PRESENT: ICD-10-PCS | Mod: CPTII,S$GLB,, | Performed by: STUDENT IN AN ORGANIZED HEALTH CARE EDUCATION/TRAINING PROGRAM

## 2021-10-26 PROCEDURE — 3074F SYST BP LT 130 MM HG: CPT | Mod: CPTII,S$GLB,, | Performed by: STUDENT IN AN ORGANIZED HEALTH CARE EDUCATION/TRAINING PROGRAM

## 2021-10-26 PROCEDURE — 1159F PR MEDICATION LIST DOCUMENTED IN MEDICAL RECORD: ICD-10-PCS | Mod: CPTII,S$GLB,, | Performed by: STUDENT IN AN ORGANIZED HEALTH CARE EDUCATION/TRAINING PROGRAM

## 2021-10-26 PROCEDURE — 1126F AMNT PAIN NOTED NONE PRSNT: CPT | Mod: CPTII,S$GLB,, | Performed by: STUDENT IN AN ORGANIZED HEALTH CARE EDUCATION/TRAINING PROGRAM

## 2021-10-26 PROCEDURE — 99214 OFFICE O/P EST MOD 30 MIN: CPT | Mod: 25,S$GLB,, | Performed by: STUDENT IN AN ORGANIZED HEALTH CARE EDUCATION/TRAINING PROGRAM

## 2021-10-26 PROCEDURE — 99999 PR PBB SHADOW E&M-EST. PATIENT-LVL III: ICD-10-PCS | Mod: PBBFAC,,, | Performed by: STUDENT IN AN ORGANIZED HEALTH CARE EDUCATION/TRAINING PROGRAM

## 2021-10-26 PROCEDURE — 1100F PTFALLS ASSESS-DOCD GE2>/YR: CPT | Mod: CPTII,S$GLB,, | Performed by: STUDENT IN AN ORGANIZED HEALTH CARE EDUCATION/TRAINING PROGRAM

## 2021-10-26 PROCEDURE — 3288F FALL RISK ASSESSMENT DOCD: CPT | Mod: CPTII,S$GLB,, | Performed by: STUDENT IN AN ORGANIZED HEALTH CARE EDUCATION/TRAINING PROGRAM

## 2021-10-26 PROCEDURE — 3074F PR MOST RECENT SYSTOLIC BLOOD PRESSURE < 130 MM HG: ICD-10-PCS | Mod: CPTII,S$GLB,, | Performed by: STUDENT IN AN ORGANIZED HEALTH CARE EDUCATION/TRAINING PROGRAM

## 2021-10-26 PROCEDURE — 1159F MED LIST DOCD IN RCRD: CPT | Mod: CPTII,S$GLB,, | Performed by: STUDENT IN AN ORGANIZED HEALTH CARE EDUCATION/TRAINING PROGRAM

## 2021-10-26 PROCEDURE — 3288F PR FALLS RISK ASSESSMENT DOCUMENTED: ICD-10-PCS | Mod: CPTII,S$GLB,, | Performed by: STUDENT IN AN ORGANIZED HEALTH CARE EDUCATION/TRAINING PROGRAM

## 2021-10-26 PROCEDURE — 31231 PR NASAL ENDOSCOPY, DX: ICD-10-PCS | Mod: S$GLB,,, | Performed by: STUDENT IN AN ORGANIZED HEALTH CARE EDUCATION/TRAINING PROGRAM

## 2021-10-26 PROCEDURE — 99214 PR OFFICE/OUTPT VISIT, EST, LEVL IV, 30-39 MIN: ICD-10-PCS | Mod: 25,S$GLB,, | Performed by: STUDENT IN AN ORGANIZED HEALTH CARE EDUCATION/TRAINING PROGRAM

## 2021-10-26 PROCEDURE — 99999 PR PBB SHADOW E&M-EST. PATIENT-LVL III: CPT | Mod: PBBFAC,,, | Performed by: STUDENT IN AN ORGANIZED HEALTH CARE EDUCATION/TRAINING PROGRAM

## 2021-10-26 PROCEDURE — 1100F PR PT FALLS ASSESS DOC 2+ FALLS/FALL W/INJURY/YR: ICD-10-PCS | Mod: CPTII,S$GLB,, | Performed by: STUDENT IN AN ORGANIZED HEALTH CARE EDUCATION/TRAINING PROGRAM

## 2021-10-26 PROCEDURE — 31231 NASAL ENDOSCOPY DX: CPT | Mod: S$GLB,,, | Performed by: STUDENT IN AN ORGANIZED HEALTH CARE EDUCATION/TRAINING PROGRAM

## 2021-10-26 RX ORDER — AZELASTINE 1 MG/ML
1 SPRAY, METERED NASAL 2 TIMES DAILY
Qty: 30 ML | Refills: 3 | Status: SHIPPED | OUTPATIENT
Start: 2021-10-26 | End: 2023-03-07

## 2021-10-29 ENCOUNTER — PATIENT MESSAGE (OUTPATIENT)
Dept: OTOLARYNGOLOGY | Facility: CLINIC | Age: 81
End: 2021-10-29
Payer: MEDICARE

## 2021-10-29 ENCOUNTER — TELEPHONE (OUTPATIENT)
Dept: CARDIOLOGY | Facility: HOSPITAL | Age: 81
End: 2021-10-29
Payer: MEDICARE

## 2021-11-05 ENCOUNTER — PATIENT MESSAGE (OUTPATIENT)
Dept: ELECTROPHYSIOLOGY | Facility: CLINIC | Age: 81
End: 2021-11-05
Payer: MEDICARE

## 2021-11-05 ENCOUNTER — TELEPHONE (OUTPATIENT)
Dept: ELECTROPHYSIOLOGY | Facility: CLINIC | Age: 81
End: 2021-11-05
Payer: MEDICARE

## 2021-11-09 ENCOUNTER — TELEPHONE (OUTPATIENT)
Dept: ELECTROPHYSIOLOGY | Facility: CLINIC | Age: 81
End: 2021-11-09
Payer: MEDICARE

## 2021-11-15 PROBLEM — I48.0 PAROXYSMAL ATRIAL FIBRILLATION: Status: ACTIVE | Noted: 2019-02-02

## 2021-11-15 PROBLEM — I48.19 PERSISTENT ATRIAL FIBRILLATION: Status: ACTIVE | Noted: 2019-02-02

## 2021-11-15 PROBLEM — I48.91 A-FIB: Status: RESOLVED | Noted: 2019-02-02 | Resolved: 2021-11-15

## 2021-11-16 ENCOUNTER — OFFICE VISIT (OUTPATIENT)
Dept: ELECTROPHYSIOLOGY | Facility: CLINIC | Age: 81
End: 2021-11-16
Payer: MEDICARE

## 2021-11-16 VITALS
BODY MASS INDEX: 29.27 KG/M2 | WEIGHT: 235.44 LBS | HEIGHT: 75 IN | DIASTOLIC BLOOD PRESSURE: 69 MMHG | HEART RATE: 58 BPM | SYSTOLIC BLOOD PRESSURE: 151 MMHG

## 2021-11-16 DIAGNOSIS — I48.0 PAROXYSMAL ATRIAL FIBRILLATION: Primary | ICD-10-CM

## 2021-11-16 DIAGNOSIS — I49.8 OTHER SPECIFIED CARDIAC ARRHYTHMIAS: ICD-10-CM

## 2021-11-16 DIAGNOSIS — Z79.01 CHRONIC ANTICOAGULATION: ICD-10-CM

## 2021-11-16 DIAGNOSIS — I48.3 TYPICAL ATRIAL FLUTTER: ICD-10-CM

## 2021-11-16 DIAGNOSIS — Z95.1 HX OF CABG: ICD-10-CM

## 2021-11-16 DIAGNOSIS — I10 ESSENTIAL HYPERTENSION: ICD-10-CM

## 2021-11-16 DIAGNOSIS — I48.4 ATYPICAL ATRIAL FLUTTER: ICD-10-CM

## 2021-11-16 DIAGNOSIS — I25.10 CORONARY ARTERY DISEASE INVOLVING NATIVE CORONARY ARTERY OF NATIVE HEART WITHOUT ANGINA PECTORIS: ICD-10-CM

## 2021-11-16 DIAGNOSIS — Z01.818 PRE-OP TESTING: ICD-10-CM

## 2021-11-16 DIAGNOSIS — E11.9 TYPE 2 DIABETES MELLITUS WITHOUT COMPLICATION, WITHOUT LONG-TERM CURRENT USE OF INSULIN: ICD-10-CM

## 2021-11-16 PROCEDURE — 93010 RHYTHM STRIP: ICD-10-PCS | Mod: S$GLB,,, | Performed by: INTERNAL MEDICINE

## 2021-11-16 PROCEDURE — 3288F PR FALLS RISK ASSESSMENT DOCUMENTED: ICD-10-PCS | Mod: CPTII,S$GLB,, | Performed by: INTERNAL MEDICINE

## 2021-11-16 PROCEDURE — 1160F PR REVIEW ALL MEDS BY PRESCRIBER/CLIN PHARMACIST DOCUMENTED: ICD-10-PCS | Mod: CPTII,S$GLB,, | Performed by: INTERNAL MEDICINE

## 2021-11-16 PROCEDURE — 93005 RHYTHM STRIP: ICD-10-PCS | Mod: S$GLB,,, | Performed by: INTERNAL MEDICINE

## 2021-11-16 PROCEDURE — 99999 PR PBB SHADOW E&M-EST. PATIENT-LVL III: CPT | Mod: PBBFAC,,, | Performed by: INTERNAL MEDICINE

## 2021-11-16 PROCEDURE — 3077F PR MOST RECENT SYSTOLIC BLOOD PRESSURE >= 140 MM HG: ICD-10-PCS | Mod: CPTII,S$GLB,, | Performed by: INTERNAL MEDICINE

## 2021-11-16 PROCEDURE — 99999 PR PBB SHADOW E&M-EST. PATIENT-LVL III: ICD-10-PCS | Mod: PBBFAC,,, | Performed by: INTERNAL MEDICINE

## 2021-11-16 PROCEDURE — 1101F PT FALLS ASSESS-DOCD LE1/YR: CPT | Mod: CPTII,S$GLB,, | Performed by: INTERNAL MEDICINE

## 2021-11-16 PROCEDURE — 3078F PR MOST RECENT DIASTOLIC BLOOD PRESSURE < 80 MM HG: ICD-10-PCS | Mod: CPTII,S$GLB,, | Performed by: INTERNAL MEDICINE

## 2021-11-16 PROCEDURE — 3288F FALL RISK ASSESSMENT DOCD: CPT | Mod: CPTII,S$GLB,, | Performed by: INTERNAL MEDICINE

## 2021-11-16 PROCEDURE — 1159F PR MEDICATION LIST DOCUMENTED IN MEDICAL RECORD: ICD-10-PCS | Mod: CPTII,S$GLB,, | Performed by: INTERNAL MEDICINE

## 2021-11-16 PROCEDURE — 3078F DIAST BP <80 MM HG: CPT | Mod: CPTII,S$GLB,, | Performed by: INTERNAL MEDICINE

## 2021-11-16 PROCEDURE — 99215 OFFICE O/P EST HI 40 MIN: CPT | Mod: S$GLB,,, | Performed by: INTERNAL MEDICINE

## 2021-11-16 PROCEDURE — 1101F PR PT FALLS ASSESS DOC 0-1 FALLS W/OUT INJ PAST YR: ICD-10-PCS | Mod: CPTII,S$GLB,, | Performed by: INTERNAL MEDICINE

## 2021-11-16 PROCEDURE — 3077F SYST BP >= 140 MM HG: CPT | Mod: CPTII,S$GLB,, | Performed by: INTERNAL MEDICINE

## 2021-11-16 PROCEDURE — 93005 ELECTROCARDIOGRAM TRACING: CPT | Mod: S$GLB,,, | Performed by: INTERNAL MEDICINE

## 2021-11-16 PROCEDURE — 1159F MED LIST DOCD IN RCRD: CPT | Mod: CPTII,S$GLB,, | Performed by: INTERNAL MEDICINE

## 2021-11-16 PROCEDURE — 93010 ELECTROCARDIOGRAM REPORT: CPT | Mod: S$GLB,,, | Performed by: INTERNAL MEDICINE

## 2021-11-16 PROCEDURE — 1160F RVW MEDS BY RX/DR IN RCRD: CPT | Mod: CPTII,S$GLB,, | Performed by: INTERNAL MEDICINE

## 2021-11-16 PROCEDURE — 99215 PR OFFICE/OUTPT VISIT, EST, LEVL V, 40-54 MIN: ICD-10-PCS | Mod: S$GLB,,, | Performed by: INTERNAL MEDICINE

## 2021-11-16 RX ORDER — RIVAROXABAN 20 MG/1
20 TABLET, FILM COATED ORAL NIGHTLY
Qty: 30 TABLET | Refills: 11 | Status: SHIPPED | OUTPATIENT
Start: 2021-11-16 | End: 2022-01-03 | Stop reason: SDUPTHER

## 2021-11-17 ENCOUNTER — PATIENT MESSAGE (OUTPATIENT)
Dept: ELECTROPHYSIOLOGY | Facility: CLINIC | Age: 81
End: 2021-11-17
Payer: MEDICARE

## 2021-11-23 ENCOUNTER — PATIENT MESSAGE (OUTPATIENT)
Dept: MEDSURG UNIT | Facility: HOSPITAL | Age: 81
End: 2021-11-23
Payer: MEDICARE

## 2021-11-23 RX ORDER — PANTOPRAZOLE SODIUM 40 MG/1
40 TABLET, DELAYED RELEASE ORAL DAILY
Qty: 44 TABLET | Refills: 0 | Status: ON HOLD | OUTPATIENT
Start: 2021-11-23 | End: 2021-12-09 | Stop reason: SDUPTHER

## 2021-12-01 ENCOUNTER — LAB VISIT (OUTPATIENT)
Dept: LAB | Facility: HOSPITAL | Age: 81
End: 2021-12-01
Attending: INTERNAL MEDICINE
Payer: MEDICARE

## 2021-12-01 DIAGNOSIS — Z79.01 CHRONIC ANTICOAGULATION: ICD-10-CM

## 2021-12-01 DIAGNOSIS — I48.4 ATYPICAL ATRIAL FLUTTER: ICD-10-CM

## 2021-12-01 DIAGNOSIS — Z01.818 PRE-OP TESTING: ICD-10-CM

## 2021-12-01 DIAGNOSIS — I48.0 PAROXYSMAL ATRIAL FIBRILLATION: ICD-10-CM

## 2021-12-01 LAB
ANION GAP SERPL CALC-SCNC: 10 MMOL/L (ref 8–16)
BASOPHILS # BLD AUTO: 0.05 K/UL (ref 0–0.2)
BASOPHILS NFR BLD: 0.8 % (ref 0–1.9)
BUN SERPL-MCNC: 17 MG/DL (ref 8–23)
CALCIUM SERPL-MCNC: 9.8 MG/DL (ref 8.7–10.5)
CHLORIDE SERPL-SCNC: 103 MMOL/L (ref 95–110)
CO2 SERPL-SCNC: 24 MMOL/L (ref 23–29)
CREAT SERPL-MCNC: 0.8 MG/DL (ref 0.5–1.4)
DIFFERENTIAL METHOD: ABNORMAL
EOSINOPHIL # BLD AUTO: 0.3 K/UL (ref 0–0.5)
EOSINOPHIL NFR BLD: 4.3 % (ref 0–8)
ERYTHROCYTE [DISTWIDTH] IN BLOOD BY AUTOMATED COUNT: 14.5 % (ref 11.5–14.5)
EST. GFR  (AFRICAN AMERICAN): >60 ML/MIN/1.73 M^2
EST. GFR  (NON AFRICAN AMERICAN): >60 ML/MIN/1.73 M^2
GLUCOSE SERPL-MCNC: 92 MG/DL (ref 70–110)
HCT VFR BLD AUTO: 41.3 % (ref 40–54)
HGB BLD-MCNC: 13.7 G/DL (ref 14–18)
IMM GRANULOCYTES # BLD AUTO: 0.01 K/UL (ref 0–0.04)
IMM GRANULOCYTES NFR BLD AUTO: 0.2 % (ref 0–0.5)
LYMPHOCYTES # BLD AUTO: 2.1 K/UL (ref 1–4.8)
LYMPHOCYTES NFR BLD: 34.2 % (ref 18–48)
MCH RBC QN AUTO: 29.5 PG (ref 27–31)
MCHC RBC AUTO-ENTMCNC: 33.2 G/DL (ref 32–36)
MCV RBC AUTO: 89 FL (ref 82–98)
MONOCYTES # BLD AUTO: 0.7 K/UL (ref 0.3–1)
MONOCYTES NFR BLD: 11.5 % (ref 4–15)
NEUTROPHILS # BLD AUTO: 3.1 K/UL (ref 1.8–7.7)
NEUTROPHILS NFR BLD: 49 % (ref 38–73)
NRBC BLD-RTO: 0 /100 WBC
PLATELET # BLD AUTO: 204 K/UL (ref 150–450)
PMV BLD AUTO: 10.9 FL (ref 9.2–12.9)
POTASSIUM SERPL-SCNC: 4 MMOL/L (ref 3.5–5.1)
RBC # BLD AUTO: 4.64 M/UL (ref 4.6–6.2)
SODIUM SERPL-SCNC: 137 MMOL/L (ref 136–145)
WBC # BLD AUTO: 6.26 K/UL (ref 3.9–12.7)

## 2021-12-01 PROCEDURE — 85025 COMPLETE CBC W/AUTO DIFF WBC: CPT | Performed by: INTERNAL MEDICINE

## 2021-12-01 PROCEDURE — 80048 BASIC METABOLIC PNL TOTAL CA: CPT | Performed by: INTERNAL MEDICINE

## 2021-12-01 PROCEDURE — 36415 COLL VENOUS BLD VENIPUNCTURE: CPT | Mod: PN | Performed by: INTERNAL MEDICINE

## 2021-12-01 PROCEDURE — 85730 THROMBOPLASTIN TIME PARTIAL: CPT | Performed by: INTERNAL MEDICINE

## 2021-12-01 PROCEDURE — 85610 PROTHROMBIN TIME: CPT | Performed by: INTERNAL MEDICINE

## 2021-12-02 LAB
APTT BLDCRRT: 32.2 SEC (ref 21–32)
INR PPP: 1.1 (ref 0.8–1.2)
PROTHROMBIN TIME: 11.8 SEC (ref 9–12.5)

## 2021-12-06 ENCOUNTER — TELEPHONE (OUTPATIENT)
Dept: ELECTROPHYSIOLOGY | Facility: CLINIC | Age: 81
End: 2021-12-06
Payer: MEDICARE

## 2021-12-08 ENCOUNTER — ANESTHESIA EVENT (OUTPATIENT)
Dept: MEDSURG UNIT | Facility: HOSPITAL | Age: 81
End: 2021-12-08
Payer: MEDICARE

## 2021-12-08 ENCOUNTER — HOSPITAL ENCOUNTER (OUTPATIENT)
Facility: HOSPITAL | Age: 81
Discharge: HOME | End: 2021-12-09
Attending: INTERNAL MEDICINE | Admitting: INTERNAL MEDICINE
Payer: MEDICARE

## 2021-12-08 ENCOUNTER — ANESTHESIA (OUTPATIENT)
Dept: MEDSURG UNIT | Facility: HOSPITAL | Age: 81
End: 2021-12-08
Payer: MEDICARE

## 2021-12-08 DIAGNOSIS — I49.9 ARRHYTHMIA: ICD-10-CM

## 2021-12-08 DIAGNOSIS — I48.0 PAROXYSMAL ATRIAL FIBRILLATION: ICD-10-CM

## 2021-12-08 DIAGNOSIS — Z86.79 STATUS POST ABLATION OPERATION FOR ARRHYTHMIA: ICD-10-CM

## 2021-12-08 DIAGNOSIS — I48.91 ATRIAL FIBRILLATION: ICD-10-CM

## 2021-12-08 DIAGNOSIS — I49.8 OTHER SPECIFIED CARDIAC ARRHYTHMIAS: Primary | ICD-10-CM

## 2021-12-08 DIAGNOSIS — Z98.890 STATUS POST ABLATION OPERATION FOR ARRHYTHMIA: ICD-10-CM

## 2021-12-08 DIAGNOSIS — I48.4 ATYPICAL ATRIAL FLUTTER: ICD-10-CM

## 2021-12-08 DIAGNOSIS — I49.9 ABNORMAL HEART RHYTHMS: ICD-10-CM

## 2021-12-08 LAB
POC ACTIVATED CLOTTING TIME K: 154 SEC (ref 74–137)
POC ACTIVATED CLOTTING TIME K: 166 SEC (ref 74–137)
POC ACTIVATED CLOTTING TIME K: 333 SEC (ref 74–137)
POC ACTIVATED CLOTTING TIME K: 350 SEC (ref 74–137)
POC ACTIVATED CLOTTING TIME K: 363 SEC (ref 74–137)
POC ACTIVATED CLOTTING TIME K: 363 SEC (ref 74–137)
POC ACTIVATED CLOTTING TIME K: 380 SEC (ref 74–137)
POC ACTIVATED CLOTTING TIME K: 386 SEC (ref 74–137)
POC ACTIVATED CLOTTING TIME K: 386 SEC (ref 74–137)
POC ACTIVATED CLOTTING TIME K: 410 SEC (ref 74–137)
POC ACTIVATED CLOTTING TIME K: 446 SEC (ref 74–137)
POCT GLUCOSE: 115 MG/DL (ref 70–110)
POCT GLUCOSE: 147 MG/DL (ref 70–110)
SAMPLE: ABNORMAL

## 2021-12-08 PROCEDURE — 93005 ELECTROCARDIOGRAM TRACING: CPT

## 2021-12-08 PROCEDURE — 36620 PR INSERT CATH,ART,PERCUT,SHORTTERM: ICD-10-PCS | Mod: 59,,, | Performed by: ANESTHESIOLOGY

## 2021-12-08 PROCEDURE — 37000009 HC ANESTHESIA EA ADD 15 MINS: Performed by: INTERNAL MEDICINE

## 2021-12-08 PROCEDURE — 93010 EKG 12-LEAD: ICD-10-PCS | Mod: ,,, | Performed by: INTERNAL MEDICINE

## 2021-12-08 PROCEDURE — C1894 INTRO/SHEATH, NON-LASER: HCPCS | Performed by: INTERNAL MEDICINE

## 2021-12-08 PROCEDURE — 93010 ELECTROCARDIOGRAM REPORT: CPT | Mod: ,,, | Performed by: INTERNAL MEDICINE

## 2021-12-08 PROCEDURE — C1732 CATH, EP, DIAG/ABL, 3D/VECT: HCPCS | Performed by: INTERNAL MEDICINE

## 2021-12-08 PROCEDURE — D9220A PRA ANESTHESIA: Mod: CRNA,,, | Performed by: STUDENT IN AN ORGANIZED HEALTH CARE EDUCATION/TRAINING PROGRAM

## 2021-12-08 PROCEDURE — 93005 ELECTROCARDIOGRAM TRACING: CPT | Mod: 59

## 2021-12-08 PROCEDURE — 93662 INTRACARDIAC ECG (ICE): CPT | Mod: 26,GC,, | Performed by: INTERNAL MEDICINE

## 2021-12-08 PROCEDURE — 93656 COMPRE EP EVAL ABLTJ ATR FIB: CPT | Mod: GC | Performed by: INTERNAL MEDICINE

## 2021-12-08 PROCEDURE — D9220A PRA ANESTHESIA: Mod: ANES,,, | Performed by: ANESTHESIOLOGY

## 2021-12-08 PROCEDURE — 63600175 PHARM REV CODE 636 W HCPCS: Performed by: INTERNAL MEDICINE

## 2021-12-08 PROCEDURE — D9220A PRA ANESTHESIA: ICD-10-PCS | Mod: CRNA,,, | Performed by: STUDENT IN AN ORGANIZED HEALTH CARE EDUCATION/TRAINING PROGRAM

## 2021-12-08 PROCEDURE — 25000003 PHARM REV CODE 250: Performed by: STUDENT IN AN ORGANIZED HEALTH CARE EDUCATION/TRAINING PROGRAM

## 2021-12-08 PROCEDURE — 93656 COMPRE EP EVAL ABLTJ ATR FIB: CPT | Mod: GC,,, | Performed by: INTERNAL MEDICINE

## 2021-12-08 PROCEDURE — 93613 INTRACARDIAC EPHYS 3D MAPG: CPT | Mod: GC,,, | Performed by: INTERNAL MEDICINE

## 2021-12-08 PROCEDURE — 93662 INTRACARDIAC ECG (ICE): CPT | Mod: GC | Performed by: INTERNAL MEDICINE

## 2021-12-08 PROCEDURE — 25000003 PHARM REV CODE 250: Performed by: NURSE PRACTITIONER

## 2021-12-08 PROCEDURE — 27201423 OPTIME MED/SURG SUP & DEVICES STERILE SUPPLY: Performed by: INTERNAL MEDICINE

## 2021-12-08 PROCEDURE — C1731 CATH, EP, 20 OR MORE ELEC: HCPCS | Performed by: INTERNAL MEDICINE

## 2021-12-08 PROCEDURE — 93657 TX L/R ATRIAL FIB ADDL: CPT | Mod: GC,,, | Performed by: INTERNAL MEDICINE

## 2021-12-08 PROCEDURE — 93010 ELECTROCARDIOGRAM REPORT: CPT | Mod: 76,,, | Performed by: INTERNAL MEDICINE

## 2021-12-08 PROCEDURE — C1730 CATH, EP, 19 OR FEW ELECT: HCPCS | Performed by: INTERNAL MEDICINE

## 2021-12-08 PROCEDURE — C1766 INTRO/SHEATH,STRBLE,NON-PEEL: HCPCS | Performed by: INTERNAL MEDICINE

## 2021-12-08 PROCEDURE — 63600175 PHARM REV CODE 636 W HCPCS: Performed by: STUDENT IN AN ORGANIZED HEALTH CARE EDUCATION/TRAINING PROGRAM

## 2021-12-08 PROCEDURE — 63600175 PHARM REV CODE 636 W HCPCS: Performed by: ANESTHESIOLOGY

## 2021-12-08 PROCEDURE — 93656 PR ELECTROPHYS EVAL, COMPREHEN, W/ABLATION OF ATRIAL FIBR: ICD-10-PCS | Mod: GC,,, | Performed by: INTERNAL MEDICINE

## 2021-12-08 PROCEDURE — 36620 INSERTION CATHETER ARTERY: CPT | Mod: 59,,, | Performed by: ANESTHESIOLOGY

## 2021-12-08 PROCEDURE — 93655 ICAR CATH ABLTJ DSCRT ARRHYT: CPT | Mod: 22,GC,, | Performed by: INTERNAL MEDICINE

## 2021-12-08 PROCEDURE — 93662 PR INTRACARD ECHO, THER/DX INTERVENT: ICD-10-PCS | Mod: 26,GC,, | Performed by: INTERNAL MEDICINE

## 2021-12-08 PROCEDURE — 93613 INTRACARDIAC EPHYS 3D MAPG: CPT | Mod: GC | Performed by: INTERNAL MEDICINE

## 2021-12-08 PROCEDURE — 93613 PR INTRACARD ELECTROPHYS 3-DIMENS MAPPING: ICD-10-PCS | Mod: GC,,, | Performed by: INTERNAL MEDICINE

## 2021-12-08 PROCEDURE — 82962 GLUCOSE BLOOD TEST: CPT | Performed by: INTERNAL MEDICINE

## 2021-12-08 PROCEDURE — 25000003 PHARM REV CODE 250: Performed by: INTERNAL MEDICINE

## 2021-12-08 PROCEDURE — C1753 CATH, INTRAVAS ULTRASOUND: HCPCS | Performed by: INTERNAL MEDICINE

## 2021-12-08 PROCEDURE — 93655 PR ABLATE ARRHYTHMIA ADD ON: ICD-10-PCS | Mod: 22,GC,, | Performed by: INTERNAL MEDICINE

## 2021-12-08 PROCEDURE — 93655 ICAR CATH ABLTJ DSCRT ARRHYT: CPT | Mod: GC | Performed by: INTERNAL MEDICINE

## 2021-12-08 PROCEDURE — 37000008 HC ANESTHESIA 1ST 15 MINUTES: Performed by: INTERNAL MEDICINE

## 2021-12-08 PROCEDURE — 93657 TX L/R ATRIAL FIB ADDL: CPT | Mod: GC | Performed by: INTERNAL MEDICINE

## 2021-12-08 PROCEDURE — 93657 PR TX L/R ATRIAL FIB ADDL: ICD-10-PCS | Mod: GC,,, | Performed by: INTERNAL MEDICINE

## 2021-12-08 PROCEDURE — D9220A PRA ANESTHESIA: ICD-10-PCS | Mod: ANES,,, | Performed by: ANESTHESIOLOGY

## 2021-12-08 RX ORDER — LIDOCAINE HYDROCHLORIDE 20 MG/ML
INJECTION, SOLUTION INFILTRATION; PERINEURAL
Status: DISCONTINUED | OUTPATIENT
Start: 2021-12-08 | End: 2021-12-08

## 2021-12-08 RX ORDER — HEPARIN SODIUM 10000 [USP'U]/100ML
INJECTION, SOLUTION INTRAVENOUS CONTINUOUS PRN
Status: DISCONTINUED | OUTPATIENT
Start: 2021-12-08 | End: 2021-12-08

## 2021-12-08 RX ORDER — HYDROMORPHONE HYDROCHLORIDE 1 MG/ML
0.2 INJECTION, SOLUTION INTRAMUSCULAR; INTRAVENOUS; SUBCUTANEOUS EVERY 5 MIN PRN
Status: DISCONTINUED | OUTPATIENT
Start: 2021-12-08 | End: 2021-12-09 | Stop reason: HOSPADM

## 2021-12-08 RX ORDER — PHENYLEPHRINE HYDROCHLORIDE 10 MG/ML
INJECTION INTRAVENOUS
Status: DISCONTINUED | OUTPATIENT
Start: 2021-12-08 | End: 2021-12-08

## 2021-12-08 RX ORDER — ONDANSETRON 2 MG/ML
4 INJECTION INTRAMUSCULAR; INTRAVENOUS DAILY PRN
Status: DISCONTINUED | OUTPATIENT
Start: 2021-12-08 | End: 2021-12-09 | Stop reason: HOSPADM

## 2021-12-08 RX ORDER — FENTANYL CITRATE 50 UG/ML
INJECTION, SOLUTION INTRAMUSCULAR; INTRAVENOUS
Status: DISCONTINUED | OUTPATIENT
Start: 2021-12-08 | End: 2021-12-08

## 2021-12-08 RX ORDER — ACETAMINOPHEN 325 MG/1
650 TABLET ORAL EVERY 4 HOURS PRN
Status: DISCONTINUED | OUTPATIENT
Start: 2021-12-08 | End: 2021-12-09 | Stop reason: HOSPADM

## 2021-12-08 RX ORDER — HEPARIN SOD,PORCINE/0.9 % NACL 1000/500ML
INTRAVENOUS SOLUTION INTRAVENOUS
Status: DISCONTINUED | OUTPATIENT
Start: 2021-12-08 | End: 2021-12-08

## 2021-12-08 RX ORDER — PANTOPRAZOLE SODIUM 40 MG/1
40 TABLET, DELAYED RELEASE ORAL
Status: DISCONTINUED | OUTPATIENT
Start: 2021-12-08 | End: 2021-12-09 | Stop reason: HOSPADM

## 2021-12-08 RX ORDER — MEPERIDINE HYDROCHLORIDE 50 MG/ML
12.5 INJECTION INTRAMUSCULAR; INTRAVENOUS; SUBCUTANEOUS ONCE AS NEEDED
Status: DISCONTINUED | OUTPATIENT
Start: 2021-12-08 | End: 2021-12-09 | Stop reason: HOSPADM

## 2021-12-08 RX ORDER — SODIUM CHLORIDE 9 MG/ML
INJECTION, SOLUTION INTRAVENOUS CONTINUOUS PRN
Status: DISCONTINUED | OUTPATIENT
Start: 2021-12-08 | End: 2021-12-08

## 2021-12-08 RX ORDER — SUCCINYLCHOLINE CHLORIDE 20 MG/ML
INJECTION INTRAMUSCULAR; INTRAVENOUS
Status: DISCONTINUED | OUTPATIENT
Start: 2021-12-08 | End: 2021-12-08

## 2021-12-08 RX ORDER — FUROSEMIDE 10 MG/ML
40 INJECTION INTRAMUSCULAR; INTRAVENOUS ONCE
Status: COMPLETED | OUTPATIENT
Start: 2021-12-08 | End: 2021-12-08

## 2021-12-08 RX ORDER — ONDANSETRON 2 MG/ML
INJECTION INTRAMUSCULAR; INTRAVENOUS
Status: DISCONTINUED | OUTPATIENT
Start: 2021-12-08 | End: 2021-12-08

## 2021-12-08 RX ORDER — SUCRALFATE 1 G/10ML
1 SUSPENSION ORAL
Status: DISCONTINUED | OUTPATIENT
Start: 2021-12-08 | End: 2021-12-09 | Stop reason: HOSPADM

## 2021-12-08 RX ORDER — LIDOCAINE HCL/PF 100 MG/5ML
SYRINGE (ML) INTRAVENOUS
Status: DISCONTINUED | OUTPATIENT
Start: 2021-12-08 | End: 2021-12-08

## 2021-12-08 RX ORDER — HEPARIN SODIUM 1000 [USP'U]/ML
INJECTION, SOLUTION INTRAVENOUS; SUBCUTANEOUS
Status: DISCONTINUED | OUTPATIENT
Start: 2021-12-08 | End: 2021-12-08

## 2021-12-08 RX ORDER — SOTALOL HYDROCHLORIDE 80 MG/1
40 TABLET ORAL 2 TIMES DAILY
Status: DISCONTINUED | OUTPATIENT
Start: 2021-12-08 | End: 2021-12-09 | Stop reason: HOSPADM

## 2021-12-08 RX ORDER — SODIUM CHLORIDE 0.9 % (FLUSH) 0.9 %
3 SYRINGE (ML) INJECTION
Status: DISCONTINUED | OUTPATIENT
Start: 2021-12-08 | End: 2021-12-09 | Stop reason: HOSPADM

## 2021-12-08 RX ORDER — PROTAMINE SULFATE 10 MG/ML
INJECTION, SOLUTION INTRAVENOUS
Status: DISCONTINUED | OUTPATIENT
Start: 2021-12-08 | End: 2021-12-08

## 2021-12-08 RX ORDER — ROCURONIUM BROMIDE 10 MG/ML
INJECTION, SOLUTION INTRAVENOUS
Status: DISCONTINUED | OUTPATIENT
Start: 2021-12-08 | End: 2021-12-08

## 2021-12-08 RX ORDER — PROPOFOL 10 MG/ML
VIAL (ML) INTRAVENOUS
Status: DISCONTINUED | OUTPATIENT
Start: 2021-12-08 | End: 2021-12-08

## 2021-12-08 RX ORDER — DEXAMETHASONE SODIUM PHOSPHATE 4 MG/ML
INJECTION, SOLUTION INTRA-ARTICULAR; INTRALESIONAL; INTRAMUSCULAR; INTRAVENOUS; SOFT TISSUE
Status: DISCONTINUED | OUTPATIENT
Start: 2021-12-08 | End: 2021-12-08

## 2021-12-08 RX ADMIN — PHENYLEPHRINE HYDROCHLORIDE 160 MCG: 10 INJECTION, SOLUTION INTRAMUSCULAR; INTRAVENOUS; SUBCUTANEOUS at 01:12

## 2021-12-08 RX ADMIN — PROTAMINE SULFATE 5 MG: 10 INJECTION, SOLUTION INTRAVENOUS at 01:12

## 2021-12-08 RX ADMIN — SUCRALFATE 1 G: 1 SUSPENSION ORAL at 10:12

## 2021-12-08 RX ADMIN — ONDANSETRON 4 MG: 2 INJECTION INTRAMUSCULAR; INTRAVENOUS at 08:12

## 2021-12-08 RX ADMIN — HYDROMORPHONE HYDROCHLORIDE 0.2 MG: 1 INJECTION, SOLUTION INTRAMUSCULAR; INTRAVENOUS; SUBCUTANEOUS at 02:12

## 2021-12-08 RX ADMIN — HEPARIN SODIUM AND DEXTROSE 12 UNITS/KG/HR: 10000; 5 INJECTION INTRAVENOUS at 09:12

## 2021-12-08 RX ADMIN — PROTAMINE SULFATE 10 MG: 10 INJECTION, SOLUTION INTRAVENOUS at 01:12

## 2021-12-08 RX ADMIN — PHENYLEPHRINE HYDROCHLORIDE 160 MCG: 10 INJECTION, SOLUTION INTRAMUSCULAR; INTRAVENOUS; SUBCUTANEOUS at 08:12

## 2021-12-08 RX ADMIN — HEPARIN SODIUM 16000 UNITS: 1000 INJECTION INTRAVENOUS; SUBCUTANEOUS at 09:12

## 2021-12-08 RX ADMIN — SOTALOL HYDROCHLORIDE 40 MG: 80 TABLET ORAL at 10:12

## 2021-12-08 RX ADMIN — GLYCOPYRROLATE 0.2 MG: 0.2 INJECTION, SOLUTION INTRAMUSCULAR; INTRAVITREAL at 08:12

## 2021-12-08 RX ADMIN — PANTOPRAZOLE SODIUM 40 MG: 40 TABLET, DELAYED RELEASE ORAL at 06:12

## 2021-12-08 RX ADMIN — FUROSEMIDE 40 MG: 40 INJECTION, SOLUTION INTRAMUSCULAR; INTRAVENOUS at 10:12

## 2021-12-08 RX ADMIN — SUCCINYLCHOLINE CHLORIDE 160 MG: 20 INJECTION, SOLUTION INTRAMUSCULAR; INTRAVENOUS at 08:12

## 2021-12-08 RX ADMIN — PROTAMINE SULFATE 60 MG: 10 INJECTION, SOLUTION INTRAVENOUS at 01:12

## 2021-12-08 RX ADMIN — ROCURONIUM BROMIDE 5 MG: 10 INJECTION, SOLUTION INTRAVENOUS at 08:12

## 2021-12-08 RX ADMIN — PHENYLEPHRINE HYDROCHLORIDE 160 MCG: 10 INJECTION, SOLUTION INTRAMUSCULAR; INTRAVENOUS; SUBCUTANEOUS at 09:12

## 2021-12-08 RX ADMIN — RIVAROXABAN 20 MG: 20 TABLET, FILM COATED ORAL at 10:12

## 2021-12-08 RX ADMIN — LIDOCAINE HYDROCHLORIDE 80 MG: 20 INJECTION, SOLUTION INTRAVENOUS at 08:12

## 2021-12-08 RX ADMIN — PROPOFOL 50 MG: 10 INJECTION, EMULSION INTRAVENOUS at 08:12

## 2021-12-08 RX ADMIN — SODIUM CHLORIDE 0.5 MCG/KG/MIN: 9 INJECTION, SOLUTION INTRAVENOUS at 08:12

## 2021-12-08 RX ADMIN — SODIUM CHLORIDE, SODIUM GLUCONATE, SODIUM ACETATE, POTASSIUM CHLORIDE, MAGNESIUM CHLORIDE, SODIUM PHOSPHATE, DIBASIC, AND POTASSIUM PHOSPHATE: .53; .5; .37; .037; .03; .012; .00082 INJECTION, SOLUTION INTRAVENOUS at 08:12

## 2021-12-08 RX ADMIN — HEPARIN SODIUM 3000 UNITS: 1000 INJECTION INTRAVENOUS; SUBCUTANEOUS at 11:12

## 2021-12-08 RX ADMIN — SODIUM CHLORIDE 1000 ML: 0.9 INJECTION, SOLUTION INTRAVENOUS at 06:12

## 2021-12-08 RX ADMIN — SODIUM CHLORIDE: 9 INJECTION, SOLUTION INTRAVENOUS at 08:12

## 2021-12-08 RX ADMIN — DEXAMETHASONE SODIUM PHOSPHATE 8 MG: 4 INJECTION, SOLUTION INTRAMUSCULAR; INTRAVENOUS at 08:12

## 2021-12-08 RX ADMIN — FENTANYL CITRATE 100 MCG: 50 INJECTION, SOLUTION INTRAMUSCULAR; INTRAVENOUS at 08:12

## 2021-12-08 RX ADMIN — PROPOFOL 100 MG: 10 INJECTION, EMULSION INTRAVENOUS at 08:12

## 2021-12-09 VITALS
OXYGEN SATURATION: 99 % | BODY MASS INDEX: 28.62 KG/M2 | TEMPERATURE: 98 F | WEIGHT: 235 LBS | RESPIRATION RATE: 20 BRPM | HEART RATE: 59 BPM | DIASTOLIC BLOOD PRESSURE: 60 MMHG | SYSTOLIC BLOOD PRESSURE: 140 MMHG | HEIGHT: 76 IN

## 2021-12-09 DIAGNOSIS — E78.5 HYPERLIPIDEMIA, UNSPECIFIED HYPERLIPIDEMIA TYPE: ICD-10-CM

## 2021-12-09 LAB
GLUCOSE SERPL-MCNC: 145 MG/DL (ref 70–110)
HCO3 UR-SCNC: 23.7 MMOL/L (ref 24–28)
HCT VFR BLD CALC: 37 %PCV (ref 36–54)
PCO2 BLDA: 46 MMHG (ref 35–45)
PH SMN: 7.32 [PH] (ref 7.35–7.45)
PO2 BLDA: 39 MMHG (ref 40–60)
POC BE: -2 MMOL/L
POC IONIZED CALCIUM: 1.19 MMOL/L (ref 1.06–1.42)
POC SATURATED O2: 68 % (ref 95–100)
POC TCO2: 25 MMOL/L (ref 24–29)
POCT GLUCOSE: 112 MG/DL (ref 70–110)
POCT GLUCOSE: 115 MG/DL (ref 70–110)
POTASSIUM BLD-SCNC: 3.9 MMOL/L (ref 3.5–5.1)
SAMPLE: ABNORMAL
SODIUM BLD-SCNC: 141 MMOL/L (ref 136–145)

## 2021-12-09 PROCEDURE — 25000003 PHARM REV CODE 250: Performed by: INTERNAL MEDICINE

## 2021-12-09 PROCEDURE — 93005 ELECTROCARDIOGRAM TRACING: CPT

## 2021-12-09 PROCEDURE — 93010 ELECTROCARDIOGRAM REPORT: CPT | Mod: ,,, | Performed by: INTERNAL MEDICINE

## 2021-12-09 PROCEDURE — 93010 EKG 12-LEAD: ICD-10-PCS | Mod: ,,, | Performed by: INTERNAL MEDICINE

## 2021-12-09 RX ORDER — SOTALOL HYDROCHLORIDE 80 MG/1
40 TABLET ORAL DAILY
Qty: 15 TABLET | Refills: 11 | Status: SHIPPED | OUTPATIENT
Start: 2021-12-09 | End: 2022-05-06

## 2021-12-09 RX ORDER — FUROSEMIDE 40 MG/1
40 TABLET ORAL DAILY
Qty: 5 TABLET | Refills: 0 | Status: SHIPPED | OUTPATIENT
Start: 2021-12-09 | End: 2022-02-07

## 2021-12-09 RX ORDER — PANTOPRAZOLE SODIUM 40 MG/1
40 TABLET, DELAYED RELEASE ORAL DAILY
Qty: 30 TABLET | Refills: 0 | Status: SHIPPED | OUTPATIENT
Start: 2021-12-09 | End: 2022-02-07

## 2021-12-09 RX ORDER — SUCRALFATE 1 G/1
1 TABLET ORAL
Qty: 120 TABLET | Refills: 0 | Status: SHIPPED | OUTPATIENT
Start: 2021-12-09 | End: 2022-01-08

## 2021-12-09 RX ADMIN — SOTALOL HYDROCHLORIDE 40 MG: 80 TABLET ORAL at 07:12

## 2021-12-09 RX ADMIN — SUCRALFATE 1 G: 1 SUSPENSION ORAL at 11:12

## 2021-12-09 RX ADMIN — SUCRALFATE 1 G: 1 SUSPENSION ORAL at 05:12

## 2021-12-09 RX ADMIN — PANTOPRAZOLE SODIUM 40 MG: 40 TABLET, DELAYED RELEASE ORAL at 05:12

## 2021-12-14 ENCOUNTER — PATIENT MESSAGE (OUTPATIENT)
Dept: CARDIOLOGY | Facility: CLINIC | Age: 81
End: 2021-12-14
Payer: MEDICARE

## 2021-12-14 DIAGNOSIS — I25.10 CORONARY ARTERY DISEASE INVOLVING NATIVE CORONARY ARTERY OF NATIVE HEART WITHOUT ANGINA PECTORIS: ICD-10-CM

## 2021-12-14 DIAGNOSIS — Z98.61 POST PTCA: ICD-10-CM

## 2021-12-14 DIAGNOSIS — I10 ESSENTIAL HYPERTENSION: ICD-10-CM

## 2021-12-14 RX ORDER — CLOPIDOGREL BISULFATE 75 MG/1
75 TABLET ORAL DAILY
Qty: 90 TABLET | Refills: 3 | Status: SHIPPED | OUTPATIENT
Start: 2021-12-14 | End: 2021-12-14 | Stop reason: SDUPTHER

## 2021-12-14 RX ORDER — AMLODIPINE BESYLATE 5 MG/1
TABLET ORAL
Qty: 90 TABLET | Refills: 3 | Status: SHIPPED | OUTPATIENT
Start: 2021-12-14 | End: 2022-09-12

## 2021-12-15 RX ORDER — CLOPIDOGREL BISULFATE 75 MG/1
75 TABLET ORAL DAILY
Qty: 90 TABLET | Refills: 0 | OUTPATIENT
Start: 2021-12-15

## 2021-12-15 RX ORDER — ATORVASTATIN CALCIUM 80 MG/1
80 TABLET, FILM COATED ORAL DAILY
Qty: 90 TABLET | Refills: 1 | Status: SHIPPED | OUTPATIENT
Start: 2021-12-15 | End: 2022-03-08

## 2021-12-20 ENCOUNTER — OFFICE VISIT (OUTPATIENT)
Dept: PRIMARY CARE CLINIC | Facility: CLINIC | Age: 81
End: 2021-12-20
Payer: MEDICARE

## 2021-12-20 VITALS
HEART RATE: 67 BPM | WEIGHT: 238.44 LBS | SYSTOLIC BLOOD PRESSURE: 128 MMHG | HEIGHT: 76 IN | DIASTOLIC BLOOD PRESSURE: 58 MMHG | BODY MASS INDEX: 29.03 KG/M2 | OXYGEN SATURATION: 97 % | RESPIRATION RATE: 16 BRPM

## 2021-12-20 DIAGNOSIS — S43.401D SPRAIN OF RIGHT SHOULDER, UNSPECIFIED SHOULDER SPRAIN TYPE, SUBSEQUENT ENCOUNTER: Primary | ICD-10-CM

## 2021-12-20 DIAGNOSIS — Z12.5 SCREENING FOR PROSTATE CANCER: ICD-10-CM

## 2021-12-20 DIAGNOSIS — Z13.220 ENCOUNTER FOR LIPID SCREENING FOR CARDIOVASCULAR DISEASE: ICD-10-CM

## 2021-12-20 DIAGNOSIS — S50.01XD CONTUSION OF RIGHT ELBOW, SUBSEQUENT ENCOUNTER: ICD-10-CM

## 2021-12-20 DIAGNOSIS — I48.0 PAROXYSMAL ATRIAL FIBRILLATION: ICD-10-CM

## 2021-12-20 DIAGNOSIS — Z13.6 ENCOUNTER FOR LIPID SCREENING FOR CARDIOVASCULAR DISEASE: ICD-10-CM

## 2021-12-20 DIAGNOSIS — F41.9 ANXIETY: ICD-10-CM

## 2021-12-20 PROCEDURE — 99999 PR PBB SHADOW E&M-EST. PATIENT-LVL V: CPT | Mod: PBBFAC,,, | Performed by: INTERNAL MEDICINE

## 2021-12-20 PROCEDURE — 99999 PR PBB SHADOW E&M-EST. PATIENT-LVL V: ICD-10-PCS | Mod: PBBFAC,,, | Performed by: INTERNAL MEDICINE

## 2021-12-20 PROCEDURE — 99214 OFFICE O/P EST MOD 30 MIN: CPT | Mod: S$GLB,,, | Performed by: INTERNAL MEDICINE

## 2021-12-20 PROCEDURE — 99214 PR OFFICE/OUTPT VISIT, EST, LEVL IV, 30-39 MIN: ICD-10-PCS | Mod: S$GLB,,, | Performed by: INTERNAL MEDICINE

## 2021-12-20 RX ORDER — HYDROCODONE BITARTRATE AND ACETAMINOPHEN 5; 325 MG/1; MG/1
1 TABLET ORAL
Qty: 25 TABLET | Refills: 0 | Status: SHIPPED | OUTPATIENT
Start: 2021-12-20 | End: 2022-03-08 | Stop reason: SDUPTHER

## 2021-12-20 RX ORDER — DIAZEPAM 10 MG/1
10 TABLET ORAL
Qty: 30 TABLET | Refills: 0 | Status: SHIPPED | OUTPATIENT
Start: 2021-12-20 | End: 2022-06-07 | Stop reason: SDUPTHER

## 2021-12-27 ENCOUNTER — PATIENT MESSAGE (OUTPATIENT)
Dept: PRIMARY CARE CLINIC | Facility: CLINIC | Age: 81
End: 2021-12-27
Payer: MEDICARE

## 2021-12-27 DIAGNOSIS — E11.9 TYPE 2 DIABETES MELLITUS WITHOUT COMPLICATION, WITHOUT LONG-TERM CURRENT USE OF INSULIN: ICD-10-CM

## 2021-12-28 RX ORDER — LANCETS
EACH MISCELLANEOUS
Qty: 100 EACH | Refills: 5 | Status: SHIPPED | OUTPATIENT
Start: 2021-12-28

## 2021-12-28 RX ORDER — INSULIN PUMP SYRINGE, 3 ML
EACH MISCELLANEOUS
Qty: 1 EACH | Refills: 0 | Status: SHIPPED | OUTPATIENT
Start: 2021-12-28 | End: 2023-12-20

## 2022-01-03 ENCOUNTER — PATIENT MESSAGE (OUTPATIENT)
Dept: ELECTROPHYSIOLOGY | Facility: CLINIC | Age: 82
End: 2022-01-03
Payer: MEDICARE

## 2022-01-03 RX ORDER — RIVAROXABAN 20 MG/1
20 TABLET, FILM COATED ORAL NIGHTLY
Qty: 30 TABLET | Refills: 11 | Status: SHIPPED | OUTPATIENT
Start: 2022-01-03 | End: 2022-12-09

## 2022-01-05 NOTE — PROGRESS NOTES
[facility]  Department of Otorhinolaryngology and Communication Sciences    Subjective:      Nick De Anda is a 81 y.o. male who presents for follow-up. He has glottic dysplasia.    9/28/2021: SML KTP assisted excision right TVF lesion.  1. BIOPSY OF RIGHT FALSE VOCAL FOLD:   MODERATE NONSPECIFIC SUBMUCOSAL CHRONIC INFLAMMATION   NO NEOPLASIA IDENTIFIED   2. BIOPSY OF VENTRICLE:   NO SIGNIFICANT HISTOLOGIC ALTERATION   NO NEOPLASIA IDENTIFIED   3. BIOPSY OF RIGHT TRUE VOCAL FOLD:   NO CARCINOMA IDENTIFIED   FOCAL MODERATE SQUAMOUS MUCOSAL DYSPLASIA    He has done well since his last visit. His voice is improving. He denies throat pain, odynophagia, otalgia, hemoptysis, hematemesis, neck mass.    The patient's medications, allergies, past medical, surgical, social and family histories were reviewed and updated as appropriate.    A detailed review of systems was obtained with pertinent positives as per the above HPI, and otherwise negative.      Objective:     VS reviewed  Constitutional: comfortable, well dressed  Psychiatric: appropriate affect  Respiratory: comfortably breathing, symmetric chest rise, no stridor  Voice: minimal variable breathiness/roughness  Head: normocephalic  Eyes: conjunctivae and sclerae clear  Indirect laryngoscopy is limited due to gag    Procedure  Rigid Laryngeal Videostroboscopy (04910): Laryngeal videostroboscopy is indicated to assess the laryngeal vibratory biomechanics and vocal fold oscillation, which cannot be assessed with a plain light examination. This was carried out with a 70 degree endoscope. After verbal consent was obtained, the patient was positioned and the tongue was gently secured with a gauze sponge. The endoscope was passed transorally and positioned to image the larynx and hypopharynx in detail. The following features were examined: laryngeal and hypopharyngeal masses; vocal fold range and symmetry of motion; laryngeal mucosal edema, erythema,  inflammation, and hydration; salivary pooling; and gross laryngeal sensation. During phonation, the vocal folds were assessed for glottal closure; mucosal wave; vocal fold lesions; vibratory periodicity, amplitude, and phase symmetry; and vertical height match. The equipment was removed. The patient tolerated the procedure well without complication. All findings were normal except:  - right elise-laryngeal surgical site healing with mild diffuse ecchymosis and isolated granulation  - complete closure, poor pliability right true vocal fold    Data Reviewed  See HPI      Assessment:     Nick De Anda is a 81 y.o. male with glottic dysplasia, progressing well following microsurgical intervention.     Plan:     Reassurance was provided. He will follow up with me in 4-6 months.    All questions were answered, and the patient is in agreement with the plan.    Rommel Stubbs M.D.  [facility]  Department of Otorhinolaryngology and Communication Sciences

## 2022-01-06 ENCOUNTER — PATIENT MESSAGE (OUTPATIENT)
Dept: OTOLARYNGOLOGY | Facility: CLINIC | Age: 82
End: 2022-01-06

## 2022-01-06 ENCOUNTER — OFFICE VISIT (OUTPATIENT)
Dept: OTOLARYNGOLOGY | Facility: CLINIC | Age: 82
End: 2022-01-06
Payer: MEDICARE

## 2022-01-06 VITALS — DIASTOLIC BLOOD PRESSURE: 69 MMHG | SYSTOLIC BLOOD PRESSURE: 124 MMHG | HEART RATE: 63 BPM

## 2022-01-06 DIAGNOSIS — R49.0 DYSPHONIA: Primary | ICD-10-CM

## 2022-01-06 DIAGNOSIS — Q31.9 DYSPLASIA OF LARYNX: ICD-10-CM

## 2022-01-06 LAB
CTP QC/QA: YES
SARS-COV-2 RDRP RESP QL NAA+PROBE: NEGATIVE

## 2022-01-06 PROCEDURE — 3288F FALL RISK ASSESSMENT DOCD: CPT | Mod: CPTII,S$GLB,, | Performed by: OTOLARYNGOLOGY

## 2022-01-06 PROCEDURE — 1160F PR REVIEW ALL MEDS BY PRESCRIBER/CLIN PHARMACIST DOCUMENTED: ICD-10-PCS | Mod: CPTII,S$GLB,, | Performed by: OTOLARYNGOLOGY

## 2022-01-06 PROCEDURE — 1126F PR PAIN SEVERITY QUANTIFIED, NO PAIN PRESENT: ICD-10-PCS | Mod: CPTII,S$GLB,, | Performed by: OTOLARYNGOLOGY

## 2022-01-06 PROCEDURE — U0002 COVID-19 LAB TEST NON-CDC: HCPCS | Mod: QW,S$GLB,, | Performed by: OTOLARYNGOLOGY

## 2022-01-06 PROCEDURE — 99213 OFFICE O/P EST LOW 20 MIN: CPT | Mod: 25,S$GLB,, | Performed by: OTOLARYNGOLOGY

## 2022-01-06 PROCEDURE — 3074F SYST BP LT 130 MM HG: CPT | Mod: CPTII,S$GLB,, | Performed by: OTOLARYNGOLOGY

## 2022-01-06 PROCEDURE — 3288F PR FALLS RISK ASSESSMENT DOCUMENTED: ICD-10-PCS | Mod: CPTII,S$GLB,, | Performed by: OTOLARYNGOLOGY

## 2022-01-06 PROCEDURE — 1160F RVW MEDS BY RX/DR IN RCRD: CPT | Mod: CPTII,S$GLB,, | Performed by: OTOLARYNGOLOGY

## 2022-01-06 PROCEDURE — 1101F PT FALLS ASSESS-DOCD LE1/YR: CPT | Mod: CPTII,S$GLB,, | Performed by: OTOLARYNGOLOGY

## 2022-01-06 PROCEDURE — 99999 PR PBB SHADOW E&M-EST. PATIENT-LVL IV: CPT | Mod: PBBFAC,,, | Performed by: OTOLARYNGOLOGY

## 2022-01-06 PROCEDURE — 1126F AMNT PAIN NOTED NONE PRSNT: CPT | Mod: CPTII,S$GLB,, | Performed by: OTOLARYNGOLOGY

## 2022-01-06 PROCEDURE — 1159F MED LIST DOCD IN RCRD: CPT | Mod: CPTII,S$GLB,, | Performed by: OTOLARYNGOLOGY

## 2022-01-06 PROCEDURE — 99999 PR PBB SHADOW E&M-EST. PATIENT-LVL IV: ICD-10-PCS | Mod: PBBFAC,,, | Performed by: OTOLARYNGOLOGY

## 2022-01-06 PROCEDURE — 1159F PR MEDICATION LIST DOCUMENTED IN MEDICAL RECORD: ICD-10-PCS | Mod: CPTII,S$GLB,, | Performed by: OTOLARYNGOLOGY

## 2022-01-06 PROCEDURE — 3078F PR MOST RECENT DIASTOLIC BLOOD PRESSURE < 80 MM HG: ICD-10-PCS | Mod: CPTII,S$GLB,, | Performed by: OTOLARYNGOLOGY

## 2022-01-06 PROCEDURE — 3074F PR MOST RECENT SYSTOLIC BLOOD PRESSURE < 130 MM HG: ICD-10-PCS | Mod: CPTII,S$GLB,, | Performed by: OTOLARYNGOLOGY

## 2022-01-06 PROCEDURE — 31579 LARYNGOSCOPY TELESCOPIC: CPT | Mod: S$GLB,,, | Performed by: OTOLARYNGOLOGY

## 2022-01-06 PROCEDURE — 3078F DIAST BP <80 MM HG: CPT | Mod: CPTII,S$GLB,, | Performed by: OTOLARYNGOLOGY

## 2022-01-06 PROCEDURE — 99213 PR OFFICE/OUTPT VISIT, EST, LEVL III, 20-29 MIN: ICD-10-PCS | Mod: 25,S$GLB,, | Performed by: OTOLARYNGOLOGY

## 2022-01-06 PROCEDURE — 31579 PR LARYNGOSCOPY, FLEX/RIGID TELESCOPIC, W/STROBOSCOPY: ICD-10-PCS | Mod: S$GLB,,, | Performed by: OTOLARYNGOLOGY

## 2022-01-06 PROCEDURE — U0002: ICD-10-PCS | Mod: QW,S$GLB,, | Performed by: OTOLARYNGOLOGY

## 2022-01-06 PROCEDURE — 1101F PR PT FALLS ASSESS DOC 0-1 FALLS W/OUT INJ PAST YR: ICD-10-PCS | Mod: CPTII,S$GLB,, | Performed by: OTOLARYNGOLOGY

## 2022-01-12 ENCOUNTER — OFFICE VISIT (OUTPATIENT)
Dept: PODIATRY | Facility: CLINIC | Age: 82
End: 2022-01-12
Payer: MEDICARE

## 2022-01-12 VITALS
DIASTOLIC BLOOD PRESSURE: 67 MMHG | WEIGHT: 238.56 LBS | SYSTOLIC BLOOD PRESSURE: 136 MMHG | BODY MASS INDEX: 29.04 KG/M2 | HEART RATE: 65 BPM

## 2022-01-12 DIAGNOSIS — Z79.01 LONG TERM (CURRENT) USE OF ANTICOAGULANTS: Primary | ICD-10-CM

## 2022-01-12 DIAGNOSIS — L97.521 ULCER OF TOE, LEFT, LIMITED TO BREAKDOWN OF SKIN: ICD-10-CM

## 2022-01-12 DIAGNOSIS — S90.221A: ICD-10-CM

## 2022-01-12 DIAGNOSIS — R60.0 EDEMA, LOWER EXTREMITY: ICD-10-CM

## 2022-01-12 DIAGNOSIS — E11.42 DIABETIC POLYNEUROPATHY ASSOCIATED WITH TYPE 2 DIABETES MELLITUS: ICD-10-CM

## 2022-01-12 PROCEDURE — 99213 PR OFFICE/OUTPT VISIT, EST, LEVL III, 20-29 MIN: ICD-10-PCS | Mod: 25,S$GLB,, | Performed by: PODIATRIST

## 2022-01-12 PROCEDURE — 99999 PR PBB SHADOW E&M-EST. PATIENT-LVL III: ICD-10-PCS | Mod: PBBFAC,,, | Performed by: PODIATRIST

## 2022-01-12 PROCEDURE — 1125F PR PAIN SEVERITY QUANTIFIED, PAIN PRESENT: ICD-10-PCS | Mod: CPTII,S$GLB,, | Performed by: PODIATRIST

## 2022-01-12 PROCEDURE — 99213 OFFICE O/P EST LOW 20 MIN: CPT | Mod: 25,S$GLB,, | Performed by: PODIATRIST

## 2022-01-12 PROCEDURE — 11042 PR DEBRIDEMENT, SKIN, SUB-Q TISSUE,=<20 SQ CM: ICD-10-PCS | Mod: S$GLB,,, | Performed by: PODIATRIST

## 2022-01-12 PROCEDURE — 3075F PR MOST RECENT SYSTOLIC BLOOD PRESS GE 130-139MM HG: ICD-10-PCS | Mod: CPTII,S$GLB,, | Performed by: PODIATRIST

## 2022-01-12 PROCEDURE — 11042 DBRDMT SUBQ TIS 1ST 20SQCM/<: CPT | Mod: S$GLB,,, | Performed by: PODIATRIST

## 2022-01-12 PROCEDURE — 1125F AMNT PAIN NOTED PAIN PRSNT: CPT | Mod: CPTII,S$GLB,, | Performed by: PODIATRIST

## 2022-01-12 PROCEDURE — 1159F PR MEDICATION LIST DOCUMENTED IN MEDICAL RECORD: ICD-10-PCS | Mod: CPTII,S$GLB,, | Performed by: PODIATRIST

## 2022-01-12 PROCEDURE — 3075F SYST BP GE 130 - 139MM HG: CPT | Mod: CPTII,S$GLB,, | Performed by: PODIATRIST

## 2022-01-12 PROCEDURE — 1159F MED LIST DOCD IN RCRD: CPT | Mod: CPTII,S$GLB,, | Performed by: PODIATRIST

## 2022-01-12 PROCEDURE — 3078F DIAST BP <80 MM HG: CPT | Mod: CPTII,S$GLB,, | Performed by: PODIATRIST

## 2022-01-12 PROCEDURE — 3078F PR MOST RECENT DIASTOLIC BLOOD PRESSURE < 80 MM HG: ICD-10-PCS | Mod: CPTII,S$GLB,, | Performed by: PODIATRIST

## 2022-01-12 PROCEDURE — 99999 PR PBB SHADOW E&M-EST. PATIENT-LVL III: CPT | Mod: PBBFAC,,, | Performed by: PODIATRIST
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
## 2022-01-17 DIAGNOSIS — I10 ESSENTIAL HYPERTENSION: ICD-10-CM

## 2022-01-17 NOTE — TELEPHONE ENCOUNTER
No new care gaps identified.  Powered by [employer]. Reference number: [id number].   1/17/2022 8:31:11 AM CST

## 2022-01-19 ENCOUNTER — PATIENT MESSAGE (OUTPATIENT)
Dept: PRIMARY CARE CLINIC | Facility: CLINIC | Age: 82
End: 2022-01-19
Payer: MEDICARE

## 2022-01-19 DIAGNOSIS — I10 ESSENTIAL HYPERTENSION: ICD-10-CM

## 2022-01-19 RX ORDER — HYDROCHLOROTHIAZIDE 12.5 MG/1
12.5 CAPSULE ORAL DAILY
Qty: 90 CAPSULE | Refills: 0 | OUTPATIENT
Start: 2022-01-19

## 2022-01-19 NOTE — TELEPHONE ENCOUNTER
No new care gaps identified.  Powered by [employer] by [employer]. Reference number: [id number].   1/19/2022 8:55:04 AM CST

## 2022-01-19 NOTE — TELEPHONE ENCOUNTER
Refill Routing Note   Medication(s) are not appropriate for processing by [facility] for the following reason(s):      - Drug-Disease Interaction (hydroCHLOROthiazide and Hyponatremia)    [facility] action(s):  Defer       Medication Therapy Plan: Drug-Disease: hydroCHLOROthiazide and Hyponatremia; Defer to PCP  --->Care Gap information included in message below if applicable.   Medication reconciliation completed: No   Automatic Epic Generated Protocol Data:        Requested Prescriptions   Pending Prescriptions Disp Refills    hydroCHLOROthiazide (MICROZIDE) 12.5 mg capsule [Pharmacy Med Name: hydroCHLOROthiazide 12.5 MG Oral Capsule] 90 capsule 3     Sig: Take 1 capsule by mouth once daily       Cardiovascular: Diuretics - Thiazide Passed - 1/17/2022  8:30 AM        Passed - Patient is at least 18 years old        Passed - Last BP in normal range within 360 days     BP Readings from Last 1 Encounters:   01/12/22 136/67               Passed - Valid encounter within last 15 months     Recent Visits  Date Type Provider Dept   12/20/21 Office Visit Santiago Adler MD [facility]   05/20/21 Office Visit Santiago Adler MD [facility]   03/16/21 Office Visit Santiago Adler MD [facility]   02/04/21 Office Visit Santiago Adler MD [facility]   12/15/20 Office Visit Santiago Adler MD [facility]   10/05/20 Office Visit Santiago Adler MD [facility]   07/01/20 Office Visit Santiago Adler MD [facility]   05/14/20 Office Visit Santiago Adler MD [facility]   05/08/20 Office Visit Santiago Adler MD [facility]   03/24/20 Office Visit Santiago Adler MD [facility]   Showing recent visits within past 720 days and meeting all other requirements  Future Appointments  No visits were found meeting these conditions.  Showing future appointments within next 150 days and meeting all other  requirements      Future Appointments              In 2 days LAB, [facility], [facility]    In 6 days MD Alexis Gancancerctr Head/[id number], [facility]    In 2 weeks EKG, [facility], [facility]    In 2 weeks Faina Espinosa, NP [id number], [facility]    In 3 weeks Silvia Parra, [place] [facility] - Podiatry, [facility]    In 2 months Santiago Adler MD [facility] [address], [facility]    In 3 months MD Gerhard Saldanasoncancerctr [facility]                Passed - Cr is 1.39 or below and within 360 days     Lab Results   Component Value Date    CREATININE 0.8 12/01/2021    CREATININE 0.8 09/17/2021    CREATININE 0.8 04/15/2021    POCCRE 0.8 11/17/2020              Passed - K in normal range and within 360 days     POC Potassium   Date Value Ref Range Status   12/08/2021 3.9 3.5 - 5.1 mmol/L Final     Potassium   Date Value Ref Range Status   12/01/2021 4.0 3.5 - 5.1 mmol/L Final   09/17/2021 4.1 3.5 - 5.1 mmol/L Final   04/15/2021 4.3 3.5 - 5.1 mmol/L Final              Passed - Na is between 130 and 148 and within 360 days     POC Sodium   Date Value Ref Range Status   12/08/2021 141 136 - 145 mmol/L Final     Sodium   Date Value Ref Range Status   12/01/2021 137 136 - 145 mmol/L Final   09/17/2021 133 (L) 136 - 145 mmol/L Final   04/15/2021 138 136 - 145 mmol/L Final              Passed - eGFR within 360 days     Lab Results   Component Value Date    EGFRNONAA >60.0 12/01/2021    EGFRNONAA >60.0 09/17/2021    EGFRNONAA >60.0 04/15/2021                      Appointments  past 12m or future 3m with PCP    Date Provider   Last Visit   12/20/2021 Santiago Adler MD   Next Visit   1/19/2022 Santiago Adler MD   ED visits in past 90 days: 0        Note composed:4:03 PM 01/19/2022

## 2022-01-19 NOTE — TELEPHONE ENCOUNTER
Quick DC. Duplicate Request-  med pended in previous encounter, awaiting assessment    Refill Authorization Note   Nick De Anda  is requesting a refill authorization.  Brief Assessment and Rationale for Refill:  Quick Discontinue  Medication Therapy Plan:       Medication Reconciliation Completed:  No      Comments:   Pended Medication(s)       Requested Prescriptions     Refused Prescriptions Disp Refills    hydroCHLOROthiazide (MICROZIDE) 12.5 mg capsule 90 capsule 0     Sig: Take 1 capsule (12.5 mg total) by mouth once daily.     Refused By: STEPHEN CASPER     Reason for Refusal: Duplicate        Duplicate Pended Encounter(s)/ Last Prescribed Details: (includes pharmacy & prescriber details)   Ordering Encounter Report    Associated Reports   View Encounter        Note composed:4:12 PM 01/19/2022

## 2022-01-19 NOTE — TELEPHONE ENCOUNTER
No new care gaps identified.  Powered by [employer]. Reference number: [id number].   1/19/2022 5:18:26 PM CST

## 2022-01-20 RX ORDER — HYDROCHLOROTHIAZIDE 12.5 MG/1
CAPSULE ORAL
Qty: 90 CAPSULE | Refills: 3 | OUTPATIENT
Start: 2022-01-20

## 2022-01-20 RX ORDER — HYDROCHLOROTHIAZIDE 12.5 MG/1
12.5 CAPSULE ORAL DAILY
Qty: 90 CAPSULE | Refills: 0 | OUTPATIENT
Start: 2022-01-20

## 2022-01-21 ENCOUNTER — LAB VISIT (OUTPATIENT)
Dept: LAB | Facility: HOSPITAL | Age: 82
End: 2022-01-21
Attending: INTERNAL MEDICINE
Payer: MEDICARE

## 2022-01-21 DIAGNOSIS — I11.0 HYPERTENSIVE HEART DISEASE WITH HEART FAILURE: ICD-10-CM

## 2022-01-21 DIAGNOSIS — I10 ESSENTIAL HYPERTENSION: ICD-10-CM

## 2022-01-21 DIAGNOSIS — I25.10 CORONARY ARTERY DISEASE INVOLVING NATIVE CORONARY ARTERY OF NATIVE HEART WITHOUT ANGINA PECTORIS: ICD-10-CM

## 2022-01-21 DIAGNOSIS — E87.1 HYPONATREMIA: ICD-10-CM

## 2022-01-21 DIAGNOSIS — I48.0 PAROXYSMAL ATRIAL FIBRILLATION: ICD-10-CM

## 2022-01-21 LAB
ANION GAP SERPL CALC-SCNC: 6 MMOL/L (ref 8–16)
BNP SERPL-MCNC: 150 PG/ML (ref 0–99)
BUN SERPL-MCNC: 17 MG/DL (ref 8–23)
CALCIUM SERPL-MCNC: 9.3 MG/DL (ref 8.7–10.5)
CHLORIDE SERPL-SCNC: 105 MMOL/L (ref 95–110)
CO2 SERPL-SCNC: 26 MMOL/L (ref 23–29)
CREAT SERPL-MCNC: 0.8 MG/DL (ref 0.5–1.4)
EST. GFR  (AFRICAN AMERICAN): >60 ML/MIN/1.73 M^2
EST. GFR  (NON AFRICAN AMERICAN): >60 ML/MIN/1.73 M^2
GLUCOSE SERPL-MCNC: 96 MG/DL (ref 70–110)
POTASSIUM SERPL-SCNC: 4 MMOL/L (ref 3.5–5.1)
SODIUM SERPL-SCNC: 137 MMOL/L (ref 136–145)

## 2022-01-21 PROCEDURE — 80048 BASIC METABOLIC PNL TOTAL CA: CPT | Performed by: INTERNAL MEDICINE

## 2022-01-21 PROCEDURE — 83880 ASSAY OF NATRIURETIC PEPTIDE: CPT | Performed by: INTERNAL MEDICINE

## 2022-01-21 PROCEDURE — 36415 COLL VENOUS BLD VENIPUNCTURE: CPT | Mod: PN | Performed by: INTERNAL MEDICINE

## 2022-01-21 NOTE — TELEPHONE ENCOUNTER
No new care gaps identified.  Powered by [employer]. Reference number: [id number].   1/21/2022 3:41:40 PM CST

## 2022-01-21 NOTE — TELEPHONE ENCOUNTER
----- Message from Felisa Tong sent at 1/21/2022  3:31 PM CST -----  Contact: [phone]  Requesting an RX refill or new RX.  Is this a refill or new RX: refill  RX name and strength (copy/paste from chart):  hydroCHLOROthiazide (MICROZIDE) 12.5 mg capsule  Is this a 30 day or 90 day RX: 30  Patient advised that in the future they can use their [facility] account to request a refill?:  yes  Patient advised that RX refills can take up to 72 hours?:  yes  Pharmacy name and phone # (copy/paste from chart):    [facility] [address] [place], [place] - [address]  [address] [facility] [address]  Phone: [phone] Fax: [phone]    Comments:   Pt states he only has 1 pill left.. please give return call when this has been sent he states he sent this through the portal but he sates it came back saying denied

## 2022-01-22 RX ORDER — HYDROCHLOROTHIAZIDE 12.5 MG/1
12.5 CAPSULE ORAL DAILY
Qty: 90 CAPSULE | Refills: 1 | Status: SHIPPED | OUTPATIENT
Start: 2022-01-22 | End: 2022-07-22

## 2022-01-28 ENCOUNTER — PATIENT MESSAGE (OUTPATIENT)
Dept: CARDIOLOGY | Facility: CLINIC | Age: 82
End: 2022-01-28
Payer: MEDICARE

## 2022-02-01 NOTE — PROGRESS NOTES
Mr. De Anda is a patient of Dr. Paez and was last seen in clinic 11/16/2021.      Subjective:   Patient ID:  Nick De Anda is an 81 y.o. male who presents for follow-up of Atrial Fibrillation  .     HPI:    Mr. De Anda is an 81 y.o. male with CAD (CABG), DM, HTN, pAF (PVI 10/2020), AFL (RFA CTI 10/2020) here for follow up after ablation.    Background:    Primary Cardiologist: Robert Alas MD  Primary Care Physician: Santiago Adler MD    Mr. De Anda has a hx of coronary artery disease s/p CABG with preserved LVEF, type 2 diabetes, hypertension and recently discovered paroxysmal atrial fibrillation and typical atrial flutter. He recently transferred care from [facility] where his AF was diagnosed and he was initiated on xarelto. He was recently admitted for a symptomatic UTI and observed to have a paroxysm of atrial fibrillation with ventricular rates in the 40s-60s (slower rates appeared to correlate during overnight hours). He monitors his vitals at home and for a while notes it indicates he has an irregular heart beat however he feels normal. He has established care with Dr. Dean in cardiology on 2/13/2019 and was noted to be in atrial fibrillation. He has no obvious symptoms. I reviewed all available electrocardiograms in Epic which show sinus rhythm on 2/5/2014 and 2/2/2019. An ECG dated 2/3/2019 notes rate controlled atrial fibrillation. When I saw him in clinic 2/2019 he was in sinus rhythm. At that visit since he had no symptoms with AF and was rate controlled when in AF I recommended no aggressive rhythm control strategy and would continue his current management. He was to see me prn.    Mr. De Anda returned for discussion of his AF 6/2019. He reported he believed AF was causing fatigue. Notes episodes every week or so that correlate to being fatigue. He was admitted for DCCV and sotalol initiation. He converted when propofol was administered and the DEANGELO/DCCV was cancelled. He saw Faina  Jesús in EP clinic 8/1/2019 and was in sinus rhythm. He felt better in sinus rhythm.    Mr. De Anda was seen in clinic 10/2019 and noted paroxysms of AF since starting sotalol. One episode lasted 2 days and another 3 days. He noted his pulse was irregular in this setting. He came in for an ECG which noted sinus rhythm however he felt he had already converted. We discussed increasing dosage to 120mg bid however he declined.    Mr. De Anda presented for routine AF follow-up 7/2020. He reported he continues to have some paroxysms of AF, at times last 1/2 day- 1 day. We discussed PVI, he desired to think about it.    Mr. De Anda returned for follow-up 10/2020. His AF burden had significantly increased and he felt poorly. He noted he was still paroxysmal. He desired ablation.    Mr. De Anda underwent RF-PVI and RFA of the CTI in December of 2020.     3/2021: Saw Faina Espinosa. Reported no arrhythmias. Repeat ECHO noted LVEF of 60%. Sotalol was reduced to 40mg bid.    10/2021: Saw Faina Espinosa. He was in sinus rhythm at that time however ECG from 9/2021 prior to a surgery noted atrial flutter (atypical). He wore a 14 day holter that noted a 6.4% burden of atrial flutter. There were R-R pauses up to 6.2 seconds during flutter while sleeping. He was instructed to stop sotalol.    11/16/2021: He now has recurrent atypical atrial flutter despite sotalol with periods of bradycardia while asleep. This arrhythmia has become symptomatic. He feels significantly better and is in sinus rhythm. He is having symptomatic breakthrough on sotalol. His [id number] score is 5 and long-term anticoagulation is recommended.    Update (02/07/2022):    12/8/2021:  · Successful pulmonary vein RF ablation (reisolation of the LIPV and RSPV)   · Successful left atrial posterior wall isolation targeting CFEs   · Successful anterior mitral isthmus ablation with bidirectional block for atypical atrial flutter that was observed as an outpatient but  unable to be induced   · Unsuccessful difficult lateral mitral isthmus ablation (unable to get bidirectional block and thus created the anterior mitral isthmus ablation lesion set)  · Complex modifier requested for extensive ablation and considerable time spent trying to obtain lateral mitral isthmus block.  Discharged on sotalol 40mg BID    Today he says he has felt well - no sustained palpitations. Only notices brief palps if his sotalol dose is delayed. Otherwise ok. No CP, RODRIGEZ, LH, syncope.    He is currently taking xarelto 20mg daily for stroke prophylaxis and denies significant bleeding episodes. He is currently being treated with sotalol 40mg BID for rhythm control.  Kidney function is stable, with a creatinine of 0.8 on 1/21/2021.    I have personally reviewed the patient's EKG today, which shows sinus rhythm with 1st deg AVB at 62bpm. NJ interval is 220. QRS is 74. QTc is 476.    Relevant Cardiac Test Results:    2D Echo (3/16/2021):  · The left ventricle is normal in size with mild concentric hypertrophy and normal systolic function. The estimated ejection fraction is 60%  · Grade II left ventricular diastolic dysfunction.  · Normal right ventricular size with normal right ventricular systolic function.  · Moderate left atrial enlargement.  · Normal central venous pressure (3 mmHg).    Current Outpatient Medications   Medication Sig    amLODIPine (NORVASC) 5 MG tablet One-2 per day or as directed    ascorbic acid, vitamin C, (VITAMIN C) 100 MG tablet Take 100 mg by mouth once daily.    atorvastatin (LIPITOR) 80 MG tablet Take 1 tablet (80 mg total) by mouth every evening.    azelastine (ASTELIN) 137 mcg (0.1 %) nasal spray 1 spray (137 mcg total) by Nasal route 2 (two) times daily.    blood sugar diagnostic Strp To check BG 2 times daily, to use with insurance preferred meter    blood-glucose meter kit To check BG 2 times daily, to use with insurance preferred meter    clopidogreL (PLAVIX) 75 mg  tablet Take 1 tablet (75 mg total) by mouth once daily.    famotidine (PEPCID) 40 MG tablet Take 1 tablet (40 mg total) by mouth every evening.    hydroCHLOROthiazide (MICROZIDE) 12.5 mg capsule Take 1 capsule (12.5 mg total) by mouth once daily.    HYDROcodone-acetaminophen (NORCO) 5-325 mg per tablet Take 1 tablet by mouth every 24 hours as needed for Pain.    irbesartan (AVAPRO) 300 MG tablet TAKE 1 TABLET BY MOUTH ONCE DAILY IN THE EVENING    lancets Misc To check BG 2 times daily, to use with insurance preferred meter    metFORMIN (GLUCOPHAGE) 500 MG tablet TAKE 1 TABLET BY MOUTH TWICE DAILY WITH MEALS    multivitamin (THERAGRAN) per tablet Take 1 tablet by mouth once daily.    sotaloL (BETAPACE) 80 MG tablet Take 0.5 tablets (40 mg total) by mouth once daily.    sucralfate (CARAFATE) 1 gram tablet     XARELTO 20 mg Tab Take 1 tablet (20 mg total) by mouth every evening.    atorvastatin (LIPITOR) 80 MG tablet Take 1 tablet (80 mg total) by mouth once daily.    diazePAM (VALIUM) 10 MG Tab Take 1 tablet (10 mg total) by mouth as needed (anxiety). Just half to one and use very seldom-get refills from PCP    furosemide (LASIX) 40 MG tablet Take 1 tablet (40 mg total) by mouth once daily. for 5 doses    nitroGLYCERIN (NITROSTAT) 0.4 MG SL tablet Place 1 tablet (0.4 mg total) under the tongue every 5 (five) minutes as needed for Chest pain. (Patient not taking: Reported on 2/7/2022)    pantoprazole (PROTONIX) 40 MG tablet Take 1 tablet (40 mg total) by mouth once daily.     No current facility-administered medications for this visit.     Facility-Administered Medications Ordered in Other Visits   Medication    0.9%  NaCl infusion    sodium chloride 0.9% bolus 1,000 mL    sodium chloride 0.9% flush 10 mL    sodium chloride 0.9% flush 5 mL       Review of Systems   Constitutional: Negative for malaise/fatigue.   Cardiovascular: Positive for palpitations (brief). Negative for chest pain, dyspnea on  exertion, irregular heartbeat and leg swelling.   Respiratory: Negative for shortness of breath.    Hematologic/Lymphatic: Negative for bleeding problem.   Skin: Negative for rash.   Musculoskeletal: Negative for myalgias.   Gastrointestinal: Negative for hematemesis, hematochezia and nausea.   Genitourinary: Negative for hematuria.   Neurological: Negative for light-headedness.   Psychiatric/Behavioral: Negative for altered mental status.   Allergic/Immunologic: Negative for persistent infections.     Objective:        BP (!) 143/79   Pulse 63     Physical Exam  Vitals and nursing note reviewed.   Constitutional:       Appearance: Normal appearance. He is well-developed and well-nourished.   HENT:      Head: Normocephalic.      Nose: Nose normal.   Eyes:      Pupils: Pupils are equal, round, and reactive to light.   Cardiovascular:      Rate and Rhythm: Normal rate and regular rhythm.   Pulmonary:      Effort: No respiratory distress.      Breath sounds: Normal breath sounds.   Musculoskeletal:         General: No edema. Normal range of motion.   Skin:     General: Skin is warm and dry.      Findings: No erythema.   Neurological:      Mental Status: He is alert and oriented to person, place, and time.   Psychiatric:         Mood and Affect: Mood and affect normal.         Speech: Speech normal.         Behavior: Behavior normal.       Lab Results   Component Value Date     01/21/2022    K 4.0 01/21/2022    BUN 17 01/21/2022    CREATININE 0.8 01/21/2022    ALT 15 09/17/2021    AST 19 09/17/2021    HGB 13.7 (L) 12/01/2021    HCT 37 12/08/2021    TSH 1.783 09/17/2021    LDLCALC 48.2 (L) 09/17/2021       Recent Labs   Lab 08/30/19  1347 09/03/19  0927 11/25/20  0951 12/01/21  1249   INR 1.3 1.5 H 1.1 1.1         Assessment:     1. Paroxysmal atrial fibrillation    2. Essential hypertension    3. Typical atrial flutter    4. Encounter for monitoring sotalol therapy    5. Long term (current) use of anticoagulants       Plan:     In summary, Mr. De Anda is an 81 y.o. male with CAD (CABG), DM, HTN, pAF (PVI 10/2020), AFL (RFA CTI 10/2020) here for follow up after ablation.  He is 3 mo s/p repeat ablation (reisolation of LIPV and RSPV, PWI, successful RFA of anterior mitral isthmus).   Doing well from a rhythm standpoint, with minimal palpitations on sotalol 40mg BID. EKG with stable intervals. CHADSVASc 5 on xarelto.    Continue current medications  RTC 3 mo, sooner if needed.    *A copy of this note has been sent to Dr. Paez*    Follow up in about 3 months (around 5/7/2022).    ------------------------------------------------------------------    WILLIAM Bach, NP-C  Cardiac Electrophysiology

## 2022-02-07 ENCOUNTER — HOSPITAL ENCOUNTER (OUTPATIENT)
Dept: CARDIOLOGY | Facility: CLINIC | Age: 82
Discharge: HOME | End: 2022-02-07
Payer: MEDICARE

## 2022-02-07 ENCOUNTER — OFFICE VISIT (OUTPATIENT)
Dept: ELECTROPHYSIOLOGY | Facility: CLINIC | Age: 82
End: 2022-02-07
Payer: MEDICARE

## 2022-02-07 VITALS — SYSTOLIC BLOOD PRESSURE: 143 MMHG | HEART RATE: 63 BPM | DIASTOLIC BLOOD PRESSURE: 79 MMHG

## 2022-02-07 DIAGNOSIS — Z79.01 LONG TERM (CURRENT) USE OF ANTICOAGULANTS: ICD-10-CM

## 2022-02-07 DIAGNOSIS — I48.0 PAROXYSMAL ATRIAL FIBRILLATION: Primary | ICD-10-CM

## 2022-02-07 DIAGNOSIS — Z51.81 ENCOUNTER FOR MONITORING SOTALOL THERAPY: ICD-10-CM

## 2022-02-07 DIAGNOSIS — Z79.899 ENCOUNTER FOR MONITORING SOTALOL THERAPY: ICD-10-CM

## 2022-02-07 DIAGNOSIS — I48.3 TYPICAL ATRIAL FLUTTER: ICD-10-CM

## 2022-02-07 DIAGNOSIS — I10 ESSENTIAL HYPERTENSION: ICD-10-CM

## 2022-02-07 DIAGNOSIS — I49.8 OTHER SPECIFIED CARDIAC ARRHYTHMIAS: ICD-10-CM

## 2022-02-07 PROCEDURE — 99499 RISK ADDL DX/OHS AUDIT: ICD-10-PCS | Mod: S$GLB,,, | Performed by: NURSE PRACTITIONER

## 2022-02-07 PROCEDURE — 1160F PR REVIEW ALL MEDS BY PRESCRIBER/CLIN PHARMACIST DOCUMENTED: ICD-10-PCS | Mod: CPTII,S$GLB,, | Performed by: NURSE PRACTITIONER

## 2022-02-07 PROCEDURE — 93005 RHYTHM STRIP: ICD-10-PCS | Mod: S$GLB,,, | Performed by: INTERNAL MEDICINE

## 2022-02-07 PROCEDURE — 99214 OFFICE O/P EST MOD 30 MIN: CPT | Mod: S$GLB,,, | Performed by: NURSE PRACTITIONER

## 2022-02-07 PROCEDURE — 93005 ELECTROCARDIOGRAM TRACING: CPT | Mod: S$GLB,,, | Performed by: INTERNAL MEDICINE

## 2022-02-07 PROCEDURE — 3078F DIAST BP <80 MM HG: CPT | Mod: CPTII,S$GLB,, | Performed by: NURSE PRACTITIONER

## 2022-02-07 PROCEDURE — 1101F PT FALLS ASSESS-DOCD LE1/YR: CPT | Mod: CPTII,S$GLB,, | Performed by: NURSE PRACTITIONER

## 2022-02-07 PROCEDURE — 1126F AMNT PAIN NOTED NONE PRSNT: CPT | Mod: CPTII,S$GLB,, | Performed by: NURSE PRACTITIONER

## 2022-02-07 PROCEDURE — 99999 PR PBB SHADOW E&M-EST. PATIENT-LVL IV: CPT | Mod: PBBFAC,,, | Performed by: NURSE PRACTITIONER

## 2022-02-07 PROCEDURE — 93010 ELECTROCARDIOGRAM REPORT: CPT | Mod: S$GLB,,, | Performed by: INTERNAL MEDICINE

## 2022-02-07 PROCEDURE — 1159F PR MEDICATION LIST DOCUMENTED IN MEDICAL RECORD: ICD-10-PCS | Mod: CPTII,S$GLB,, | Performed by: NURSE PRACTITIONER

## 2022-02-07 PROCEDURE — 1101F PR PT FALLS ASSESS DOC 0-1 FALLS W/OUT INJ PAST YR: ICD-10-PCS | Mod: CPTII,S$GLB,, | Performed by: NURSE PRACTITIONER

## 2022-02-07 PROCEDURE — 3078F PR MOST RECENT DIASTOLIC BLOOD PRESSURE < 80 MM HG: ICD-10-PCS | Mod: CPTII,S$GLB,, | Performed by: NURSE PRACTITIONER

## 2022-02-07 PROCEDURE — 1159F MED LIST DOCD IN RCRD: CPT | Mod: CPTII,S$GLB,, | Performed by: NURSE PRACTITIONER

## 2022-02-07 PROCEDURE — 3077F SYST BP >= 140 MM HG: CPT | Mod: CPTII,S$GLB,, | Performed by: NURSE PRACTITIONER

## 2022-02-07 PROCEDURE — 1160F RVW MEDS BY RX/DR IN RCRD: CPT | Mod: CPTII,S$GLB,, | Performed by: NURSE PRACTITIONER

## 2022-02-07 PROCEDURE — 99999 PR PBB SHADOW E&M-EST. PATIENT-LVL IV: ICD-10-PCS | Mod: PBBFAC,,, | Performed by: NURSE PRACTITIONER

## 2022-02-07 PROCEDURE — 3288F FALL RISK ASSESSMENT DOCD: CPT | Mod: CPTII,S$GLB,, | Performed by: NURSE PRACTITIONER

## 2022-02-07 PROCEDURE — 99499 UNLISTED E&M SERVICE: CPT | Mod: S$GLB,,, | Performed by: NURSE PRACTITIONER

## 2022-02-07 PROCEDURE — 99214 PR OFFICE/OUTPT VISIT, EST, LEVL IV, 30-39 MIN: ICD-10-PCS | Mod: S$GLB,,, | Performed by: NURSE PRACTITIONER

## 2022-02-07 PROCEDURE — 3288F PR FALLS RISK ASSESSMENT DOCUMENTED: ICD-10-PCS | Mod: CPTII,S$GLB,, | Performed by: NURSE PRACTITIONER

## 2022-02-07 PROCEDURE — 1126F PR PAIN SEVERITY QUANTIFIED, NO PAIN PRESENT: ICD-10-PCS | Mod: CPTII,S$GLB,, | Performed by: NURSE PRACTITIONER

## 2022-02-07 PROCEDURE — 3077F PR MOST RECENT SYSTOLIC BLOOD PRESSURE >= 140 MM HG: ICD-10-PCS | Mod: CPTII,S$GLB,, | Performed by: NURSE PRACTITIONER

## 2022-02-07 PROCEDURE — 93010 RHYTHM STRIP: ICD-10-PCS | Mod: S$GLB,,, | Performed by: INTERNAL MEDICINE

## 2022-02-07 RX ORDER — SUCRALFATE 1 G/1
TABLET ORAL
COMMUNITY
Start: 2022-01-12 | End: 2022-03-08

## 2022-03-08 ENCOUNTER — OFFICE VISIT (OUTPATIENT)
Dept: PRIMARY CARE CLINIC | Facility: CLINIC | Age: 82
End: 2022-03-08
Payer: MEDICARE

## 2022-03-08 VITALS
DIASTOLIC BLOOD PRESSURE: 76 MMHG | RESPIRATION RATE: 18 BRPM | SYSTOLIC BLOOD PRESSURE: 124 MMHG | HEART RATE: 69 BPM | OXYGEN SATURATION: 97 % | BODY MASS INDEX: 29.51 KG/M2 | WEIGHT: 242.31 LBS | HEIGHT: 76 IN

## 2022-03-08 DIAGNOSIS — E66.01 MORBID OBESITY: ICD-10-CM

## 2022-03-08 DIAGNOSIS — I48.0 PAROXYSMAL ATRIAL FIBRILLATION: ICD-10-CM

## 2022-03-08 DIAGNOSIS — D69.6 THROMBOCYTOPENIA, UNSPECIFIED: ICD-10-CM

## 2022-03-08 DIAGNOSIS — E11.9 TYPE 2 DIABETES MELLITUS WITHOUT COMPLICATION, WITHOUT LONG-TERM CURRENT USE OF INSULIN: ICD-10-CM

## 2022-03-08 DIAGNOSIS — S43.401D SPRAIN OF RIGHT SHOULDER, UNSPECIFIED SHOULDER SPRAIN TYPE, SUBSEQUENT ENCOUNTER: ICD-10-CM

## 2022-03-08 DIAGNOSIS — C32.9 CARCINOMA LARYNX: ICD-10-CM

## 2022-03-08 DIAGNOSIS — S43.402A SPRAIN OF LEFT SHOULDER, UNSPECIFIED SHOULDER SPRAIN TYPE, INITIAL ENCOUNTER: Primary | ICD-10-CM

## 2022-03-08 DIAGNOSIS — S50.01XD CONTUSION OF RIGHT ELBOW, SUBSEQUENT ENCOUNTER: ICD-10-CM

## 2022-03-08 DIAGNOSIS — I70.201 ATHEROSCLEROSIS OF NATIVE ARTERY OF RIGHT LOWER EXTREMITY, WITH UNSPECIFIED PRESENCE OF CLINICAL MANIFESTATION: ICD-10-CM

## 2022-03-08 DIAGNOSIS — I10 ESSENTIAL HYPERTENSION: ICD-10-CM

## 2022-03-08 DIAGNOSIS — J47.9 BRONCHIECTASIS, UNCOMPLICATED: ICD-10-CM

## 2022-03-08 DIAGNOSIS — I11.0 HYPERTENSIVE HEART DISEASE WITH HEART FAILURE: ICD-10-CM

## 2022-03-08 PROCEDURE — 3288F PR FALLS RISK ASSESSMENT DOCUMENTED: ICD-10-PCS | Mod: CPTII,S$GLB,, | Performed by: INTERNAL MEDICINE

## 2022-03-08 PROCEDURE — 1100F PTFALLS ASSESS-DOCD GE2>/YR: CPT | Mod: CPTII,S$GLB,, | Performed by: INTERNAL MEDICINE

## 2022-03-08 PROCEDURE — 1125F PR PAIN SEVERITY QUANTIFIED, PAIN PRESENT: ICD-10-PCS | Mod: CPTII,S$GLB,, | Performed by: INTERNAL MEDICINE

## 2022-03-08 PROCEDURE — 1100F PR PT FALLS ASSESS DOC 2+ FALLS/FALL W/INJURY/YR: ICD-10-PCS | Mod: CPTII,S$GLB,, | Performed by: INTERNAL MEDICINE

## 2022-03-08 PROCEDURE — 3078F DIAST BP <80 MM HG: CPT | Mod: CPTII,S$GLB,, | Performed by: INTERNAL MEDICINE

## 2022-03-08 PROCEDURE — 1160F PR REVIEW ALL MEDS BY PRESCRIBER/CLIN PHARMACIST DOCUMENTED: ICD-10-PCS | Mod: CPTII,S$GLB,, | Performed by: INTERNAL MEDICINE

## 2022-03-08 PROCEDURE — 99214 PR OFFICE/OUTPT VISIT, EST, LEVL IV, 30-39 MIN: ICD-10-PCS | Mod: S$GLB,,, | Performed by: INTERNAL MEDICINE

## 2022-03-08 PROCEDURE — 1159F MED LIST DOCD IN RCRD: CPT | Mod: CPTII,S$GLB,, | Performed by: INTERNAL MEDICINE

## 2022-03-08 PROCEDURE — 3288F FALL RISK ASSESSMENT DOCD: CPT | Mod: CPTII,S$GLB,, | Performed by: INTERNAL MEDICINE

## 2022-03-08 PROCEDURE — 99214 OFFICE O/P EST MOD 30 MIN: CPT | Mod: S$GLB,,, | Performed by: INTERNAL MEDICINE

## 2022-03-08 PROCEDURE — 99999 PR PBB SHADOW E&M-EST. PATIENT-LVL V: ICD-10-PCS | Mod: PBBFAC,,, | Performed by: INTERNAL MEDICINE

## 2022-03-08 PROCEDURE — 1159F PR MEDICATION LIST DOCUMENTED IN MEDICAL RECORD: ICD-10-PCS | Mod: CPTII,S$GLB,, | Performed by: INTERNAL MEDICINE

## 2022-03-08 PROCEDURE — 1160F RVW MEDS BY RX/DR IN RCRD: CPT | Mod: CPTII,S$GLB,, | Performed by: INTERNAL MEDICINE

## 2022-03-08 PROCEDURE — 82570 ASSAY OF URINE CREATININE: CPT | Performed by: INTERNAL MEDICINE

## 2022-03-08 PROCEDURE — 99499 RISK ADDL DX/OHS AUDIT: ICD-10-PCS | Mod: S$GLB,,, | Performed by: INTERNAL MEDICINE

## 2022-03-08 PROCEDURE — 1125F AMNT PAIN NOTED PAIN PRSNT: CPT | Mod: CPTII,S$GLB,, | Performed by: INTERNAL MEDICINE

## 2022-03-08 PROCEDURE — 3074F SYST BP LT 130 MM HG: CPT | Mod: CPTII,S$GLB,, | Performed by: INTERNAL MEDICINE

## 2022-03-08 PROCEDURE — 3078F PR MOST RECENT DIASTOLIC BLOOD PRESSURE < 80 MM HG: ICD-10-PCS | Mod: CPTII,S$GLB,, | Performed by: INTERNAL MEDICINE

## 2022-03-08 PROCEDURE — 3074F PR MOST RECENT SYSTOLIC BLOOD PRESSURE < 130 MM HG: ICD-10-PCS | Mod: CPTII,S$GLB,, | Performed by: INTERNAL MEDICINE

## 2022-03-08 PROCEDURE — 82043 UR ALBUMIN QUANTITATIVE: CPT | Performed by: INTERNAL MEDICINE

## 2022-03-08 PROCEDURE — 99499 UNLISTED E&M SERVICE: CPT | Mod: S$GLB,,, | Performed by: INTERNAL MEDICINE

## 2022-03-08 PROCEDURE — 99999 PR PBB SHADOW E&M-EST. PATIENT-LVL V: CPT | Mod: PBBFAC,,, | Performed by: INTERNAL MEDICINE

## 2022-03-08 RX ORDER — HYDROCODONE BITARTRATE AND ACETAMINOPHEN 5; 325 MG/1; MG/1
1 TABLET ORAL
Qty: 25 TABLET | Refills: 0 | Status: SHIPPED | OUTPATIENT
Start: 2022-03-08 | End: 2022-11-08

## 2022-03-09 LAB
ALBUMIN/CREAT UR: NORMAL UG/MG (ref 0–30)
CREAT UR-MCNC: 39 MG/DL (ref 23–375)
MICROALBUMIN UR DL<=1MG/L-MCNC: <5 UG/ML

## 2022-03-09 NOTE — PROGRESS NOTES
Answers for HPI/ROS [place]  Subjective:       Patient ID: Nick De Anda is a 81 y.o. male.    Chief Complaint: Follow-up (Fell a few days ago), Hip Pain, Back Pain, and Neck Pain    HPI  Pt visit today c/o felt again in the yard had soft cushion c/o severe left shouder pain radiate left neck  No weakness [facility] no fever chill no n/v/d Pt is still on xarelto and plavix pt ha slabs ordered at [facility] by his cardiologist  Review of Systems    Objective:      Physical Exam  Vitals and nursing note reviewed.   Constitutional:       General: He is not in acute distress.     Appearance: He is well-developed.   HENT:      Head: Normocephalic and atraumatic.      Right Ear: External ear normal.      Left Ear: External ear normal.      Nose: Nose normal.      Mouth/Throat:      Pharynx: No oropharyngeal exudate.   Eyes:      Extraocular Movements: Extraocular movements intact.      Conjunctiva/sclera: Conjunctivae normal.      Pupils: Pupils are equal, round, and reactive to light.   Neck:      Thyroid: No thyromegaly.   Cardiovascular:      Rate and Rhythm: Normal rate and regular rhythm.      Heart sounds: Normal heart sounds. No murmur heard.    No friction rub. No gallop.   Pulmonary:      Effort: Pulmonary effort is normal. No respiratory distress.      Breath sounds: Normal breath sounds. No wheezing or rales.   Abdominal:      General: Bowel sounds are normal. There is no distension.      Palpations: Abdomen is soft.      Tenderness: There is no abdominal tenderness. There is no guarding.   Musculoskeletal:         General: Tenderness (tendreness left shoulder upper shoulder radaite to left neck and left arm increase pain with ROM worsen with hyperextension) present. No deformity. Normal range of motion.      Cervical back: Normal range of motion and neck supple.   Lymphadenopathy:      Cervical: No cervical adenopathy.   Skin:     General: Skin is warm and dry.       Capillary Refill: Capillary refill takes less than 2 seconds.      Findings: No erythema or rash.   Neurological:      General: No focal deficit present.      Mental Status: He is alert and oriented to person, place, and time.   Psychiatric:         Thought Content: Thought content normal.         Judgment: Judgment normal.         Assessment:       1. Sprain of left shoulder, unspecified shoulder sprain type, initial encounter    2. Type 2 diabetes mellitus without complication, without long-term current use of insulin    3. Sprain of right shoulder, unspecified shoulder sprain type, subsequent encounter    4. Contusion of right elbow, subsequent encounter    5. Carcinoma larynx    6. Morbid obesity    7. Essential hypertension    8. Thrombocytopenia, unspecified    9. Hypertensive heart disease with heart failure    10. Atherosclerosis of native artery of right lower extremity, with unspecified presence of clinical manifestation    11. Bronchiectasis, uncomplicated    12. Paroxysmal atrial fibrillation        Plan:       Sprain of left shoulder, unspecified shoulder sprain type, initial encounter  Comments:  f/u with [facility] orthopedist for further w/u and treatment    Type 2 diabetes mellitus without complication, without long-term current use of insulin  -     MICROALBUMIN / CREATININE RATIO URINE    Sprain of right shoulder, unspecified shoulder sprain type, subsequent encounter  -     X-Ray Shoulder 2 or More Views Left; Future; Expected date: 03/08/2022  -     HYDROcodone-acetaminophen (NORCO) 5-325 mg per tablet; Take 1 tablet by mouth every 24 hours as needed for Pain.  Dispense: 25 tablet; Refill: 0    Contusion of right elbow, subsequent encounter  -     HYDROcodone-acetaminophen (NORCO) 5-325 mg per tablet; Take 1 tablet by mouth every 24 hours as needed for Pain.  Dispense: 25 tablet; Refill: 0    Carcinoma larynx    Morbid obesity  Comments:  pt is loosing wt nicely with diet and  exercise    Essential hypertension  Comments:  BP fairly controlled     Thrombocytopenia, unspecified  Comments:  no cahnge continue with tx    Hypertensive heart disease with heart failure  Comments:  stable no sign of decompesation    Atherosclerosis of native artery of right lower extremity, with unspecified presence of clinical manifestation    Bronchiectasis, uncomplicated    Paroxysmal atrial fibrillation  Comments:  currently redular ventricular sock no sob cp RODRIGEZ        Medication List with Changes/Refills   Current Medications    AMLODIPINE (NORVASC) 5 MG TABLET    One-2 per day or as directed    ASCORBIC ACID, VITAMIN C, (VITAMIN C) 100 MG TABLET    Take 100 mg by mouth once daily.    ATORVASTATIN (LIPITOR) 80 MG TABLET    Take 1 tablet (80 mg total) by mouth every evening.    AZELASTINE (ASTELIN) 137 MCG (0.1 %) NASAL SPRAY    1 spray (137 mcg total) by Nasal route 2 (two) times daily.    BLOOD SUGAR DIAGNOSTIC STRP    To check BG 2 times daily, to use with insurance preferred meter    BLOOD-GLUCOSE METER KIT    To check BG 2 times daily, to use with insurance preferred meter    CLOPIDOGREL (PLAVIX) 75 MG TABLET    Take 1 tablet (75 mg total) by mouth once daily.    DIAZEPAM (VALIUM) 10 MG TAB    Take 1 tablet (10 mg total) by mouth as needed (anxiety). Just half to one and use very seldom-get refills from PCP    FAMOTIDINE (PEPCID) 40 MG TABLET    Take 1 tablet (40 mg total) by mouth every evening.    HYDROCHLOROTHIAZIDE (MICROZIDE) 12.5 MG CAPSULE    Take 1 capsule (12.5 mg total) by mouth once daily.    IRBESARTAN (AVAPRO) 300 MG TABLET    TAKE 1 TABLET BY MOUTH ONCE DAILY IN THE EVENING    LANCETS MISC    To check BG 2 times daily, to use with insurance preferred meter    METFORMIN (GLUCOPHAGE) 500 MG TABLET    TAKE 1 TABLET BY MOUTH TWICE DAILY WITH MEALS    MULTIVITAMIN (THERAGRAN) PER TABLET    Take 1 tablet by mouth once daily.    SOTALOL (BETAPACE) 80 MG TABLET    Take 0.5 tablets (40 mg total)  by mouth once daily.    XARELTO 20 MG TAB    Take 1 tablet (20 mg total) by mouth every evening.   Changed and/or Refilled Medications    Modified Medication Previous Medication    HYDROCODONE-ACETAMINOPHEN (NORCO) 5-325 MG PER TABLET HYDROcodone-acetaminophen (NORCO) 5-325 mg per tablet       Take 1 tablet by mouth every 24 hours as needed for Pain.    Take 1 tablet by mouth every 24 hours as needed for Pain.   Discontinued Medications    ATORVASTATIN (LIPITOR) 80 MG TABLET    Take 1 tablet (80 mg total) by mouth once daily.    NITROGLYCERIN (NITROSTAT) 0.4 MG SL TABLET    Place 1 tablet (0.4 mg total) under the tongue every 5 (five) minutes as needed for Chest pain.    SUCRALFATE (CARAFATE) 1 GRAM TABLET        bmitted by the patient on 3/5/2022  activity change: Yes  unexpected weight change: No  neck pain: Yes  hearing loss: No  rhinorrhea: Yes  trouble swallowing: No  eye discharge: Yes  visual disturbance: Yes  chest tightness: No  wheezing: Yes  chest pain: No  palpitations: No  blood in stool: No  constipation: Yes  vomiting: No  diarrhea: No  polydipsia: No  polyuria: No  difficulty urinating: No  urgency: No  hematuria: No  joint swelling: Yes  arthralgias: Yes  headaches: No  weakness: No  confusion: No  dysphoric mood: Yes

## 2022-03-15 DIAGNOSIS — E78.5 HYPERLIPIDEMIA, UNSPECIFIED HYPERLIPIDEMIA TYPE: ICD-10-CM

## 2022-03-15 NOTE — TELEPHONE ENCOUNTER
Care Due:                  Date            Visit Type   Department     Provider  --------------------------------------------------------------------------------                                MYCHART                              FOLLOWUP/OF  [facility]  Last Visit: 03-      FICE VISIT   PRIMARY CARE   Santiago Adler                               -                              PRIMARY      [facility]  Next Visit: 06-      CARE ([facility])   PRIMARY CARE   Santiago Adler                                                            Last  Test          Frequency    Reason                     Performed    Due Date  --------------------------------------------------------------------------------    HBA1C.......  6 months...  metFORMIN................  09- 03-    Powered by [employer] by [employer]. Reference number: [id number].   3/15/2022 6:05:43 PM CDT

## 2022-03-17 ENCOUNTER — LAB VISIT (OUTPATIENT)
Dept: LAB | Facility: HOSPITAL | Age: 82
End: 2022-03-17
Attending: INTERNAL MEDICINE
Payer: MEDICARE

## 2022-03-17 DIAGNOSIS — Z13.6 ENCOUNTER FOR LIPID SCREENING FOR CARDIOVASCULAR DISEASE: ICD-10-CM

## 2022-03-17 DIAGNOSIS — Z13.220 ENCOUNTER FOR LIPID SCREENING FOR CARDIOVASCULAR DISEASE: ICD-10-CM

## 2022-03-17 DIAGNOSIS — Z12.5 SCREENING FOR PROSTATE CANCER: ICD-10-CM

## 2022-03-17 LAB
CHOLEST SERPL-MCNC: 123 MG/DL (ref 120–199)
CHOLEST/HDLC SERPL: 2.4 {RATIO} (ref 2–5)
COMPLEXED PSA SERPL-MCNC: 2.4 NG/ML (ref 0–4)
HDLC SERPL-MCNC: 51 MG/DL (ref 40–75)
HDLC SERPL: 41.5 % (ref 20–50)
LDLC SERPL CALC-MCNC: 55.4 MG/DL (ref 63–159)
NONHDLC SERPL-MCNC: 72 MG/DL
TRIGL SERPL-MCNC: 83 MG/DL (ref 30–150)

## 2022-03-17 PROCEDURE — 84153 ASSAY OF PSA TOTAL: CPT | Performed by: INTERNAL MEDICINE

## 2022-03-17 PROCEDURE — 36415 COLL VENOUS BLD VENIPUNCTURE: CPT | Mod: PN | Performed by: INTERNAL MEDICINE

## 2022-03-17 PROCEDURE — 80061 LIPID PANEL: CPT | Performed by: INTERNAL MEDICINE

## 2022-03-18 ENCOUNTER — PATIENT MESSAGE (OUTPATIENT)
Dept: CARDIOLOGY | Facility: CLINIC | Age: 82
End: 2022-03-18
Payer: MEDICARE

## 2022-03-18 DIAGNOSIS — E78.5 HYPERLIPIDEMIA, UNSPECIFIED HYPERLIPIDEMIA TYPE: ICD-10-CM

## 2022-03-18 RX ORDER — ATORVASTATIN CALCIUM 80 MG/1
80 TABLET, FILM COATED ORAL NIGHTLY
Qty: 90 TABLET | Refills: 3
Start: 2022-03-18 | End: 2023-04-17

## 2022-03-19 RX ORDER — ATORVASTATIN CALCIUM 40 MG/1
TABLET, FILM COATED ORAL
Qty: 90 TABLET | Refills: 0 | OUTPATIENT
Start: 2022-03-19

## 2022-03-19 NOTE — TELEPHONE ENCOUNTER
Quick DC. Inappropriate Request      This medication has been reordered by PCP already. Will remove duplicate and close out encounter.

## 2022-04-27 NOTE — PROGRESS NOTES
Mr. De Anda is a patient of Dr. Paez and was last seen in clinic 2/7/2022.      Subjective:   Patient ID:  Nick De Anda is a 81 y.o. male who presents for follow-up of Atrial Fibrillation  .     HPI:    Mr. De Anda is an 81 y.o. male with CAD (CABG), DM, HTN, pAF (PVI 10/2020, 12/2021), AFL (RFA CTI 10/2020) here for follow up.    Background:    Primary Cardiologist: Robert Alas MD  Primary Care Physician: Santiago Adler MD    Mr. De Anda has a hx of coronary artery disease s/p CABG with preserved LVEF, type 2 diabetes, hypertension and recently discovered paroxysmal atrial fibrillation and typical atrial flutter. He recently transferred care from [facility] where his AF was diagnosed and he was initiated on xarelto. He was recently admitted for a symptomatic UTI and observed to have a paroxysm of atrial fibrillation with ventricular rates in the 40s-60s (slower rates appeared to correlate during overnight hours). He monitors his vitals at home and for a while notes it indicates he has an irregular heart beat however he feels normal. He has established care with Dr. Dean in cardiology on 2/13/2019 and was noted to be in atrial fibrillation. He has no obvious symptoms. I reviewed all available electrocardiograms in Epic which show sinus rhythm on 2/5/2014 and 2/2/2019. An ECG dated 2/3/2019 notes rate controlled atrial fibrillation. When I saw him in clinic 2/2019 he was in sinus rhythm. At that visit since he had no symptoms with AF and was rate controlled when in AF I recommended no aggressive rhythm control strategy and would continue his current management. He was to see me prn.    Mr. De Anda returned for discussion of his AF 6/2019. He reported he believed AF was causing fatigue. Notes episodes every week or so that correlate to being fatigue. He was admitted for DCCV and sotalol initiation. He converted when propofol was administered and the DEANGELO/DCCV was cancelled. He saw Faina Espinosa in EP  clinic 8/1/2019 and was in sinus rhythm. He felt better in sinus rhythm.    Mr. De Anda was seen in clinic 10/2019 and noted paroxysms of AF since starting sotalol. One episode lasted 2 days and another 3 days. He noted his pulse was irregular in this setting. He came in for an ECG which noted sinus rhythm however he felt he had already converted. We discussed increasing dosage to 120mg bid however he declined.    Mr. De Anda presented for routine AF follow-up 7/2020. He reported he continues to have some paroxysms of AF, at times last 1/2 day- 1 day. We discussed PVI, he desired to think about it.    Mr. De Anda returned for follow-up 10/2020. His AF burden had significantly increased and he felt poorly. He noted he was still paroxysmal. He desired ablation.    Mr. De Anda underwent RF-PVI and RFA of the CTI in December of 2020.     3/2021: Saw Faina Espinosa. Reported no arrhythmias. Repeat ECHO noted LVEF of 60%. Sotalol was reduced to 40mg bid.    10/2021: Saw Faina Espinosa. He was in sinus rhythm at that time however ECG from 9/2021 prior to a surgery noted atrial flutter (atypical). He wore a 14 day holter that noted a 6.4% burden of atrial flutter. There were R-R pauses up to 6.2 seconds during flutter while sleeping. He was instructed to stop sotalol.    11/16/2021: He now has recurrent atypical atrial flutter despite sotalol with periods of bradycardia while asleep. This arrhythmia has become symptomatic. He feels significantly better and is in sinus rhythm. He is having symptomatic breakthrough on sotalol. His [id number] score is 5 and long-term anticoagulation is recommended.    12/8/2021:  · Successful pulmonary vein RF ablation (reisolation of the LIPV and RSPV)   · Successful left atrial posterior wall isolation targeting CFEs   · Successful anterior mitral isthmus ablation with bidirectional block for atypical atrial flutter that was observed as an outpatient but unable to be induced   · Unsuccessful  difficult lateral mitral isthmus ablation (unable to get bidirectional block and thus created the anterior mitral isthmus ablation lesion set)  · Complex modifier requested for extensive ablation and considerable time spent trying to obtain lateral mitral isthmus block.  Discharged on sotalol 40mg BID    2/7/2022: He is 3 mo s/p repeat ablation (reisolation of LIPV and RSPV, PWI, successful RFA of anterior mitral isthmus).   Doing well from a rhythm standpoint, with minimal palpitations on sotalol 40mg BID. EKG with stable intervals. CHADSVASc 5 on xarelto.    Update (05/06/2022):    Today has been in physical therapy and feels great. Gaining muscle mass. Exercising regularly - pool, cardio class, etc. Weaned himself off sotalol 2 months ago. Mr. De Anda reports no chest pain with exertion or at rest, palpitations, SOB, RODRIGEZ, dizziness, or syncope.    He is currently taking xarelto 20mg daily for stroke prophylaxis and denies significant bleeding episodes. He has been off sotalol 2 months. Kidney function is stable, with a creatinine of 0.8 on 1/21/2022.    I have personally reviewed the patient's EKG today, which shows sinus rhythm at 68bpm. AK interval is 184. QRS is 90. QTc is 438.    Relevant Cardiac Test Results:    2D Echo (3/16/2021):  · The left ventricle is normal in size with mild concentric hypertrophy and normal systolic function. The estimated ejection fraction is 60%  · Grade II left ventricular diastolic dysfunction.  · Normal right ventricular size with normal right ventricular systolic function.  · Moderate left atrial enlargement.  · Normal central venous pressure (3 mmHg).    Current Outpatient Medications   Medication Sig    amLODIPine (NORVASC) 5 MG tablet One-2 per day or as directed    ascorbic acid, vitamin C, (VITAMIN C) 100 MG tablet Take 100 mg by mouth once daily.    atorvastatin (LIPITOR) 80 MG tablet Take 1 tablet (80 mg total) by mouth every evening.    azelastine (ASTELIN) 137 mcg  (0.1 %) nasal spray 1 spray (137 mcg total) by Nasal route 2 (two) times daily.    blood sugar diagnostic Strp To check BG 2 times daily, to use with insurance preferred meter    blood-glucose meter kit To check BG 2 times daily, to use with insurance preferred meter    clopidogreL (PLAVIX) 75 mg tablet Take 1 tablet (75 mg total) by mouth once daily.    diazePAM (VALIUM) 10 MG Tab Take 1 tablet (10 mg total) by mouth as needed (anxiety). Just half to one and use very seldom-get refills from PCP    famotidine (PEPCID) 40 MG tablet Take 1 tablet (40 mg total) by mouth every evening.    hydroCHLOROthiazide (MICROZIDE) 12.5 mg capsule Take 1 capsule (12.5 mg total) by mouth once daily.    HYDROcodone-acetaminophen (NORCO) 5-325 mg per tablet Take 1 tablet by mouth every 24 hours as needed for Pain.    irbesartan (AVAPRO) 300 MG tablet TAKE 1 TABLET BY MOUTH ONCE DAILY IN THE EVENING    lancets Misc To check BG 2 times daily, to use with insurance preferred meter    metFORMIN (GLUCOPHAGE) 500 MG tablet TAKE 1 TABLET BY MOUTH TWICE DAILY WITH MEALS    multivitamin (THERAGRAN) per tablet Take 1 tablet by mouth once daily.    sotaloL (BETAPACE) 80 MG tablet Take 0.5 tablets (40 mg total) by mouth once daily.    XARELTO 20 mg Tab Take 1 tablet (20 mg total) by mouth every evening.     No current facility-administered medications for this visit.     Facility-Administered Medications Ordered in Other Visits   Medication    0.9%  NaCl infusion    sodium chloride 0.9% bolus 1,000 mL    sodium chloride 0.9% flush 10 mL    sodium chloride 0.9% flush 5 mL     Review of Systems   Constitutional: Negative for malaise/fatigue.   Cardiovascular: Negative for chest pain, dyspnea on exertion, irregular heartbeat, leg swelling and palpitations.   Respiratory: Negative for shortness of breath.    Hematologic/Lymphatic: Negative for bleeding problem.   Skin: Negative for rash.   Musculoskeletal: Positive for joint pain  "(left shoulder). Negative for myalgias.   Gastrointestinal: Negative for hematemesis, hematochezia and nausea.   Genitourinary: Negative for hematuria.   Neurological: Negative for light-headedness.   Psychiatric/Behavioral: Negative for altered mental status.   Allergic/Immunologic: Negative for persistent infections.     Objective:        BP (!) 102/52   Pulse 68   Ht 6' 4" (1.93 m)   Wt 111.6 kg (246 lb 0.5 oz)   BMI 29.95 kg/m²     Physical Exam  Vitals and nursing note reviewed.   Constitutional:       Appearance: Normal appearance. He is well-developed.   HENT:      Head: Normocephalic.      Nose: Nose normal.   Eyes:      Pupils: Pupils are equal, round, and reactive to light.   Cardiovascular:      Rate and Rhythm: Normal rate and regular rhythm.   Pulmonary:      Effort: No respiratory distress.      Breath sounds: Normal breath sounds.   Musculoskeletal:         General: Normal range of motion.   Skin:     General: Skin is warm and dry.      Findings: No erythema.   Neurological:      Mental Status: He is alert and oriented to person, place, and time.   Psychiatric:         Speech: Speech normal.         Behavior: Behavior normal.       Lab Results   Component Value Date     01/21/2022    K 4.0 01/21/2022    BUN 17 01/21/2022    CREATININE 0.8 01/21/2022    ALT 15 09/17/2021    AST 19 09/17/2021    HGB 13.7 (L) 12/01/2021    HCT 37 12/08/2021    TSH 1.783 09/17/2021    LDLCALC 55.4 (L) 03/17/2022       Recent Labs   Lab 08/30/19  1347 09/03/19  0927 11/25/20  0951 12/01/21  1249   INR 1.3 1.5 H 1.1 1.1       Assessment:     1. Paroxysmal atrial fibrillation    2. Typical atrial flutter    3. Essential hypertension    4. Long term (current) use of anticoagulants    5. Overweight (BMI 25.0-29.9)    6. History of radiofrequency ablation (RFA) procedure for cardiac arrhythmia      Plan:     In summary, Mr. De Anda is an 81 y.o. male with CAD (CABG), DM, HTN, pAF (PVI 10/2020, 12/2021), AFL (RFA CTI " 10/2020) here for follow up.  He is now 5 months s/p redo ablation. He is doing well from a rhythm standpoint, with no documented or symptomatic recurrence of arrhythmia since procedure.  He weaned himself off sotalol about 2 months ago and feels wonderful. Very active and exercises regularly. CHADSVASc 5 on xarelto.    Continue current medications  RTC 6 mo, sooner if needed    *A copy of this note has been sent to Dr. Paez*    Follow up in about 6 months (around 11/6/2022).    ------------------------------------------------------------------    WILLIAM Bach, NP-C  Cardiac Electrophysiology

## 2022-05-06 ENCOUNTER — HOSPITAL ENCOUNTER (OUTPATIENT)
Dept: CARDIOLOGY | Facility: CLINIC | Age: 82
Discharge: HOME | End: 2022-05-06
Payer: MEDICARE

## 2022-05-06 ENCOUNTER — OFFICE VISIT (OUTPATIENT)
Dept: ELECTROPHYSIOLOGY | Facility: CLINIC | Age: 82
End: 2022-05-06
Payer: MEDICARE

## 2022-05-06 VITALS
HEIGHT: 76 IN | DIASTOLIC BLOOD PRESSURE: 52 MMHG | WEIGHT: 246.06 LBS | SYSTOLIC BLOOD PRESSURE: 102 MMHG | HEART RATE: 68 BPM | BODY MASS INDEX: 29.96 KG/M2

## 2022-05-06 DIAGNOSIS — I49.8 OTHER SPECIFIED CARDIAC ARRHYTHMIAS: ICD-10-CM

## 2022-05-06 DIAGNOSIS — Z79.01 LONG TERM (CURRENT) USE OF ANTICOAGULANTS: ICD-10-CM

## 2022-05-06 DIAGNOSIS — I48.0 PAROXYSMAL ATRIAL FIBRILLATION: Primary | ICD-10-CM

## 2022-05-06 DIAGNOSIS — Z98.890 HISTORY OF RADIOFREQUENCY ABLATION (RFA) PROCEDURE FOR CARDIAC ARRHYTHMIA: ICD-10-CM

## 2022-05-06 DIAGNOSIS — E66.3 OVERWEIGHT (BMI 25.0-29.9): ICD-10-CM

## 2022-05-06 DIAGNOSIS — I10 ESSENTIAL HYPERTENSION: ICD-10-CM

## 2022-05-06 DIAGNOSIS — I48.3 TYPICAL ATRIAL FLUTTER: ICD-10-CM

## 2022-05-06 PROBLEM — Z79.899 ENCOUNTER FOR MONITORING SOTALOL THERAPY: Status: RESOLVED | Noted: 2019-08-01 | Resolved: 2022-05-06

## 2022-05-06 PROBLEM — Z51.81 ENCOUNTER FOR MONITORING SOTALOL THERAPY: Status: RESOLVED | Noted: 2019-08-01 | Resolved: 2022-05-06

## 2022-05-06 PROCEDURE — 1160F PR REVIEW ALL MEDS BY PRESCRIBER/CLIN PHARMACIST DOCUMENTED: ICD-10-PCS | Mod: CPTII,S$GLB,, | Performed by: NURSE PRACTITIONER

## 2022-05-06 PROCEDURE — 99499 UNLISTED E&M SERVICE: CPT | Mod: S$GLB,,, | Performed by: NURSE PRACTITIONER

## 2022-05-06 PROCEDURE — 3074F PR MOST RECENT SYSTOLIC BLOOD PRESSURE < 130 MM HG: ICD-10-PCS | Mod: CPTII,S$GLB,, | Performed by: NURSE PRACTITIONER

## 2022-05-06 PROCEDURE — 3078F PR MOST RECENT DIASTOLIC BLOOD PRESSURE < 80 MM HG: ICD-10-PCS | Mod: CPTII,S$GLB,, | Performed by: NURSE PRACTITIONER

## 2022-05-06 PROCEDURE — 1101F PR PT FALLS ASSESS DOC 0-1 FALLS W/OUT INJ PAST YR: ICD-10-PCS | Mod: CPTII,S$GLB,, | Performed by: NURSE PRACTITIONER

## 2022-05-06 PROCEDURE — 1126F PR PAIN SEVERITY QUANTIFIED, NO PAIN PRESENT: ICD-10-PCS | Mod: CPTII,S$GLB,, | Performed by: NURSE PRACTITIONER

## 2022-05-06 PROCEDURE — 99499 RISK ADDL DX/OHS AUDIT: ICD-10-PCS | Mod: S$GLB,,, | Performed by: NURSE PRACTITIONER

## 2022-05-06 PROCEDURE — 99214 OFFICE O/P EST MOD 30 MIN: CPT | Mod: S$GLB,,, | Performed by: NURSE PRACTITIONER

## 2022-05-06 PROCEDURE — 93005 RHYTHM STRIP: ICD-10-PCS | Mod: S$GLB,,, | Performed by: INTERNAL MEDICINE

## 2022-05-06 PROCEDURE — 93005 ELECTROCARDIOGRAM TRACING: CPT | Mod: S$GLB,,, | Performed by: INTERNAL MEDICINE

## 2022-05-06 PROCEDURE — 99999 PR PBB SHADOW E&M-EST. PATIENT-LVL IV: ICD-10-PCS | Mod: PBBFAC,,, | Performed by: NURSE PRACTITIONER

## 2022-05-06 PROCEDURE — 93010 RHYTHM STRIP: ICD-10-PCS | Mod: S$GLB,,, | Performed by: INTERNAL MEDICINE

## 2022-05-06 PROCEDURE — 3288F PR FALLS RISK ASSESSMENT DOCUMENTED: ICD-10-PCS | Mod: CPTII,S$GLB,, | Performed by: NURSE PRACTITIONER

## 2022-05-06 PROCEDURE — 1159F MED LIST DOCD IN RCRD: CPT | Mod: CPTII,S$GLB,, | Performed by: NURSE PRACTITIONER

## 2022-05-06 PROCEDURE — 1159F PR MEDICATION LIST DOCUMENTED IN MEDICAL RECORD: ICD-10-PCS | Mod: CPTII,S$GLB,, | Performed by: NURSE PRACTITIONER

## 2022-05-06 PROCEDURE — 3074F SYST BP LT 130 MM HG: CPT | Mod: CPTII,S$GLB,, | Performed by: NURSE PRACTITIONER

## 2022-05-06 PROCEDURE — 3288F FALL RISK ASSESSMENT DOCD: CPT | Mod: CPTII,S$GLB,, | Performed by: NURSE PRACTITIONER

## 2022-05-06 PROCEDURE — 1126F AMNT PAIN NOTED NONE PRSNT: CPT | Mod: CPTII,S$GLB,, | Performed by: NURSE PRACTITIONER

## 2022-05-06 PROCEDURE — 3078F DIAST BP <80 MM HG: CPT | Mod: CPTII,S$GLB,, | Performed by: NURSE PRACTITIONER

## 2022-05-06 PROCEDURE — 1160F RVW MEDS BY RX/DR IN RCRD: CPT | Mod: CPTII,S$GLB,, | Performed by: NURSE PRACTITIONER

## 2022-05-06 PROCEDURE — 99214 PR OFFICE/OUTPT VISIT, EST, LEVL IV, 30-39 MIN: ICD-10-PCS | Mod: S$GLB,,, | Performed by: NURSE PRACTITIONER

## 2022-05-06 PROCEDURE — 1101F PT FALLS ASSESS-DOCD LE1/YR: CPT | Mod: CPTII,S$GLB,, | Performed by: NURSE PRACTITIONER

## 2022-05-06 PROCEDURE — 99999 PR PBB SHADOW E&M-EST. PATIENT-LVL IV: CPT | Mod: PBBFAC,,, | Performed by: NURSE PRACTITIONER

## 2022-05-06 PROCEDURE — 93010 ELECTROCARDIOGRAM REPORT: CPT | Mod: S$GLB,,, | Performed by: INTERNAL MEDICINE

## 2022-05-12 ENCOUNTER — OFFICE VISIT (OUTPATIENT)
Dept: OTOLARYNGOLOGY | Facility: CLINIC | Age: 82
End: 2022-05-12
Payer: MEDICARE

## 2022-05-12 VITALS — DIASTOLIC BLOOD PRESSURE: 105 MMHG | SYSTOLIC BLOOD PRESSURE: 140 MMHG | HEART RATE: 87 BPM

## 2022-05-12 DIAGNOSIS — R49.0 DYSPHONIA: Primary | ICD-10-CM

## 2022-05-12 DIAGNOSIS — Q31.9 DYSPLASIA OF LARYNX: ICD-10-CM

## 2022-05-12 PROCEDURE — 1160F PR REVIEW ALL MEDS BY PRESCRIBER/CLIN PHARMACIST DOCUMENTED: ICD-10-PCS | Mod: CPTII,S$GLB,, | Performed by: OTOLARYNGOLOGY

## 2022-05-12 PROCEDURE — 3077F SYST BP >= 140 MM HG: CPT | Mod: CPTII,S$GLB,, | Performed by: OTOLARYNGOLOGY

## 2022-05-12 PROCEDURE — 3080F DIAST BP >= 90 MM HG: CPT | Mod: CPTII,S$GLB,, | Performed by: OTOLARYNGOLOGY

## 2022-05-12 PROCEDURE — 31579 PR LARYNGOSCOPY, FLEX/RIGID TELESCOPIC, W/STROBOSCOPY: ICD-10-PCS | Mod: S$GLB,,, | Performed by: OTOLARYNGOLOGY

## 2022-05-12 PROCEDURE — 31579 LARYNGOSCOPY TELESCOPIC: CPT | Mod: S$GLB,,, | Performed by: OTOLARYNGOLOGY

## 2022-05-12 PROCEDURE — 1159F MED LIST DOCD IN RCRD: CPT | Mod: CPTII,S$GLB,, | Performed by: OTOLARYNGOLOGY

## 2022-05-12 PROCEDURE — 3077F PR MOST RECENT SYSTOLIC BLOOD PRESSURE >= 140 MM HG: ICD-10-PCS | Mod: CPTII,S$GLB,, | Performed by: OTOLARYNGOLOGY

## 2022-05-12 PROCEDURE — 99999 PR PBB SHADOW E&M-EST. PATIENT-LVL III: ICD-10-PCS | Mod: PBBFAC,,, | Performed by: OTOLARYNGOLOGY

## 2022-05-12 PROCEDURE — 1159F PR MEDICATION LIST DOCUMENTED IN MEDICAL RECORD: ICD-10-PCS | Mod: CPTII,S$GLB,, | Performed by: OTOLARYNGOLOGY

## 2022-05-12 PROCEDURE — 3080F PR MOST RECENT DIASTOLIC BLOOD PRESSURE >= 90 MM HG: ICD-10-PCS | Mod: CPTII,S$GLB,, | Performed by: OTOLARYNGOLOGY

## 2022-05-12 PROCEDURE — 1160F RVW MEDS BY RX/DR IN RCRD: CPT | Mod: CPTII,S$GLB,, | Performed by: OTOLARYNGOLOGY

## 2022-05-12 PROCEDURE — 1126F AMNT PAIN NOTED NONE PRSNT: CPT | Mod: CPTII,S$GLB,, | Performed by: OTOLARYNGOLOGY

## 2022-05-12 PROCEDURE — 1126F PR PAIN SEVERITY QUANTIFIED, NO PAIN PRESENT: ICD-10-PCS | Mod: CPTII,S$GLB,, | Performed by: OTOLARYNGOLOGY

## 2022-05-12 PROCEDURE — 99213 OFFICE O/P EST LOW 20 MIN: CPT | Mod: 25,S$GLB,, | Performed by: OTOLARYNGOLOGY

## 2022-05-12 PROCEDURE — 99999 PR PBB SHADOW E&M-EST. PATIENT-LVL III: CPT | Mod: PBBFAC,,, | Performed by: OTOLARYNGOLOGY

## 2022-05-12 PROCEDURE — 99213 PR OFFICE/OUTPT VISIT, EST, LEVL III, 20-29 MIN: ICD-10-PCS | Mod: 25,S$GLB,, | Performed by: OTOLARYNGOLOGY

## 2022-05-12 NOTE — PROGRESS NOTES
[facility]  Department of Otorhinolaryngology and Communication Sciences    Subjective:      Nick De Anda is a 81 y.o. male who presents for follow-up. He has glottic dysplasia.    9/28/2021: SML KTP assisted excision right TVF lesion.  1. BIOPSY OF RIGHT FALSE VOCAL FOLD:   MODERATE NONSPECIFIC SUBMUCOSAL CHRONIC INFLAMMATION   NO NEOPLASIA IDENTIFIED   2. BIOPSY OF VENTRICLE:   NO SIGNIFICANT HISTOLOGIC ALTERATION   NO NEOPLASIA IDENTIFIED   3. BIOPSY OF RIGHT TRUE VOCAL FOLD:   NO CARCINOMA IDENTIFIED   FOCAL MODERATE SQUAMOUS MUCOSAL DYSPLASIA    He continues to do well.  His voice is great, although it is hoarse from time to time.  He denies dysphagia, throat pain, odynophagia, otalgia, hemoptysis, hematemesis, neck mass.     The patient's medications, allergies, past medical, surgical, social and family histories were reviewed and updated as appropriate.    A detailed review of systems was obtained with pertinent positives as per the above HPI, and otherwise negative.      Objective:     VS reviewed  Constitutional: comfortable, well dressed  Psychiatric: appropriate affect  Respiratory: comfortably breathing, symmetric chest rise, no stridor  Voice: minimal variable breathiness/roughness  Head: normocephalic  Eyes: conjunctivae and sclerae clear  Indirect laryngoscopy is limited due to gag    Procedure  Rigid Laryngeal Videostroboscopy (12040): Laryngeal videostroboscopy is indicated to assess the laryngeal vibratory biomechanics and vocal fold oscillation, which cannot be assessed with a plain light examination. This was carried out with a 70 degree endoscope. After verbal consent was obtained, the patient was positioned and the tongue was gently secured with a gauze sponge. The endoscope was passed transorally and positioned to image the larynx and hypopharynx in detail. The following features were examined: laryngeal and hypopharyngeal masses; vocal fold range and symmetry of motion;  laryngeal mucosal edema, erythema, inflammation, and hydration; salivary pooling; and gross laryngeal sensation. During phonation, the vocal folds were assessed for glottal closure; mucosal wave; vocal fold lesions; vibratory periodicity, amplitude, and phase symmetry; and vertical height match. The equipment was removed. The patient tolerated the procedure well without complication. All findings were normal except:  - right elise-laryngeal surgical site well healed, with isolated punctate granulation at false vocal fold resection site  - no evidence of disease  - complete closure, poor pliability right true vocal fold    Data Reviewed  See HPI      Assessment:     Nick De Anda is a 81 y.o. male with glottic dysplasia, progressing well following microsurgical intervention.     Plan:     Reassurance was provided. He will follow up with me in 6 months, or sooner if needed.    All questions were answered, and the patient is in agreement with the plan.    Rommel Stubbs M.D.  [facility]  Department of Otorhinolaryngology and Communication Sciences

## 2022-06-07 ENCOUNTER — OFFICE VISIT (OUTPATIENT)
Dept: PRIMARY CARE CLINIC | Facility: CLINIC | Age: 82
End: 2022-06-07
Payer: MEDICARE

## 2022-06-07 VITALS
SYSTOLIC BLOOD PRESSURE: 107 MMHG | BODY MASS INDEX: 29.53 KG/M2 | OXYGEN SATURATION: 97 % | HEIGHT: 76 IN | DIASTOLIC BLOOD PRESSURE: 68 MMHG | HEART RATE: 71 BPM | WEIGHT: 242.5 LBS | RESPIRATION RATE: 17 BRPM

## 2022-06-07 DIAGNOSIS — E66.01 MORBID (SEVERE) OBESITY DUE TO EXCESS CALORIES: ICD-10-CM

## 2022-06-07 DIAGNOSIS — E11.9 TYPE 2 DIABETES MELLITUS WITHOUT COMPLICATION, WITHOUT LONG-TERM CURRENT USE OF INSULIN: Primary | ICD-10-CM

## 2022-06-07 DIAGNOSIS — I48.0 PAROXYSMAL ATRIAL FIBRILLATION: ICD-10-CM

## 2022-06-07 DIAGNOSIS — I10 ESSENTIAL HYPERTENSION: ICD-10-CM

## 2022-06-07 DIAGNOSIS — D69.6 THROMBOCYTOPENIA, UNSPECIFIED: ICD-10-CM

## 2022-06-07 DIAGNOSIS — F41.9 ANXIETY: ICD-10-CM

## 2022-06-07 PROCEDURE — 3288F PR FALLS RISK ASSESSMENT DOCUMENTED: ICD-10-PCS | Mod: CPTII,S$GLB,, | Performed by: INTERNAL MEDICINE

## 2022-06-07 PROCEDURE — 1159F PR MEDICATION LIST DOCUMENTED IN MEDICAL RECORD: ICD-10-PCS | Mod: CPTII,S$GLB,, | Performed by: INTERNAL MEDICINE

## 2022-06-07 PROCEDURE — 3288F FALL RISK ASSESSMENT DOCD: CPT | Mod: CPTII,S$GLB,, | Performed by: INTERNAL MEDICINE

## 2022-06-07 PROCEDURE — 99999 PR PBB SHADOW E&M-EST. PATIENT-LVL V: CPT | Mod: PBBFAC,,, | Performed by: INTERNAL MEDICINE

## 2022-06-07 PROCEDURE — 99213 PR OFFICE/OUTPT VISIT, EST, LEVL III, 20-29 MIN: ICD-10-PCS | Mod: S$GLB,,, | Performed by: INTERNAL MEDICINE

## 2022-06-07 PROCEDURE — 1160F RVW MEDS BY RX/DR IN RCRD: CPT | Mod: CPTII,S$GLB,, | Performed by: INTERNAL MEDICINE

## 2022-06-07 PROCEDURE — 1100F PTFALLS ASSESS-DOCD GE2>/YR: CPT | Mod: CPTII,S$GLB,, | Performed by: INTERNAL MEDICINE

## 2022-06-07 PROCEDURE — 99213 OFFICE O/P EST LOW 20 MIN: CPT | Mod: S$GLB,,, | Performed by: INTERNAL MEDICINE

## 2022-06-07 PROCEDURE — 3074F SYST BP LT 130 MM HG: CPT | Mod: CPTII,S$GLB,, | Performed by: INTERNAL MEDICINE

## 2022-06-07 PROCEDURE — 1100F PR PT FALLS ASSESS DOC 2+ FALLS/FALL W/INJURY/YR: ICD-10-PCS | Mod: CPTII,S$GLB,, | Performed by: INTERNAL MEDICINE

## 2022-06-07 PROCEDURE — 1160F PR REVIEW ALL MEDS BY PRESCRIBER/CLIN PHARMACIST DOCUMENTED: ICD-10-PCS | Mod: CPTII,S$GLB,, | Performed by: INTERNAL MEDICINE

## 2022-06-07 PROCEDURE — 3074F PR MOST RECENT SYSTOLIC BLOOD PRESSURE < 130 MM HG: ICD-10-PCS | Mod: CPTII,S$GLB,, | Performed by: INTERNAL MEDICINE

## 2022-06-07 PROCEDURE — 99999 PR PBB SHADOW E&M-EST. PATIENT-LVL V: ICD-10-PCS | Mod: PBBFAC,,, | Performed by: INTERNAL MEDICINE

## 2022-06-07 PROCEDURE — 1125F PR PAIN SEVERITY QUANTIFIED, PAIN PRESENT: ICD-10-PCS | Mod: CPTII,S$GLB,, | Performed by: INTERNAL MEDICINE

## 2022-06-07 PROCEDURE — 1125F AMNT PAIN NOTED PAIN PRSNT: CPT | Mod: CPTII,S$GLB,, | Performed by: INTERNAL MEDICINE

## 2022-06-07 PROCEDURE — 3078F DIAST BP <80 MM HG: CPT | Mod: CPTII,S$GLB,, | Performed by: INTERNAL MEDICINE

## 2022-06-07 PROCEDURE — 3078F PR MOST RECENT DIASTOLIC BLOOD PRESSURE < 80 MM HG: ICD-10-PCS | Mod: CPTII,S$GLB,, | Performed by: INTERNAL MEDICINE

## 2022-06-07 PROCEDURE — 1159F MED LIST DOCD IN RCRD: CPT | Mod: CPTII,S$GLB,, | Performed by: INTERNAL MEDICINE

## 2022-06-07 RX ORDER — DIAZEPAM 10 MG/1
10 TABLET ORAL
Qty: 30 TABLET | Refills: 2 | Status: SHIPPED | OUTPATIENT
Start: 2022-06-07 | End: 2022-09-27 | Stop reason: SDUPTHER

## 2022-06-07 NOTE — PROGRESS NOTES
Subjective:       Patient ID: Nick De Anda is a 81 y.o. male.    Chief Complaint: Follow-up and Medication Refill    HPI  Pt visit today  For f/u and refill medications pt ha sbeen doing much better since get into exercise and diet program to loose wt his BP and DM much better able to get off multiple meds except eliquis cardiologist still suggest to continue pt ha sbeen having chronic pain left shoulder pt still ha sanxiety and the pain in left shoulder tress related when take valium it help with the pain left shoulder he needs eye exam left weakness  Review of Systems    Objective:      Physical Exam  Vitals and nursing note reviewed.   Constitutional:       General: He is not in acute distress.     Appearance: He is well-developed.      Comments: Pt appear to loose a lot of wt compare last visit   HENT:      Head: Normocephalic and atraumatic.      Right Ear: External ear normal.      Left Ear: External ear normal.      Nose: Nose normal.      Mouth/Throat:      Pharynx: No oropharyngeal exudate.   Eyes:      Extraocular Movements: Extraocular movements intact.      Conjunctiva/sclera: Conjunctivae normal.      Pupils: Pupils are equal, round, and reactive to light.   Neck:      Thyroid: No thyromegaly.   Cardiovascular:      Rate and Rhythm: Normal rate. Rhythm irregular.      Heart sounds: Normal heart sounds. No murmur heard.    No friction rub. No gallop.   Pulmonary:      Effort: Pulmonary effort is normal. No respiratory distress.      Breath sounds: Normal breath sounds. No wheezing.   Abdominal:      General: Bowel sounds are normal. There is no distension.      Palpations: Abdomen is soft.      Tenderness: There is no abdominal tenderness.   Musculoskeletal:         General: No tenderness or deformity. Normal range of motion.      Cervical back: Normal range of motion and neck supple.   Lymphadenopathy:      Cervical: No cervical adenopathy.   Skin:     General: Skin is warm and dry.       Findings: No erythema or rash.   Neurological:      Mental Status: He is alert and oriented to person, place, and time.   Psychiatric:         Thought Content: Thought content normal.         Judgment: Judgment normal.         Assessment:       1. Type 2 diabetes mellitus without complication, without long-term current use of insulin    2. Anxiety    3. Essential hypertension    4. Paroxysmal atrial fibrillation    5. Thrombocytopenia, unspecified    6. Morbid (severe) obesity due to excess calories        Plan:       Type 2 diabetes mellitus without complication, without long-term current use of insulin  -     Hemoglobin A1C; Future; Expected date: 06/07/2022  -     Comprehensive Metabolic Panel; Future; Expected date: 06/07/2022  -     Lipid Panel; Future; Expected date: 06/07/2022    Anxiety  -     diazePAM (VALIUM) 10 MG Tab; Take 1 tablet (10 mg total) by mouth as needed (anxiety). Just half to one and use very seldom-get refills from PCP  Dispense: 30 tablet; Refill: 2    Essential hypertension  -     Comprehensive Metabolic Panel; Future; Expected date: 06/07/2022    Paroxysmal atrial fibrillation  -     BNP; Future; Expected date: 06/07/2022  -     TSH; Future; Expected date: 06/07/2022  -     T4, Free; Future; Expected date: 06/07/2022    Thrombocytopenia, unspecified  Comments:  no bleeding monitor PLT    Morbid (severe) obesity due to excess calories  Comments:  pt is doing well with wt loss by diet exercise        Medication List with Changes/Refills   Current Medications    AMLODIPINE (NORVASC) 5 MG TABLET    One-2 per day or as directed    ASCORBIC ACID, VITAMIN C, (VITAMIN C) 100 MG TABLET    Take 500 mg by mouth once daily.    ATORVASTATIN (LIPITOR) 80 MG TABLET    Take 1 tablet (80 mg total) by mouth every evening.    AZELASTINE (ASTELIN) 137 MCG (0.1 %) NASAL SPRAY    1 spray (137 mcg total) by Nasal route 2 (two) times daily.    BLOOD SUGAR DIAGNOSTIC STRP    To check BG 2 times daily, to use with  insurance preferred meter    BLOOD-GLUCOSE METER KIT    To check BG 2 times daily, to use with insurance preferred meter    CLOPIDOGREL (PLAVIX) 75 MG TABLET    Take 1 tablet (75 mg total) by mouth once daily.    FAMOTIDINE (PEPCID) 40 MG TABLET    Take 1 tablet (40 mg total) by mouth every evening.    HYDROCHLOROTHIAZIDE (MICROZIDE) 12.5 MG CAPSULE    Take 1 capsule (12.5 mg total) by mouth once daily.    HYDROCODONE-ACETAMINOPHEN (NORCO) 5-325 MG PER TABLET    Take 1 tablet by mouth every 24 hours as needed for Pain.    IRBESARTAN (AVAPRO) 300 MG TABLET    TAKE 1 TABLET BY MOUTH ONCE DAILY IN THE EVENING    LANCETS MISC    To check BG 2 times daily, to use with insurance preferred meter    METFORMIN (GLUCOPHAGE) 500 MG TABLET    TAKE 1 TABLET BY MOUTH TWICE DAILY WITH MEALS    MULTIVITAMIN (THERAGRAN) PER TABLET    Take 1 tablet by mouth once daily.    XARELTO 20 MG TAB    Take 1 tablet (20 mg total) by mouth every evening.   Changed and/or Refilled Medications    Modified Medication Previous Medication    DIAZEPAM (VALIUM) 10 MG TAB diazePAM (VALIUM) 10 MG Tab       Take 1 tablet (10 mg total) by mouth as needed (anxiety). Just half to one and use very seldom-get refills from PCP    Take 1 tablet (10 mg total) by mouth as needed (anxiety). Just half to one and use very seldom-get refills from PCP

## 2022-06-13 ENCOUNTER — LAB VISIT (OUTPATIENT)
Dept: LAB | Facility: HOSPITAL | Age: 82
End: 2022-06-13
Attending: INTERNAL MEDICINE
Payer: MEDICARE

## 2022-06-13 DIAGNOSIS — I10 ESSENTIAL HYPERTENSION: ICD-10-CM

## 2022-06-13 DIAGNOSIS — I48.0 PAROXYSMAL ATRIAL FIBRILLATION: ICD-10-CM

## 2022-06-13 DIAGNOSIS — E11.9 TYPE 2 DIABETES MELLITUS WITHOUT COMPLICATION, WITHOUT LONG-TERM CURRENT USE OF INSULIN: ICD-10-CM

## 2022-06-13 LAB
ALBUMIN SERPL BCP-MCNC: 3.5 G/DL (ref 3.5–5.2)
ALP SERPL-CCNC: 93 U/L (ref 55–135)
ALT SERPL W/O P-5'-P-CCNC: 19 U/L (ref 10–44)
ANION GAP SERPL CALC-SCNC: 10 MMOL/L (ref 8–16)
AST SERPL-CCNC: 19 U/L (ref 10–40)
BILIRUB SERPL-MCNC: 0.8 MG/DL (ref 0.1–1)
BNP SERPL-MCNC: 109 PG/ML (ref 0–99)
BUN SERPL-MCNC: 16 MG/DL (ref 8–23)
CALCIUM SERPL-MCNC: 9.5 MG/DL (ref 8.7–10.5)
CHLORIDE SERPL-SCNC: 104 MMOL/L (ref 95–110)
CHOLEST SERPL-MCNC: 116 MG/DL (ref 120–199)
CHOLEST/HDLC SERPL: 2.4 {RATIO} (ref 2–5)
CO2 SERPL-SCNC: 24 MMOL/L (ref 23–29)
CREAT SERPL-MCNC: 0.8 MG/DL (ref 0.5–1.4)
EST. GFR  (AFRICAN AMERICAN): >60 ML/MIN/1.73 M^2
EST. GFR  (NON AFRICAN AMERICAN): >60 ML/MIN/1.73 M^2
ESTIMATED AVG GLUCOSE: 105 MG/DL (ref 68–131)
GLUCOSE SERPL-MCNC: 92 MG/DL (ref 70–110)
HBA1C MFR BLD: 5.3 % (ref 4–5.6)
HDLC SERPL-MCNC: 49 MG/DL (ref 40–75)
HDLC SERPL: 42.2 % (ref 20–50)
LDLC SERPL CALC-MCNC: 56.4 MG/DL (ref 63–159)
NONHDLC SERPL-MCNC: 67 MG/DL
POTASSIUM SERPL-SCNC: 4.2 MMOL/L (ref 3.5–5.1)
PROT SERPL-MCNC: 6.6 G/DL (ref 6–8.4)
SODIUM SERPL-SCNC: 138 MMOL/L (ref 136–145)
T4 FREE SERPL-MCNC: 0.89 NG/DL (ref 0.71–1.51)
TRIGL SERPL-MCNC: 53 MG/DL (ref 30–150)
TSH SERPL DL<=0.005 MIU/L-ACNC: 2.85 UIU/ML (ref 0.4–4)

## 2022-06-13 PROCEDURE — 84443 ASSAY THYROID STIM HORMONE: CPT | Performed by: INTERNAL MEDICINE

## 2022-06-13 PROCEDURE — 80061 LIPID PANEL: CPT | Performed by: INTERNAL MEDICINE

## 2022-06-13 PROCEDURE — 83880 ASSAY OF NATRIURETIC PEPTIDE: CPT | Performed by: INTERNAL MEDICINE

## 2022-06-13 PROCEDURE — 80053 COMPREHEN METABOLIC PANEL: CPT | Performed by: INTERNAL MEDICINE

## 2022-06-13 PROCEDURE — 83036 HEMOGLOBIN GLYCOSYLATED A1C: CPT | Performed by: INTERNAL MEDICINE

## 2022-06-13 PROCEDURE — 36415 COLL VENOUS BLD VENIPUNCTURE: CPT | Mod: PN | Performed by: INTERNAL MEDICINE

## 2022-06-13 PROCEDURE — 84439 ASSAY OF FREE THYROXINE: CPT | Performed by: INTERNAL MEDICINE

## 2022-06-16 ENCOUNTER — PATIENT MESSAGE (OUTPATIENT)
Dept: PODIATRY | Facility: CLINIC | Age: 82
End: 2022-06-16
Payer: MEDICARE

## 2022-06-20 ENCOUNTER — PATIENT MESSAGE (OUTPATIENT)
Dept: PRIMARY CARE CLINIC | Facility: CLINIC | Age: 82
End: 2022-06-20
Payer: MEDICARE

## 2022-06-21 ENCOUNTER — PATIENT OUTREACH (OUTPATIENT)
Dept: ADMINISTRATIVE | Facility: HOSPITAL | Age: 82
End: 2022-06-21
Payer: MEDICARE

## 2022-06-22 ENCOUNTER — TELEPHONE (OUTPATIENT)
Dept: PRIMARY CARE CLINIC | Facility: CLINIC | Age: 82
End: 2022-06-22
Payer: MEDICARE

## 2022-06-24 ENCOUNTER — TELEPHONE (OUTPATIENT)
Dept: PRIMARY CARE CLINIC | Facility: CLINIC | Age: 82
End: 2022-06-24
Payer: MEDICARE

## 2022-06-24 NOTE — TELEPHONE ENCOUNTER
Patient was told by his Podiatrist that he has to see her in July before a Rx for diabetic shoes can be written.

## 2022-06-24 NOTE — TELEPHONE ENCOUNTER
----- Message from Halima Osei MA sent at 6/21/2022  4:43 PM CDT -----  Contact: [facility]/[place]/[phone]    ----- Message -----  From: Devika Eldridge  Sent: 6/21/2022   3:46 PM CDT  To: Vitor Akhtar from [facility] said that she Is calling in regards to needing to get an order and clinicals for diabetic shoes with inserts faxed to [facility] at:[phone]. Please advise

## 2022-06-27 ENCOUNTER — PATIENT OUTREACH (OUTPATIENT)
Dept: ADMINISTRATIVE | Facility: HOSPITAL | Age: 82
End: 2022-06-27
Payer: MEDICARE

## 2022-06-27 NOTE — PROGRESS NOTES
JESSICA SENT TO [facility] FOR THE PATIENT EYE EXAM RESULTS  Chart review done. HM updated. Immunizations reviewed & updated. Care Everywhere updated.  
Samples Given: CeraVe hydrating bar
Detail Level: Simple
Render In Strict Bullet Format?: No

## 2022-06-27 NOTE — LETTER
AUTHORIZATION FOR RELEASE OF   CONFIDENTIAL INFORMATION    Dear [facility],    We are seeing Nick De Anda, date of birth 1940, in the clinic at [facility]. Santiago Adler MD is the patient's PCP. Nick De Anda has an outstanding lab/procedure at the time we reviewed his chart. In order to help keep his health information updated, he has authorized us to request the following medical record(s):        (  )  MAMMOGRAM                                      (  )  COLONOSCOPY      (  )  PAP SMEAR                                          (  )  OUTSIDE LAB RESULTS     (  )  DEXA SCAN                                          (  X)  EYE EXAM            (  )  FOOT EXAM                                          (  )  ENTIRE RECORD     (  )  OUTSIDE IMMUNIZATIONS                 (  )  _______________         [facility] [phone]      Patient Name: Nick De Anda  : 1940  Patient Phone #: [phone]

## 2022-06-30 ENCOUNTER — TELEPHONE (OUTPATIENT)
Dept: DERMATOLOGY | Facility: CLINIC | Age: 82
End: 2022-06-30
Payer: MEDICARE

## 2022-06-30 NOTE — TELEPHONE ENCOUNTER
Pt scheduled to see  Wednesday 07.27.22 @915am        Lori       ----- Message from Lou Juarez sent at 6/30/2022  3:32 PM CDT -----  Contact: @[phone]  Pt requesting a call back from Lori regarding a sooner appt.  I stated to the patient he was schedule for a sooner appt on 07/27/22 9:15am.  Please call to discuss further.

## 2022-06-30 NOTE — TELEPHONE ENCOUNTER
Scheduled pt a sooner appt. LVM for him to give the office a call back .      Lori      ----- Message from Becky Vee MA sent at 6/28/2022  5:04 PM CDT -----  Regarding: FW: sooner appt  Contact: pt @ [phone]    ----- Message -----  From: Melissa Childs  Sent: 6/28/2022   5:03 PM CDT  To: [facility] Staff  Subject: sooner appt                                      Pt was scheduled an appt with Dr. Cr has spot on ear, previous has skin cancer, asking to be seen sooner than 10/10. Please call.

## 2022-07-06 ENCOUNTER — OFFICE VISIT (OUTPATIENT)
Dept: PODIATRY | Facility: CLINIC | Age: 82
End: 2022-07-06
Payer: MEDICARE

## 2022-07-06 VITALS
BODY MASS INDEX: 29.53 KG/M2 | HEART RATE: 72 BPM | SYSTOLIC BLOOD PRESSURE: 132 MMHG | DIASTOLIC BLOOD PRESSURE: 64 MMHG | WEIGHT: 242.5 LBS | HEIGHT: 76 IN

## 2022-07-06 DIAGNOSIS — M20.41 HAMMER TOES OF BOTH FEET: ICD-10-CM

## 2022-07-06 DIAGNOSIS — M20.42 HAMMER TOES OF BOTH FEET: ICD-10-CM

## 2022-07-06 DIAGNOSIS — Z79.01 LONG TERM (CURRENT) USE OF ANTICOAGULANTS: Primary | ICD-10-CM

## 2022-07-06 DIAGNOSIS — M20.30 HALLUX, MALLEUS ACQUIRED: ICD-10-CM

## 2022-07-06 DIAGNOSIS — B35.3 TINEA PEDIS OF BOTH FEET: ICD-10-CM

## 2022-07-06 DIAGNOSIS — B35.1 ONYCHOMYCOSIS DUE TO DERMATOPHYTE: ICD-10-CM

## 2022-07-06 DIAGNOSIS — E11.42 DIABETIC POLYNEUROPATHY ASSOCIATED WITH TYPE 2 DIABETES MELLITUS: ICD-10-CM

## 2022-07-06 PROCEDURE — 11720 PR DEBRIDEMENT OF NAIL(S), 1-5: ICD-10-PCS | Mod: Q9,S$GLB,, | Performed by: PODIATRIST

## 2022-07-06 PROCEDURE — 3075F SYST BP GE 130 - 139MM HG: CPT | Mod: CPTII,S$GLB,, | Performed by: PODIATRIST

## 2022-07-06 PROCEDURE — 1159F PR MEDICATION LIST DOCUMENTED IN MEDICAL RECORD: ICD-10-PCS | Mod: CPTII,S$GLB,, | Performed by: PODIATRIST

## 2022-07-06 PROCEDURE — 99213 PR OFFICE/OUTPT VISIT, EST, LEVL III, 20-29 MIN: ICD-10-PCS | Mod: 25,S$GLB,, | Performed by: PODIATRIST

## 2022-07-06 PROCEDURE — 99999 PR PBB SHADOW E&M-EST. PATIENT-LVL III: ICD-10-PCS | Mod: PBBFAC,,, | Performed by: PODIATRIST

## 2022-07-06 PROCEDURE — 1126F AMNT PAIN NOTED NONE PRSNT: CPT | Mod: CPTII,S$GLB,, | Performed by: PODIATRIST

## 2022-07-06 PROCEDURE — 3075F PR MOST RECENT SYSTOLIC BLOOD PRESS GE 130-139MM HG: ICD-10-PCS | Mod: CPTII,S$GLB,, | Performed by: PODIATRIST

## 2022-07-06 PROCEDURE — 99999 PR PBB SHADOW E&M-EST. PATIENT-LVL III: CPT | Mod: PBBFAC,,, | Performed by: PODIATRIST

## 2022-07-06 PROCEDURE — 3078F PR MOST RECENT DIASTOLIC BLOOD PRESSURE < 80 MM HG: ICD-10-PCS | Mod: CPTII,S$GLB,, | Performed by: PODIATRIST

## 2022-07-06 PROCEDURE — 1126F PR PAIN SEVERITY QUANTIFIED, NO PAIN PRESENT: ICD-10-PCS | Mod: CPTII,S$GLB,, | Performed by: PODIATRIST

## 2022-07-06 PROCEDURE — 1159F MED LIST DOCD IN RCRD: CPT | Mod: CPTII,S$GLB,, | Performed by: PODIATRIST

## 2022-07-06 PROCEDURE — 3078F DIAST BP <80 MM HG: CPT | Mod: CPTII,S$GLB,, | Performed by: PODIATRIST

## 2022-07-06 PROCEDURE — 11720 DEBRIDE NAIL 1-5: CPT | Mod: Q9,S$GLB,, | Performed by: PODIATRIST

## 2022-07-06 PROCEDURE — 99213 OFFICE O/P EST LOW 20 MIN: CPT | Mod: 25,S$GLB,, | Performed by: PODIATRIST

## 2022-07-06 NOTE — PROGRESS NOTES
Subjective:      Patient ID: Nick De Anda is a 81 y.o. male.    Chief Complaint:   Diabetic Foot Exam (Pcp-Vitor 6/7/2022)    Nick is a 81 y.o. male who returns to the clinic for evaluation and treatment of high risk feet. Nick has a past medical history of Anticoagulant long-term use, Arthritis, Cancer, CHF (congestive heart failure), COPD (chronic obstructive pulmonary disease), Coronary artery disease, Diabetes mellitus, Encounter for blood transfusion, Encounter for monitoring sotalol therapy (8/1/2019), History of staphylococcal infection (2012), Hypertension, and Persistent atrial fibrillation (2/2/2019).  This patient has documented high risk feet requiring routine maintenance secondary to peripheral neuropathy.     Pt requesting more DM shoes  His athlete's foot seems to be doing well as he has been using spacers.  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      PCP: Santiago Adler MD    Date Last Seen by PCP: 6/7/22    Current shoe gear:  Affected Foot: Extra depth shoes with custome accommodative insoles     Unaffected Foot: Extra depth shoes with custome accommodative insoles    Hemoglobin A1C   Date Value Ref Range Status   06/13/2022 5.3 4.0 - 5.6 % Final     Comment:     ADA Screening Guidelines:  5.7-6.4%  Consistent with prediabetes  >or=6.5%  Consistent with diabetes    High levels of fetal hemoglobin interfere with the HbA1C  assay. Heterozygous hemoglobin variants (HbS, HgC, etc)do  not significantly interfere with this assay.   However, presence of multiple variants may affect accuracy.     09/17/2021 5.3 4.0 - 5.6 % Final     Comment:     ADA Screening Guidelines:  5.7-6.4%  Consistent with prediabetes  >or=6.5%  Consistent with diabetes    High levels of fetal hemoglobin interfere with the HbA1C  assay. Heterozygous  hemoglobin variants (HbS, HgC, etc)do  not significantly interfere with this assay.   However, presence of multiple variants may affect accuracy.     04/15/2021 5.9 (H) 4.0 - 5.6 % Final     Comment:     ADA Screening Guidelines:  5.7-6.4%  Consistent with prediabetes  >or=6.5%  Consistent with diabetes    High levels of fetal hemoglobin interfere with the HbA1C  assay. Heterozygous hemoglobin variants (HbS, HgC, etc)do  not significantly interfere with this assay.   However, presence of multiple variants may affect accuracy.          Past Medical History:   Diagnosis Date    Anticoagulant long-term use     Arthritis     Cancer     skin    CHF (congestive heart failure)     COPD (chronic obstructive pulmonary disease)     Coronary artery disease     Diabetes mellitus     diet    Encounter for blood transfusion     Encounter for monitoring sotalol therapy 8/1/2019    History of staphylococcal infection 2012    after CABG    Hypertension     Persistent atrial fibrillation 2/2/2019     Past Surgical History:   Procedure Laterality Date    ABLATION OF ARRHYTHMOGENIC FOCUS FOR ATRIAL FIBRILLATION N/A 12/2/2020    Procedure: Ablation atrial fibrillation;  Surgeon: Yung Paez MD;  Location: [facility] EP LAB;  Service: Cardiology;  Laterality: N/A;  PAF, AFl, DEANGELO (Cx if SR), PVI, CTI, RFA, Carto, Gen, HI, 3 Prep    ABLATION OF ARRHYTHMOGENIC FOCUS FOR ATRIAL FIBRILLATION N/A 12/8/2021    Procedure: Ablation atrial fibrillation;  Surgeon: Yung Paez MD;  Location: [facility] EP LAB;  Service: Cardiology;  Laterality: N/A;  AF/AFL (atypical), DEANGELO (Cx if SR), PVI re-do, RFA for Atypical AFL, Carto, Gen, HI, 3 Prep    CARDIAC SURGERY      cabg    CORONARY STENT PLACEMENT      CYSTOSCOPY      prostate infection    FOOT SURGERY      RECTOPERITONEAL FISTULA CLOSURE      SHOULDER SURGERY      Triple bypass      VOCAL FOLD LESION EXCISION N/A 9/28/2021    Procedure: EXCISION, LESION, VOCAL CORD;  Surgeon: Rommel  HORACIO Stubbs MD;  Location: [facility] OR [address] [place] [address];  Service: ENT;  Laterality: N/A;     Current Outpatient Medications on File Prior to Visit   Medication Sig Dispense Refill    amLODIPine (NORVASC) 5 MG tablet One-2 per day or as directed 90 tablet 3    ascorbic acid, vitamin C, (VITAMIN C) 100 MG tablet Take 500 mg by mouth once daily.      atorvastatin (LIPITOR) 80 MG tablet Take 1 tablet (80 mg total) by mouth every evening. 90 tablet 3    azelastine (ASTELIN) 137 mcg (0.1 %) nasal spray 1 spray (137 mcg total) by Nasal route 2 (two) times daily. 30 mL 3    blood sugar diagnostic Strp To check BG 2 times daily, to use with insurance preferred meter 100 strip 5    blood-glucose meter kit To check BG 2 times daily, to use with insurance preferred meter 1 each 0    clopidogreL (PLAVIX) 75 mg tablet Take 1 tablet (75 mg total) by mouth once daily. 90 tablet 3    diazePAM (VALIUM) 10 MG Tab Take 1 tablet (10 mg total) by mouth as needed (anxiety). Just half to one and use very seldom-get refills from PCP 30 tablet 2    hydroCHLOROthiazide (MICROZIDE) 12.5 mg capsule Take 1 capsule (12.5 mg total) by mouth once daily. 90 capsule 1    HYDROcodone-acetaminophen (NORCO) 5-325 mg per tablet Take 1 tablet by mouth every 24 hours as needed for Pain. 25 tablet 0    irbesartan (AVAPRO) 300 MG tablet TAKE 1 TABLET BY MOUTH ONCE DAILY IN THE EVENING 90 tablet 0    lancets Misc To check BG 2 times daily, to use with insurance preferred meter 100 each 5    metFORMIN (GLUCOPHAGE) 500 MG tablet TAKE 1 TABLET BY MOUTH TWICE DAILY WITH MEALS (Patient taking differently: Daily. Pt stated he is taking once daily) 180 tablet 0    multivitamin (THERAGRAN) per tablet Take 1 tablet by mouth once daily.      XARELTO 20 mg Tab Take 1 tablet (20 mg total) by mouth every evening. 30 tablet 11    famotidine (PEPCID) 40 MG tablet Take 1 tablet (40 mg total) by mouth every evening. 30 tablet 11     Current Facility-Administered  Medications on File Prior to Visit   Medication Dose Route Frequency Provider Last Rate Last Admin    0.9%  NaCl infusion   Intravenous Continuous Rebecca Dickinson NP   Stopped at 12/02/20 1636    sodium chloride 0.9% bolus 1,000 mL  1,000 mL Intravenous Once Winnie Larkin NP        sodium chloride 0.9% flush 10 mL  10 mL Intravenous PRN BALAJI. Javier Jaramillo MD        sodium chloride 0.9% flush 5 mL  5 mL Intravenous PRN Rebecca Dickinson NP         Review of patient's allergies indicates:   Allergen Reactions    Penicillins Anaphylaxis     Can take keflex    Hydrocodone      Causes constipation    Amoxicillin Swelling and Rash       Review of Systems   Constitutional: Negative for chills, decreased appetite, fever, malaise/fatigue, night sweats, weight gain and weight loss.   Cardiovascular: Positive for dyspnea on exertion and leg swelling. Negative for chest pain, claudication, palpitations and syncope.   Respiratory: Negative for cough and shortness of breath.    Endocrine: Negative for cold intolerance and heat intolerance.   Hematologic/Lymphatic: Negative for bleeding problem. Bruises/bleeds easily.   Skin: Positive for nail changes. Negative for color change, dry skin, flushing, itching, poor wound healing, rash, skin cancer, suspicious lesions and unusual hair distribution.   Musculoskeletal: Positive for arthritis, back pain and stiffness. Negative for falls, gout, joint pain, joint swelling, muscle cramps, muscle weakness, myalgias and neck pain.   Gastrointestinal: Negative for diarrhea, nausea and vomiting.   Neurological: Positive for numbness. Negative for dizziness, focal weakness, light-headedness, paresthesias, tremors, vertigo and weakness.   Psychiatric/Behavioral: Negative for altered mental status and depression. The patient does not have insomnia.    Allergic/Immunologic: Negative.            Objective:       Vitals:    07/06/22 1051   BP: 132/64   Pulse: 72   Weight: 110 kg  "(242 lb 8.1 oz)   Height: 6' 4" (1.93 m)   PainSc: 0-No pain   110 kg (242 lb 8.1 oz)     Physical Exam  Vitals reviewed.   Constitutional:       General: He is not in acute distress.     Appearance: He is well-developed. He is not ill-appearing, toxic-appearing or diaphoretic.   Cardiovascular:      Pulses:           Dorsalis pedis pulses are 1+ on the right side and 1+ on the left side.        Posterior tibial pulses are 2+ on the right side and 2+ on the left side.      Comments: Decreased hair growth to lower shins with evidence of chronic venous stasis dz.   Musculoskeletal:         General: No tenderness.      Right lower le+ Edema present.      Left lower le+ Edema present.      Right foot: Decreased range of motion. Deformity, bunion and prominent metatarsal heads present. No tenderness or bony tenderness.      Left foot: Decreased range of motion. Deformity, bunion and prominent metatarsal heads present. No tenderness or bony tenderness.      Comments: Flexible pes planus foot type w/ medial arch collapse and mild gastroc equinus      Semi-Reducible and non-reducable extensor and flexor contractures at the MTPJ and PIPJ of toes 2-5, bilat.      Hallux malleus contractures with mild lateral deviation    No pain on palpation or with range of motion           Feet:      Right foot:      Protective Sensation: 10 sites tested. 0 sites sensed.      Skin integrity: No ulcer, blister, skin breakdown, erythema, warmth or callus.      Toenail Condition: Right toenails are abnormally thick.      Left foot:      Protective Sensation: 10 sites tested. 0 sites sensed.      Skin integrity: No ulcer, blister, skin breakdown, erythema, warmth or callus.      Toenail Condition: Left toenails are abnormally thick.      Comments: Mild IS macerations      Toenails 1-3 bilaterally are elongated by 2-3 mm, thickened by 2-3 mm, discolored/yellowed, dystrophic, brittle with subungual debris.   Dried blood to left 2nd digit " nail bed     No signs of infection or open lesions  Skin:     General: Skin is warm.      Capillary Refill: Capillary refill takes 2 to 3 seconds.      Coloration: Skin is pale.      Findings: No erythema.      Nails: There is no clubbing.   Neurological:      Mental Status: He is alert and oriented to person, place, and time.      Gait: Gait abnormal.   Psychiatric:         Attention and Perception: Attention normal.         Mood and Affect: Mood normal.         Speech: Speech normal.         Behavior: Behavior normal.         Thought Content: Thought content normal.         Judgment: Judgment normal.      Comments: ** hard of hearing               Assessment:       Encounter Diagnoses   Name Primary?    Long term (current) use of anticoagulants Yes    Diabetic polyneuropathy associated with type 2 diabetes mellitus     Hallux, malleus acquired     Hammer toes of both feet     Tinea pedis of both feet     Onychomycosis due to dermatophyte          Plan:       Nick was seen today for diabetic foot exam.    Diagnoses and all orders for this visit:    Long term (current) use of anticoagulants    Diabetic polyneuropathy associated with type 2 diabetes mellitus  -     DIABETIC SHOES FOR HOME USE    Hallux, malleus acquired  -     DIABETIC SHOES FOR HOME USE    Hammer toes of both feet  -     DIABETIC SHOES FOR HOME USE    Tinea pedis of both feet    Onychomycosis due to dermatophyte      I counseled the patient on his conditions, their implications and medical management.     - - Shoe inspection. Diabetic Foot Education. Patient reminded of the importance of good nutrition and blood sugar control to help prevent podiatric complications of diabetes. Patient instructed on proper foot hygeine. We discussed wearing proper shoe gear, daily foot inspections, never walking without protective shoe gear, never putting sharp instruments to feet, routine podiatric nail visits every 2-3 months.      - With patient's  permission, nails were aggressively reduced and debrided x 4 to their soft tissue attachment mechanically and with electric , removing all offending nail and debris. Patient relates relief following the procedure. He will continue to monitor the areas daily, inspect his feet, wear protective shoe gear when ambulatory, moisturizer to maintain skin integrity and follow in this office in approximately 2-3 months, sooner p.r.n.          - continue diabetic shoes; rx new ones    - applied g.violet to IS.        - patient to follow-up in 6 months or sooner if needed

## 2022-07-27 ENCOUNTER — OFFICE VISIT (OUTPATIENT)
Dept: DERMATOLOGY | Facility: CLINIC | Age: 82
End: 2022-07-27
Payer: MEDICARE

## 2022-07-27 DIAGNOSIS — Z12.83 SKIN EXAM, SCREENING FOR CANCER: ICD-10-CM

## 2022-07-27 DIAGNOSIS — L90.5 SCAR: ICD-10-CM

## 2022-07-27 DIAGNOSIS — D02.0 SQUAMOUS CELL CARCINOMA IN SITU (SCCIS) OF TRUE VOCAL CORD: ICD-10-CM

## 2022-07-27 DIAGNOSIS — L57.0 ACTINIC KERATOSES: Primary | ICD-10-CM

## 2022-07-27 DIAGNOSIS — Z85.828 HISTORY OF SKIN CANCER: ICD-10-CM

## 2022-07-27 PROCEDURE — 99999 PR PBB SHADOW E&M-EST. PATIENT-LVL III: ICD-10-PCS | Mod: PBBFAC,,, | Performed by: DERMATOLOGY

## 2022-07-27 PROCEDURE — 3288F PR FALLS RISK ASSESSMENT DOCUMENTED: ICD-10-PCS | Mod: CPTII,S$GLB,, | Performed by: DERMATOLOGY

## 2022-07-27 PROCEDURE — 99214 PR OFFICE/OUTPT VISIT, EST, LEVL IV, 30-39 MIN: ICD-10-PCS | Mod: 25,S$GLB,, | Performed by: DERMATOLOGY

## 2022-07-27 PROCEDURE — 17000 DESTRUCT PREMALG LESION: CPT | Mod: S$GLB,,, | Performed by: DERMATOLOGY

## 2022-07-27 PROCEDURE — 1126F AMNT PAIN NOTED NONE PRSNT: CPT | Mod: CPTII,S$GLB,, | Performed by: DERMATOLOGY

## 2022-07-27 PROCEDURE — 99999 PR PBB SHADOW E&M-EST. PATIENT-LVL III: CPT | Mod: PBBFAC,,, | Performed by: DERMATOLOGY

## 2022-07-27 PROCEDURE — 1101F PT FALLS ASSESS-DOCD LE1/YR: CPT | Mod: CPTII,S$GLB,, | Performed by: DERMATOLOGY

## 2022-07-27 PROCEDURE — 3288F FALL RISK ASSESSMENT DOCD: CPT | Mod: CPTII,S$GLB,, | Performed by: DERMATOLOGY

## 2022-07-27 PROCEDURE — 1101F PR PT FALLS ASSESS DOC 0-1 FALLS W/OUT INJ PAST YR: ICD-10-PCS | Mod: CPTII,S$GLB,, | Performed by: DERMATOLOGY

## 2022-07-27 PROCEDURE — 1126F PR PAIN SEVERITY QUANTIFIED, NO PAIN PRESENT: ICD-10-PCS | Mod: CPTII,S$GLB,, | Performed by: DERMATOLOGY

## 2022-07-27 PROCEDURE — 17000 PR DESTRUCTION(LASER SURGERY,CRYOSURGERY,CHEMOSURGERY),PREMALIGNANT LESIONS,FIRST LESION: ICD-10-PCS | Mod: S$GLB,,, | Performed by: DERMATOLOGY

## 2022-07-27 PROCEDURE — 1159F MED LIST DOCD IN RCRD: CPT | Mod: CPTII,S$GLB,, | Performed by: DERMATOLOGY

## 2022-07-27 PROCEDURE — 1159F PR MEDICATION LIST DOCUMENTED IN MEDICAL RECORD: ICD-10-PCS | Mod: CPTII,S$GLB,, | Performed by: DERMATOLOGY

## 2022-07-27 PROCEDURE — 99214 OFFICE O/P EST MOD 30 MIN: CPT | Mod: 25,S$GLB,, | Performed by: DERMATOLOGY

## 2022-07-27 RX ORDER — AMMONIUM LACTATE 12 G/100G
LOTION TOPICAL NIGHTLY
Qty: 225 G | Refills: 2 | Status: SHIPPED | OUTPATIENT
Start: 2022-07-27 | End: 2023-05-18 | Stop reason: SDUPTHER

## 2022-07-27 NOTE — PROGRESS NOTES
Subjective:       Patient ID:  Nick De Anda is a 81 y.o. male who presents for   Chief Complaint   Patient presents with    Skin Check     TBSE   History of BCC and SCC     HPI    Review of Systems   Constitutional: Negative.  Negative for fever and chills.   HENT: Negative.    Respiratory: Negative.  Negative for cough and shortness of breath.    Gastrointestinal: Negative for nausea and vomiting.   Musculoskeletal: Negative.  Negative for joint swelling and arthralgias.   Skin: Positive for dry skin and wears hat. Negative for daily sunscreen use, activity-related sunscreen use and recent sunburn.   All other systems reviewed and are negative.  Hematologic/Lymphatic: Bruises/bleeds easily.        Objective:    Physical Exam   Constitutional: He appears well-developed and well-nourished.   Eyes: No conjunctival no injection.   Neurological: He is alert and oriented to person, place, and time.   Psychiatric: He has a normal mood and affect.   Skin:   Areas Examined (abnormalities noted in diagram):   Scalp / Hair Palpated and Inspected  Head / Face Inspection Performed  Neck Inspection Performed  Chest / Axilla Inspection Performed  Abdomen Inspection Performed  Back Inspection Performed  RUE Inspected  LUE Inspection Performed  RLE Inspected  LLE Inspection Performed                   Diagram Legend     Erythematous scaling macule/papule c/w actinic keratosis       Vascular papule c/w angioma      Pigmented verrucoid papule/plaque c/w seborrheic keratosis      Yellow umbilicated papule c/w sebaceous hyperplasia      Irregularly shaped tan macule c/w lentigo     1-2 mm smooth white papules consistent with Milia      Movable subcutaneous cyst with punctum c/w epidermal inclusion cyst      Subcutaneous movable cyst c/w pilar cyst      Firm pink to brown papule c/w dermatofibroma      Pedunculated fleshy papule(s) c/w skin tag(s)      Evenly pigmented macule c/w junctional nevus     Mildly variegated  pigmented, slightly irregular-bordered macule c/w mildly atypical nevus      Flesh colored to evenly pigmented papule c/w intradermal nevus       Pink pearly papule/plaque c/w basal cell carcinoma      Erythematous hyperkeratotic cursted plaque c/w SCC      Surgical scar with no sign of skin cancer recurrence      Open and closed comedones      Inflammatory papules and pustules      Verrucoid papule consistent consistent with wart     Erythematous eczematous patches and plaques     Dystrophic onycholytic nail with subungual debris c/w onychomycosis     Umbilicated papule    Erythematous-base heme-crusted tan verrucoid plaque consistent with inflamed seborrheic keratosis     Erythematous Silvery Scaling Plaque c/w Psoriasis     See annotation      Assessment / Plan:        Actinic keratoses  -     ammonium lactate (LAC-HYDRIN) 12 % lotion; Apply topically every evening. Hands and arms and then later scalp, forehead, temples  Dispense: 225 g; Refill: 2  Discussed all of the following:  Actinic keratosis is a skin growth caused by sun damage. These growths are not cancer, but they may develop into skin cancer (precancerous). Many people get these growths, especially as they age. This is because of cumulative sun exposure over years. Multiple growths are called actinic keratoses.    Patient instructed in importance in daily sun protection. Sun avoidance and topical protection discussed.     Patient encouraged to wear hat for all outdoor exposure.     Also discussed sun protective clothing.  Cryosurgery Procedure Note:  r neck    Verbal consent from the patient is obtained including, but not limited to, risk of hypopigmentation/hyperpigmentation, scar, recurrence of lesion. The patient is aware of the precancerous quality and need for treatment of these lesions. Liquid nitrogen cryosurgery is applied to the 1 actinic keratoses, as detailed in the physical exam, to produce a freeze injury. The patient is aware that  blisters may form and is instructed on wound care with gentle cleansing and use of vaseline ointment to keep moist until healed. The patient is supplied a handout on cryosurgery and is instructed to call if lesions do not completely resolve.  Patient instructed to start Amlactin cream or lotion nightly to AK prone areas or other specified affected areas.  Warned of skin irritation and to decrease frequency of usage if this occurs.  Proper application of medications and or care for affected area(s) and condition(s) reviewed.    We will recheck the face, scalp, arms, hands at our follow up appointment.    Skin exam, screening for cancer  No other seriously suspicious lesions noted for body areas examined today.  Patient to inform of us if they notice any dark or changing or suspicious spots in areas not examined today.  Follow up for routine monitoring recommended to patient.    Instructed patient to watch out for dark spots, bleeding spots, crusty spots, sores that break out repeatedly in the same spot.  These characteristics are risk factors for skin cancer, and patient is to notify us if they experience any of these symptoms.  Brochure given for patient education.  Pt to check under shorts, shoes, and leg braces.    History of skin cancer  No signs of recurrence are noted in previous surgical areas or scars.  Previous [facility] labs and or records and notes reviewed and considered for their impact on our clinical decision making today.    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  rev'd how this is sep from skin scc.    Scar  No signs of recurrence are noted in previous surgical areas or scars.             Follow up in about 4 months (around 11/27/2022).

## 2022-07-27 NOTE — PATIENT INSTRUCTIONS
Patient instructed to start Amlactin cream or lotion nightly to AK prone areas or other specified affected areas.  Warned of skin irritation and to decrease frequency of usage if this occurs.      Discussed all of the following with the patient.    Patient to check their breasts (if applicable), buttocks, and groin with a mirror.  Patient deferred our examination of these areas today.    Each month, check your body for any spots such as freckles, age spots, and moles.  Watch for color changes and shape changes and growth in size.    Have your bergeron or  pay attention to any dark color changes to moles of the scalp.    Moles, also called nevi, are small, colored (pigmented) marks on the skin. They have no known purpose. Many moles appear before age 30, but they also increase frequently as people age. Moles most often are not cancer (benign) and are harmless. But some become cancerous (malignant). Thats why you need to watch the moles on your body and tell your healthcare provider about any that concern you.

## 2022-09-20 ENCOUNTER — PES CALL (OUTPATIENT)
Dept: ADMINISTRATIVE | Facility: CLINIC | Age: 82
End: 2022-09-20
Payer: MEDICARE

## 2022-09-20 PROBLEM — E66.3 OVERWEIGHT WITH BODY MASS INDEX (BMI) OF 29 TO 29.9 IN ADULT: Status: ACTIVE | Noted: 2022-09-20

## 2022-09-20 NOTE — PROGRESS NOTES
Subjective:   Patient ID:  Nick De Anda is a 81 y.o. male who presents for follow-up of CVD    HPI:  The patient is here for CAD/PAD/AF.  The patient has no chest pain, SOB, TIA, palpitations, syncope or pre-syncope.Patient does not exercise a lot but is active.BP in Podiatry 132/64 and in PC < 110/70.        Review of Systems   Constitutional: Negative for chills, decreased appetite, diaphoresis, fever, malaise/fatigue, night sweats, weight gain and weight loss.   HENT:  Negative for congestion, hoarse voice, nosebleeds, sore throat and tinnitus.    Eyes:  Negative for blurred vision, double vision, vision loss in left eye, vision loss in right eye, visual disturbance and visual halos.   Cardiovascular:  Negative for chest pain, claudication, cyanosis, dyspnea on exertion, irregular heartbeat, leg swelling, near-syncope, orthopnea, palpitations, paroxysmal nocturnal dyspnea and syncope.   Respiratory:  Negative for cough, hemoptysis, shortness of breath, sleep disturbances due to breathing, snoring, sputum production and wheezing.    Endocrine: Negative for cold intolerance, heat intolerance, polydipsia, polyphagia and polyuria.   Hematologic/Lymphatic: Negative for adenopathy and bleeding problem. Does not bruise/bleed easily.   Skin:  Negative for color change, dry skin, flushing, itching, nail changes, poor wound healing, rash, skin cancer, suspicious lesions and unusual hair distribution.   Musculoskeletal:  Negative for arthritis, back pain, falls, gout, joint pain, joint swelling, muscle cramps, muscle weakness, myalgias and stiffness.   Gastrointestinal:  Negative for abdominal pain, anorexia, change in bowel habit, constipation, diarrhea, dysphagia, heartburn, hematemesis, hematochezia, melena and vomiting.   Genitourinary:  Negative for decreased libido, dysuria, hematuria, hesitancy and urgency.   Neurological:  Negative for excessive daytime sleepiness, dizziness, focal weakness, headaches,  "light-headedness, loss of balance, numbness, paresthesias, seizures, sensory change, tremors, vertigo and weakness.   Psychiatric/Behavioral:  Negative for altered mental status, depression, hallucinations, memory loss, substance abuse and suicidal ideas. The patient does not have insomnia and is not nervous/anxious.    Allergic/Immunologic: Negative for environmental allergies and hives.     Objective: BP (!) 152/70 (BP Location: Left arm, Patient Position: Sitting, BP Method: Medium (Automatic))   Pulse 69   Ht 6' 2" (1.88 m)   Wt 114.4 kg (252 lb 3.3 oz)   BMI 32.38 kg/m²      Physical Exam  Constitutional:       General: He is not in acute distress.     Appearance: He is well-developed. He is not diaphoretic.   HENT:      Head: Normocephalic.   Eyes:      Pupils: Pupils are equal, round, and reactive to light.   Neck:      Thyroid: No thyromegaly.   Cardiovascular:      Rate and Rhythm: Normal rate and regular rhythm.      Pulses: Intact distal pulses.           Carotid pulses are 3+ on the right side and 3+ on the left side.       Radial pulses are 3+ on the right side and 3+ on the left side.        Femoral pulses are 3+ on the right side and 3+ on the left side.       Popliteal pulses are 3+ on the right side and 3+ on the left side.        Dorsalis pedis pulses are 3+ on the right side and 3+ on the left side.        Posterior tibial pulses are 3+ on the right side and 3+ on the left side.      Heart sounds: Normal heart sounds. No murmur heard.    No friction rub. No gallop.   Pulmonary:      Effort: Pulmonary effort is normal. No respiratory distress.      Breath sounds: Normal breath sounds. No wheezing or rales.   Chest:      Chest wall: No tenderness.   Abdominal:      General: There is no distension.      Palpations: Abdomen is soft. There is no mass.      Tenderness: There is no abdominal tenderness.   Musculoskeletal:         General: Normal range of motion.      Cervical back: Normal range of " motion.   Lymphadenopathy:      Cervical: No cervical adenopathy.   Skin:     General: Skin is warm.      Nails: There is no clubbing.   Neurological:      Mental Status: He is alert and oriented to person, place, and time.   Psychiatric:         Speech: Speech normal.         Behavior: Behavior normal.         Thought Content: Thought content normal.         Judgment: Judgment normal.       Assessment:     1. Coronary artery disease involving native coronary artery of native heart without angina pectoris    2. Hx of CABG    3. Thrombocytopenia    4. Hyponatremia    5. Post PTCA    6. Typical atrial flutter    7. Long term (current) use of anticoagulants    8. Bronchiectasis without complication    9. Hypertensive heart disease with heart failure    10. Atherosclerosis of native artery of right lower extremity, with unspecified presence of clinical manifestation    11. Paroxysmal atrial fibrillation    12. Type 2 diabetes mellitus without complication, without long-term current use of insulin    13. Overweight with body mass index (BMI) of 29 to 29.9 in adult    14. Dyslipidemia        Plan:   Discussed diet , achieving and maintaining ideal body weight, and exercise.   We reviewed meds in detail.  Reassured-Discussed goals, options, plan.  Omega-3 > 800 mg/d combined EPA/DHA.  Goal BP< 130/80.  Goal LDL < 70.  NTG should be refilled every 8-9 months.  He can reduce Plavix to 4 days per week    Nick was seen today for coronary artery disease involving native coronary artery of.    Diagnoses and all orders for this visit:    Coronary artery disease involving native coronary artery of native heart without angina pectoris  -     Lipid Panel; Future; Expected date: 09/21/2022  -     Comprehensive Metabolic Panel; Future; Expected date: 09/21/2022  -     TSH; Future; Expected date: 09/21/2022  -     EKG 12-lead; Future  -     BNP; Future; Expected date: 09/21/2022  -     Discontinue: nitroGLYCERIN (NITROSTAT) 0.4 MG SL  tablet; Place 1 tablet (0.4 mg total) under the tongue every 5 (five) minutes as needed for Chest pain.  -     Echo  -     nitroGLYCERIN (NITROSTAT) 0.4 MG SL tablet; Place 1 tablet (0.4 mg total) under the tongue every 5 (five) minutes as needed for Chest pain.    Hx of CABG  -     EKG 12-lead; Future  -     BNP; Future; Expected date: 09/21/2022  -     Discontinue: nitroGLYCERIN (NITROSTAT) 0.4 MG SL tablet; Place 1 tablet (0.4 mg total) under the tongue every 5 (five) minutes as needed for Chest pain.  -     Echo  -     nitroGLYCERIN (NITROSTAT) 0.4 MG SL tablet; Place 1 tablet (0.4 mg total) under the tongue every 5 (five) minutes as needed for Chest pain.    Thrombocytopenia  -     CBC Auto Differential; Future; Expected date: 09/21/2022    Hyponatremia  -     Comprehensive Metabolic Panel; Future; Expected date: 09/21/2022    Post PTCA    Typical atrial flutter  -     EKG 12-lead; Future  -     Echo    Long term (current) use of anticoagulants    Bronchiectasis without complication    Hypertensive heart disease with heart failure  -     TSH; Future; Expected date: 09/21/2022    Atherosclerosis of native artery of right lower extremity, with unspecified presence of clinical manifestation    Paroxysmal atrial fibrillation  -     Echo    Type 2 diabetes mellitus without complication, without long-term current use of insulin  -     TSH; Future; Expected date: 09/21/2022  -     Hemoglobin A1C; Future; Expected date: 09/21/2022    Overweight with body mass index (BMI) of 29 to 29.9 in adult    Dyslipidemia  -     Lipid Panel; Future; Expected date: 09/21/2022  -     Comprehensive Metabolic Panel; Future; Expected date: 09/21/2022  -     TSH; Future; Expected date: 09/21/2022          Follow up in about 15 months (around 12/21/2023) for with labs , ECG and CFD Bishop Alas to read not now but 15 months.

## 2022-09-21 ENCOUNTER — OFFICE VISIT (OUTPATIENT)
Dept: CARDIOLOGY | Facility: CLINIC | Age: 82
End: 2022-09-21
Payer: MEDICARE

## 2022-09-21 VITALS
SYSTOLIC BLOOD PRESSURE: 152 MMHG | BODY MASS INDEX: 32.36 KG/M2 | HEART RATE: 69 BPM | WEIGHT: 252.19 LBS | HEIGHT: 74 IN | DIASTOLIC BLOOD PRESSURE: 70 MMHG

## 2022-09-21 DIAGNOSIS — Z79.01 LONG TERM (CURRENT) USE OF ANTICOAGULANTS: ICD-10-CM

## 2022-09-21 DIAGNOSIS — I48.0 PAROXYSMAL ATRIAL FIBRILLATION: ICD-10-CM

## 2022-09-21 DIAGNOSIS — Z95.1 HX OF CABG: ICD-10-CM

## 2022-09-21 DIAGNOSIS — E66.3 OVERWEIGHT WITH BODY MASS INDEX (BMI) OF 29 TO 29.9 IN ADULT: ICD-10-CM

## 2022-09-21 DIAGNOSIS — I48.3 TYPICAL ATRIAL FLUTTER: ICD-10-CM

## 2022-09-21 DIAGNOSIS — I70.201 ATHEROSCLEROSIS OF NATIVE ARTERY OF RIGHT LOWER EXTREMITY, WITH UNSPECIFIED PRESENCE OF CLINICAL MANIFESTATION: ICD-10-CM

## 2022-09-21 DIAGNOSIS — E87.1 HYPONATREMIA: ICD-10-CM

## 2022-09-21 DIAGNOSIS — Z98.61 POST PTCA: ICD-10-CM

## 2022-09-21 DIAGNOSIS — I25.10 CORONARY ARTERY DISEASE INVOLVING NATIVE CORONARY ARTERY OF NATIVE HEART WITHOUT ANGINA PECTORIS: Primary | ICD-10-CM

## 2022-09-21 DIAGNOSIS — I11.0 HYPERTENSIVE HEART DISEASE WITH HEART FAILURE: ICD-10-CM

## 2022-09-21 DIAGNOSIS — D69.6 THROMBOCYTOPENIA: ICD-10-CM

## 2022-09-21 DIAGNOSIS — E78.5 DYSLIPIDEMIA: ICD-10-CM

## 2022-09-21 DIAGNOSIS — J47.9 BRONCHIECTASIS WITHOUT COMPLICATION: ICD-10-CM

## 2022-09-21 DIAGNOSIS — E11.9 TYPE 2 DIABETES MELLITUS WITHOUT COMPLICATION, WITHOUT LONG-TERM CURRENT USE OF INSULIN: ICD-10-CM

## 2022-09-21 PROCEDURE — 3078F DIAST BP <80 MM HG: CPT | Mod: CPTII,S$GLB,, | Performed by: INTERNAL MEDICINE

## 2022-09-21 PROCEDURE — 1126F AMNT PAIN NOTED NONE PRSNT: CPT | Mod: CPTII,S$GLB,, | Performed by: INTERNAL MEDICINE

## 2022-09-21 PROCEDURE — 3077F SYST BP >= 140 MM HG: CPT | Mod: CPTII,S$GLB,, | Performed by: INTERNAL MEDICINE

## 2022-09-21 PROCEDURE — 1159F MED LIST DOCD IN RCRD: CPT | Mod: CPTII,S$GLB,, | Performed by: INTERNAL MEDICINE

## 2022-09-21 PROCEDURE — 3288F PR FALLS RISK ASSESSMENT DOCUMENTED: ICD-10-PCS | Mod: CPTII,S$GLB,, | Performed by: INTERNAL MEDICINE

## 2022-09-21 PROCEDURE — 1159F PR MEDICATION LIST DOCUMENTED IN MEDICAL RECORD: ICD-10-PCS | Mod: CPTII,S$GLB,, | Performed by: INTERNAL MEDICINE

## 2022-09-21 PROCEDURE — 3078F PR MOST RECENT DIASTOLIC BLOOD PRESSURE < 80 MM HG: ICD-10-PCS | Mod: CPTII,S$GLB,, | Performed by: INTERNAL MEDICINE

## 2022-09-21 PROCEDURE — 3077F PR MOST RECENT SYSTOLIC BLOOD PRESSURE >= 140 MM HG: ICD-10-PCS | Mod: CPTII,S$GLB,, | Performed by: INTERNAL MEDICINE

## 2022-09-21 PROCEDURE — 99215 OFFICE O/P EST HI 40 MIN: CPT | Mod: S$GLB,,, | Performed by: INTERNAL MEDICINE

## 2022-09-21 PROCEDURE — 1101F PT FALLS ASSESS-DOCD LE1/YR: CPT | Mod: CPTII,S$GLB,, | Performed by: INTERNAL MEDICINE

## 2022-09-21 PROCEDURE — 99999 PR PBB SHADOW E&M-EST. PATIENT-LVL V: CPT | Mod: PBBFAC,,, | Performed by: INTERNAL MEDICINE

## 2022-09-21 PROCEDURE — 99499 RISK ADDL DX/OHS AUDIT: ICD-10-PCS | Mod: S$GLB,,, | Performed by: INTERNAL MEDICINE

## 2022-09-21 PROCEDURE — 99215 PR OFFICE/OUTPT VISIT, EST, LEVL V, 40-54 MIN: ICD-10-PCS | Mod: S$GLB,,, | Performed by: INTERNAL MEDICINE

## 2022-09-21 PROCEDURE — 99499 UNLISTED E&M SERVICE: CPT | Mod: S$GLB,,, | Performed by: INTERNAL MEDICINE

## 2022-09-21 PROCEDURE — 99999 PR PBB SHADOW E&M-EST. PATIENT-LVL V: ICD-10-PCS | Mod: PBBFAC,,, | Performed by: INTERNAL MEDICINE

## 2022-09-21 PROCEDURE — 1126F PR PAIN SEVERITY QUANTIFIED, NO PAIN PRESENT: ICD-10-PCS | Mod: CPTII,S$GLB,, | Performed by: INTERNAL MEDICINE

## 2022-09-21 PROCEDURE — 3288F FALL RISK ASSESSMENT DOCD: CPT | Mod: CPTII,S$GLB,, | Performed by: INTERNAL MEDICINE

## 2022-09-21 PROCEDURE — 1101F PR PT FALLS ASSESS DOC 0-1 FALLS W/OUT INJ PAST YR: ICD-10-PCS | Mod: CPTII,S$GLB,, | Performed by: INTERNAL MEDICINE

## 2022-09-21 RX ORDER — NITROGLYCERIN 0.4 MG/1
0.4 TABLET SUBLINGUAL EVERY 5 MIN PRN
Qty: 25 TABLET | Refills: 3 | Status: SHIPPED | OUTPATIENT
Start: 2022-09-21 | End: 2023-07-06

## 2022-09-21 RX ORDER — NITROGLYCERIN 0.4 MG/1
0.4 TABLET SUBLINGUAL EVERY 5 MIN PRN
Qty: 25 TABLET | Refills: 3 | Status: SHIPPED | OUTPATIENT
Start: 2022-09-21 | End: 2022-09-21 | Stop reason: SDUPTHER

## 2022-09-21 NOTE — PATIENT INSTRUCTIONS
Discussed diet , achieving and maintaining ideal body weight, and exercise.   We reviewed meds in detail.  Reassured-Discussed goals, options, plan.  Omega-3 > 800 mg/d combined EPA/DHA.  Goal BP< 130/80.  Goal LDL < 70.  NTG should be refilled every 8-9 months.  He can reduce Plavix to 4 days per week

## 2022-09-27 ENCOUNTER — PATIENT MESSAGE (OUTPATIENT)
Dept: PRIMARY CARE CLINIC | Facility: CLINIC | Age: 82
End: 2022-09-27

## 2022-09-27 ENCOUNTER — OFFICE VISIT (OUTPATIENT)
Dept: PRIMARY CARE CLINIC | Facility: CLINIC | Age: 82
End: 2022-09-27
Payer: MEDICARE

## 2022-09-27 VITALS
SYSTOLIC BLOOD PRESSURE: 130 MMHG | BODY MASS INDEX: 32.38 KG/M2 | HEART RATE: 61 BPM | RESPIRATION RATE: 16 BRPM | OXYGEN SATURATION: 98 % | WEIGHT: 252.31 LBS | HEIGHT: 74 IN | DIASTOLIC BLOOD PRESSURE: 78 MMHG

## 2022-09-27 DIAGNOSIS — E11.9 TYPE 2 DIABETES MELLITUS WITHOUT COMPLICATION, WITHOUT LONG-TERM CURRENT USE OF INSULIN: ICD-10-CM

## 2022-09-27 DIAGNOSIS — Z23 ENCOUNTER FOR VACCINATION: ICD-10-CM

## 2022-09-27 DIAGNOSIS — S43.401D SPRAIN OF RIGHT SHOULDER, UNSPECIFIED SHOULDER SPRAIN TYPE, SUBSEQUENT ENCOUNTER: ICD-10-CM

## 2022-09-27 DIAGNOSIS — M75.52 BURSITIS OF LEFT SHOULDER: ICD-10-CM

## 2022-09-27 DIAGNOSIS — F41.9 ANXIETY: ICD-10-CM

## 2022-09-27 DIAGNOSIS — I10 ESSENTIAL HYPERTENSION: Primary | ICD-10-CM

## 2022-09-27 DIAGNOSIS — K21.9 GASTROESOPHAGEAL REFLUX DISEASE, UNSPECIFIED WHETHER ESOPHAGITIS PRESENT: ICD-10-CM

## 2022-09-27 PROCEDURE — 1160F RVW MEDS BY RX/DR IN RCRD: CPT | Mod: CPTII,S$GLB,, | Performed by: INTERNAL MEDICINE

## 2022-09-27 PROCEDURE — 99214 OFFICE O/P EST MOD 30 MIN: CPT | Mod: 25,S$GLB,, | Performed by: INTERNAL MEDICINE

## 2022-09-27 PROCEDURE — 1101F PR PT FALLS ASSESS DOC 0-1 FALLS W/OUT INJ PAST YR: ICD-10-PCS | Mod: CPTII,S$GLB,, | Performed by: INTERNAL MEDICINE

## 2022-09-27 PROCEDURE — 3288F PR FALLS RISK ASSESSMENT DOCUMENTED: ICD-10-PCS | Mod: CPTII,S$GLB,, | Performed by: INTERNAL MEDICINE

## 2022-09-27 PROCEDURE — 3288F FALL RISK ASSESSMENT DOCD: CPT | Mod: CPTII,S$GLB,, | Performed by: INTERNAL MEDICINE

## 2022-09-27 PROCEDURE — 3075F PR MOST RECENT SYSTOLIC BLOOD PRESS GE 130-139MM HG: ICD-10-PCS | Mod: CPTII,S$GLB,, | Performed by: INTERNAL MEDICINE

## 2022-09-27 PROCEDURE — 99999 PR PBB SHADOW E&M-EST. PATIENT-LVL IV: ICD-10-PCS | Mod: PBBFAC,,, | Performed by: INTERNAL MEDICINE

## 2022-09-27 PROCEDURE — 90694 FLU VACCINE - QUADRIVALENT - ADJUVANTED: ICD-10-PCS | Mod: S$GLB,,, | Performed by: INTERNAL MEDICINE

## 2022-09-27 PROCEDURE — 20610 LARGE JOINT ASPIRATION/INJECTION: L SUBACROMIAL BURSA: ICD-10-PCS | Mod: LT,S$GLB,, | Performed by: INTERNAL MEDICINE

## 2022-09-27 PROCEDURE — 1101F PT FALLS ASSESS-DOCD LE1/YR: CPT | Mod: CPTII,S$GLB,, | Performed by: INTERNAL MEDICINE

## 2022-09-27 PROCEDURE — 20610 DRAIN/INJ JOINT/BURSA W/O US: CPT | Mod: LT,S$GLB,, | Performed by: INTERNAL MEDICINE

## 2022-09-27 PROCEDURE — 1160F PR REVIEW ALL MEDS BY PRESCRIBER/CLIN PHARMACIST DOCUMENTED: ICD-10-PCS | Mod: CPTII,S$GLB,, | Performed by: INTERNAL MEDICINE

## 2022-09-27 PROCEDURE — 3075F SYST BP GE 130 - 139MM HG: CPT | Mod: CPTII,S$GLB,, | Performed by: INTERNAL MEDICINE

## 2022-09-27 PROCEDURE — G0008 ADMIN INFLUENZA VIRUS VAC: HCPCS | Mod: S$GLB,,, | Performed by: INTERNAL MEDICINE

## 2022-09-27 PROCEDURE — 99214 PR OFFICE/OUTPT VISIT, EST, LEVL IV, 30-39 MIN: ICD-10-PCS | Mod: 25,S$GLB,, | Performed by: INTERNAL MEDICINE

## 2022-09-27 PROCEDURE — 1159F PR MEDICATION LIST DOCUMENTED IN MEDICAL RECORD: ICD-10-PCS | Mod: CPTII,S$GLB,, | Performed by: INTERNAL MEDICINE

## 2022-09-27 PROCEDURE — 99999 PR PBB SHADOW E&M-EST. PATIENT-LVL IV: CPT | Mod: PBBFAC,,, | Performed by: INTERNAL MEDICINE

## 2022-09-27 PROCEDURE — 1126F AMNT PAIN NOTED NONE PRSNT: CPT | Mod: CPTII,S$GLB,, | Performed by: INTERNAL MEDICINE

## 2022-09-27 PROCEDURE — 1159F MED LIST DOCD IN RCRD: CPT | Mod: CPTII,S$GLB,, | Performed by: INTERNAL MEDICINE

## 2022-09-27 PROCEDURE — 3078F DIAST BP <80 MM HG: CPT | Mod: CPTII,S$GLB,, | Performed by: INTERNAL MEDICINE

## 2022-09-27 PROCEDURE — 90694 VACC AIIV4 NO PRSRV 0.5ML IM: CPT | Mod: S$GLB,,, | Performed by: INTERNAL MEDICINE

## 2022-09-27 PROCEDURE — 1126F PR PAIN SEVERITY QUANTIFIED, NO PAIN PRESENT: ICD-10-PCS | Mod: CPTII,S$GLB,, | Performed by: INTERNAL MEDICINE

## 2022-09-27 PROCEDURE — G0008 PR ADMIN INFLUENZA VIRUS VAC: ICD-10-PCS | Mod: S$GLB,,, | Performed by: INTERNAL MEDICINE

## 2022-09-27 PROCEDURE — 3078F PR MOST RECENT DIASTOLIC BLOOD PRESSURE < 80 MM HG: ICD-10-PCS | Mod: CPTII,S$GLB,, | Performed by: INTERNAL MEDICINE

## 2022-09-27 RX ORDER — FAMOTIDINE 40 MG/1
40 TABLET, FILM COATED ORAL DAILY PRN
Qty: 30 TABLET | Refills: 2 | Status: SHIPPED | OUTPATIENT
Start: 2022-09-27 | End: 2023-09-28 | Stop reason: CLARIF

## 2022-09-27 RX ORDER — METFORMIN HYDROCHLORIDE 500 MG/1
500 TABLET ORAL 2 TIMES DAILY WITH MEALS
Qty: 180 TABLET | Refills: 0 | Status: SHIPPED | OUTPATIENT
Start: 2022-12-12 | End: 2023-04-17

## 2022-09-27 RX ORDER — HYDROCODONE BITARTRATE AND ACETAMINOPHEN 5; 325 MG/1; MG/1
1 TABLET ORAL
Qty: 25 TABLET | Refills: 0 | Status: CANCELLED | OUTPATIENT
Start: 2022-09-27

## 2022-09-27 RX ORDER — DIAZEPAM 10 MG/1
10 TABLET ORAL
Qty: 30 TABLET | Refills: 3 | Status: SHIPPED | OUTPATIENT
Start: 2022-09-27 | End: 2023-01-13 | Stop reason: SDUPTHER

## 2022-09-27 RX ADMIN — TRIAMCINOLONE ACETONIDE 40 MG: 40 INJECTION, SUSPENSION INTRA-ARTICULAR; INTRAMUSCULAR at 11:09

## 2022-09-27 NOTE — PROGRESS NOTES
Patient identified by name and . Allergies reviewed. Administered High Dose Flu vaccine IM using aseptic technique

## 2022-09-27 NOTE — PROGRESS NOTES
Subjective:       Patient ID: Nick De Anda is a 81 y.o. male.    Chief Complaint: Follow-up (4m follow-up) and Results (X-Ray results)    HPI  Pt viist today for routine f/u he ha sh/o  atrial fib s/p [employer] procedure seeing EP . He denies sob cp palpitation . He still having chronic anxiety taking medication prn which ahs been helping his DM well controlled Hgba1c 5.3 he exercise at [facility] regularly  no RODRIGEZ he ha sbeen having chronic left shoulder which has been getting worse he had steroid in jection last yr had good results request another injection   Review of Systems    Objective:      Physical Exam  Vitals and nursing note reviewed.   Constitutional:       General: He is not in acute distress.     Appearance: He is well-developed.   HENT:      Head: Normocephalic and atraumatic.      Right Ear: External ear normal.      Left Ear: External ear normal.      Nose: Nose normal.      Mouth/Throat:      Pharynx: No oropharyngeal exudate.   Eyes:      Extraocular Movements: Extraocular movements intact.      Conjunctiva/sclera: Conjunctivae normal.      Pupils: Pupils are equal, round, and reactive to light.   Neck:      Thyroid: No thyromegaly.   Cardiovascular:      Rate and Rhythm: Normal rate and regular rhythm.      Heart sounds: Normal heart sounds. No murmur heard.    No friction rub. No gallop.   Pulmonary:      Effort: Pulmonary effort is normal. No respiratory distress.      Breath sounds: Normal breath sounds. No wheezing.   Abdominal:      General: Bowel sounds are normal. There is no distension.      Palpations: Abdomen is soft.      Tenderness: There is no abdominal tenderness.   Musculoskeletal:         General: Tenderness (tenderness left shoulder joint when try abduc >80 degree or try to extend) present. No deformity. Normal range of motion.      Cervical back: Normal range of motion and neck supple.   Lymphadenopathy:      Cervical: No cervical adenopathy.   Skin:     General: Skin is warm  and dry.      Findings: No erythema or rash.   Neurological:      Mental Status: He is alert and oriented to person, place, and time.   Psychiatric:         Thought Content: Thought content normal.         Judgment: Judgment normal.       Assessment:       1. Essential hypertension    2. Anxiety    3. Sprain of right shoulder, unspecified shoulder sprain type, subsequent encounter    4. Type 2 diabetes mellitus without complication, without long-term current use of insulin    5. Bursitis of left shoulder    6. Encounter for vaccination    7. Gastroesophageal reflux disease, unspecified whether esophagitis present          Plan:       Essential hypertension    Anxiety  -     diazePAM (VALIUM) 10 MG Tab; Take 1 tablet (10 mg total) by mouth as needed (anxiety). Just half to one and use very seldom-get refills from PCP  Dispense: 30 tablet; Refill: 3    Sprain of right shoulder, unspecified shoulder sprain type, subsequent encounter    Type 2 diabetes mellitus without complication, without long-term current use of insulin  -     metFORMIN (GLUCOPHAGE) 500 MG tablet; Take 1 tablet (500 mg total) by mouth 2 (two) times daily with meals.  Dispense: 180 tablet; Refill: 0    Bursitis of left shoulder  -     Large Joint Aspiration/Injection: L subacromial bursa  -     triamcinolone acetonide injection 40 mg    Encounter for vaccination  -     Influenza (FLUAD) - Quadrivalent (Adjuvanted) *Preferred* (65+) (PF)    Gastroesophageal reflux disease, unspecified whether esophagitis present  -     famotidine (PEPCID) 40 MG tablet; Take 1 tablet (40 mg total) by mouth daily as needed for Heartburn.  Dispense: 30 tablet; Refill: 2      Medication List with Changes/Refills   Current Medications    AMLODIPINE (NORVASC) 5 MG TABLET    TAKE 1 TO 2 TABLETS BY MOUTH ONCE DAILY OR  AS  DIRECTED    AMMONIUM LACTATE (LAC-HYDRIN) 12 % LOTION    Apply topically every evening. Hands and arms and then later scalp, forehead, temples    ASCORBIC  ACID, VITAMIN C, (VITAMIN C) 100 MG TABLET    Take 500 mg by mouth once daily.    ATORVASTATIN (LIPITOR) 80 MG TABLET    Take 1 tablet (80 mg total) by mouth every evening.    AZELASTINE (ASTELIN) 137 MCG (0.1 %) NASAL SPRAY    1 spray (137 mcg total) by Nasal route 2 (two) times daily.    BLOOD SUGAR DIAGNOSTIC STRP    To check BG 2 times daily, to use with insurance preferred meter    BLOOD-GLUCOSE METER KIT    To check BG 2 times daily, to use with insurance preferred meter    CLOPIDOGREL (PLAVIX) 75 MG TABLET    Take 1 tablet (75 mg total) by mouth once daily.    HYDROCHLOROTHIAZIDE (MICROZIDE) 12.5 MG CAPSULE    Take 1 capsule by mouth once daily    HYDROCODONE-ACETAMINOPHEN (NORCO) 5-325 MG PER TABLET    Take 1 tablet by mouth every 24 hours as needed for Pain.    IRBESARTAN (AVAPRO) 300 MG TABLET    TAKE 1 TABLET BY MOUTH ONCE DAILY IN THE EVENING    LANCETS MISC    To check BG 2 times daily, to use with insurance preferred meter    MULTIVITAMIN (THERAGRAN) PER TABLET    Take 1 tablet by mouth once daily.    NITROGLYCERIN (NITROSTAT) 0.4 MG SL TABLET    Place 1 tablet (0.4 mg total) under the tongue every 5 (five) minutes as needed for Chest pain.    XARELTO 20 MG TAB    Take 1 tablet (20 mg total) by mouth every evening.   Changed and/or Refilled Medications    Modified Medication Previous Medication    DIAZEPAM (VALIUM) 10 MG TAB diazePAM (VALIUM) 10 MG Tab       Take 1 tablet (10 mg total) by mouth as needed (anxiety). Just half to one and use very seldom-get refills from PCP    Take 1 tablet (10 mg total) by mouth as needed (anxiety). Just half to one and use very seldom-get refills from PCP    FAMOTIDINE (PEPCID) 40 MG TABLET famotidine (PEPCID) 40 MG tablet       Take 1 tablet (40 mg total) by mouth daily as needed for Heartburn.    Take 1 tablet (40 mg total) by mouth every evening.    METFORMIN (GLUCOPHAGE) 500 MG TABLET metFORMIN (GLUCOPHAGE) 500 MG tablet       Take 1 tablet (500 mg total) by mouth  2 (two) times daily with meals.    TAKE 1 TABLET BY MOUTH TWICE DAILY WITH MEALS

## 2022-09-28 DIAGNOSIS — F41.9 ANXIETY: ICD-10-CM

## 2022-09-28 RX ORDER — DIAZEPAM 10 MG/1
10 TABLET ORAL
Qty: 30 TABLET | Refills: 3 | Status: CANCELLED | OUTPATIENT
Start: 2022-09-28 | End: 2022-10-28

## 2022-09-28 RX ORDER — TRIAMCINOLONE ACETONIDE 40 MG/ML
40 INJECTION, SUSPENSION INTRA-ARTICULAR; INTRAMUSCULAR
Status: DISCONTINUED | OUTPATIENT
Start: 2022-09-27 | End: 2022-09-28 | Stop reason: HOSPADM

## 2022-09-28 NOTE — TELEPHONE ENCOUNTER
No new care gaps identified.  [facility] Embedded Care Gaps. Reference number: [id number]. 9/28/2022   8:52:48 AM NINFAT

## 2022-09-28 NOTE — TELEPHONE ENCOUNTER
----- Message from Louise Reece sent at 9/28/2022  3:17 PM CDT -----  Contact: Gemma([facility])  [phone]  Gemma from [facility] pharm requesting frequency for pt diazePAM (VALIUM) 10 MG Tab.    Please call and advise

## 2022-09-28 NOTE — PROCEDURES
Large Joint Aspiration/Injection: L subacromial bursa    Date/Time: 9/27/2022 11:00 AM  Performed by: Santiago Adler MD  Authorized by: Santiago Adler MD     Indications:  Pain  Local anesthesia used?: No    Approach:  Posterior  Location:  Shoulder  Site:  L subacromial bursa  Medications:  40 mg triamcinolone acetonide 40 mg/mL  Patient tolerance:  Patient tolerated the procedure well with no immediate complications

## 2022-09-28 NOTE — TELEPHONE ENCOUNTER
----- Message from Marcela Alamo MA sent at 9/27/2022  4:45 PM CDT -----  Contact: [facility] [phone]    ----- Message -----  From: Meg Cho  Sent: 9/27/2022   4:34 PM CDT  To: Vitor Henderson Staff    Pharmacy needs clarification on directions for rx diazePAM (VALIUM) 10 MG Tab. Please f/u

## 2022-11-03 NOTE — PROGRESS NOTES
Mr. De Anda is a patient of Dr. Paez and was last seen in clinic 5/6/2022.      Subjective:   Patient ID:  Nick De Anda is an 82 y.o. male who presents for follow-up of Atrial Fibrillation  .     HPI:    Mr. De Anda is an 82 y.o. male with CAD (CABG), DM, HTN, pAF (PVI 10/2020, 12/2021), AFL (RFA CTI 10/2020) here for follow up.    Background:    Primary Cardiologist: Robert Alas MD  Primary Care Physician: Santiago Adler MD    Mr. De Anda has a hx of coronary artery disease s/p CABG with preserved LVEF, type 2 diabetes, hypertension and recently discovered paroxysmal atrial fibrillation and typical atrial flutter. He recently transferred care from [facility] where his AF was diagnosed and he was initiated on xarelto. He was recently admitted for a symptomatic UTI and observed to have a paroxysm of atrial fibrillation with ventricular rates in the 40s-60s (slower rates appeared to correlate during overnight hours). He monitors his vitals at home and for a while notes it indicates he has an irregular heart beat however he feels normal. He has established care with Dr. Dean in cardiology on 2/13/2019 and was noted to be in atrial fibrillation. He has no obvious symptoms. I reviewed all available electrocardiograms in Epic which show sinus rhythm on 2/5/2014 and 2/2/2019. An ECG dated 2/3/2019 notes rate controlled atrial fibrillation. When I saw him in clinic 2/2019 he was in sinus rhythm. At that visit since he had no symptoms with AF and was rate controlled when in AF I recommended no aggressive rhythm control strategy and would continue his current management. He was to see me prn.    Mr. De Anda returned for discussion of his AF 6/2019. He reported he believed AF was causing fatigue. Notes episodes every week or so that correlate to being fatigue. He was admitted for DCCV and sotalol initiation. He converted when propofol was administered and the DEANGELO/DCCV was cancelled. He saw Faina Espinosa in  EP clinic 8/1/2019 and was in sinus rhythm. He felt better in sinus rhythm.    Mr. De Anda was seen in clinic 10/2019 and noted paroxysms of AF since starting sotalol. One episode lasted 2 days and another 3 days. He noted his pulse was irregular in this setting. He came in for an ECG which noted sinus rhythm however he felt he had already converted. We discussed increasing dosage to 120mg bid however he declined.    Mr. De Anda presented for routine AF follow-up 7/2020. He reported he continues to have some paroxysms of AF, at times last 1/2 day- 1 day. We discussed PVI, he desired to think about it.    Mr. De Anda returned for follow-up 10/2020. His AF burden had significantly increased and he felt poorly. He noted he was still paroxysmal. He desired ablation.    Mr. De Anda underwent RF-PVI and RFA of the CTI in December of 2020.     3/2021: Saw Faina Espinosa. Reported no arrhythmias. Repeat ECHO noted LVEF of 60%. Sotalol was reduced to 40mg bid.    10/2021: Saw Faina Espinosa. He was in sinus rhythm at that time however ECG from 9/2021 prior to a surgery noted atrial flutter (atypical). He wore a 14 day holter that noted a 6.4% burden of atrial flutter. There were R-R pauses up to 6.2 seconds during flutter while sleeping. He was instructed to stop sotalol.    11/16/2021: He now has recurrent atypical atrial flutter despite sotalol with periods of bradycardia while asleep. This arrhythmia has become symptomatic. He feels significantly better and is in sinus rhythm. He is having symptomatic breakthrough on sotalol. His [id number] score is 5 and long-term anticoagulation is recommended.    12/8/2021:  Successful pulmonary vein RF ablation (reisolation of the LIPV and RSPV)   Successful left atrial posterior wall isolation targeting CFEs   Successful anterior mitral isthmus ablation with bidirectional block for atypical atrial flutter that was observed as an outpatient but unable to be induced   Unsuccessful  difficult lateral mitral isthmus ablation (unable to get bidirectional block and thus created the anterior mitral isthmus ablation lesion set)  Complex modifier requested for extensive ablation and considerable time spent trying to obtain lateral mitral isthmus block.  Discharged on sotalol 40mg BID    2/7/2022: He is 3 mo s/p repeat ablation (reisolation of LIPV and RSPV, PWI, successful RFA of anterior mitral isthmus).   Doing well from a rhythm standpoint, with minimal palpitations on sotalol 40mg BID. EKG with stable intervals. CHADSVAS 5 on xarelto.    5/6/2022: He is now 5 months s/p redo ablation. He is doing well from a rhythm standpoint, with no documented or symptomatic recurrence of arrhythmia since procedure.  He weaned himself off sotalol about 2 months ago and feels wonderful. Very active and exercises regularly. [facility] 5 on xarelto.    Update (11/08/2022):    Today he says overall he still feels well. Going to the gym regularly. Swimming. Eating low carb. One episode of irreg HB lasting a couple of hours after drinking a lot of coffee. He says this resolved after taking a leftover sotalol. He has GERD and sometimes has pain underneath his right chest that resolves with an antacid. No exertional CP.    He is currently taking xarelto 20mg daily for stroke prophylaxis and denies significant bleeding episodes. Kidney function is stable, with a creatinine of 0.8 on 6/13/2022.    I have personally reviewed the patient's EKG today, which shows sinus rhythm at 62bpm. AZ interval is 208. QRS is 92. QTc is 420.    Relevant Cardiac Test Results:    2D Echo (3/16/2021):  The left ventricle is normal in size with mild concentric hypertrophy and normal systolic function. The estimated ejection fraction is 60%  Grade II left ventricular diastolic dysfunction.  Normal right ventricular size with normal right ventricular systolic function.  Moderate left atrial enlargement.  Normal central venous pressure (3  mmHg).    Current Outpatient Medications   Medication Sig    amLODIPine (NORVASC) 5 MG tablet TAKE 1 TO 2 TABLETS BY MOUTH ONCE DAILY OR  AS  DIRECTED    ammonium lactate (LAC-HYDRIN) 12 % lotion Apply topically every evening. Hands and arms and then later scalp, forehead, temples    ascorbic acid, vitamin C, (VITAMIN C) 100 MG tablet Take 500 mg by mouth once daily.    atorvastatin (LIPITOR) 80 MG tablet Take 1 tablet (80 mg total) by mouth every evening.    azelastine (ASTELIN) 137 mcg (0.1 %) nasal spray 1 spray (137 mcg total) by Nasal route 2 (two) times daily.    blood sugar diagnostic Strp To check BG 2 times daily, to use with insurance preferred meter    blood-glucose meter kit To check BG 2 times daily, to use with insurance preferred meter    clopidogreL (PLAVIX) 75 mg tablet Take 1 tablet (75 mg total) by mouth once daily.    diazePAM (VALIUM) 10 MG Tab Take 1 tablet (10 mg total) by mouth as needed (anxiety). Just half to one and use very seldom-get refills from PCP    famotidine (PEPCID) 40 MG tablet Take 1 tablet (40 mg total) by mouth daily as needed for Heartburn.    hydroCHLOROthiazide (MICROZIDE) 12.5 mg capsule Take 1 capsule by mouth once daily    HYDROcodone-acetaminophen (NORCO) 5-325 mg per tablet Take 1 tablet by mouth every 24 hours as needed for Pain.    irbesartan (AVAPRO) 300 MG tablet TAKE 1 TABLET BY MOUTH ONCE DAILY IN THE EVENING    lancets Misc To check BG 2 times daily, to use with insurance preferred meter    [START ON 12/12/2022] metFORMIN (GLUCOPHAGE) 500 MG tablet Take 1 tablet (500 mg total) by mouth 2 (two) times daily with meals.    multivitamin (THERAGRAN) per tablet Take 1 tablet by mouth once daily.    nitroGLYCERIN (NITROSTAT) 0.4 MG SL tablet Place 1 tablet (0.4 mg total) under the tongue every 5 (five) minutes as needed for Chest pain.    XARELTO 20 mg Tab Take 1 tablet (20 mg total) by mouth every evening.     No current facility-administered medications for this  "visit.     Facility-Administered Medications Ordered in Other Visits   Medication    0.9%  NaCl infusion    sodium chloride 0.9% bolus 1,000 mL    sodium chloride 0.9% flush 10 mL    sodium chloride 0.9% flush 5 mL       Review of Systems   Constitutional: Negative for malaise/fatigue.   Cardiovascular:  Positive for irregular heartbeat. Negative for chest pain, dyspnea on exertion, leg swelling and palpitations.   Respiratory:  Negative for shortness of breath.    Hematologic/Lymphatic: Negative for bleeding problem.   Skin:  Negative for rash.   Musculoskeletal:  Negative for myalgias.   Gastrointestinal:  Positive for heartburn. Negative for hematemesis, hematochezia and nausea.   Genitourinary:  Negative for hematuria.   Neurological:  Negative for light-headedness.   Psychiatric/Behavioral:  Negative for altered mental status.    Allergic/Immunologic: Negative for persistent infections.     Objective:        BP (!) 132/58 (BP Location: Right arm, Patient Position: Sitting, BP Method: Medium (Manual))   Pulse 62   Ht 6' 4" (1.93 m)   Wt 114.2 kg (251 lb 12.3 oz)   BMI 30.65 kg/m²     Physical Exam  Vitals and nursing note reviewed.   Constitutional:       Appearance: Normal appearance. He is well-developed.   HENT:      Head: Normocephalic.      Nose: Nose normal.   Eyes:      Pupils: Pupils are equal, round, and reactive to light.   Cardiovascular:      Rate and Rhythm: Normal rate and regular rhythm.   Pulmonary:      Effort: No respiratory distress.      Breath sounds: Normal breath sounds.   Musculoskeletal:         General: Normal range of motion.   Skin:     General: Skin is warm and dry.      Findings: No erythema.   Neurological:      Mental Status: He is alert and oriented to person, place, and time.   Psychiatric:         Speech: Speech normal.         Behavior: Behavior normal.     Lab Results   Component Value Date     06/13/2022    K 4.2 06/13/2022    BUN 16 06/13/2022    CREATININE 0.8 " 06/13/2022    ALT 19 06/13/2022    AST 19 06/13/2022    HGB 13.7 (L) 12/01/2021    HCT 37 12/08/2021    TSH 2.847 06/13/2022    LDLCALC 56.4 (L) 06/13/2022       Recent Labs   Lab 11/25/20  0951 12/01/21  1249   INR 1.1 1.1         Assessment:     1. Paroxysmal atrial fibrillation    2. Essential hypertension    3. Typical atrial flutter    4. Long term (current) use of anticoagulants      Plan:     In summary, Mr. De Anda is an 82 y.o. male with CAD (CABG), DM, HTN, pAF (PVI 10/2020, 12/2021), AFL (RFA CTI 10/2020) here for follow up.  He is 11 months s/p repeat ablation. He says he is feeling well. One episode of irreg HB after drinking a lot of coffee. He took a leftover sotalol and it stopped. Will provide with PRN lopressor instead. He will notify clinic if episodes become more frequent. [facility] 5 on xarelto.     PRN lopressor  Continue other medications  RTC 6 mo, sooner if needed    *A copy of this note has been sent to Dr. Paez*    Follow up in about 6 months (around 5/8/2023).    ------------------------------------------------------------------    WILLIAM Bach, NP-C  Cardiac Electrophysiology

## 2022-11-08 ENCOUNTER — OFFICE VISIT (OUTPATIENT)
Dept: ELECTROPHYSIOLOGY | Facility: CLINIC | Age: 82
End: 2022-11-08
Payer: MEDICARE

## 2022-11-08 ENCOUNTER — HOSPITAL ENCOUNTER (OUTPATIENT)
Dept: CARDIOLOGY | Facility: CLINIC | Age: 82
Discharge: HOME | End: 2022-11-08
Payer: MEDICARE

## 2022-11-08 VITALS
WEIGHT: 251.75 LBS | HEART RATE: 62 BPM | SYSTOLIC BLOOD PRESSURE: 132 MMHG | HEIGHT: 76 IN | DIASTOLIC BLOOD PRESSURE: 58 MMHG | BODY MASS INDEX: 30.66 KG/M2

## 2022-11-08 DIAGNOSIS — I10 ESSENTIAL HYPERTENSION: ICD-10-CM

## 2022-11-08 DIAGNOSIS — I48.3 TYPICAL ATRIAL FLUTTER: ICD-10-CM

## 2022-11-08 DIAGNOSIS — I48.0 PAROXYSMAL ATRIAL FIBRILLATION: Primary | ICD-10-CM

## 2022-11-08 DIAGNOSIS — I49.8 OTHER SPECIFIED CARDIAC ARRHYTHMIAS: ICD-10-CM

## 2022-11-08 DIAGNOSIS — Z79.01 LONG TERM (CURRENT) USE OF ANTICOAGULANTS: ICD-10-CM

## 2022-11-08 PROCEDURE — 99499 UNLISTED E&M SERVICE: CPT | Mod: S$GLB,,, | Performed by: NURSE PRACTITIONER

## 2022-11-08 PROCEDURE — 1160F PR REVIEW ALL MEDS BY PRESCRIBER/CLIN PHARMACIST DOCUMENTED: ICD-10-PCS | Mod: CPTII,S$GLB,, | Performed by: NURSE PRACTITIONER

## 2022-11-08 PROCEDURE — 1101F PR PT FALLS ASSESS DOC 0-1 FALLS W/OUT INJ PAST YR: ICD-10-PCS | Mod: CPTII,S$GLB,, | Performed by: NURSE PRACTITIONER

## 2022-11-08 PROCEDURE — 99214 OFFICE O/P EST MOD 30 MIN: CPT | Mod: S$GLB,,, | Performed by: NURSE PRACTITIONER

## 2022-11-08 PROCEDURE — 1160F RVW MEDS BY RX/DR IN RCRD: CPT | Mod: CPTII,S$GLB,, | Performed by: NURSE PRACTITIONER

## 2022-11-08 PROCEDURE — 93010 RHYTHM STRIP: ICD-10-PCS | Mod: S$GLB,,, | Performed by: INTERNAL MEDICINE

## 2022-11-08 PROCEDURE — 1159F PR MEDICATION LIST DOCUMENTED IN MEDICAL RECORD: ICD-10-PCS | Mod: CPTII,S$GLB,, | Performed by: NURSE PRACTITIONER

## 2022-11-08 PROCEDURE — 99214 PR OFFICE/OUTPT VISIT, EST, LEVL IV, 30-39 MIN: ICD-10-PCS | Mod: S$GLB,,, | Performed by: NURSE PRACTITIONER

## 2022-11-08 PROCEDURE — 3288F PR FALLS RISK ASSESSMENT DOCUMENTED: ICD-10-PCS | Mod: CPTII,S$GLB,, | Performed by: NURSE PRACTITIONER

## 2022-11-08 PROCEDURE — 3075F SYST BP GE 130 - 139MM HG: CPT | Mod: CPTII,S$GLB,, | Performed by: NURSE PRACTITIONER

## 2022-11-08 PROCEDURE — 3075F PR MOST RECENT SYSTOLIC BLOOD PRESS GE 130-139MM HG: ICD-10-PCS | Mod: CPTII,S$GLB,, | Performed by: NURSE PRACTITIONER

## 2022-11-08 PROCEDURE — 93005 RHYTHM STRIP: ICD-10-PCS | Mod: S$GLB,,, | Performed by: INTERNAL MEDICINE

## 2022-11-08 PROCEDURE — 3078F DIAST BP <80 MM HG: CPT | Mod: CPTII,S$GLB,, | Performed by: NURSE PRACTITIONER

## 2022-11-08 PROCEDURE — 3078F PR MOST RECENT DIASTOLIC BLOOD PRESSURE < 80 MM HG: ICD-10-PCS | Mod: CPTII,S$GLB,, | Performed by: NURSE PRACTITIONER

## 2022-11-08 PROCEDURE — 93010 ELECTROCARDIOGRAM REPORT: CPT | Mod: S$GLB,,, | Performed by: INTERNAL MEDICINE

## 2022-11-08 PROCEDURE — 93005 ELECTROCARDIOGRAM TRACING: CPT | Mod: S$GLB,,, | Performed by: INTERNAL MEDICINE

## 2022-11-08 PROCEDURE — 1125F AMNT PAIN NOTED PAIN PRSNT: CPT | Mod: CPTII,S$GLB,, | Performed by: NURSE PRACTITIONER

## 2022-11-08 PROCEDURE — 3288F FALL RISK ASSESSMENT DOCD: CPT | Mod: CPTII,S$GLB,, | Performed by: NURSE PRACTITIONER

## 2022-11-08 PROCEDURE — 1125F PR PAIN SEVERITY QUANTIFIED, PAIN PRESENT: ICD-10-PCS | Mod: CPTII,S$GLB,, | Performed by: NURSE PRACTITIONER

## 2022-11-08 PROCEDURE — 99999 PR PBB SHADOW E&M-EST. PATIENT-LVL IV: CPT | Mod: PBBFAC,,, | Performed by: NURSE PRACTITIONER

## 2022-11-08 PROCEDURE — 1101F PT FALLS ASSESS-DOCD LE1/YR: CPT | Mod: CPTII,S$GLB,, | Performed by: NURSE PRACTITIONER

## 2022-11-08 PROCEDURE — 99499 RISK ADDL DX/OHS AUDIT: ICD-10-PCS | Mod: S$GLB,,, | Performed by: NURSE PRACTITIONER

## 2022-11-08 PROCEDURE — 1159F MED LIST DOCD IN RCRD: CPT | Mod: CPTII,S$GLB,, | Performed by: NURSE PRACTITIONER

## 2022-11-08 PROCEDURE — 99999 PR PBB SHADOW E&M-EST. PATIENT-LVL IV: ICD-10-PCS | Mod: PBBFAC,,, | Performed by: NURSE PRACTITIONER

## 2022-11-08 RX ORDER — METOPROLOL TARTRATE 25 MG/1
25 TABLET, FILM COATED ORAL DAILY PRN
Qty: 90 TABLET | Refills: 0 | Status: SHIPPED | OUTPATIENT
Start: 2022-11-08 | End: 2023-04-17

## 2022-11-28 ENCOUNTER — OFFICE VISIT (OUTPATIENT)
Dept: DERMATOLOGY | Facility: CLINIC | Age: 82
End: 2022-11-28
Payer: MEDICARE

## 2022-11-28 DIAGNOSIS — L82.1 SEBORRHEIC KERATOSES: ICD-10-CM

## 2022-11-28 DIAGNOSIS — L57.0 ACTINIC KERATOSES: Primary | ICD-10-CM

## 2022-11-28 DIAGNOSIS — L90.5 SCAR: ICD-10-CM

## 2022-11-28 DIAGNOSIS — L85.3 XEROSIS CUTIS: ICD-10-CM

## 2022-11-28 PROCEDURE — 1101F PR PT FALLS ASSESS DOC 0-1 FALLS W/OUT INJ PAST YR: ICD-10-PCS | Mod: CPTII,S$GLB,, | Performed by: DERMATOLOGY

## 2022-11-28 PROCEDURE — 99212 OFFICE O/P EST SF 10 MIN: CPT | Mod: 25,S$GLB,, | Performed by: DERMATOLOGY

## 2022-11-28 PROCEDURE — 99212 PR OFFICE/OUTPT VISIT, EST, LEVL II, 10-19 MIN: ICD-10-PCS | Mod: 25,S$GLB,, | Performed by: DERMATOLOGY

## 2022-11-28 PROCEDURE — 1159F MED LIST DOCD IN RCRD: CPT | Mod: CPTII,S$GLB,, | Performed by: DERMATOLOGY

## 2022-11-28 PROCEDURE — 17003 DESTRUCTION, PREMALIGNANT LESIONS; SECOND THROUGH 14 LESIONS: ICD-10-PCS | Mod: S$GLB,,, | Performed by: DERMATOLOGY

## 2022-11-28 PROCEDURE — 1159F PR MEDICATION LIST DOCUMENTED IN MEDICAL RECORD: ICD-10-PCS | Mod: CPTII,S$GLB,, | Performed by: DERMATOLOGY

## 2022-11-28 PROCEDURE — 1101F PT FALLS ASSESS-DOCD LE1/YR: CPT | Mod: CPTII,S$GLB,, | Performed by: DERMATOLOGY

## 2022-11-28 PROCEDURE — 3288F FALL RISK ASSESSMENT DOCD: CPT | Mod: CPTII,S$GLB,, | Performed by: DERMATOLOGY

## 2022-11-28 PROCEDURE — 17000 PR DESTRUCTION(LASER SURGERY,CRYOSURGERY,CHEMOSURGERY),PREMALIGNANT LESIONS,FIRST LESION: ICD-10-PCS | Mod: S$GLB,,, | Performed by: DERMATOLOGY

## 2022-11-28 PROCEDURE — 99999 PR PBB SHADOW E&M-EST. PATIENT-LVL III: ICD-10-PCS | Mod: PBBFAC,,, | Performed by: DERMATOLOGY

## 2022-11-28 PROCEDURE — 17000 DESTRUCT PREMALG LESION: CPT | Mod: S$GLB,,, | Performed by: DERMATOLOGY

## 2022-11-28 PROCEDURE — 99999 PR PBB SHADOW E&M-EST. PATIENT-LVL III: CPT | Mod: PBBFAC,,, | Performed by: DERMATOLOGY

## 2022-11-28 PROCEDURE — 17003 DESTRUCT PREMALG LES 2-14: CPT | Mod: S$GLB,,, | Performed by: DERMATOLOGY

## 2022-11-28 PROCEDURE — 1160F PR REVIEW ALL MEDS BY PRESCRIBER/CLIN PHARMACIST DOCUMENTED: ICD-10-PCS | Mod: CPTII,S$GLB,, | Performed by: DERMATOLOGY

## 2022-11-28 PROCEDURE — 1160F RVW MEDS BY RX/DR IN RCRD: CPT | Mod: CPTII,S$GLB,, | Performed by: DERMATOLOGY

## 2022-11-28 PROCEDURE — 3288F PR FALLS RISK ASSESSMENT DOCUMENTED: ICD-10-PCS | Mod: CPTII,S$GLB,, | Performed by: DERMATOLOGY

## 2022-11-28 NOTE — PROGRESS NOTES
Subjective:       Patient ID:  Nick De Anda is a 82 y.o. male who presents for   Chief Complaint   Patient presents with    Actinic Keratosis     Follow-up      Actinic Keratosis      Review of Systems   Constitutional:  Negative for fever and chills.   Respiratory:  Negative for cough and shortness of breath.    Gastrointestinal:  Negative for nausea and vomiting.   Musculoskeletal:  Negative for joint swelling and arthralgias.   Skin:  Positive for dry skin. Negative for daily sunscreen use, activity-related sunscreen use, recent sunburn and wears hat.   All other systems reviewed and are negative.  Hematologic/Lymphatic: Does not bruise/bleed easily.      Objective:    Physical Exam   Constitutional: He appears well-developed and well-nourished.   Eyes: No conjunctival no injection.   Neurological: He is alert and oriented to person, place, and time.   Psychiatric: He has a normal mood and affect.   Skin:                    Diagram Legend     Erythematous scaling macule/papule c/w actinic keratosis       Vascular papule c/w angioma      Pigmented verrucoid papule/plaque c/w seborrheic keratosis      Yellow umbilicated papule c/w sebaceous hyperplasia      Irregularly shaped tan macule c/w lentigo     1-2 mm smooth white papules consistent with Milia      Movable subcutaneous cyst with punctum c/w epidermal inclusion cyst      Subcutaneous movable cyst c/w pilar cyst      Firm pink to brown papule c/w dermatofibroma      Pedunculated fleshy papule(s) c/w skin tag(s)      Evenly pigmented macule c/w junctional nevus     Mildly variegated pigmented, slightly irregular-bordered macule c/w mildly atypical nevus      Flesh colored to evenly pigmented papule c/w intradermal nevus       Pink pearly papule/plaque c/w basal cell carcinoma      Erythematous hyperkeratotic cursted plaque c/w SCC      Surgical scar with no sign of skin cancer recurrence      Open and closed comedones      Inflammatory papules and  pustules      Verrucoid papule consistent consistent with wart     Erythematous eczematous patches and plaques     Dystrophic onycholytic nail with subungual debris c/w onychomycosis     Umbilicated papule    Erythematous-base heme-crusted tan verrucoid plaque consistent with inflamed seborrheic keratosis     Erythematous Silvery Scaling Plaque c/w Psoriasis     See annotation      Assessment / Plan:        Actinic keratoses  Discussed all of the following:  Actinic keratosis is a skin growth caused by sun damage. These growths are not cancer, but they may develop into skin cancer (precancerous). Many people get these growths, especially as they age. This is because of cumulative sun exposure over years. Multiple growths are called actinic keratoses.  Patient instructed to start Amlactin cream or lotion nightly to AK prone areas or other specified affected areas.  Warned of skin irritation and to decrease frequency of usage if this occurs.  Proper application of medications and or care for affected area(s) and condition(s) reviewed.  Cryosurgery Procedure Note    Verbal consent from the patient is obtained including, but not limited to, risk of hypopigmentation/hyperpigmentation, scar, recurrence of lesion. The patient is aware of the precancerous quality and need for treatment of these lesions. Liquid nitrogen cryosurgery is applied to the 5 larger actinic keratoses, as detailed in the physical exam, to produce a freeze injury. The patient is aware that blisters may form and is instructed on wound care with gentle cleansing and use of vaseline ointment to keep moist until healed. The patient is supplied a handout on cryosurgery and is instructed to call if lesions do not completely resolve.    Increase amlactin to qd to all areas.  Was rotating.  Proper application of medications and or care for affected area(s) and condition(s) reviewed.  Doing well with improvement.    Scar    Seborrheic keratoses  Discussed with  patient the benign nature of these lesions and that no treatment is indicated.    Xerosis cutis  Patient instructed to start Amlactin cream or lotion nightly to AK prone areas or other specified affected areas.  Warned of skin irritation and to decrease frequency of usage if this occurs.           Follow up in about 6 months (around 5/28/2023).

## 2022-11-28 NOTE — PATIENT INSTRUCTIONS
Patient instructed to start Amlactin cream or lotion nightly to AK prone areas or other specified affected areas.  Warned of skin irritation and to decrease frequency of usage if this occurs.  CRYOSURGERY      Your doctor has used a method called cryosurgery to treat your skin condition. Cryosurgery refers to the use of very cold substances to treat a variety of skin conditions such as warts, pre-skin cancers, molluscum contagiosum, sun spots, and several benign growths. The substance we use in cryosurgery is liquid nitrogen and is so cold (-195 degrees Celsius) that is burns when administered.     Following treatment in the office, the skin may immediately burn and become red. You may find the area around the lesion is affected as well. It is sometimes necessary to treat not only the lesion, but a small area of the surrounding normal skin to achieve a good response.     A blister, and even a blood filled blister, may form after treatment.   This is a normal response. If the blister is painful, it is acceptable to sterilize a needle and with rubbing alcohol and gently pop the blister. It is important that you gently wash the area with soap and warm water as the blister fluid may contain wart virus if a wart was treated. Do no remove the roof of the blister.     The area treated can take anywhere from 1-3 weeks to heal. Healing time depends on the kind skin lesion treated, the location, and how aggressively the lesion was treated. It is recommended that the areas treated are covered with Vaseline or bacitracin ointment and a band-aid. If a band-aid is not practical, just ointment applied several times per day will do. Keeping these areas moist will speed the healing time.    Treatment with liquid nitrogen can leave a scar. In dark skin, it may be a light or dark scar, in light skin it may be a white or pink scar. These will generally fade with time, but may never go away completely.     If you have any concerns after  your treatment, please feel free to call the office.       [facility] [place] [address]/ [phone] FAX/ [email or web address]

## 2023-01-13 ENCOUNTER — OFFICE VISIT (OUTPATIENT)
Dept: PRIMARY CARE CLINIC | Facility: CLINIC | Age: 83
End: 2023-01-13
Payer: MEDICARE

## 2023-01-13 VITALS
RESPIRATION RATE: 18 BRPM | WEIGHT: 262.69 LBS | TEMPERATURE: 99 F | BODY MASS INDEX: 31.99 KG/M2 | HEIGHT: 76 IN | DIASTOLIC BLOOD PRESSURE: 64 MMHG | HEART RATE: 76 BPM | OXYGEN SATURATION: 98 % | SYSTOLIC BLOOD PRESSURE: 132 MMHG

## 2023-01-13 DIAGNOSIS — M77.8 THUMB TENDONITIS: ICD-10-CM

## 2023-01-13 DIAGNOSIS — M54.31 SCIATICA OF RIGHT SIDE: Primary | ICD-10-CM

## 2023-01-13 DIAGNOSIS — C32.9 CARCINOMA LARYNX: ICD-10-CM

## 2023-01-13 DIAGNOSIS — M54.41 CHRONIC MIDLINE LOW BACK PAIN WITH RIGHT-SIDED SCIATICA: ICD-10-CM

## 2023-01-13 DIAGNOSIS — I11.0 HYPERTENSIVE HEART DISEASE WITH HEART FAILURE: ICD-10-CM

## 2023-01-13 DIAGNOSIS — F41.9 ANXIETY: ICD-10-CM

## 2023-01-13 DIAGNOSIS — J47.9 BRONCHIECTASIS WITHOUT COMPLICATION: ICD-10-CM

## 2023-01-13 DIAGNOSIS — I48.0 PAROXYSMAL ATRIAL FIBRILLATION: ICD-10-CM

## 2023-01-13 DIAGNOSIS — E11.42 DIABETIC POLYNEUROPATHY ASSOCIATED WITH TYPE 2 DIABETES MELLITUS: ICD-10-CM

## 2023-01-13 DIAGNOSIS — G89.29 CHRONIC MIDLINE LOW BACK PAIN WITH RIGHT-SIDED SCIATICA: ICD-10-CM

## 2023-01-13 DIAGNOSIS — I70.201 ATHEROSCLEROSIS OF NATIVE ARTERY OF RIGHT LOWER EXTREMITY, WITH UNSPECIFIED PRESENCE OF CLINICAL MANIFESTATION: ICD-10-CM

## 2023-01-13 PROCEDURE — 1100F PR PT FALLS ASSESS DOC 2+ FALLS/FALL W/INJURY/YR: ICD-10-PCS | Mod: CPTII,S$GLB,, | Performed by: INTERNAL MEDICINE

## 2023-01-13 PROCEDURE — 99214 PR OFFICE/OUTPT VISIT, EST, LEVL IV, 30-39 MIN: ICD-10-PCS | Mod: 25,S$GLB,, | Performed by: INTERNAL MEDICINE

## 2023-01-13 PROCEDURE — 1160F PR REVIEW ALL MEDS BY PRESCRIBER/CLIN PHARMACIST DOCUMENTED: ICD-10-PCS | Mod: CPTII,S$GLB,, | Performed by: INTERNAL MEDICINE

## 2023-01-13 PROCEDURE — 3075F PR MOST RECENT SYSTOLIC BLOOD PRESS GE 130-139MM HG: ICD-10-PCS | Mod: CPTII,S$GLB,, | Performed by: INTERNAL MEDICINE

## 2023-01-13 PROCEDURE — 99499 UNLISTED E&M SERVICE: CPT | Mod: S$GLB,,, | Performed by: INTERNAL MEDICINE

## 2023-01-13 PROCEDURE — 3288F PR FALLS RISK ASSESSMENT DOCUMENTED: ICD-10-PCS | Mod: CPTII,S$GLB,, | Performed by: INTERNAL MEDICINE

## 2023-01-13 PROCEDURE — 3078F DIAST BP <80 MM HG: CPT | Mod: CPTII,S$GLB,, | Performed by: INTERNAL MEDICINE

## 2023-01-13 PROCEDURE — 20552 PR INJECT TRIGGER POINT, 1 OR 2: ICD-10-PCS | Mod: S$GLB,,, | Performed by: INTERNAL MEDICINE

## 2023-01-13 PROCEDURE — 99999 PR PBB SHADOW E&M-EST. PATIENT-LVL V: ICD-10-PCS | Mod: PBBFAC,,, | Performed by: INTERNAL MEDICINE

## 2023-01-13 PROCEDURE — 1100F PTFALLS ASSESS-DOCD GE2>/YR: CPT | Mod: CPTII,S$GLB,, | Performed by: INTERNAL MEDICINE

## 2023-01-13 PROCEDURE — 99999 PR PBB SHADOW E&M-EST. PATIENT-LVL V: CPT | Mod: PBBFAC,,, | Performed by: INTERNAL MEDICINE

## 2023-01-13 PROCEDURE — 1125F AMNT PAIN NOTED PAIN PRSNT: CPT | Mod: CPTII,S$GLB,, | Performed by: INTERNAL MEDICINE

## 2023-01-13 PROCEDURE — 1125F PR PAIN SEVERITY QUANTIFIED, PAIN PRESENT: ICD-10-PCS | Mod: CPTII,S$GLB,, | Performed by: INTERNAL MEDICINE

## 2023-01-13 PROCEDURE — 99214 OFFICE O/P EST MOD 30 MIN: CPT | Mod: 25,S$GLB,, | Performed by: INTERNAL MEDICINE

## 2023-01-13 PROCEDURE — 3075F SYST BP GE 130 - 139MM HG: CPT | Mod: CPTII,S$GLB,, | Performed by: INTERNAL MEDICINE

## 2023-01-13 PROCEDURE — 1160F RVW MEDS BY RX/DR IN RCRD: CPT | Mod: CPTII,S$GLB,, | Performed by: INTERNAL MEDICINE

## 2023-01-13 PROCEDURE — 3288F FALL RISK ASSESSMENT DOCD: CPT | Mod: CPTII,S$GLB,, | Performed by: INTERNAL MEDICINE

## 2023-01-13 PROCEDURE — 3078F PR MOST RECENT DIASTOLIC BLOOD PRESSURE < 80 MM HG: ICD-10-PCS | Mod: CPTII,S$GLB,, | Performed by: INTERNAL MEDICINE

## 2023-01-13 PROCEDURE — 1159F MED LIST DOCD IN RCRD: CPT | Mod: CPTII,S$GLB,, | Performed by: INTERNAL MEDICINE

## 2023-01-13 PROCEDURE — 99499 RISK ADDL DX/OHS AUDIT: ICD-10-PCS | Mod: S$GLB,,, | Performed by: INTERNAL MEDICINE

## 2023-01-13 PROCEDURE — 20552 NJX 1/MLT TRIGGER POINT 1/2: CPT | Mod: S$GLB,,, | Performed by: INTERNAL MEDICINE

## 2023-01-13 PROCEDURE — 1159F PR MEDICATION LIST DOCUMENTED IN MEDICAL RECORD: ICD-10-PCS | Mod: CPTII,S$GLB,, | Performed by: INTERNAL MEDICINE

## 2023-01-13 RX ORDER — DIAZEPAM 10 MG/1
10 TABLET ORAL
Qty: 30 TABLET | Refills: 3 | Status: SHIPPED | OUTPATIENT
Start: 2023-01-13 | End: 2023-08-20 | Stop reason: SDUPTHER

## 2023-01-13 RX ORDER — METHOCARBAMOL 500 MG/1
TABLET, FILM COATED ORAL
Qty: 30 TABLET | Refills: 1 | Status: SHIPPED | OUTPATIENT
Start: 2023-01-13 | End: 2023-07-06 | Stop reason: SDUPTHER

## 2023-01-16 PROBLEM — D69.6 THROMBOCYTOPENIA: Status: RESOLVED | Noted: 2019-02-03 | Resolved: 2023-01-16

## 2023-01-16 PROBLEM — C32.9 CARCINOMA LARYNX: Status: ACTIVE | Noted: 2023-01-16

## 2023-01-16 PROBLEM — E11.42 DIABETIC POLYNEUROPATHY ASSOCIATED WITH TYPE 2 DIABETES MELLITUS: Status: ACTIVE | Noted: 2023-01-16

## 2023-01-16 RX ORDER — METHYLPREDNISOLONE ACETATE 40 MG/ML
10 INJECTION, SUSPENSION INTRA-ARTICULAR; INTRALESIONAL; INTRAMUSCULAR; SOFT TISSUE
Status: DISCONTINUED | OUTPATIENT
Start: 2023-01-16 | End: 2023-01-16 | Stop reason: HOSPADM

## 2023-01-16 RX ADMIN — METHYLPREDNISOLONE ACETATE 10 MG: 40 INJECTION, SUSPENSION INTRA-ARTICULAR; INTRALESIONAL; INTRAMUSCULAR; SOFT TISSUE at 10:01

## 2023-01-16 NOTE — PROGRESS NOTES
Subjective:       Patient ID: Nick De Anda is a 82 y.o. male.    Chief Complaint: Arthritis (Pain in bilateral thumbs) and Anxiety (medication)    HPI  patient visit today for routine follow-up he has history of bilateral knee pain lower back pain from osteoarthritis muscle relaxant Robaxin help pessary at night he can sleep he also not physically active lately but will begin to get back on his exercise in the gym again he complained of bilateral thumb pain at the base of the thumb along the flexor tendon he request an injection and his still taking Xarelto for his atypical atrial flutter and atrial fib and follow-up with cardiology his high blood pressure is fairly control with medication.  He has surgical removal of the vocal cord dysplasia doing well no recurrent by last laryngoscope  Review of Systems    Objective:      Physical Exam  Vitals and nursing note reviewed.   Constitutional:       General: He is not in acute distress.     Appearance: He is well-developed.   HENT:      Head: Normocephalic and atraumatic.      Right Ear: External ear normal.      Left Ear: External ear normal.      Nose: Nose normal.   Eyes:      Conjunctiva/sclera: Conjunctivae normal.      Pupils: Pupils are equal, round, and reactive to light.   Neck:      Thyroid: No thyromegaly.   Cardiovascular:      Rate and Rhythm: Normal rate and regular rhythm.      Heart sounds: Normal heart sounds. No murmur heard.    No friction rub. No gallop.   Pulmonary:      Effort: Pulmonary effort is normal. No respiratory distress.      Breath sounds: Normal breath sounds. No wheezing.   Abdominal:      General: Bowel sounds are normal. There is no distension.      Palpations: Abdomen is soft.      Tenderness: There is no abdominal tenderness.   Musculoskeletal:         General: No tenderness (Tenderness at the base of the thumb along the flexor tendon with palpation) or deformity. Normal range of motion.      Cervical back: Normal range of  motion and neck supple.   Lymphadenopathy:      Cervical: No cervical adenopathy.   Skin:     General: Skin is warm and dry.      Capillary Refill: Capillary refill takes less than 2 seconds.      Findings: No erythema or rash.   Neurological:      Mental Status: He is alert and oriented to person, place, and time.   Psychiatric:         Thought Content: Thought content normal.         Judgment: Judgment normal.       Assessment:       1. Sciatica of right side    2. Anxiety    3. Chronic midline low back pain with right-sided sciatica    4. Carcinoma larynx    5. Diabetic polyneuropathy associated with type 2 diabetes mellitus    6. Hypertensive heart disease with heart failure    7. Paroxysmal atrial fibrillation    8. Atherosclerosis of native artery of right lower extremity, with unspecified presence of clinical manifestation    9. Bronchiectasis without complication    10. Thumb tendonitis          Plan:       Sciatica of right side    Anxiety  -     diazePAM (VALIUM) 10 MG Tab; Take 1 tablet (10 mg total) by mouth as needed (anxiety). 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    Diabetic polyneuropathy associated with type 2 diabetes mellitus  Comments:  Diabetic well control hemoglobin A1c 5.3 continue with treatment his diabetic neuropathy also improved    Hypertensive heart disease with heart failure  Comments:  Blood pressure well control heart rate well control continue with treatment    Paroxysmal atrial fibrillation  Comments:  Continue with Xarelto and follow-up with cardiologist    Atherosclerosis of native artery of right lower extremity, with  unspecified presence of clinical manifestation  Comments:  Peripheral vascular disease continue with medical management currently asymptomatic    Bronchiectasis without complication  Comments:  Much better now only take Adderall treatment occasionally coughing resolved    Thumb tendonitis  Comments:  Patient tolerated procedure well on both sides. Both thumbs get injections  Orders:  -     Trigger Point Injection  -     Discontinue: methylPREDNISolone acetate injection 10 mg        Medication List with Changes/Refills   New Medications    METHOCARBAMOL (ROBAXIN) 500 MG TAB    1 po bid prn muscle spasm   Current Medications    AMLODIPINE (NORVASC) 5 MG TABLET    TAKE 1 TO 2 TABLETS BY MOUTH ONCE DAILY OR  AS  DIRECTED    AMMONIUM LACTATE (LAC-HYDRIN) 12 % LOTION    Apply topically every evening. Hands and arms and then later scalp, forehead, temples    ASCORBIC ACID, VITAMIN C, (VITAMIN C) 100 MG TABLET    Take 500 mg by mouth once daily.    ATORVASTATIN (LIPITOR) 80 MG TABLET    Take 1 tablet (80 mg total) by mouth every evening.    AZELASTINE (ASTELIN) 137 MCG (0.1 %) NASAL SPRAY    1 spray (137 mcg total) by Nasal route 2 (two) times daily.    BLOOD SUGAR DIAGNOSTIC STRP    To check BG 2 times daily, to use with insurance preferred meter    BLOOD-GLUCOSE METER KIT    To check BG 2 times daily, to use with insurance preferred meter    CLOPIDOGREL (PLAVIX) 75 MG TABLET    Take 1 tablet by mouth once daily    FAMOTIDINE (PEPCID) 40 MG TABLET    Take 1 tablet (40 mg total) by mouth daily as needed for Heartburn.    HYDROCHLOROTHIAZIDE (MICROZIDE) 12.5 MG CAPSULE    Take 1 capsule by mouth once daily    IRBESARTAN (AVAPRO) 300 MG TABLET    TAKE 1 TABLET BY MOUTH ONCE DAILY IN THE EVENING    LANCETS MISC    To check BG 2 times daily, to use with insurance preferred meter    METFORMIN (GLUCOPHAGE) 500 MG TABLET    Take 1 tablet (500 mg total) by mouth 2 (two) times daily with meals.    METOPROLOL TARTRATE (LOPRESSOR) 25  MG TABLET    Take 1 tablet (25 mg total) by mouth daily as needed (Palpitations).    MULTIVITAMIN (THERAGRAN) PER TABLET    Take 1 tablet by mouth once daily.    NITROGLYCERIN (NITROSTAT) 0.4 MG SL TABLET    Place 1 tablet (0.4 mg total) under the tongue every 5 (five) minutes as needed for Chest pain.    XARELTO 20 MG TAB    TAKE 1 TABLET BY MOUTH ONCE DAILY IN THE EVENING   Changed and/or Refilled Medications    Modified Medication Previous Medication    DIAZEPAM (VALIUM) 10 MG TAB diazePAM (VALIUM) 10 MG Tab       Take 1 tablet (10 mg total) by mouth as needed (anxiety). Just half to one and use very seldom-get refills from PCP    Take 1 tablet (10 mg total) by mouth as needed (anxiety). Just half to one and use very seldom-get refills from PCP

## 2023-01-16 NOTE — PROCEDURES
Trigger Point Injection  Performed by: Santiago Adler MD  Authorized by: Santiago Adler MD       Consent Done?:  Not Needed        Local anesthesia used?: No    Medications: 10 mg methylPREDNISolone acetate 40 mg/mL

## 2023-02-14 ENCOUNTER — OFFICE VISIT (OUTPATIENT)
Dept: URGENT CARE | Facility: CLINIC | Age: 83
End: 2023-02-14
Payer: MEDICARE

## 2023-02-14 VITALS
BODY MASS INDEX: 31.9 KG/M2 | RESPIRATION RATE: 20 BRPM | SYSTOLIC BLOOD PRESSURE: 133 MMHG | DIASTOLIC BLOOD PRESSURE: 78 MMHG | WEIGHT: 262 LBS | HEART RATE: 96 BPM | TEMPERATURE: 98 F | HEIGHT: 76 IN | OXYGEN SATURATION: 97 %

## 2023-02-14 DIAGNOSIS — R04.0 EPISTAXIS: ICD-10-CM

## 2023-02-14 DIAGNOSIS — Z88.0 PENICILLIN ALLERGY: ICD-10-CM

## 2023-02-14 DIAGNOSIS — J06.9 BACTERIAL URI: Primary | ICD-10-CM

## 2023-02-14 DIAGNOSIS — Z79.01 ON ANTICOAGULANT THERAPY: ICD-10-CM

## 2023-02-14 DIAGNOSIS — B96.89 BACTERIAL URI: Primary | ICD-10-CM

## 2023-02-14 PROCEDURE — 3075F PR MOST RECENT SYSTOLIC BLOOD PRESS GE 130-139MM HG: ICD-10-PCS | Mod: CPTII,S$GLB,, | Performed by: FAMILY MEDICINE

## 2023-02-14 PROCEDURE — 1160F RVW MEDS BY RX/DR IN RCRD: CPT | Mod: CPTII,S$GLB,, | Performed by: FAMILY MEDICINE

## 2023-02-14 PROCEDURE — 3078F DIAST BP <80 MM HG: CPT | Mod: CPTII,S$GLB,, | Performed by: FAMILY MEDICINE

## 2023-02-14 PROCEDURE — 1159F PR MEDICATION LIST DOCUMENTED IN MEDICAL RECORD: ICD-10-PCS | Mod: CPTII,S$GLB,, | Performed by: FAMILY MEDICINE

## 2023-02-14 PROCEDURE — 99213 OFFICE O/P EST LOW 20 MIN: CPT | Mod: S$GLB,,, | Performed by: FAMILY MEDICINE

## 2023-02-14 PROCEDURE — 3078F PR MOST RECENT DIASTOLIC BLOOD PRESSURE < 80 MM HG: ICD-10-PCS | Mod: CPTII,S$GLB,, | Performed by: FAMILY MEDICINE

## 2023-02-14 PROCEDURE — 99213 PR OFFICE/OUTPT VISIT, EST, LEVL III, 20-29 MIN: ICD-10-PCS | Mod: S$GLB,,, | Performed by: FAMILY MEDICINE

## 2023-02-14 PROCEDURE — 1159F MED LIST DOCD IN RCRD: CPT | Mod: CPTII,S$GLB,, | Performed by: FAMILY MEDICINE

## 2023-02-14 PROCEDURE — 1160F PR REVIEW ALL MEDS BY PRESCRIBER/CLIN PHARMACIST DOCUMENTED: ICD-10-PCS | Mod: CPTII,S$GLB,, | Performed by: FAMILY MEDICINE

## 2023-02-14 PROCEDURE — 3075F SYST BP GE 130 - 139MM HG: CPT | Mod: CPTII,S$GLB,, | Performed by: FAMILY MEDICINE

## 2023-02-14 RX ORDER — MUPIROCIN 20 MG/G
OINTMENT TOPICAL 2 TIMES DAILY
Qty: 30 G | Refills: 0 | Status: SHIPPED | OUTPATIENT
Start: 2023-02-14 | End: 2023-02-21

## 2023-02-14 RX ORDER — MUPIROCIN 20 MG/G
OINTMENT TOPICAL 2 TIMES DAILY
Qty: 30 G | Refills: 0 | Status: SHIPPED | OUTPATIENT
Start: 2023-02-14 | End: 2023-02-14 | Stop reason: SDUPTHER

## 2023-02-14 RX ORDER — CEPHALEXIN 500 MG/1
500 CAPSULE ORAL EVERY 12 HOURS
Qty: 20 CAPSULE | Refills: 0 | Status: SHIPPED | OUTPATIENT
Start: 2023-02-14 | End: 2023-02-14 | Stop reason: SDUPTHER

## 2023-02-14 RX ORDER — CEPHALEXIN 500 MG/1
500 CAPSULE ORAL EVERY 12 HOURS
Qty: 20 CAPSULE | Refills: 0 | Status: SHIPPED | OUTPATIENT
Start: 2023-02-14 | End: 2023-02-24

## 2023-02-14 NOTE — PROGRESS NOTES
"Subjective:       Patient ID: Nick De Anda is a 82 y.o. male.    Vitals:  height is 6' 4" (1.93 m) and weight is 118.8 kg (262 lb). His temperature is 98.2 °F (36.8 °C). His blood pressure is 133/78 and his pulse is 96. His respiration is 20 and oxygen saturation is 97%.     Chief Complaint: Nasal Congestion (Sinus Infection - Entered by patient) and Epistaxis (FOR 4 DAYS NOW /)    Sinus Problem  This is a new problem. The current episode started in the past 7 days. The problem is unchanged. There has been no fever. His pain is at a severity of 2/10. The pain is mild. Associated symptoms include congestion, sinus pressure and sneezing. Pertinent negatives include no chills, coughing, diaphoresis, ear pain, headaches, hoarse voice, neck pain, shortness of breath, sore throat or swollen glands. Past treatments include saline sprays. The treatment provided mild relief.     Constitution: Negative for chills and sweating.   HENT:  Positive for congestion and sinus pressure. Negative for ear pain and sore throat.    Neck: Negative for neck pain.   Respiratory:  Negative for cough and shortness of breath.    Allergic/Immunologic: Positive for sneezing.   Neurological:  Negative for headaches.     Objective:      Vitals:    02/14/23 1725   BP: 133/78   Pulse: 96   Resp: 20   Temp: 98.2 °F (36.8 °C)   SpO2: 97%   Weight: 118.8 kg (262 lb)   Height: 6' 4" (1.93 m)      Physical Exam   Constitutional: He is oriented to person, place, and time.  Non-toxic appearance. He does not appear ill. No distress.   HENT:   Head: Atraumatic.      Comments: Hard of hearing  Sinus tenderness  No active nosebleed  Ears:   Right Ear: Tympanic membrane normal.   Left Ear: Tympanic membrane normal.   Nose: Congestion present.   Mouth/Throat: No posterior oropharyngeal erythema.   Eyes: Conjunctivae are normal.   Neck: Neck supple.   Cardiovascular: Normal rate, normal heart sounds and normal pulses. An irregular rhythm present. "   Pulmonary/Chest: Effort normal and breath sounds normal.   Abdominal: Bowel sounds are normal. Soft.   Lymphadenopathy:     He has no cervical adenopathy.   Neurological: He is alert and oriented to person, place, and time.   Skin: Skin is warm and not diaphoretic. Capillary refill takes less than 2 seconds.   Psychiatric: Judgment and thought content normal.       Assessment:       1. Bacterial URI    2. Epistaxis    3. On anticoagulant therapy    4. Penicillin allergy          Plan:         Bacterial URI  -     cephALEXin (KEFLEX) 500 MG capsule; Take 1 capsule (500 mg total) by mouth every 12 (twelve) hours. for 10 days  Dispense: 20 capsule; Refill: 0  Continue Mucinex. Take Zyrtec. Patient reports that Keflex always helped in the past.    2. Epistaxis  -     mupirocin (BACTROBAN) 2 % ointment; by Nasal route 2 (two) times daily. for 7 days  Dispense: 30 g; Refill: 0    3. On anticoagulant therapy  Caution in patient with reports of nose bleeds. No active bleeding at present.    4. Penicillin allergy  Has tolerated Keflex in the past with good repose. Keflex selected as antibiotic of choice per patient's preference.    Patient Instructions   Below are suggestions for symptomatic relief of your upper respiratory symptoms:              -Salt water gargles to soothe throat pain.              -Chloroseptic spray and Cepacol lozenges also help to numb throat pain.              -Warm herbal teas with honey/lemon/eloina can help soothe sore throat and hoarseness              -Nasal saline spray reduces inflammation and dryness.              -Warm face compresses to help with facial sinus pain/pressure.              -Humidifiers and steam can help with nasal dryness and congestion              -Vicks vapor rub at night for chest congestion.              -Flonase OTC or Nasacort OTC for nasal congestion and post-nasal drip. Ok to use twice daily for the first week, then reduce to once daily after symptoms have begun to  improve.              -Afrin is a nasal spray that can give immediate relief of nasal congestion but you cannot use this medication for more than 3 days              -Simple foods like chicken noodle soup.              - Mucinex for congestion or Mucinex DM for cough during the day time. Delsym helps with coughing at night. Mucinex-D if you have sinus pressure/sinus pain or chest congestion. (caution if history of high blood pressure or palpitations). You must increase your water intake when using expectorants (Mucinex).             -Zyrtec/Claritin/Allegra/Xyzal should help with allergies.  -If you DO NOT have Hypertension or any history of palpitations, it is ok to take over the counter Sudafed or Mucinex D or Allegra-D or Claritin-D or Zyrtec-D.  -If you do take one of the above, it is ok to combine that with plain over the counter Mucinex or Allegra or Claritin or Zyrtec. If, for example, you are taking Zyrtec -D, you can combine that with Mucinex, but not Mucinex-D.  If you are taking Mucinex-D, you can combine that with plain Allegra or Claritin or Zyrtec.   -If you DO have Hypertension or palpitations, it is safe to take Coricidin HBP for relief of sinus symptoms.

## 2023-02-14 NOTE — PATIENT INSTRUCTIONS
Below are suggestions for symptomatic relief of your upper respiratory symptoms:              -Salt water gargles to soothe throat pain.              -Chloroseptic spray and Cepacol lozenges also help to numb throat pain.              -Warm herbal teas with honey/lemon/eloina can help soothe sore throat and hoarseness              -Nasal saline spray reduces inflammation and dryness.              -Warm face compresses to help with facial sinus pain/pressure.              -Humidifiers and steam can help with nasal dryness and congestion              -Vicks vapor rub at night for chest congestion.              -Flonase OTC or Nasacort OTC for nasal congestion and post-nasal drip. Ok to use twice daily for the first week, then reduce to once daily after symptoms have begun to improve.              -Afrin is a nasal spray that can give immediate relief of nasal congestion but you cannot use this medication for more than 3 days              -Simple foods like chicken noodle soup.              - Mucinex for congestion or Mucinex DM for cough during the day time. Delsym helps with coughing at night. Mucinex-D if you have sinus pressure/sinus pain or chest congestion. (caution if history of high blood pressure or palpitations). You must increase your water intake when using expectorants (Mucinex).             -Zyrtec/Claritin/Allegra/Xyzal should help with allergies.  -If you DO NOT have Hypertension or any history of palpitations, it is ok to take over the counter Sudafed or Mucinex D or Allegra-D or Claritin-D or Zyrtec-D.  -If you do take one of the above, it is ok to combine that with plain over the counter Mucinex or Allegra or Claritin or Zyrtec. If, for example, you are taking Zyrtec -D, you can combine that with Mucinex, but not Mucinex-D.  If you are taking Mucinex-D, you can combine that with plain Allegra or Claritin or Zyrtec.   -If you DO have Hypertension or palpitations, it is safe to take Coricidin HBP for  relief of sinus symptoms.

## 2023-02-20 ENCOUNTER — TELEPHONE (OUTPATIENT)
Dept: CARDIOLOGY | Facility: CLINIC | Age: 83
End: 2023-02-20
Payer: MEDICARE

## 2023-02-20 ENCOUNTER — TELEPHONE (OUTPATIENT)
Dept: PRIMARY CARE CLINIC | Facility: CLINIC | Age: 83
End: 2023-02-20
Payer: MEDICARE

## 2023-02-20 NOTE — TELEPHONE ENCOUNTER
Returned pt's call - offered him appt with someone in cardiology 2/22 but pt declined - he prefers next week. Booked him to see Dr Gross on 2/27 at 1:40 PM w/ ekg before.      . Nahed HERRING Staff  Caller: Unspecified (Today,  1:06 PM)  Patient stated that he had an Infection in his Sinus which migrated to  Upper Resperitory System which sent him to the [facility] ER and the diagnosis was Pnuemonia. They requested and Immediate followup with cardiologist. Patient call back number is [phone] (home)     TY

## 2023-02-20 NOTE — TELEPHONE ENCOUNTER
----- Message from Nahed Mohan sent at 2/20/2023  1:08 PM CST -----  Patient stated that he had an Infection in his Sinus which migrated to mu Upper Resperitory System which sent him to the [facility] ER and the diagnosis was Pnuemonia. They requested and Immediate followup with PCP. Patient can be reached at [phone] (home)     TY

## 2023-02-20 NOTE — TELEPHONE ENCOUNTER
----- Message from Nahed Mohan sent at 2/20/2023  1:06 PM CST -----  Patient stated that he had an Infection in his Sinus which migrated to  Upper Resperitory System which sent him to the [facility] ER and the diagnosis was Pnuemonia. They requested and Immediate followup with cardiologist. Patient call back number is [phone] (home)     TY

## 2023-02-20 NOTE — TELEPHONE ENCOUNTER
Spoke with patient and he said that he will call back to schedule appointment after he get transportation together

## 2023-02-27 ENCOUNTER — HOSPITAL ENCOUNTER (OUTPATIENT)
Dept: CARDIOLOGY | Facility: CLINIC | Age: 83
Discharge: HOME | End: 2023-02-27
Payer: MEDICARE

## 2023-02-27 ENCOUNTER — OFFICE VISIT (OUTPATIENT)
Dept: CARDIOLOGY | Facility: CLINIC | Age: 83
End: 2023-02-27
Payer: MEDICARE

## 2023-02-27 VITALS
WEIGHT: 255.06 LBS | OXYGEN SATURATION: 96 % | BODY MASS INDEX: 31.06 KG/M2 | HEART RATE: 78 BPM | DIASTOLIC BLOOD PRESSURE: 82 MMHG | HEIGHT: 76 IN | SYSTOLIC BLOOD PRESSURE: 116 MMHG

## 2023-02-27 DIAGNOSIS — Z79.01 LONG TERM (CURRENT) USE OF ANTICOAGULANTS: ICD-10-CM

## 2023-02-27 DIAGNOSIS — E66.3 OVERWEIGHT WITH BODY MASS INDEX (BMI) OF 29 TO 29.9 IN ADULT: ICD-10-CM

## 2023-02-27 DIAGNOSIS — E78.5 DYSLIPIDEMIA: ICD-10-CM

## 2023-02-27 DIAGNOSIS — I48.3 TYPICAL ATRIAL FLUTTER: ICD-10-CM

## 2023-02-27 DIAGNOSIS — R06.02 SHORTNESS OF BREATH: Primary | ICD-10-CM

## 2023-02-27 DIAGNOSIS — I11.0 HYPERTENSIVE HEART DISEASE WITH HEART FAILURE: ICD-10-CM

## 2023-02-27 DIAGNOSIS — Z98.61 POST PTCA: ICD-10-CM

## 2023-02-27 DIAGNOSIS — I25.10 CORONARY ARTERY DISEASE INVOLVING NATIVE CORONARY ARTERY OF NATIVE HEART WITHOUT ANGINA PECTORIS: ICD-10-CM

## 2023-02-27 DIAGNOSIS — E11.9 TYPE 2 DIABETES MELLITUS WITHOUT COMPLICATION, WITHOUT LONG-TERM CURRENT USE OF INSULIN: ICD-10-CM

## 2023-02-27 DIAGNOSIS — Z95.1 HX OF CABG: ICD-10-CM

## 2023-02-27 DIAGNOSIS — E11.42 DIABETIC POLYNEUROPATHY ASSOCIATED WITH TYPE 2 DIABETES MELLITUS: ICD-10-CM

## 2023-02-27 DIAGNOSIS — I48.0 PAROXYSMAL ATRIAL FIBRILLATION: ICD-10-CM

## 2023-02-27 PROCEDURE — 93000 EKG 12-LEAD: ICD-10-PCS | Mod: S$GLB,,, | Performed by: INTERNAL MEDICINE

## 2023-02-27 PROCEDURE — 1160F RVW MEDS BY RX/DR IN RCRD: CPT | Mod: CPTII,GC,S$GLB, | Performed by: INTERNAL MEDICINE

## 2023-02-27 PROCEDURE — 3288F FALL RISK ASSESSMENT DOCD: CPT | Mod: CPTII,GC,S$GLB, | Performed by: INTERNAL MEDICINE

## 2023-02-27 PROCEDURE — 1126F AMNT PAIN NOTED NONE PRSNT: CPT | Mod: CPTII,GC,S$GLB, | Performed by: INTERNAL MEDICINE

## 2023-02-27 PROCEDURE — 1101F PR PT FALLS ASSESS DOC 0-1 FALLS W/OUT INJ PAST YR: ICD-10-PCS | Mod: CPTII,GC,S$GLB, | Performed by: INTERNAL MEDICINE

## 2023-02-27 PROCEDURE — 99499 UNLISTED E&M SERVICE: CPT | Mod: S$GLB,,, | Performed by: INTERNAL MEDICINE

## 2023-02-27 PROCEDURE — 3288F PR FALLS RISK ASSESSMENT DOCUMENTED: ICD-10-PCS | Mod: CPTII,GC,S$GLB, | Performed by: INTERNAL MEDICINE

## 2023-02-27 PROCEDURE — 1101F PT FALLS ASSESS-DOCD LE1/YR: CPT | Mod: CPTII,GC,S$GLB, | Performed by: INTERNAL MEDICINE

## 2023-02-27 PROCEDURE — 99214 OFFICE O/P EST MOD 30 MIN: CPT | Mod: GC,S$GLB,, | Performed by: INTERNAL MEDICINE

## 2023-02-27 PROCEDURE — 1160F PR REVIEW ALL MEDS BY PRESCRIBER/CLIN PHARMACIST DOCUMENTED: ICD-10-PCS | Mod: CPTII,GC,S$GLB, | Performed by: INTERNAL MEDICINE

## 2023-02-27 PROCEDURE — 93000 ELECTROCARDIOGRAM COMPLETE: CPT | Mod: S$GLB,,, | Performed by: INTERNAL MEDICINE

## 2023-02-27 PROCEDURE — 3079F DIAST BP 80-89 MM HG: CPT | Mod: CPTII,GC,S$GLB, | Performed by: INTERNAL MEDICINE

## 2023-02-27 PROCEDURE — 1159F MED LIST DOCD IN RCRD: CPT | Mod: CPTII,GC,S$GLB, | Performed by: INTERNAL MEDICINE

## 2023-02-27 PROCEDURE — 1159F PR MEDICATION LIST DOCUMENTED IN MEDICAL RECORD: ICD-10-PCS | Mod: CPTII,GC,S$GLB, | Performed by: INTERNAL MEDICINE

## 2023-02-27 PROCEDURE — 3074F SYST BP LT 130 MM HG: CPT | Mod: CPTII,GC,S$GLB, | Performed by: INTERNAL MEDICINE

## 2023-02-27 PROCEDURE — 99999 PR PBB SHADOW E&M-EST. PATIENT-LVL V: ICD-10-PCS | Mod: PBBFAC,GC,, | Performed by: INTERNAL MEDICINE

## 2023-02-27 PROCEDURE — 3079F PR MOST RECENT DIASTOLIC BLOOD PRESSURE 80-89 MM HG: ICD-10-PCS | Mod: CPTII,GC,S$GLB, | Performed by: INTERNAL MEDICINE

## 2023-02-27 PROCEDURE — 1126F PR PAIN SEVERITY QUANTIFIED, NO PAIN PRESENT: ICD-10-PCS | Mod: CPTII,GC,S$GLB, | Performed by: INTERNAL MEDICINE

## 2023-02-27 PROCEDURE — 99214 PR OFFICE/OUTPT VISIT, EST, LEVL IV, 30-39 MIN: ICD-10-PCS | Mod: GC,S$GLB,, | Performed by: INTERNAL MEDICINE

## 2023-02-27 PROCEDURE — 3074F PR MOST RECENT SYSTOLIC BLOOD PRESSURE < 130 MM HG: ICD-10-PCS | Mod: CPTII,GC,S$GLB, | Performed by: INTERNAL MEDICINE

## 2023-02-27 PROCEDURE — 99999 PR PBB SHADOW E&M-EST. PATIENT-LVL V: CPT | Mod: PBBFAC,GC,, | Performed by: INTERNAL MEDICINE

## 2023-02-27 RX ORDER — ALBUTEROL SULFATE 2.5 MG/.5ML
2.5 SOLUTION RESPIRATORY (INHALATION) EVERY 6 HOURS
COMMUNITY
End: 2023-04-25 | Stop reason: SDUPTHER

## 2023-02-27 NOTE — PROGRESS NOTES
PCP - Santiago Adler MD  Subjective:     Nick De Anda is a 82 y.o. y.o. male with PMH of DM2, HTN, CAD s/p CABG (remote), atrial fibrillation and flutter (s/p PVI/CTI 10/2020, redo PVI and atypical flutter ablation 2021) who presents to cardiology clinic for follow up. He is currently maintained on xarelto for stroke prophylaxis, previously on sotalol for rhythm control since weaned off after last ablation. Today he presents after a hospitalization for pneumonia. He had dyspnea with productive cough which did not improve after course of keflex from urgent care. He then presented to the ED on  and spent the night in the hospital. Cardiac workup there was negative, EKG showed sinus rhythm, troponin and BNP checked and unremarkable. He was ultimately discharged with levaquin for empiric pneumonia coverage. Today he states he still feels short of breath. Denies chest pain. States he felt perfectly fine with good exercise tolerance prior to most recent illness.         History:     Social History     Tobacco Use    Smoking status: Former     Packs/day: 0.50     Years: 15.00     Pack years: 7.50     Types: Cigarettes     Start date: 1956     Quit date: 10/1/1979     Years since quittin.4    Smokeless tobacco: Never   Substance Use Topics    Alcohol use: No     Family History   Problem Relation Age of Onset    Heart attack Father        Meds:     Review of patient's allergies indicates:   Allergen Reactions    Penicillins Anaphylaxis     Can take keflex    Hydrocodone      Causes constipation    Amoxicillin Swelling and Rash       Current Outpatient Medications:     albuterol sulfate 2.5 mg/0.5 mL Nebu, Take 2.5 mg by nebulization every 6 (six) hours. Rescue, Disp: , Rfl:     amLODIPine (NORVASC) 5 MG tablet, TAKE 1 TO 2 TABLETS BY MOUTH ONCE DAILY OR  AS  DIRECTED, Disp: 90 tablet, Rfl: 0    ascorbic acid, vitamin C, (VITAMIN C) 100 MG tablet, Take 500 mg by mouth once daily., Disp: , Rfl:      atorvastatin (LIPITOR) 80 MG tablet, Take 1 tablet (80 mg total) by mouth every evening., Disp: 90 tablet, Rfl: 3    clopidogreL (PLAVIX) 75 mg tablet, Take 1 tablet by mouth once daily, Disp: 90 tablet, Rfl: 3    famotidine (PEPCID) 40 MG tablet, Take 1 tablet (40 mg total) by mouth daily as needed for Heartburn., Disp: 30 tablet, Rfl: 2    hydroCHLOROthiazide (MICROZIDE) 12.5 mg capsule, Take 1 capsule by mouth once daily, Disp: 90 capsule, Rfl: 3    irbesartan (AVAPRO) 300 MG tablet, TAKE 1 TABLET BY MOUTH ONCE DAILY IN THE EVENING, Disp: 90 tablet, Rfl: 3    metFORMIN (GLUCOPHAGE) 500 MG tablet, Take 1 tablet (500 mg total) by mouth 2 (two) times daily with meals., Disp: 180 tablet, Rfl: 0    methocarbamoL (ROBAXIN) 500 MG Tab, 1 po bid prn muscle spasm, Disp: 30 tablet, Rfl: 1    metoprolol tartrate (LOPRESSOR) 25 MG tablet, Take 1 tablet (25 mg total) by mouth daily as needed (Palpitations)., Disp: 90 tablet, Rfl: 0    multivitamin (THERAGRAN) per tablet, Take 1 tablet by mouth once daily., Disp: , Rfl:     XARELTO 20 mg Tab, TAKE 1 TABLET BY MOUTH ONCE DAILY IN THE EVENING, Disp: 90 tablet, Rfl: 3    ammonium lactate (LAC-HYDRIN) 12 % lotion, Apply topically every evening. Hands and arms and then later scalp, forehead, temples (Patient not taking: Reported on 2/14/2023), Disp: 225 g, Rfl: 2    azelastine (ASTELIN) 137 mcg (0.1 %) nasal spray, 1 spray (137 mcg total) by Nasal route 2 (two) times daily. (Patient not taking: Reported on 1/13/2023), Disp: 30 mL, Rfl: 3    blood sugar diagnostic Strp, To check BG 2 times daily, to use with insurance preferred meter (Patient not taking: Reported on 2/14/2023), Disp: 100 strip, Rfl: 5    blood-glucose meter kit, To check BG 2 times daily, to use with insurance preferred meter, Disp: 1 each, Rfl: 0    diazePAM (VALIUM) 10 MG Tab, Take 1 tablet (10 mg total) by mouth as needed (anxiety). Just half to one and use very seldom-get refills from PCP, Disp: 30 tablet, Rfl:  "3    lancets Misc, To check BG 2 times daily, to use with insurance preferred meter (Patient not taking: Reported on 2/14/2023), Disp: 100 each, Rfl: 5    nitroGLYCERIN (NITROSTAT) 0.4 MG SL tablet, Place 1 tablet (0.4 mg total) under the tongue every 5 (five) minutes as needed for Chest pain. (Patient not taking: Reported on 1/13/2023), Disp: 25 tablet, Rfl: 3  No current facility-administered medications for this visit.    Facility-Administered Medications Ordered in Other Visits:     0.9%  NaCl infusion, , Intravenous, Continuous, Rebecca Dickinson NP, Stopped at 12/02/20 1636    sodium chloride 0.9% bolus 1,000 mL, 1,000 mL, Intravenous, Once, Winnie Larkin NP    sodium chloride 0.9% flush 10 mL, 10 mL, Intravenous, PRN, TSalvatore Jaramillo MD    sodium chloride 0.9% flush 5 mL, 5 mL, Intravenous, PRN, Rebecca Dickinson NP    10 point ROS performed and negative except as stated in HPI     Objective:   /82 (BP Location: Right arm, Patient Position: Sitting)   Pulse 78   Ht 6' 4" (1.93 m)   Wt 115.7 kg (255 lb 1.2 oz)   SpO2 96%   BMI 31.05 kg/m²     Physical Exam:   Constitutional: no acute distress  HEENT: NCAT, EOMI, no scleral icterus  Cardiovascular: Regular rate and rhythm, no murmurs appreciated. 2+ carotid, radial, DP pulses bilaterally    Pulmonary: Faint crackles bilateral lung bases   Abdomen: nontender, non-distended   Neuro: alert and oriented, no focal deficits  Extremities: warm, no edema   MSK: no deformities  Integument: intact, no rashes       Labs:     Lab Results   Component Value Date     06/13/2022    K 4.2 06/13/2022     06/13/2022    CO2 24 06/13/2022    BUN 16 06/13/2022    CREATININE 0.8 06/13/2022    ANIONGAP 10 06/13/2022     Lab Results   Component Value Date    HGBA1C 5.3 06/13/2022     Lab Results   Component Value Date     (H) 06/13/2022     (H) 01/21/2022     (H) 09/17/2021       Lab Results   Component Value Date    WBC 6.26 " 12/01/2021    HGB 13.7 (L) 12/01/2021    HCT 37 12/08/2021     12/01/2021    GRAN 3.1 12/01/2021    GRAN 49.0 12/01/2021     Lab Results   Component Value Date    CHOL 116 (L) 06/13/2022    HDL 49 06/13/2022    LDLCALC 56.4 (L) 06/13/2022    TRIG 53 06/13/2022       Lab Results   Component Value Date     06/13/2022    K 4.2 06/13/2022     06/13/2022    CO2 24 06/13/2022    BUN 16 06/13/2022    CREATININE 0.8 06/13/2022    ANIONGAP 10 06/13/2022     Lab Results   Component Value Date    HGBA1C 5.3 06/13/2022     Lab Results   Component Value Date     (H) 06/13/2022     (H) 01/21/2022     (H) 09/17/2021    Lab Results   Component Value Date    WBC 6.26 12/01/2021    HGB 13.7 (L) 12/01/2021    HCT 37 12/08/2021     12/01/2021    GRAN 3.1 12/01/2021    GRAN 49.0 12/01/2021     Lab Results   Component Value Date    CHOL 116 (L) 06/13/2022    HDL 49 06/13/2022    LDLCALC 56.4 (L) 06/13/2022    TRIG 53 06/13/2022            Assessment     1. Shortness of breath    2. Hx of CABG    3. Coronary artery disease involving native coronary artery of native heart without angina pectoris    4. Post PTCA    5. Dyslipidemia    6. Typical atrial flutter    7. Hypertensive heart disease with heart failure    8. Paroxysmal atrial fibrillation    9. Diabetic polyneuropathy associated with type 2 diabetes mellitus    10. Long term (current) use of anticoagulants    11. Type 2 diabetes mellitus without complication, without long-term current use of insulin    12. Overweight with body mass index (BMI) of 29 to 29.9 in adult        Plan:   82 y.o. y.o. male with PMH of DM2, HTN, CAD s/p CABG (remote), atrial fibrillation and flutter (s/p PVI/CTI 10/2020, redo PVI and atypical flutter ablation 12/2021) who presents to cardiology clinic for follow up.     Shortness of breath: Doesn't appear to be cardiac related at this time. Cardiac workup negative at the time of ED visit. Currently in sinus  rhythm and historically echo has been largely unremarkable. Notably he is just completing a course of antibiotics for pneumonia and may just need more time to recover. Would recommend pulmonology evaluation if symptoms don't improve.   Pulmonology referral placed    CAD: Denies anginal symptoms. Continue plavix, statin.     Afib/flutter: Doesn't appear to have significant burden since last ablation. On xarelto for stroke prophylaxis.     HTN: Controlled today.     Follow up with Dr. Alas in 1 year

## 2023-02-28 NOTE — PROGRESS NOTES
I have reviewed the notes, assessments, and/or procedures performed by fellow, I concur with her/his documentation of Nick De Anda.

## 2023-03-03 DIAGNOSIS — R06.02 SOB (SHORTNESS OF BREATH): ICD-10-CM

## 2023-03-03 DIAGNOSIS — J47.9 BRONCHIECTASIS WITHOUT COMPLICATION: Primary | ICD-10-CM

## 2023-03-07 ENCOUNTER — HOSPITAL ENCOUNTER (OUTPATIENT)
Dept: PULMONOLOGY | Facility: CLINIC | Age: 83
Discharge: HOME | End: 2023-03-07
Payer: MEDICARE

## 2023-03-07 ENCOUNTER — OFFICE VISIT (OUTPATIENT)
Dept: PULMONOLOGY | Facility: CLINIC | Age: 83
End: 2023-03-07
Payer: MEDICARE

## 2023-03-07 ENCOUNTER — HOSPITAL ENCOUNTER (OUTPATIENT)
Dept: RADIOLOGY | Facility: HOSPITAL | Age: 83
Discharge: HOME | End: 2023-03-07
Attending: INTERNAL MEDICINE
Payer: MEDICARE

## 2023-03-07 VITALS
WEIGHT: 257.5 LBS | SYSTOLIC BLOOD PRESSURE: 108 MMHG | HEART RATE: 99 BPM | BODY MASS INDEX: 31.36 KG/M2 | DIASTOLIC BLOOD PRESSURE: 70 MMHG | OXYGEN SATURATION: 96 % | HEIGHT: 76 IN

## 2023-03-07 DIAGNOSIS — R06.02 SOB (SHORTNESS OF BREATH): ICD-10-CM

## 2023-03-07 DIAGNOSIS — R06.02 SOB (SHORTNESS OF BREATH): Primary | ICD-10-CM

## 2023-03-07 DIAGNOSIS — J47.9 BRONCHIECTASIS WITHOUT COMPLICATION: ICD-10-CM

## 2023-03-07 DIAGNOSIS — C32.9 CARCINOMA LARYNX: ICD-10-CM

## 2023-03-07 LAB
FEF 25 75 LLN: 0.79
FEF 25 75 PRE REF: 26.3 %
FEF 25 75 REF: 2.2
FEV05 LLN: 1.85
FEV05 REF: 2.99
FEV1 FVC LLN: 58
FEV1 FVC PRE REF: 67.1 %
FEV1 FVC REF: 73
FEV1 LLN: 2.28
FEV1 PRE REF: 42.9 %
FEV1 REF: 3.33
FVC LLN: 3.29
FVC PRE REF: 63 %
FVC REF: 4.61
PEF LLN: 5.53
PEF PRE REF: 48.8 %
PEF REF: 8.26
PHYSICIAN COMMENT: ABNORMAL
PRE FEF 25 75: 0.58 L/S (ref 0.79–4.3)
PRE FET 100: 7.15 SEC
PRE FEV05 REF: 32.6 %
PRE FEV1 FVC: 49.18 % (ref 58.26–87)
PRE FEV1: 1.43 L (ref 2.28–4.31)
PRE FEV5: 0.97 L (ref 1.85–4.12)
PRE FVC: 2.9 L (ref 3.29–5.95)
PRE PEF: 4.03 L/S (ref 5.53–10.99)

## 2023-03-07 PROCEDURE — 1159F MED LIST DOCD IN RCRD: CPT | Mod: CPTII,S$GLB,, | Performed by: INTERNAL MEDICINE

## 2023-03-07 PROCEDURE — 99999 PR PBB SHADOW E&M-EST. PATIENT-LVL IV: CPT | Mod: PBBFAC,,, | Performed by: INTERNAL MEDICINE

## 2023-03-07 PROCEDURE — 3078F PR MOST RECENT DIASTOLIC BLOOD PRESSURE < 80 MM HG: ICD-10-PCS | Mod: CPTII,S$GLB,, | Performed by: INTERNAL MEDICINE

## 2023-03-07 PROCEDURE — 99214 PR OFFICE/OUTPT VISIT, EST, LEVL IV, 30-39 MIN: ICD-10-PCS | Mod: S$GLB,,, | Performed by: INTERNAL MEDICINE

## 2023-03-07 PROCEDURE — 1159F PR MEDICATION LIST DOCUMENTED IN MEDICAL RECORD: ICD-10-PCS | Mod: CPTII,S$GLB,, | Performed by: INTERNAL MEDICINE

## 2023-03-07 PROCEDURE — 3288F PR FALLS RISK ASSESSMENT DOCUMENTED: ICD-10-PCS | Mod: CPTII,S$GLB,, | Performed by: INTERNAL MEDICINE

## 2023-03-07 PROCEDURE — 99999 PR PBB SHADOW E&M-EST. PATIENT-LVL IV: ICD-10-PCS | Mod: PBBFAC,,, | Performed by: INTERNAL MEDICINE

## 2023-03-07 PROCEDURE — 1101F PT FALLS ASSESS-DOCD LE1/YR: CPT | Mod: CPTII,S$GLB,, | Performed by: INTERNAL MEDICINE

## 2023-03-07 PROCEDURE — 3074F PR MOST RECENT SYSTOLIC BLOOD PRESSURE < 130 MM HG: ICD-10-PCS | Mod: CPTII,S$GLB,, | Performed by: INTERNAL MEDICINE

## 2023-03-07 PROCEDURE — 71046 X-RAY EXAM CHEST 2 VIEWS: CPT | Mod: TC,FY

## 2023-03-07 PROCEDURE — 94010 BREATHING CAPACITY TEST: CPT | Mod: S$GLB,,, | Performed by: INTERNAL MEDICINE

## 2023-03-07 PROCEDURE — 3288F FALL RISK ASSESSMENT DOCD: CPT | Mod: CPTII,S$GLB,, | Performed by: INTERNAL MEDICINE

## 2023-03-07 PROCEDURE — 71046 XR CHEST PA AND LATERAL: ICD-10-PCS | Mod: 26,,, | Performed by: RADIOLOGY

## 2023-03-07 PROCEDURE — 3074F SYST BP LT 130 MM HG: CPT | Mod: CPTII,S$GLB,, | Performed by: INTERNAL MEDICINE

## 2023-03-07 PROCEDURE — 1126F PR PAIN SEVERITY QUANTIFIED, NO PAIN PRESENT: ICD-10-PCS | Mod: CPTII,S$GLB,, | Performed by: INTERNAL MEDICINE

## 2023-03-07 PROCEDURE — 99214 OFFICE O/P EST MOD 30 MIN: CPT | Mod: S$GLB,,, | Performed by: INTERNAL MEDICINE

## 2023-03-07 PROCEDURE — 1126F AMNT PAIN NOTED NONE PRSNT: CPT | Mod: CPTII,S$GLB,, | Performed by: INTERNAL MEDICINE

## 2023-03-07 PROCEDURE — 94010 BREATHING CAPACITY TEST: ICD-10-PCS | Mod: S$GLB,,, | Performed by: INTERNAL MEDICINE

## 2023-03-07 PROCEDURE — 3078F DIAST BP <80 MM HG: CPT | Mod: CPTII,S$GLB,, | Performed by: INTERNAL MEDICINE

## 2023-03-07 PROCEDURE — 71046 X-RAY EXAM CHEST 2 VIEWS: CPT | Mod: 26,,, | Performed by: RADIOLOGY

## 2023-03-07 PROCEDURE — 1101F PR PT FALLS ASSESS DOC 0-1 FALLS W/OUT INJ PAST YR: ICD-10-PCS | Mod: CPTII,S$GLB,, | Performed by: INTERNAL MEDICINE

## 2023-03-07 NOTE — PROGRESS NOTES
"Subjective:      Patient ID: Nick De Anda is a 82 y.o. male.    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    States that he quit smoking in 1979    He was hospitalized in the [facility] for ;pneumonia about 2 weeks ago and is referred to me by cardiology for follow up.  The patient is in no distress today and I can NOT access the films to see if he had pneumonia or not. His Chest x-ray today is clear without infiltrates and sternal wires.   He has moderate hyperinflation and flattened elise diaphragms compatible with COPD  but no pneumonia  and he feels well  PFT's show severe obstriction with Fev-1 1.43 L  49% of a reduced FVC  Values are compatible with emphysema    I doubt that he had pneumonia, it would  not have resolved this quickly  Review of Systems   Constitutional: Negative.    HENT: Negative.          Dr. Stubbs  did surgery on his larynx for cancer a year ago   Eyes: Negative.    Respiratory: Negative.          COPD    Recently hospitalized with "pneumonia"   Cardiovascular: Negative.         S/P CABG; 3 Vessesl    Stents  x 2    Hx of ablation for AF    Genitourinary: Negative.    Musculoskeletal: Negative.    Skin: Negative.    Gastrointestinal: Negative.    Neurological: Negative.    Psychiatric/Behavioral: Negative.     Objective:     Physical Exam  Constitutional:       Appearance: He is well-developed. He is obese.      Comments: BMI 31.34   25 7Pounds   HENT:      Head: Normocephalic and atraumatic.      Right Ear: External ear normal.      Left Ear: External ear normal.   Eyes:      Conjunctiva/sclera: Conjunctivae normal.      Pupils: Pupils are equal, round, and reactive to light.   Cardiovascular:      Rate and " "Rhythm: Normal rate and regular rhythm.      Heart sounds: Normal heart sounds.   Pulmonary:      Effort: Pulmonary effort is normal.      Breath sounds: Normal breath sounds.      Comments: Decreased air entry but no  wheezes    Reviewed the PFT;s and Chest x-ray with the patient. Told him that there is  no evidence of pneumonia today.    Has moderate severe obstruction but Sa02; 97%  "Pink Puffer"  Abdominal:      General: Bowel sounds are normal.      Palpations: Abdomen is soft.   Musculoskeletal:         General: Normal range of motion.      Cervical back: Normal range of motion and neck supple.   Skin:     General: Skin is warm and dry.   Neurological:      Mental Status: He is alert and oriented to person, place, and time.      Deep Tendon Reflexes: Reflexes are normal and symmetric.   Psychiatric:         Behavior: Behavior normal.         Thought Content: Thought content normal.         Judgment: Judgment normal.       Assessment:     1. SOB (shortness of breath)    2. Carcinoma larynx      Outpatient Encounter Medications as of 3/7/2023   Medication Sig Dispense Refill    albuterol sulfate 2.5 mg/0.5 mL Nebu Take 2.5 mg by nebulization every 6 (six) hours. Rescue      amLODIPine (NORVASC) 5 MG tablet TAKE 1 TO 2 TABLETS BY MOUTH ONCE DAILY AS DIRECTED 90 tablet 3    ammonium lactate (LAC-HYDRIN) 12 % lotion Apply topically every evening. Hands and arms and then later scalp, forehead, temples (Patient not taking: Reported on 2/14/2023) 225 g 2    ascorbic acid, vitamin C, (VITAMIN C) 100 MG tablet Take 500 mg by mouth once daily.      atorvastatin (LIPITOR) 80 MG tablet Take 1 tablet (80 mg total) by mouth every evening. 90 tablet 3    blood sugar diagnostic Strp To check BG 2 times daily, to use with insurance preferred meter (Patient not taking: Reported on 2/14/2023) 100 strip 5    blood-glucose meter kit To check BG 2 times daily, to use with insurance preferred meter 1 each 0    clopidogreL (PLAVIX) 75 " mg tablet Take 1 tablet by mouth once daily 90 tablet 3    diazePAM (VALIUM) 10 MG Tab Take 1 tablet (10 mg total) by mouth as needed (anxiety). Just half to one and use very seldom-get refills from PCP 30 tablet 3    famotidine (PEPCID) 40 MG tablet Take 1 tablet (40 mg total) by mouth daily as needed for Heartburn. 30 tablet 2    hydroCHLOROthiazide (MICROZIDE) 12.5 mg capsule Take 1 capsule by mouth once daily 90 capsule 3    irbesartan (AVAPRO) 300 MG tablet TAKE 1 TABLET BY MOUTH ONCE DAILY IN THE EVENING 90 tablet 3    lancets Misc To check BG 2 times daily, to use with insurance preferred meter (Patient not taking: Reported on 2/14/2023) 100 each 5    metFORMIN (GLUCOPHAGE) 500 MG tablet Take 1 tablet (500 mg total) by mouth 2 (two) times daily with meals. 180 tablet 0    methocarbamoL (ROBAXIN) 500 MG Tab 1 po bid prn muscle spasm 30 tablet 1    metoprolol tartrate (LOPRESSOR) 25 MG tablet Take 1 tablet (25 mg total) by mouth daily as needed (Palpitations). 90 tablet 0    multivitamin (THERAGRAN) per tablet Take 1 tablet by mouth once daily.      nitroGLYCERIN (NITROSTAT) 0.4 MG SL tablet Place 1 tablet (0.4 mg total) under the tongue every 5 (five) minutes as needed for Chest pain. (Patient not taking: Reported on 1/13/2023) 25 tablet 3    XARELTO 20 mg Tab TAKE 1 TABLET BY MOUTH ONCE DAILY IN THE EVENING 90 tablet 3    [DISCONTINUED] amLODIPine (NORVASC) 5 MG tablet TAKE 1 TO 2 TABLETS BY MOUTH ONCE DAILY OR  AS  DIRECTED 90 tablet 0    [DISCONTINUED] azelastine (ASTELIN) 137 mcg (0.1 %) nasal spray 1 spray (137 mcg total) by Nasal route 2 (two) times daily. (Patient not taking: Reported on 1/13/2023) 30 mL 3     Facility-Administered Encounter Medications as of 3/7/2023   Medication Dose Route Frequency Provider Last Rate Last Admin    0.9%  NaCl infusion   Intravenous Continuous Rebecca Dickinson NP   Stopped at 12/02/20 0436    sodium chloride 0.9% bolus 1,000 mL  1,000 mL Intravenous Once Winnie LR  PARISA Larkin        sodium chloride 0.9% flush 10 mL  10 mL Intravenous PRN DELICIA Jaramillo MD        sodium chloride 0.9% flush 5 mL  5 mL Intravenous PRN Rebecca Dickinson NP         No orders of the defined types were placed in this encounter.    Plan:   Continue current pulmonary meds  No pneumonia  Problem List Items Addressed This Visit       Carcinoma larynx     Other Visit Diagnoses       SOB (shortness of breath)    -  Primary

## 2023-03-13 ENCOUNTER — OFFICE VISIT (OUTPATIENT)
Dept: OTOLARYNGOLOGY | Facility: CLINIC | Age: 83
End: 2023-03-13
Payer: MEDICARE

## 2023-03-13 VITALS
BODY MASS INDEX: 31.29 KG/M2 | HEART RATE: 80 BPM | SYSTOLIC BLOOD PRESSURE: 114 MMHG | HEIGHT: 76 IN | DIASTOLIC BLOOD PRESSURE: 77 MMHG | WEIGHT: 257 LBS

## 2023-03-13 DIAGNOSIS — R49.0 DYSPHONIA: Primary | ICD-10-CM

## 2023-03-13 DIAGNOSIS — C32.9 LARYNGEAL CANCER: ICD-10-CM

## 2023-03-13 DIAGNOSIS — Q31.9 DYSPLASIA OF LARYNX: ICD-10-CM

## 2023-03-13 PROCEDURE — 1101F PR PT FALLS ASSESS DOC 0-1 FALLS W/OUT INJ PAST YR: ICD-10-PCS | Mod: CPTII,S$GLB,, | Performed by: OTOLARYNGOLOGY

## 2023-03-13 PROCEDURE — 99213 PR OFFICE/OUTPT VISIT, EST, LEVL III, 20-29 MIN: ICD-10-PCS | Mod: 25,S$GLB,, | Performed by: OTOLARYNGOLOGY

## 2023-03-13 PROCEDURE — 3078F PR MOST RECENT DIASTOLIC BLOOD PRESSURE < 80 MM HG: ICD-10-PCS | Mod: CPTII,S$GLB,, | Performed by: OTOLARYNGOLOGY

## 2023-03-13 PROCEDURE — 1125F PR PAIN SEVERITY QUANTIFIED, PAIN PRESENT: ICD-10-PCS | Mod: CPTII,S$GLB,, | Performed by: OTOLARYNGOLOGY

## 2023-03-13 PROCEDURE — 31579 PR LARYNGOSCOPY, FLEX/RIGID TELESCOPIC, W/STROBOSCOPY: ICD-10-PCS | Mod: S$GLB,,, | Performed by: OTOLARYNGOLOGY

## 2023-03-13 PROCEDURE — 1159F PR MEDICATION LIST DOCUMENTED IN MEDICAL RECORD: ICD-10-PCS | Mod: CPTII,S$GLB,, | Performed by: OTOLARYNGOLOGY

## 2023-03-13 PROCEDURE — 1160F PR REVIEW ALL MEDS BY PRESCRIBER/CLIN PHARMACIST DOCUMENTED: ICD-10-PCS | Mod: CPTII,S$GLB,, | Performed by: OTOLARYNGOLOGY

## 2023-03-13 PROCEDURE — 3078F DIAST BP <80 MM HG: CPT | Mod: CPTII,S$GLB,, | Performed by: OTOLARYNGOLOGY

## 2023-03-13 PROCEDURE — 3074F SYST BP LT 130 MM HG: CPT | Mod: CPTII,S$GLB,, | Performed by: OTOLARYNGOLOGY

## 2023-03-13 PROCEDURE — 3288F PR FALLS RISK ASSESSMENT DOCUMENTED: ICD-10-PCS | Mod: CPTII,S$GLB,, | Performed by: OTOLARYNGOLOGY

## 2023-03-13 PROCEDURE — 1125F AMNT PAIN NOTED PAIN PRSNT: CPT | Mod: CPTII,S$GLB,, | Performed by: OTOLARYNGOLOGY

## 2023-03-13 PROCEDURE — 3288F FALL RISK ASSESSMENT DOCD: CPT | Mod: CPTII,S$GLB,, | Performed by: OTOLARYNGOLOGY

## 2023-03-13 PROCEDURE — 3074F PR MOST RECENT SYSTOLIC BLOOD PRESSURE < 130 MM HG: ICD-10-PCS | Mod: CPTII,S$GLB,, | Performed by: OTOLARYNGOLOGY

## 2023-03-13 PROCEDURE — 31579 LARYNGOSCOPY TELESCOPIC: CPT | Mod: S$GLB,,, | Performed by: OTOLARYNGOLOGY

## 2023-03-13 PROCEDURE — 99999 PR PBB SHADOW E&M-EST. PATIENT-LVL IV: CPT | Mod: PBBFAC,,, | Performed by: OTOLARYNGOLOGY

## 2023-03-13 PROCEDURE — 99213 OFFICE O/P EST LOW 20 MIN: CPT | Mod: 25,S$GLB,, | Performed by: OTOLARYNGOLOGY

## 2023-03-13 PROCEDURE — 1159F MED LIST DOCD IN RCRD: CPT | Mod: CPTII,S$GLB,, | Performed by: OTOLARYNGOLOGY

## 2023-03-13 PROCEDURE — 99999 PR PBB SHADOW E&M-EST. PATIENT-LVL IV: ICD-10-PCS | Mod: PBBFAC,,, | Performed by: OTOLARYNGOLOGY

## 2023-03-13 PROCEDURE — 1101F PT FALLS ASSESS-DOCD LE1/YR: CPT | Mod: CPTII,S$GLB,, | Performed by: OTOLARYNGOLOGY

## 2023-03-13 PROCEDURE — 1160F RVW MEDS BY RX/DR IN RCRD: CPT | Mod: CPTII,S$GLB,, | Performed by: OTOLARYNGOLOGY

## 2023-03-13 RX ORDER — DIPHENHYDRAMINE HCL 25 MG
25 CAPSULE ORAL EVERY 6 HOURS PRN
COMMUNITY
End: 2023-09-28 | Stop reason: CLARIF

## 2023-03-13 NOTE — PROGRESS NOTES
[facility]  Department of Otorhinolaryngology and Communication Sciences    Subjective:      Nick De Anda is a 82 y.o. male who presents for follow-up. He has glottic dysplasia.    9/28/2021: SML KTP assisted excision right TVF lesion.  1. BIOPSY OF RIGHT FALSE VOCAL FOLD:   MODERATE NONSPECIFIC SUBMUCOSAL CHRONIC INFLAMMATION   NO NEOPLASIA IDENTIFIED   2. BIOPSY OF VENTRICLE:   NO SIGNIFICANT HISTOLOGIC ALTERATION   NO NEOPLASIA IDENTIFIED   3. BIOPSY OF RIGHT TRUE VOCAL FOLD:   NO CARCINOMA IDENTIFIED   FOCAL MODERATE SQUAMOUS MUCOSAL DYSPLASIA    He continues to do well.  His voice remains good, although he did lose it recently during an illness. He denies dysphagia, throat pain, odynophagia, otalgia, hemoptysis, hematemesis, neck mass.     The patient's medications, allergies, past medical, surgical, social and family histories were reviewed and updated as appropriate.    A detailed review of systems was obtained with pertinent positives as per the above HPI, and otherwise negative.      Objective:     VS reviewed  Constitutional: comfortable, well dressed  Psychiatric: appropriate affect  Respiratory: comfortably breathing, symmetric chest rise, no stridor  Voice: minimal variable breathiness/roughness   Head: normocephalic  Eyes: conjunctivae and sclerae clear  Indirect laryngoscopy is limited due to gag    Procedure  Rigid Laryngeal Videostroboscopy (85348): Laryngeal videostroboscopy is indicated to assess the laryngeal vibratory biomechanics and vocal fold oscillation, which cannot be assessed with a plain light examination. This was carried out with a 70 degree endoscope. After verbal consent was obtained, the patient was positioned and the tongue was gently secured with a gauze sponge. The endoscope was passed transorally and positioned to image the larynx and hypopharynx in detail. The following features were examined: laryngeal and hypopharyngeal masses; vocal fold range and symmetry  of motion; laryngeal mucosal edema, erythema, inflammation, and hydration; salivary pooling; and gross laryngeal sensation. During phonation, the vocal folds were assessed for glottal closure; mucosal wave; vocal fold lesions; vibratory periodicity, amplitude, and phase symmetry; and vertical height match. The equipment was removed. The patient tolerated the procedure well without complication. All findings were normal except:  - right elise-laryngeal surgical site well healed  - thick endolaryngeal mucus  - no evidence of disease  - complete closure, blunted pliability right true vocal fold    Data Reviewed  See HPI      Assessment:     Nick De Anda is a 82 y.o. male with glottic dysplasia, progressing well following microsurgical intervention.     Plan:     Reassurance was provided. He will follow up with me in about 4 months, or sooner if needed.    All questions were answered, and the patient is in agreement with the plan.    Rommel Stubbs M.D.  [facility]  Department of Otorhinolaryngology and Communication Sciences

## 2023-03-31 ENCOUNTER — PES CALL (OUTPATIENT)
Dept: ADMINISTRATIVE | Facility: CLINIC | Age: 83
End: 2023-03-31
Payer: MEDICARE

## 2023-04-11 NOTE — PROGRESS NOTES
[facility] Note    Chief Complaint      Chief Complaint   Patient presents with    Health Risk Assessment       History of Present Illness      Nick De Anda is a 82 y.o. male who presents today for   Chief Complaint   Patient presents with    Health Risk Assessment         Patient presents for AWV. Loves his PCP but would like to establish future primary care with me due to this location being closer to home.        Review of Systems   Constitutional: Negative.    HENT:  Positive for hearing loss.    Eyes: Negative.    Respiratory: Negative.     Cardiovascular: Negative.    Gastrointestinal: Negative.    Genitourinary: Negative.    Musculoskeletal: Negative.    Skin: Negative.    Neurological: Negative.    Endo/Heme/Allergies:  Bruises/bleeds easily.   Psychiatric/Behavioral: Negative.     All 12 systems otherwise negative.     Family History:  family history includes Heart attack in his father.   Family history was reviewed with patient.     Medications:  Outpatient Encounter Medications as of 4/25/2023   Medication Sig Note Dispense Refill    amLODIPine (NORVASC) 5 MG tablet TAKE 1 TO 2 TABLETS BY MOUTH ONCE DAILY AS DIRECTED  90 tablet 3    ammonium lactate (LAC-HYDRIN) 12 % lotion Apply topically every evening. Hands and arms and then later scalp, forehead, temples  225 g 2    ascorbic acid, vitamin C, (VITAMIN C) 100 MG tablet Take 500 mg by mouth once daily. 9/21/2021: Hold 1 week prior to surgery       atorvastatin (LIPITOR) 80 MG tablet TAKE 1 TABLET BY MOUTH ONCE DAILY IN THE EVENING  90 tablet 3    blood sugar diagnostic Strp To check BG 2 times daily, to use with insurance preferred meter  100 strip 5    blood-glucose meter kit To check BG 2 times daily, to use with insurance preferred meter  1 each 0    clopidogreL (PLAVIX) 75 mg tablet Take 1 tablet by mouth once daily  90 tablet 3    diazePAM (VALIUM) 10 MG Tab Take 1 tablet (10 mg total) by mouth as needed (anxiety). Just half  to one and use very seldom-get refills from PCP  30 tablet 3    diphenhydrAMINE (BENADRYL) 25 mg capsule Take 25 mg by mouth every 6 (six) hours as needed for Allergies.       famotidine (PEPCID) 40 MG tablet Take 1 tablet (40 mg total) by mouth daily as needed for Heartburn.  30 tablet 2    hydroCHLOROthiazide (MICROZIDE) 12.5 mg capsule Take 1 capsule by mouth once daily  90 capsule 3    irbesartan (AVAPRO) 300 MG tablet TAKE 1 TABLET BY MOUTH ONCE DAILY IN THE EVENING  90 tablet 3    lancets Misc To check BG 2 times daily, to use with insurance preferred meter  100 each 5    metFORMIN (GLUCOPHAGE) 500 MG tablet TAKE 1 TABLET BY MOUTH TWICE DAILY WITH MEALS  180 tablet 0    methocarbamoL (ROBAXIN) 500 MG Tab 1 po bid prn muscle spasm  30 tablet 1    metoprolol tartrate (LOPRESSOR) 25 MG tablet TAKE 1 TABLET BY MOUTH ONCE DAILY AS NEEDED FOR  PALPITATIONS  90 tablet 1    multivitamin (THERAGRAN) per tablet Take 1 tablet by mouth once daily. 9/21/2021: Hold 1 week prior to surgery       nitroGLYCERIN (NITROSTAT) 0.4 MG SL tablet Place 1 tablet (0.4 mg total) under the tongue every 5 (five) minutes as needed for Chest pain. 3/13/2023: PRN 25 tablet 3    XARELTO 20 mg Tab TAKE 1 TABLET BY MOUTH ONCE DAILY IN THE EVENING  90 tablet 3    albuterol (PROVENTIL) 2.5 mg /3 mL (0.083 %) nebulizer solution Inhale one vial (3 ml's) (2.5 mg total) by nebulization every 6 (six) hours.  90 mL 4    [DISCONTINUED] albuterol sulfate 2.5 mg/0.5 mL Nebu Take 2.5 mg by nebulization every 6 (six) hours. Rescue       [DISCONTINUED] atorvastatin (LIPITOR) 80 MG tablet Take 1 tablet (80 mg total) by mouth every evening.  90 tablet 3    [DISCONTINUED] metFORMIN (GLUCOPHAGE) 500 MG tablet Take 1 tablet (500 mg total) by mouth 2 (two) times daily with meals.  180 tablet 0    [DISCONTINUED] metoprolol tartrate (LOPRESSOR) 25 MG tablet Take 1 tablet (25 mg total) by mouth daily as needed (Palpitations).  90 tablet 0     Facility-Administered  "Encounter Medications as of 4/25/2023   Medication Dose Route Frequency Provider Last Rate Last Admin    0.9%  NaCl infusion   Intravenous Continuous Rebecca Dickinson NP   Stopped at 12/02/20 1636    sodium chloride 0.9% bolus 1,000 mL  1,000 mL Intravenous Once Winnie Larkin NP        sodium chloride 0.9% flush 10 mL  10 mL Intravenous PRN T. Javier Jaramillo MD        sodium chloride 0.9% flush 5 mL  5 mL Intravenous PRN Rebecca Dickinson NP         Review for Opioid Screening: Pt does not have Rx for Opioids    Review for Substance Use Disorders: Patient does not use substance       Allergies:  Review of patient's allergies indicates:   Allergen Reactions    Penicillins Anaphylaxis     Can take keflex    Hydrocodone      Causes constipation    Amoxicillin Swelling and Rash       Health Maintenance:  Health Maintenance   Topic Date Due    Eye Exam  04/13/2022    Hemoglobin A1c  12/13/2022    Lipid Panel  06/13/2023    TETANUS VACCINE  10/15/2023     Health Maintenance Topics with due status: Not Due       Topic Last Completion Date    TETANUS VACCINE 10/15/2013    Lipid Panel 06/13/2022    Shingles Vaccine 04/25/2023       Physical Exam      Vital Signs  Pulse: 71  SpO2: 97 %  BP: 116/62  BP Location: Right arm  Patient Position: Sitting  Pain Score: 0-No pain  Height and Weight  Height: 6' 4" (193 cm)  Weight: 121.3 kg (267 lb 6.7 oz)  BSA (Calculated - sq m): 2.55 sq meters  BMI (Calculated): 32.6  Weight in (lb) to have BMI = 25: 205]    Physical Exam  Constitutional:       Appearance: Normal appearance. He is obese.   HENT:      Head: Normocephalic and atraumatic.      Nose: Nose normal.      Mouth/Throat:      Mouth: Mucous membranes are moist.      Pharynx: Oropharynx is clear.   Eyes:      Extraocular Movements: Extraocular movements intact.      Conjunctiva/sclera: Conjunctivae normal.      Pupils: Pupils are equal, round, and reactive to light.   Cardiovascular:      Rate and Rhythm: Normal " rate and regular rhythm.      Pulses: Normal pulses.      Heart sounds: Normal heart sounds.   Pulmonary:      Effort: Pulmonary effort is normal.      Breath sounds: Normal breath sounds.   Abdominal:      General: Abdomen is flat. Bowel sounds are normal.      Palpations: Abdomen is soft.   Musculoskeletal:         General: Normal range of motion.      Cervical back: Normal range of motion and neck supple.      Right lower leg: Edema present.      Left lower leg: Edema present.      Comments: + chronic swelling to lower extremities.   Skin:     General: Skin is warm and dry.      Capillary Refill: Capillary refill takes less than 2 seconds.   Neurological:      General: No focal deficit present.      Mental Status: He is alert and oriented to person, place, and time. Mental status is at baseline.   Psychiatric:         Mood and Affect: Mood normal.         Behavior: Behavior normal.         Thought Content: Thought content normal.         Judgment: Judgment normal.          Assessment/Plan     Nick De Anda is a 82 y.o.male with:    Essential hypertension  -     Ambulatory referral/consult to Ophthalmology; Future; Expected date: 05/02/2023    Carcinoma larynx    Abnormality of gait and mobility    Other reduced mobility    Encounter for preventive health examination    Type 2 diabetes mellitus without complication, without long-term current use of insulin    Overweight with body mass index (BMI) of 29 to 29.9 in adult    Bronchiectasis without complication    Chronic pain syndrome    Diabetic polyneuropathy associated with type 2 diabetes mellitus    Peripheral edema    Other orders  -     albuterol (PROVENTIL) 2.5 mg /3 mL (0.083 %) nebulizer solution; Inhale one vial (3 ml's) (2.5 mg total) by nebulization every 6 (six) hours.  Dispense: 90 mL; Refill: 4    I discussed diet of low calorie, low fat, low cholesterol, low carbohydrate, no fast foods diet. I have advised walking for 30 minutes for 2 days a  week for 2 weeks and increasing a 30 minute day every 2 weeks. Patient verbalizes understanding.   As above, continue current medications and maintain follow up with specialists.  Return to clinic as needed.    Greater than 50% of this time was spent face to face with patient.  All questions were answered to patient's satisfaction.           Elma Gonzalez, NP-C  [facility]                  I offered to discuss advanced care planning, including how to pick a person who would make decisions for you if you were unable to make them for yourself, called a health care power of , and what kind of decisions you might make such as use of life sustaining treatments such as ventilators and tube feeding when faced with a life limiting illness recorded on a living will that they will need to know. (How you want to be cared for as you near the end of your natural life)     X Patient is interested in learning more about how to make advanced directives.  I provided them paperwork and offered to discuss this with them.  I offered to discuss advanced care planning, including how to pick a person who would make decisions for you if you were unable to make them for yourself, called a health care power of , and what kind of decisions you might make such as use of life sustaining treatments such as ventilators and tube feeding when faced with a life limiting illness recorded on a living will that they will need to know. (How you want to be cared for as you near the end of your natural life)     X Patient is interested in learning more about how to make advanced directives.  I provided them paperwork and offered to discuss this with them.

## 2023-04-25 ENCOUNTER — TELEPHONE (OUTPATIENT)
Dept: OPTOMETRY | Facility: CLINIC | Age: 83
End: 2023-04-25
Payer: MEDICARE

## 2023-04-25 ENCOUNTER — OFFICE VISIT (OUTPATIENT)
Dept: PRIMARY CARE CLINIC | Facility: CLINIC | Age: 83
End: 2023-04-25
Payer: MEDICARE

## 2023-04-25 VITALS
SYSTOLIC BLOOD PRESSURE: 116 MMHG | BODY MASS INDEX: 32.57 KG/M2 | OXYGEN SATURATION: 97 % | WEIGHT: 267.44 LBS | HEART RATE: 71 BPM | HEIGHT: 76 IN | DIASTOLIC BLOOD PRESSURE: 62 MMHG

## 2023-04-25 DIAGNOSIS — G89.4 CHRONIC PAIN SYNDROME: ICD-10-CM

## 2023-04-25 DIAGNOSIS — Z74.09 OTHER REDUCED MOBILITY: ICD-10-CM

## 2023-04-25 DIAGNOSIS — R60.0 PERIPHERAL EDEMA: ICD-10-CM

## 2023-04-25 DIAGNOSIS — C32.9 CARCINOMA LARYNX: ICD-10-CM

## 2023-04-25 DIAGNOSIS — R26.9 ABNORMALITY OF GAIT AND MOBILITY: ICD-10-CM

## 2023-04-25 DIAGNOSIS — E11.9 TYPE 2 DIABETES MELLITUS WITHOUT COMPLICATION, WITHOUT LONG-TERM CURRENT USE OF INSULIN: ICD-10-CM

## 2023-04-25 DIAGNOSIS — E66.3 OVERWEIGHT WITH BODY MASS INDEX (BMI) OF 29 TO 29.9 IN ADULT: ICD-10-CM

## 2023-04-25 DIAGNOSIS — J47.9 BRONCHIECTASIS WITHOUT COMPLICATION: ICD-10-CM

## 2023-04-25 DIAGNOSIS — I10 ESSENTIAL HYPERTENSION: Primary | ICD-10-CM

## 2023-04-25 DIAGNOSIS — E11.42 DIABETIC POLYNEUROPATHY ASSOCIATED WITH TYPE 2 DIABETES MELLITUS: ICD-10-CM

## 2023-04-25 DIAGNOSIS — Z00.00 ENCOUNTER FOR PREVENTIVE HEALTH EXAMINATION: ICD-10-CM

## 2023-04-25 PROBLEM — Z87.440 HISTORY OF UTI: Status: RESOLVED | Noted: 2019-02-02 | Resolved: 2023-04-25

## 2023-04-25 PROBLEM — E87.1 HYPONATREMIA: Status: RESOLVED | Noted: 2019-02-02 | Resolved: 2023-04-25

## 2023-04-25 PROBLEM — Z87.440 HISTORY OF UTI: Status: ACTIVE | Noted: 2019-02-02

## 2023-04-25 PROBLEM — Z95.1 HX OF CABG: Status: RESOLVED | Noted: 2019-01-13 | Resolved: 2023-04-25

## 2023-04-25 PROBLEM — R05.3 COUGH, PERSISTENT: Status: RESOLVED | Noted: 2020-03-30 | Resolved: 2023-04-25

## 2023-04-25 PROBLEM — Z98.61 POST PTCA: Status: RESOLVED | Noted: 2019-02-13 | Resolved: 2023-04-25

## 2023-04-25 PROBLEM — D38.0 NEOPLASM OF UNCERTAIN BEHAVIOR OF LARYNX: Status: RESOLVED | Noted: 2021-09-28 | Resolved: 2023-04-25

## 2023-04-25 PROBLEM — E78.5 DYSLIPIDEMIA: Status: RESOLVED | Noted: 2019-02-13 | Resolved: 2023-04-25

## 2023-04-25 PROCEDURE — 1170F FXNL STATUS ASSESSED: CPT | Mod: CPTII,S$GLB,, | Performed by: NURSE PRACTITIONER

## 2023-04-25 PROCEDURE — G0439 PPPS, SUBSEQ VISIT: HCPCS | Mod: S$GLB,,, | Performed by: NURSE PRACTITIONER

## 2023-04-25 PROCEDURE — 1126F PR PAIN SEVERITY QUANTIFIED, NO PAIN PRESENT: ICD-10-PCS | Mod: CPTII,S$GLB,, | Performed by: NURSE PRACTITIONER

## 2023-04-25 PROCEDURE — 1160F RVW MEDS BY RX/DR IN RCRD: CPT | Mod: CPTII,S$GLB,, | Performed by: NURSE PRACTITIONER

## 2023-04-25 PROCEDURE — 3074F PR MOST RECENT SYSTOLIC BLOOD PRESSURE < 130 MM HG: ICD-10-PCS | Mod: CPTII,S$GLB,, | Performed by: NURSE PRACTITIONER

## 2023-04-25 PROCEDURE — 1159F PR MEDICATION LIST DOCUMENTED IN MEDICAL RECORD: ICD-10-PCS | Mod: CPTII,S$GLB,, | Performed by: NURSE PRACTITIONER

## 2023-04-25 PROCEDURE — 3078F PR MOST RECENT DIASTOLIC BLOOD PRESSURE < 80 MM HG: ICD-10-PCS | Mod: CPTII,S$GLB,, | Performed by: NURSE PRACTITIONER

## 2023-04-25 PROCEDURE — 1160F PR REVIEW ALL MEDS BY PRESCRIBER/CLIN PHARMACIST DOCUMENTED: ICD-10-PCS | Mod: CPTII,S$GLB,, | Performed by: NURSE PRACTITIONER

## 2023-04-25 PROCEDURE — 3288F PR FALLS RISK ASSESSMENT DOCUMENTED: ICD-10-PCS | Mod: CPTII,S$GLB,, | Performed by: NURSE PRACTITIONER

## 2023-04-25 PROCEDURE — 1126F AMNT PAIN NOTED NONE PRSNT: CPT | Mod: CPTII,S$GLB,, | Performed by: NURSE PRACTITIONER

## 2023-04-25 PROCEDURE — 99999 PR PBB SHADOW E&M-EST. PATIENT-LVL V: CPT | Mod: PBBFAC,,, | Performed by: NURSE PRACTITIONER

## 2023-04-25 PROCEDURE — 1101F PR PT FALLS ASSESS DOC 0-1 FALLS W/OUT INJ PAST YR: ICD-10-PCS | Mod: CPTII,S$GLB,, | Performed by: NURSE PRACTITIONER

## 2023-04-25 PROCEDURE — 3288F FALL RISK ASSESSMENT DOCD: CPT | Mod: CPTII,S$GLB,, | Performed by: NURSE PRACTITIONER

## 2023-04-25 PROCEDURE — G0439 PR MEDICARE ANNUAL WELLNESS SUBSEQUENT VISIT: ICD-10-PCS | Mod: S$GLB,,, | Performed by: NURSE PRACTITIONER

## 2023-04-25 PROCEDURE — 3074F SYST BP LT 130 MM HG: CPT | Mod: CPTII,S$GLB,, | Performed by: NURSE PRACTITIONER

## 2023-04-25 PROCEDURE — 1170F PR FUNCTIONAL STATUS ASSESSED: ICD-10-PCS | Mod: CPTII,S$GLB,, | Performed by: NURSE PRACTITIONER

## 2023-04-25 PROCEDURE — 3078F DIAST BP <80 MM HG: CPT | Mod: CPTII,S$GLB,, | Performed by: NURSE PRACTITIONER

## 2023-04-25 PROCEDURE — 1159F MED LIST DOCD IN RCRD: CPT | Mod: CPTII,S$GLB,, | Performed by: NURSE PRACTITIONER

## 2023-04-25 PROCEDURE — 99999 PR PBB SHADOW E&M-EST. PATIENT-LVL V: ICD-10-PCS | Mod: PBBFAC,,, | Performed by: NURSE PRACTITIONER

## 2023-04-25 PROCEDURE — 1101F PT FALLS ASSESS-DOCD LE1/YR: CPT | Mod: CPTII,S$GLB,, | Performed by: NURSE PRACTITIONER

## 2023-04-25 RX ORDER — ALBUTEROL SULFATE 0.83 MG/ML
2.5 SOLUTION RESPIRATORY (INHALATION) EVERY 6 HOURS
Qty: 90 ML | Refills: 4 | Status: SHIPPED | OUTPATIENT
Start: 2023-04-25 | End: 2023-07-27

## 2023-04-25 NOTE — PATIENT INSTRUCTIONS
Counseling and Referral of Other Preventative  (Italic type indicates deductible and co-insurance are waived)    Patient Name: Nick De Adna  Today's Date: 4/25/2023    Health Maintenance       Date Due Completion Date    Eye Exam 04/13/2022 4/13/2021    Override on 6/9/2020: Done ([facility] [place] location)    Override on 3/18/2019: Done    Hemoglobin A1c 12/13/2022 6/13/2022    Diabetes Urine Screening 03/08/2023 3/8/2022    Shingles Vaccine (1 of 2) 04/25/2023 (Originally 9/29/1959) ---    COVID-19 Vaccine (3 - Booster for Moderna series) 04/25/2024 (Originally 4/9/2021) 2/12/2021    Lipid Panel 06/13/2023 6/13/2022    TETANUS VACCINE 10/15/2023 10/15/2013        Orders Placed This Encounter   Procedures    Ambulatory referral/consult to Ophthalmology       The following information is provided to all patients.  This information is to help you find resources for any of the problems found today that may be affecting your health:                Living healthy guide: www.[facility].[place].[facility]      Understanding Diabetes: www.diabetes.org      Eating healthy: www.cdc.gov/healthyweight      CDC home safety checklist: www.cdc.gov/steadi/patient.html      Agency on Aging: www.goea.[place].[facility]      Alcoholics anonymous (AA): www.aa.org      Physical Activity: www.cedric.nih.gov/mu9kvmn      Tobacco use: www.quitwithusla.org     Counseling and Referral of Other Preventative  (Italic type indicates deductible and co-insurance are waived)    Patient Name: Nick De Anda  Today's Date: 4/25/2023    Health Maintenance       Date Due Completion Date    Eye Exam 04/13/2022 4/13/2021    Override on 6/9/2020: Done ([facility] location)    Override on 3/18/2019: Done    Hemoglobin A1c 12/13/2022 6/13/2022    Diabetes Urine Screening 03/08/2023 3/8/2022    Shingles Vaccine (1 of 2) 04/25/2023 (Originally 9/29/1959) ---    COVID-19 Vaccine (3 - Booster for Moderna series) 04/25/2024 (Originally 4/9/2021) 2/12/2021    Lipid Panel  06/13/2023 6/13/2022    TETANUS VACCINE 10/15/2023 10/15/2013        Orders Placed This Encounter   Procedures    Ambulatory referral/consult to Ophthalmology       The following information is provided to all patients.  This information is to help you find resources for any of the problems found today that may be affecting your health:                Living healthy guide: www.[facility].[place].[facility]      Understanding Diabetes: www.diabetes.org      Eating healthy: www.cdc.gov/healthyweight      CDC home safety checklist: www.[facility].gov/steadi/patient.html      Agency on Aging: www.[place]      Alcoholics anonymous (AA): www.aa.org      Physical Activity: www.cedric.nih.gov/kx7qong      Tobacco use: www.quitSampausla.org     Counseling and Referral of Other Preventative  (Italic type indicates deductible and co-insurance are waived)    Patient Name: Nick De Anda  Today's Date: 4/25/2023    Health Maintenance       Date Due Completion Date    Eye Exam 04/13/2022 4/13/2021    Override on 6/9/2020: Done ([facility] [place] location)    Override on 3/18/2019: Done    Hemoglobin A1c 12/13/2022 6/13/2022    Diabetes Urine Screening 03/08/2023 3/8/2022    Shingles Vaccine (1 of 2) 04/25/2023 (Originally 9/29/1959) ---    COVID-19 Vaccine (3 - Booster for Moderna series) 04/25/2024 (Originally 4/9/2021) 2/12/2021    Lipid Panel 06/13/2023 6/13/2022    TETANUS VACCINE 10/15/2023 10/15/2013        Orders Placed This Encounter   Procedures    Ambulatory referral/consult to Ophthalmology       The following information is provided to all patients.  This information is to help you find resources for any of the problems found today that may be affecting your health:                Living healthy guide: www.[facility].[place]      Understanding Diabetes: www.diabetes.org      Eating healthy: www.cdc.gov/healthyweight      [facility] home safety checklist: www.[facility].gov/steadi/patient.html      Agency on Aging: www.[place]       Alcoholics anonymous (AA): www.aa.org      Physical Activity: www.cedric.nih.gov/ic5zikr      Tobacco use: www.quitwithusla.org

## 2023-04-25 NOTE — TELEPHONE ENCOUNTER
----- Message from Wendy Cohen sent at 4/25/2023 12:33 PM CDT -----  Please selam with Dr. Aldridge or Dr. Ricardo  ----- Message -----  From: Estephania Oconnell  Sent: 4/25/2023  10:26 AM CDT  To: [facility] Clinical Support Staff    NP Elma Gonzalez has put in a referral for Consult to Ophthalmology. Please assist in scheduling.    Essential hypertension [I10]    Thanks

## 2023-05-02 NOTE — PROGRESS NOTES
Mr. De Anda is a patient of Dr. Paez and was last seen in clinic 11/8/2022.      Subjective:   Patient ID:  Nick De Anda is an 82 y.o. male who presents for follow up of Atrial Fibrillation  .     HPI:    Mr. De Anda is an 82 y.o. male with CAD (CABG), DM, HTN, pAF (PVI 10/2020, 12/2021), AFL (RFA CTI 10/2020) here for follow up.    Background:    Primary Cardiologist: Robert Alas MD  Primary Care Physician: Santiago Adler MD    Mr. De Anda has a hx of coronary artery disease s/p CABG with preserved LVEF, type 2 diabetes, hypertension and recently discovered paroxysmal atrial fibrillation and typical atrial flutter. He recently transferred care from [facility] where his AF was diagnosed and he was initiated on xarelto. He was recently admitted for a symptomatic UTI and observed to have a paroxysm of atrial fibrillation with ventricular rates in the 40s-60s (slower rates appeared to correlate during overnight hours). He monitors his vitals at home and for a while notes it indicates he has an irregular heart beat however he feels normal. He has established care with Dr. Dean in cardiology on 2/13/2019 and was noted to be in atrial fibrillation. He has no obvious symptoms. I reviewed all available electrocardiograms in Epic which show sinus rhythm on 2/5/2014 and 2/2/2019. An ECG dated 2/3/2019 notes rate controlled atrial fibrillation. When I saw him in clinic 2/2019 he was in sinus rhythm. At that visit since he had no symptoms with AF and was rate controlled when in AF I recommended no aggressive rhythm control strategy and would continue his current management. He was to see me prn.    Mr. De Anda returned for discussion of his AF 6/2019. He reported he believed AF was causing fatigue. Notes episodes every week or so that correlate to being fatigue. He was admitted for DCCV and sotalol initiation. He converted when propofol was administered and the DEANGELO/DCCV was cancelled. He saw Faina Espinosa in  EP clinic 8/1/2019 and was in sinus rhythm. He felt better in sinus rhythm.    Mr. eD Anda was seen in clinic 10/2019 and noted paroxysms of AF since starting sotalol. One episode lasted 2 days and another 3 days. He noted his pulse was irregular in this setting. He came in for an ECG which noted sinus rhythm however he felt he had already converted. We discussed increasing dosage to 120mg bid however he declined.    Mr. De Anda presented for routine AF follow-up 7/2020. He reported he continues to have some paroxysms of AF, at times last 1/2 day- 1 day. We discussed PVI, he desired to think about it.    Mr. De Anda returned for follow-up 10/2020. His AF burden had significantly increased and he felt poorly. He noted he was still paroxysmal. He desired ablation.    Mr. De Anda underwent RF-PVI and RFA of the CTI in December of 2020.     3/2021: Saw Faina Espinosa. Reported no arrhythmias. Repeat ECHO noted LVEF of 60%. Sotalol was reduced to 40mg bid.    10/2021: Saw Faina Espinosa. He was in sinus rhythm at that time however ECG from 9/2021 prior to a surgery noted atrial flutter (atypical). He wore a 14 day holter that noted a 6.4% burden of atrial flutter. There were R-R pauses up to 6.2 seconds during flutter while sleeping. He was instructed to stop sotalol.    11/16/2021: He now has recurrent atypical atrial flutter despite sotalol with periods of bradycardia while asleep. This arrhythmia has become symptomatic. He feels significantly better and is in sinus rhythm. He is having symptomatic breakthrough on sotalol. His [id number] score is 5 and long-term anticoagulation is recommended.    12/8/2021:  Successful pulmonary vein RF ablation (reisolation of the LIPV and RSPV)   Successful left atrial posterior wall isolation targeting CFEs   Successful anterior mitral isthmus ablation with bidirectional block for atypical atrial flutter that was observed as an outpatient but unable to be induced   Unsuccessful  difficult lateral mitral isthmus ablation (unable to get bidirectional block and thus created the anterior mitral isthmus ablation lesion set)  Complex modifier requested for extensive ablation and considerable time spent trying to obtain lateral mitral isthmus block.  Discharged on sotalol 40mg BID    2/7/2022: He is 3 mo s/p repeat ablation (reisolation of LIPV and RSPV, PWI, successful RFA of anterior mitral isthmus).   Doing well from a rhythm standpoint, with minimal palpitations on sotalol 40mg BID. EKG with stable intervals. [facility] 5 on xarelto.    5/6/2022: He is now 5 months s/p redo ablation. He is doing well from a rhythm standpoint, with no documented or symptomatic recurrence of arrhythmia since procedure.  He weaned himself off sotalol about 2 months ago and feels wonderful. Very active and exercises regularly. [facility] 5 on xarelto.    11/8/2022: He is 11 months s/p repeat ablation. He says he is feeling well. One episode of irreg HB after drinking a lot of coffee. He took a leftover sotalol and it stopped. Will provide with PRN lopressor instead. He will notify clinic if episodes become more frequent. [facility] 5 on xarelto.     Update (05/09/2023):    2/2022 PNA    Today he says he still has SOB since PNA. Improves with albuterol. He is following with pulmonology.  Did see cardiology who ruled out likely cardiac etiology of SOB. Having trouble with sinuses and PND.   Some palps from time to time for which he takes PRN lopressor. No sustained episodes.   Mild leg edema.  Back on GoLo diet and losing weight.   No CP, LH, syncope.    He is currently taking xarelto 20mg daily for stroke prophylaxis and denies significant bleeding episodes. He is currently being treated with lopressor 25mg PRN for palpitations for HR control.  Kidney function is stable, with a creatinine of 0.8 on 6/13/2022.    I have personally reviewed the patient's EKG today, which shows sinus rhythm with 1st deg AVB at 81bpm. DE  interval is 220. QRS is 96. QTc is 455.    Relevant Cardiac Test Results:    2D Echo (3/16/2021):  The left ventricle is normal in size with mild concentric hypertrophy and normal systolic function. The estimated ejection fraction is 60%  Grade II left ventricular diastolic dysfunction.  Normal right ventricular size with normal right ventricular systolic function.  Moderate left atrial enlargement.  Normal central venous pressure (3 mmHg).    Current Outpatient Medications   Medication Sig    albuterol (PROVENTIL) 2.5 mg /3 mL (0.083 %) nebulizer solution Inhale one vial (3 ml's) (2.5 mg total) by nebulization every 6 (six) hours.    amLODIPine (NORVASC) 5 MG tablet TAKE 1 TO 2 TABLETS BY MOUTH ONCE DAILY AS DIRECTED    ammonium lactate (LAC-HYDRIN) 12 % lotion Apply topically every evening. Hands and arms and then later scalp, forehead, temples    ascorbic acid, vitamin C, (VITAMIN C) 100 MG tablet Take 500 mg by mouth once daily.    atorvastatin (LIPITOR) 80 MG tablet TAKE 1 TABLET BY MOUTH ONCE DAILY IN THE EVENING    blood sugar diagnostic Strp To check BG 2 times daily, to use with insurance preferred meter    blood-glucose meter kit To check BG 2 times daily, to use with insurance preferred meter    clopidogreL (PLAVIX) 75 mg tablet Take 1 tablet by mouth once daily    diazePAM (VALIUM) 10 MG Tab Take 1 tablet (10 mg total) by mouth as needed (anxiety). Just half to one and use very seldom-get refills from PCP    diphenhydrAMINE (BENADRYL) 25 mg capsule Take 25 mg by mouth every 6 (six) hours as needed for Allergies.    famotidine (PEPCID) 40 MG tablet Take 1 tablet (40 mg total) by mouth daily as needed for Heartburn.    hydroCHLOROthiazide (MICROZIDE) 12.5 mg capsule Take 1 capsule by mouth once daily    irbesartan (AVAPRO) 300 MG tablet TAKE 1 TABLET BY MOUTH ONCE DAILY IN THE EVENING    lancets Misc To check BG 2 times daily, to use with insurance preferred meter    metFORMIN (GLUCOPHAGE) 500 MG tablet  "TAKE 1 TABLET BY MOUTH TWICE DAILY WITH MEALS    methocarbamoL (ROBAXIN) 500 MG Tab 1 po bid prn muscle spasm    metoprolol tartrate (LOPRESSOR) 25 MG tablet TAKE 1 TABLET BY MOUTH ONCE DAILY AS NEEDED FOR  PALPITATIONS    multivitamin (THERAGRAN) per tablet Take 1 tablet by mouth once daily.    nitroGLYCERIN (NITROSTAT) 0.4 MG SL tablet Place 1 tablet (0.4 mg total) under the tongue every 5 (five) minutes as needed for Chest pain.    XARELTO 20 mg Tab TAKE 1 TABLET BY MOUTH ONCE DAILY IN THE EVENING     No current facility-administered medications for this visit.     Facility-Administered Medications Ordered in Other Visits   Medication    0.9%  NaCl infusion    sodium chloride 0.9% bolus 1,000 mL    sodium chloride 0.9% flush 10 mL    sodium chloride 0.9% flush 5 mL       Review of Systems   Constitutional: Negative for malaise/fatigue.   Cardiovascular:  Positive for dyspnea on exertion and palpitations. Negative for chest pain, irregular heartbeat and leg swelling.   Respiratory:  Negative for shortness of breath.    Hematologic/Lymphatic: Negative for bleeding problem.   Skin:  Negative for rash.   Musculoskeletal:  Negative for myalgias.   Gastrointestinal:  Negative for hematemesis, hematochezia and nausea.   Genitourinary:  Negative for hematuria.   Neurological:  Negative for light-headedness.   Psychiatric/Behavioral:  Negative for altered mental status.    Allergic/Immunologic: Negative for persistent infections.     Objective:          /60   Pulse 81   Ht 6' 4" (1.93 m)   Wt 119.8 kg (264 lb 1.8 oz)   BMI 32.15 kg/m²     Physical Exam  Vitals and nursing note reviewed.   Constitutional:       Appearance: Normal appearance. He is well-developed.   HENT:      Head: Normocephalic.      Nose: Nose normal.   Eyes:      Pupils: Pupils are equal, round, and reactive to light.   Cardiovascular:      Rate and Rhythm: Normal rate and regular rhythm.   Pulmonary:      Effort: No respiratory distress.      " Breath sounds: Normal breath sounds.   Musculoskeletal:         General: Normal range of motion.   Skin:     General: Skin is warm and dry.      Findings: No erythema.   Neurological:      Mental Status: He is alert and oriented to person, place, and time.   Psychiatric:         Speech: Speech normal.         Behavior: Behavior normal.       Lab Results   Component Value Date     06/13/2022    K 4.2 06/13/2022    BUN 16 06/13/2022    CREATININE 0.8 06/13/2022    ALT 19 06/13/2022    AST 19 06/13/2022    HGB 13.7 (L) 12/01/2021    HCT 37 12/08/2021    TSH 2.847 06/13/2022    LDLCALC 56.4 (L) 06/13/2022       Recent Labs   Lab 11/25/20  0951 12/01/21  1249   INR 1.1 1.1       Assessment:     1. Paroxysmal atrial fibrillation    2. Typical atrial flutter    3. Essential hypertension    4. Long term (current) use of anticoagulants      Plan:     In summary, Mr. De Anda is an 82 y.o. male with CAD (CABG), DM, HTN, pAF (PVI 10/2020, 12/2021), AFL (RFA CTI 10/2020) here for follow up.  Stable from rhythm standpoint. Only brief palps which resolve with PRN metoprolol. [facility] on xarelto.   He has SOB since PNA 2/2023. Following with pulmonology and ENT.    Continue current regimen  RTC 6 mo, sooner if needed    *A copy of this note has been sent to Dr. Paez*    Follow up in about 6 months (around 11/9/2023).    ------------------------------------------------------------------    WILLIAM Bach, NP-C  Cardiac Electrophysiology

## 2023-05-04 ENCOUNTER — TELEPHONE (OUTPATIENT)
Dept: OPHTHALMOLOGY | Facility: CLINIC | Age: 83
End: 2023-05-04
Payer: MEDICARE

## 2023-05-04 NOTE — TELEPHONE ENCOUNTER
Appointment booked   Problem: Pain  Goal: #Acceptable pain level achieved/maintained at rest using NRS/Faces  This goal is used for patients who can self-report.  Acceptable means the level is at or below the identified comfort/function goal.  Outcome: Outcome Not Met, Continue to Monitor  Page out to Shanda Lomas NP.

## 2023-05-04 NOTE — TELEPHONE ENCOUNTER
----- Message from Shannan Hopson sent at 5/4/2023  2:25 PM CDT -----  Regarding: Sooner Appt  Contact: Pt  Pt is requesting a callback in regards to scheduling a diabetic Exam. Pt have a referral in Epic and would like to be seen sooner than August if possible. Please adv pt      Confirmed contact below:   Contact Name:Nick Helmyner  Phone Number: [phone]

## 2023-05-09 ENCOUNTER — OFFICE VISIT (OUTPATIENT)
Dept: ELECTROPHYSIOLOGY | Facility: CLINIC | Age: 83
End: 2023-05-09
Payer: MEDICARE

## 2023-05-09 ENCOUNTER — HOSPITAL ENCOUNTER (OUTPATIENT)
Dept: CARDIOLOGY | Facility: CLINIC | Age: 83
Discharge: HOME | End: 2023-05-09
Payer: MEDICARE

## 2023-05-09 VITALS
DIASTOLIC BLOOD PRESSURE: 60 MMHG | WEIGHT: 264.13 LBS | HEIGHT: 76 IN | BODY MASS INDEX: 32.16 KG/M2 | SYSTOLIC BLOOD PRESSURE: 125 MMHG | HEART RATE: 81 BPM

## 2023-05-09 DIAGNOSIS — Z79.01 LONG TERM (CURRENT) USE OF ANTICOAGULANTS: ICD-10-CM

## 2023-05-09 DIAGNOSIS — I48.0 PAROXYSMAL ATRIAL FIBRILLATION: Primary | ICD-10-CM

## 2023-05-09 DIAGNOSIS — I49.8 OTHER SPECIFIED CARDIAC ARRHYTHMIAS: ICD-10-CM

## 2023-05-09 DIAGNOSIS — I48.3 TYPICAL ATRIAL FLUTTER: ICD-10-CM

## 2023-05-09 DIAGNOSIS — I10 ESSENTIAL HYPERTENSION: ICD-10-CM

## 2023-05-09 PROCEDURE — 93010 RHYTHM STRIP: ICD-10-PCS | Mod: S$GLB,,, | Performed by: INTERNAL MEDICINE

## 2023-05-09 PROCEDURE — 99214 PR OFFICE/OUTPT VISIT, EST, LEVL IV, 30-39 MIN: ICD-10-PCS | Mod: S$GLB,,, | Performed by: NURSE PRACTITIONER

## 2023-05-09 PROCEDURE — 3078F DIAST BP <80 MM HG: CPT | Mod: CPTII,S$GLB,, | Performed by: NURSE PRACTITIONER

## 2023-05-09 PROCEDURE — 3078F PR MOST RECENT DIASTOLIC BLOOD PRESSURE < 80 MM HG: ICD-10-PCS | Mod: CPTII,S$GLB,, | Performed by: NURSE PRACTITIONER

## 2023-05-09 PROCEDURE — 1101F PR PT FALLS ASSESS DOC 0-1 FALLS W/OUT INJ PAST YR: ICD-10-PCS | Mod: CPTII,S$GLB,, | Performed by: NURSE PRACTITIONER

## 2023-05-09 PROCEDURE — 1160F RVW MEDS BY RX/DR IN RCRD: CPT | Mod: CPTII,S$GLB,, | Performed by: NURSE PRACTITIONER

## 2023-05-09 PROCEDURE — 1160F PR REVIEW ALL MEDS BY PRESCRIBER/CLIN PHARMACIST DOCUMENTED: ICD-10-PCS | Mod: CPTII,S$GLB,, | Performed by: NURSE PRACTITIONER

## 2023-05-09 PROCEDURE — 1101F PT FALLS ASSESS-DOCD LE1/YR: CPT | Mod: CPTII,S$GLB,, | Performed by: NURSE PRACTITIONER

## 2023-05-09 PROCEDURE — 99214 OFFICE O/P EST MOD 30 MIN: CPT | Mod: S$GLB,,, | Performed by: NURSE PRACTITIONER

## 2023-05-09 PROCEDURE — 1126F PR PAIN SEVERITY QUANTIFIED, NO PAIN PRESENT: ICD-10-PCS | Mod: CPTII,S$GLB,, | Performed by: NURSE PRACTITIONER

## 2023-05-09 PROCEDURE — 3074F PR MOST RECENT SYSTOLIC BLOOD PRESSURE < 130 MM HG: ICD-10-PCS | Mod: CPTII,S$GLB,, | Performed by: NURSE PRACTITIONER

## 2023-05-09 PROCEDURE — 3074F SYST BP LT 130 MM HG: CPT | Mod: CPTII,S$GLB,, | Performed by: NURSE PRACTITIONER

## 2023-05-09 PROCEDURE — 3288F PR FALLS RISK ASSESSMENT DOCUMENTED: ICD-10-PCS | Mod: CPTII,S$GLB,, | Performed by: NURSE PRACTITIONER

## 2023-05-09 PROCEDURE — 1159F MED LIST DOCD IN RCRD: CPT | Mod: CPTII,S$GLB,, | Performed by: NURSE PRACTITIONER

## 2023-05-09 PROCEDURE — 93010 ELECTROCARDIOGRAM REPORT: CPT | Mod: S$GLB,,, | Performed by: INTERNAL MEDICINE

## 2023-05-09 PROCEDURE — 99999 PR PBB SHADOW E&M-EST. PATIENT-LVL IV: ICD-10-PCS | Mod: PBBFAC,,, | Performed by: NURSE PRACTITIONER

## 2023-05-09 PROCEDURE — 93005 RHYTHM STRIP: ICD-10-PCS | Mod: S$GLB,,, | Performed by: INTERNAL MEDICINE

## 2023-05-09 PROCEDURE — 3288F FALL RISK ASSESSMENT DOCD: CPT | Mod: CPTII,S$GLB,, | Performed by: NURSE PRACTITIONER

## 2023-05-09 PROCEDURE — 93005 ELECTROCARDIOGRAM TRACING: CPT | Mod: S$GLB,,, | Performed by: INTERNAL MEDICINE

## 2023-05-09 PROCEDURE — 99999 PR PBB SHADOW E&M-EST. PATIENT-LVL IV: CPT | Mod: PBBFAC,,, | Performed by: NURSE PRACTITIONER

## 2023-05-09 PROCEDURE — 1159F PR MEDICATION LIST DOCUMENTED IN MEDICAL RECORD: ICD-10-PCS | Mod: CPTII,S$GLB,, | Performed by: NURSE PRACTITIONER

## 2023-05-09 PROCEDURE — 1126F AMNT PAIN NOTED NONE PRSNT: CPT | Mod: CPTII,S$GLB,, | Performed by: NURSE PRACTITIONER

## 2023-05-12 ENCOUNTER — TELEPHONE (OUTPATIENT)
Dept: DERMATOLOGY | Facility: CLINIC | Age: 83
End: 2023-05-12
Payer: MEDICARE

## 2023-05-12 NOTE — TELEPHONE ENCOUNTER
Scheduled pt for 05/18/23 @930am. Pt was pleased with the appt.      Lori       ----- Message from Narcisa Vee sent at 5/12/2023 10:22 AM CDT -----  Regarding: Follow Up  Contact: Pt @ [phone]  Pt is calling to get a follow up appt. Asking for a call back

## 2023-05-18 ENCOUNTER — OFFICE VISIT (OUTPATIENT)
Dept: DERMATOLOGY | Facility: CLINIC | Age: 83
End: 2023-05-18
Payer: MEDICARE

## 2023-05-18 DIAGNOSIS — L85.3 XEROSIS CUTIS: ICD-10-CM

## 2023-05-18 DIAGNOSIS — L57.0 ACTINIC KERATOSIS: Primary | ICD-10-CM

## 2023-05-18 DIAGNOSIS — L57.0 ACTINIC KERATOSES: ICD-10-CM

## 2023-05-18 DIAGNOSIS — Z12.83 SKIN EXAM, SCREENING FOR CANCER: ICD-10-CM

## 2023-05-18 DIAGNOSIS — L57.8 SUN-DAMAGED SKIN: ICD-10-CM

## 2023-05-18 DIAGNOSIS — L30.4 INTERTRIGO: ICD-10-CM

## 2023-05-18 PROCEDURE — 99214 OFFICE O/P EST MOD 30 MIN: CPT | Mod: 25,,, | Performed by: DERMATOLOGY

## 2023-05-18 PROCEDURE — 17003 DESTRUCT PREMALG LES 2-14: CPT | Mod: ,,, | Performed by: DERMATOLOGY

## 2023-05-18 PROCEDURE — 99214 PR OFFICE/OUTPT VISIT, EST, LEVL IV, 30-39 MIN: ICD-10-PCS | Mod: 25,,, | Performed by: DERMATOLOGY

## 2023-05-18 PROCEDURE — 17003 DESTRUCTION, PREMALIGNANT LESIONS; SECOND THROUGH 14 LESIONS: ICD-10-PCS | Mod: ,,, | Performed by: DERMATOLOGY

## 2023-05-18 PROCEDURE — 1159F PR MEDICATION LIST DOCUMENTED IN MEDICAL RECORD: ICD-10-PCS | Mod: CPTII,,, | Performed by: DERMATOLOGY

## 2023-05-18 PROCEDURE — 1160F RVW MEDS BY RX/DR IN RCRD: CPT | Mod: CPTII,,, | Performed by: DERMATOLOGY

## 2023-05-18 PROCEDURE — 99999 PR PBB SHADOW E&M-EST. PATIENT-LVL II: ICD-10-PCS | Mod: PBBFAC,,, | Performed by: DERMATOLOGY

## 2023-05-18 PROCEDURE — 17000 DESTRUCT PREMALG LESION: CPT | Mod: ,,, | Performed by: DERMATOLOGY

## 2023-05-18 PROCEDURE — 1159F MED LIST DOCD IN RCRD: CPT | Mod: CPTII,,, | Performed by: DERMATOLOGY

## 2023-05-18 PROCEDURE — 99999 PR PBB SHADOW E&M-EST. PATIENT-LVL II: CPT | Mod: PBBFAC,,, | Performed by: DERMATOLOGY

## 2023-05-18 PROCEDURE — 17000 PR DESTRUCTION(LASER SURGERY,CRYOSURGERY,CHEMOSURGERY),PREMALIGNANT LESIONS,FIRST LESION: ICD-10-PCS | Mod: ,,, | Performed by: DERMATOLOGY

## 2023-05-18 PROCEDURE — 1160F PR REVIEW ALL MEDS BY PRESCRIBER/CLIN PHARMACIST DOCUMENTED: ICD-10-PCS | Mod: CPTII,,, | Performed by: DERMATOLOGY

## 2023-05-18 RX ORDER — NYSTATIN 100000 U/G
CREAM TOPICAL 2 TIMES DAILY
Qty: 60 G | Refills: 6 | Status: SHIPPED | OUTPATIENT
Start: 2023-05-18

## 2023-05-18 RX ORDER — AMMONIUM LACTATE 12 G/100G
LOTION TOPICAL NIGHTLY
Qty: 225 G | Refills: 4 | Status: SHIPPED | OUTPATIENT
Start: 2023-05-18

## 2023-05-18 RX ORDER — NYSTATIN 100000 [USP'U]/G
POWDER TOPICAL 3 TIMES DAILY
Qty: 60 G | Refills: 5 | Status: SHIPPED | OUTPATIENT
Start: 2023-05-18 | End: 2023-12-04

## 2023-05-18 NOTE — PATIENT INSTRUCTIONS

## 2023-05-18 NOTE — PROGRESS NOTES
Subjective:      Patient ID:  Nick De Anda is a 82 y.o. male who presents for No chief complaint on file.    Pt fu for aks.  Doing well with softer skin with amlactin usage.      Review of Systems   Constitutional:  Negative for fever.   HENT:  Negative for sore throat.    Respiratory:  Negative for cough.      Objective:   Physical Exam   Constitutional: He appears well-developed and well-nourished.   Eyes: No conjunctival no injection.   Neurological: He is alert and oriented to person, place, and time.   Psychiatric: He has a normal mood and affect.   Skin:   Areas Examined (abnormalities noted in diagram):   Scalp / Hair Palpated and Inspected  Head / Face Inspection Performed  RUE Inspected  LUE Inspection Performed               Diagram Legend     Erythematous scaling macule/papule c/w actinic keratosis       Vascular papule c/w angioma      Pigmented verrucoid papule/plaque c/w seborrheic keratosis      Yellow umbilicated papule c/w sebaceous hyperplasia      Irregularly shaped tan macule c/w lentigo     1-2 mm smooth white papules consistent with Milia      Movable subcutaneous cyst with punctum c/w epidermal inclusion cyst      Subcutaneous movable cyst c/w pilar cyst      Firm pink to brown papule c/w dermatofibroma      Pedunculated fleshy papule(s) c/w skin tag(s)      Evenly pigmented macule c/w junctional nevus     Mildly variegated pigmented, slightly irregular-bordered macule c/w mildly atypical nevus      Flesh colored to evenly pigmented papule c/w intradermal nevus       Pink pearly papule/plaque c/w basal cell carcinoma      Erythematous hyperkeratotic cursted plaque c/w SCC      Surgical scar with no sign of skin cancer recurrence      Open and closed comedones      Inflammatory papules and pustules      Verrucoid papule consistent consistent with wart     Erythematous eczematous patches and plaques     Dystrophic onycholytic nail with subungual debris c/w onychomycosis     Umbilicated  papule    Erythematous-base heme-crusted tan verrucoid plaque consistent with inflamed seborrheic keratosis     Erythematous Silvery Scaling Plaque c/w Psoriasis     See annotation      Assessment / Plan:        Actinic keratosis  Doing much better.  Cryosurgery Procedure Note    Verbal consent from the patient is obtained including, but not limited to, risk of hypopigmentation/hyperpigmentation, scar, recurrence of lesion. The patient is aware of the precancerous quality and need for treatment of these lesions. Liquid nitrogen cryosurgery is applied to the 3 actinic keratoses, as detailed in the physical exam, to produce a freeze injury. The patient is aware that blisters may form and is instructed on wound care with gentle cleansing and use of vaseline ointment to keep moist until healed. The patient is supplied a handout on cryosurgery and is instructed to call if lesions do not completely resolve.  Cont amlactin.  Working well.  Patient instructed to start Amlactin cream or lotion nightly to AK prone areas or other specified affected areas.  Warned of skin irritation and to decrease frequency of usage if this occurs.  Proper application of medications and or care for affected area(s) and condition(s) reviewed.    Skin exam, screening for cancer  Recommended a full body skin check for moles and skin cancer screening to be done later.    Xerosis cutis  Patient instructed to start Amlactin cream or lotion nightly to AK prone areas or other specified affected areas.  Warned of skin irritation and to decrease frequency of usage if this occurs.    Sun-damaged skin  Cont amlactin.  Patient instructed to start Amlactin cream or lotion nightly to AK prone areas or other specified affected areas.  Warned of skin irritation and to decrease frequency of usage if this occurs.  ammonium lactate (LAC-HYDRIN) 12 % lotion                        Apply topically every evening. Hands and arms and then later scalp, forehead,  temples, Starting u 5/18/2023, Normal                         nystatin (MYCOSTATIN) cream                      Apply topically 2 (two) times daily. groin, Starting Thu 5/18/2023, Normal                 nystatin (MYCOSTATIN) powder                      Apply topically 3 (three) times daily. Prn groin, Starting Thu 5/18/2023, Normal       Intertrigo  Pt has ho this.  Wants refill on nystatin.  States works well for him.  Shower sooner than later after exercise, exertion, or sweating.  Can do wash cloth wipes if more convenient.  Sweat can cause irritation and may exacerbate skin conditions.  Use corn starch as a drying powder.  ammonium lactate (LAC-HYDRIN) 12 % lotion                        Apply topically every evening. Hands and arms and then later scalp, forehead, temples, Starting Thu 5/18/2023, Normal                         nystatin (MYCOSTATIN) cream                      Apply topically 2 (two) times daily. groin, Starting Thu 5/18/2023, Normal                 nystatin (MYCOSTATIN) powder                      Apply topically 3 (three) times daily. Prn groin, Starting Thu 5/18/2023, Normal              Follow up in about 9 months (around 2/18/2024) for TBSE.

## 2023-05-23 ENCOUNTER — TELEPHONE (OUTPATIENT)
Dept: PRIMARY CARE CLINIC | Facility: CLINIC | Age: 83
End: 2023-05-23
Payer: MEDICARE

## 2023-05-23 DIAGNOSIS — J47.9 BRONCHIECTASIS WITHOUT COMPLICATION: Primary | ICD-10-CM

## 2023-05-23 RX ORDER — ALBUTEROL SULFATE 90 UG/1
2 AEROSOL, METERED RESPIRATORY (INHALATION) EVERY 6 HOURS PRN
Qty: 85 G | Refills: 4 | Status: SHIPPED | OUTPATIENT
Start: 2023-05-23

## 2023-05-23 NOTE — TELEPHONE ENCOUNTER
----- Message from Renée Oconnell sent at 5/23/2023 12:29 PM CDT -----  Contact: [phone]  Requesting an RX refill or new RX.  Is this a refill or new RX: new  RX name and strength   albuterol (PROVENTIL) inhaler  Is this a 30 day or 90 day RX:   Pharmacy name and phone # :  [facility]   Phone:  [phone]  Fax:  [phone]        Patient need the inhaler and not the solution

## 2023-05-23 NOTE — TELEPHONE ENCOUNTER
Spoke with patient, informed per NP Carlos Proventil inhaler prescription sent to [facility]. Patient agreed and verbalized understanding.

## 2023-06-09 ENCOUNTER — OFFICE VISIT (OUTPATIENT)
Dept: URGENT CARE | Facility: CLINIC | Age: 83
End: 2023-06-09
Payer: MEDICARE

## 2023-06-09 VITALS
OXYGEN SATURATION: 96 % | DIASTOLIC BLOOD PRESSURE: 75 MMHG | HEIGHT: 76 IN | HEART RATE: 61 BPM | RESPIRATION RATE: 18 BRPM | TEMPERATURE: 99 F | WEIGHT: 260 LBS | BODY MASS INDEX: 31.66 KG/M2 | SYSTOLIC BLOOD PRESSURE: 134 MMHG

## 2023-06-09 DIAGNOSIS — S61.411A LACERATION OF RIGHT HAND WITHOUT FOREIGN BODY, INITIAL ENCOUNTER: Primary | ICD-10-CM

## 2023-06-09 DIAGNOSIS — Z79.01 LONG TERM (CURRENT) USE OF ANTICOAGULANTS: ICD-10-CM

## 2023-06-09 PROCEDURE — 90471 TD VACCINE GREATER THAN OR EQUAL TO 7YO WITH PRESERVATIVE IM: ICD-10-PCS | Mod: S$GLB,,, | Performed by: FAMILY MEDICINE

## 2023-06-09 PROCEDURE — 12001 RPR S/N/AX/GEN/TRNK 2.5CM/<: CPT | Mod: S$GLB,,, | Performed by: FAMILY MEDICINE

## 2023-06-09 PROCEDURE — 99213 PR OFFICE/OUTPT VISIT, EST, LEVL III, 20-29 MIN: ICD-10-PCS | Mod: 25,S$GLB,, | Performed by: FAMILY MEDICINE

## 2023-06-09 PROCEDURE — 12001 LACERATION REPAIR: ICD-10-PCS | Mod: S$GLB,,, | Performed by: FAMILY MEDICINE

## 2023-06-09 PROCEDURE — 90471 IMMUNIZATION ADMIN: CPT | Mod: S$GLB,,, | Performed by: FAMILY MEDICINE

## 2023-06-09 PROCEDURE — 90714 TD VACC NO PRESV 7 YRS+ IM: CPT | Mod: S$GLB,,, | Performed by: FAMILY MEDICINE

## 2023-06-09 PROCEDURE — 99213 OFFICE O/P EST LOW 20 MIN: CPT | Mod: 25,S$GLB,, | Performed by: FAMILY MEDICINE

## 2023-06-09 PROCEDURE — 90714 TD VACCINE GREATER THAN OR EQUAL TO 7YO WITH PRESERVATIVE IM: ICD-10-PCS | Mod: S$GLB,,, | Performed by: FAMILY MEDICINE

## 2023-06-09 NOTE — PROGRESS NOTES
"Subjective:      Patient ID: Nick De Anda is a 82 y.o. male.    Vitals:  height is 6' 4" (1.93 m) and weight is 117.9 kg (260 lb). His temperature is 98.5 °F (36.9 °C). His blood pressure is 134/75 and his pulse is 61. His respiration is 18 and oxygen saturation is 96%.     Chief Complaint: Abrasion    Pt states abrasion to right hand this am.  Pt states abrasion caused by dog leash as he [place].  Pt.'s last tetanus was 2013.    Hand Injury   His dominant hand is their right hand. The incident occurred less than 1 hour ago. The incident occurred at home. The pain is present in the right hand. The pain has been Constant since the incident. Pertinent negatives include no chest pain, muscle weakness, numbness or tingling. The symptoms are aggravated by a foreign body.     Cardiovascular:  Negative for chest pain.   Skin:  Negative for erythema.   Neurological:  Negative for numbness.    Objective:     Physical Exam   Constitutional: He is oriented to person, place, and time. He appears well-developed.   HENT:   Head: Normocephalic and atraumatic. Head is without abrasion, without contusion and without laceration.   Ears:   Right Ear: External ear normal.   Left Ear: External ear normal.   Nose: Nose normal.   Mouth/Throat: Oropharynx is clear and moist and mucous membranes are normal.   Eyes: Conjunctivae, EOM and lids are normal. Pupils are equal, round, and reactive to light.   Neck: Trachea normal and phonation normal. Neck supple.   Cardiovascular: Normal rate, regular rhythm and normal heart sounds.   Pulmonary/Chest: Effort normal and breath sounds normal. No stridor. No respiratory distress.   Musculoskeletal: Normal range of motion.         General: Normal range of motion.      Right hand: Right middle finger: Injuries: laceration (3 cm skin tear).   Neurological: He is alert and oriented to person, place, and time.   Skin: Skin is warm, dry, intact and no rash. Capillary refill " takes less than 2 seconds. No abrasion, No burn, No bruising, No erythema and No ecchymosis   Psychiatric: His speech is normal and behavior is normal. Judgment and thought content normal.   Nursing note and vitals reviewed.    Assessment:     1. Laceration of right hand without foreign body, initial encounter    2. Long term (current) use of anticoagulants        Plan:       Laceration of right hand without foreign body, initial encounter  -     (In Office Administered) Td Vaccine  -     Laceration Repair    Long term (current) use of anticoagulants  -     Laceration Repair

## 2023-06-09 NOTE — PROCEDURES
Laceration Repair    Date/Time: 6/9/2023 9:00 AM  Performed by: Roby Phillips MD  Authorized by: Roby Phillips MD   Body area: upper extremity  Location details: left hand  Nerve involvement: none  Vascular damage: no  Irrigation solution: saline  Amount of cleaning: standard  Debridement: none  Degree of undermining: none  Dressing: non-stick sterile dressing  Comments: Silver nitrate was used to cauterize to prevent further bleeding

## 2023-06-19 ENCOUNTER — OFFICE VISIT (OUTPATIENT)
Dept: OTOLARYNGOLOGY | Facility: CLINIC | Age: 83
End: 2023-06-19
Payer: MEDICARE

## 2023-06-19 VITALS — SYSTOLIC BLOOD PRESSURE: 129 MMHG | HEART RATE: 73 BPM | DIASTOLIC BLOOD PRESSURE: 63 MMHG

## 2023-06-19 DIAGNOSIS — R49.0 DYSPHONIA: Primary | ICD-10-CM

## 2023-06-19 DIAGNOSIS — Q31.9 DYSPLASIA OF LARYNX: ICD-10-CM

## 2023-06-19 PROCEDURE — 31579 LARYNGOSCOPY TELESCOPIC: CPT | Mod: S$GLB,,, | Performed by: OTOLARYNGOLOGY

## 2023-06-19 PROCEDURE — 99213 OFFICE O/P EST LOW 20 MIN: CPT | Mod: 25,S$GLB,, | Performed by: OTOLARYNGOLOGY

## 2023-06-19 PROCEDURE — 1126F AMNT PAIN NOTED NONE PRSNT: CPT | Mod: CPTII,S$GLB,, | Performed by: OTOLARYNGOLOGY

## 2023-06-19 PROCEDURE — 1160F PR REVIEW ALL MEDS BY PRESCRIBER/CLIN PHARMACIST DOCUMENTED: ICD-10-PCS | Mod: CPTII,S$GLB,, | Performed by: OTOLARYNGOLOGY

## 2023-06-19 PROCEDURE — 31579 PR LARYNGOSCOPY, FLEX/RIGID TELESCOPIC, W/STROBOSCOPY: ICD-10-PCS | Mod: S$GLB,,, | Performed by: OTOLARYNGOLOGY

## 2023-06-19 PROCEDURE — 3074F PR MOST RECENT SYSTOLIC BLOOD PRESSURE < 130 MM HG: ICD-10-PCS | Mod: CPTII,S$GLB,, | Performed by: OTOLARYNGOLOGY

## 2023-06-19 PROCEDURE — 1160F RVW MEDS BY RX/DR IN RCRD: CPT | Mod: CPTII,S$GLB,, | Performed by: OTOLARYNGOLOGY

## 2023-06-19 PROCEDURE — 99999 PR PBB SHADOW E&M-EST. PATIENT-LVL III: CPT | Mod: PBBFAC,,, | Performed by: OTOLARYNGOLOGY

## 2023-06-19 PROCEDURE — 1159F PR MEDICATION LIST DOCUMENTED IN MEDICAL RECORD: ICD-10-PCS | Mod: CPTII,S$GLB,, | Performed by: OTOLARYNGOLOGY

## 2023-06-19 PROCEDURE — 99999 PR PBB SHADOW E&M-EST. PATIENT-LVL III: ICD-10-PCS | Mod: PBBFAC,,, | Performed by: OTOLARYNGOLOGY

## 2023-06-19 PROCEDURE — 99213 PR OFFICE/OUTPT VISIT, EST, LEVL III, 20-29 MIN: ICD-10-PCS | Mod: 25,S$GLB,, | Performed by: OTOLARYNGOLOGY

## 2023-06-19 PROCEDURE — 1159F MED LIST DOCD IN RCRD: CPT | Mod: CPTII,S$GLB,, | Performed by: OTOLARYNGOLOGY

## 2023-06-19 PROCEDURE — 3078F DIAST BP <80 MM HG: CPT | Mod: CPTII,S$GLB,, | Performed by: OTOLARYNGOLOGY

## 2023-06-19 PROCEDURE — 1126F PR PAIN SEVERITY QUANTIFIED, NO PAIN PRESENT: ICD-10-PCS | Mod: CPTII,S$GLB,, | Performed by: OTOLARYNGOLOGY

## 2023-06-19 PROCEDURE — 3078F PR MOST RECENT DIASTOLIC BLOOD PRESSURE < 80 MM HG: ICD-10-PCS | Mod: CPTII,S$GLB,, | Performed by: OTOLARYNGOLOGY

## 2023-06-19 PROCEDURE — 3074F SYST BP LT 130 MM HG: CPT | Mod: CPTII,S$GLB,, | Performed by: OTOLARYNGOLOGY

## 2023-06-19 NOTE — PROGRESS NOTES
[facility]  Department of Otorhinolaryngology and Communication Sciences    Subjective:      Nick De Anda is a 82 y.o. male who presents for follow-up. He has glottic dysplasia.    9/28/2021: SML KTP assisted excision right TVF lesion.  1. BIOPSY OF RIGHT FALSE VOCAL FOLD:   MODERATE NONSPECIFIC SUBMUCOSAL CHRONIC INFLAMMATION   NO NEOPLASIA IDENTIFIED   2. BIOPSY OF VENTRICLE:   NO SIGNIFICANT HISTOLOGIC ALTERATION   NO NEOPLASIA IDENTIFIED   3. BIOPSY OF RIGHT TRUE VOCAL FOLD:   NO CARCINOMA IDENTIFIED   FOCAL MODERATE SQUAMOUS MUCOSAL DYSPLASIA    He continues to do well.  His voice overall seems to be getting better, but it does get weak sometimes with increased use. He denies dysphagia, throat pain, odynophagia, otalgia, hemoptysis, hematemesis, neck mass.     The patient's medications, allergies, past medical, surgical, social and family histories were reviewed and updated as appropriate.    A detailed review of systems was obtained with pertinent positives as per the above HPI, and otherwise negative.      Objective:     /63 (Patient Position: Sitting)   Pulse 73   Constitutional: comfortable, well dressed  Psychiatric: appropriate affect  Respiratory: comfortably breathing, symmetric chest rise, no stridor  Voice: minimal variable roughness/strain  Head: normocephalic  Eyes: conjunctivae and sclerae clear  Indirect laryngoscopy is limited due to gag    Procedure  Rigid Laryngeal Videostroboscopy (50317): Laryngeal videostroboscopy is indicated to assess the laryngeal vibratory biomechanics and vocal fold oscillation, which cannot be assessed with a plain light examination. This was carried out with a 70 degree endoscope. After verbal consent was obtained, the patient was positioned and the tongue was gently secured with a gauze sponge. The endoscope was passed transorally and positioned to image the larynx and hypopharynx in detail. The following features were examined: laryngeal and  hypopharyngeal masses; vocal fold range and symmetry of motion; laryngeal mucosal edema, erythema, inflammation, and hydration; salivary pooling; and gross laryngeal sensation. During phonation, the vocal folds were assessed for glottal closure; mucosal wave; vocal fold lesions; vibratory periodicity, amplitude, and phase symmetry; and vertical height match. The equipment was removed. The patient tolerated the procedure well without complication. 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 male with glottic dysplasia, progressing well following microsurgical intervention.     Plan:     Options discussed included continued surveillance versus procedural/surgical treatment of the residual/recurrent leukoplakia.  Together we decided on continued surveillance.  He will follow up with me in 3- 4 months, or sooner if needed.    All questions were answered, and the patient is in agreement with the plan.    Rommel Stubbs M.D.  [facility]  Department of Otorhinolaryngology and Communication Sciences

## 2023-06-29 ENCOUNTER — OFFICE VISIT (OUTPATIENT)
Dept: PODIATRY | Facility: CLINIC | Age: 83
End: 2023-06-29
Payer: MEDICARE

## 2023-06-29 ENCOUNTER — TELEPHONE (OUTPATIENT)
Dept: PODIATRY | Facility: CLINIC | Age: 83
End: 2023-06-29

## 2023-06-29 VITALS
HEIGHT: 76 IN | WEIGHT: 267 LBS | HEART RATE: 56 BPM | DIASTOLIC BLOOD PRESSURE: 62 MMHG | BODY MASS INDEX: 32.51 KG/M2 | SYSTOLIC BLOOD PRESSURE: 122 MMHG

## 2023-06-29 DIAGNOSIS — Z79.01 LONG TERM (CURRENT) USE OF ANTICOAGULANTS: Primary | ICD-10-CM

## 2023-06-29 DIAGNOSIS — Z86.31 HX OF DIABETIC FOOT ULCER: ICD-10-CM

## 2023-06-29 DIAGNOSIS — M20.41 HAMMER TOES OF BOTH FEET: ICD-10-CM

## 2023-06-29 DIAGNOSIS — L97.511 SKIN ULCER OF TOE OF RIGHT FOOT, LIMITED TO BREAKDOWN OF SKIN: ICD-10-CM

## 2023-06-29 DIAGNOSIS — M20.30 HALLUX, MALLEUS ACQUIRED: ICD-10-CM

## 2023-06-29 DIAGNOSIS — M20.42 HAMMER TOES OF BOTH FEET: ICD-10-CM

## 2023-06-29 DIAGNOSIS — B35.3 TINEA PEDIS OF BOTH FEET: ICD-10-CM

## 2023-06-29 DIAGNOSIS — E11.42 DIABETIC POLYNEUROPATHY ASSOCIATED WITH TYPE 2 DIABETES MELLITUS: ICD-10-CM

## 2023-06-29 DIAGNOSIS — B35.1 ONYCHOMYCOSIS DUE TO DERMATOPHYTE: ICD-10-CM

## 2023-06-29 PROCEDURE — 3074F PR MOST RECENT SYSTOLIC BLOOD PRESSURE < 130 MM HG: ICD-10-PCS | Mod: CPTII,S$GLB,, | Performed by: PODIATRIST

## 2023-06-29 PROCEDURE — 3288F PR FALLS RISK ASSESSMENT DOCUMENTED: ICD-10-PCS | Mod: CPTII,S$GLB,, | Performed by: PODIATRIST

## 2023-06-29 PROCEDURE — 1159F MED LIST DOCD IN RCRD: CPT | Mod: CPTII,S$GLB,, | Performed by: PODIATRIST

## 2023-06-29 PROCEDURE — 1160F PR REVIEW ALL MEDS BY PRESCRIBER/CLIN PHARMACIST DOCUMENTED: ICD-10-PCS | Mod: CPTII,S$GLB,, | Performed by: PODIATRIST

## 2023-06-29 PROCEDURE — 99999 PR PBB SHADOW E&M-EST. PATIENT-LVL IV: ICD-10-PCS | Mod: PBBFAC,,, | Performed by: PODIATRIST

## 2023-06-29 PROCEDURE — 1160F RVW MEDS BY RX/DR IN RCRD: CPT | Mod: CPTII,S$GLB,, | Performed by: PODIATRIST

## 2023-06-29 PROCEDURE — 99999 PR PBB SHADOW E&M-EST. PATIENT-LVL IV: CPT | Mod: PBBFAC,,, | Performed by: PODIATRIST

## 2023-06-29 PROCEDURE — 1125F PR PAIN SEVERITY QUANTIFIED, PAIN PRESENT: ICD-10-PCS | Mod: CPTII,S$GLB,, | Performed by: PODIATRIST

## 2023-06-29 PROCEDURE — 3078F DIAST BP <80 MM HG: CPT | Mod: CPTII,S$GLB,, | Performed by: PODIATRIST

## 2023-06-29 PROCEDURE — 99214 OFFICE O/P EST MOD 30 MIN: CPT | Mod: S$GLB,,, | Performed by: PODIATRIST

## 2023-06-29 PROCEDURE — 1101F PR PT FALLS ASSESS DOC 0-1 FALLS W/OUT INJ PAST YR: ICD-10-PCS | Mod: CPTII,S$GLB,, | Performed by: PODIATRIST

## 2023-06-29 PROCEDURE — 3078F PR MOST RECENT DIASTOLIC BLOOD PRESSURE < 80 MM HG: ICD-10-PCS | Mod: CPTII,S$GLB,, | Performed by: PODIATRIST

## 2023-06-29 PROCEDURE — 99214 PR OFFICE/OUTPT VISIT, EST, LEVL IV, 30-39 MIN: ICD-10-PCS | Mod: S$GLB,,, | Performed by: PODIATRIST

## 2023-06-29 PROCEDURE — 3074F SYST BP LT 130 MM HG: CPT | Mod: CPTII,S$GLB,, | Performed by: PODIATRIST

## 2023-06-29 PROCEDURE — 3288F FALL RISK ASSESSMENT DOCD: CPT | Mod: CPTII,S$GLB,, | Performed by: PODIATRIST

## 2023-06-29 PROCEDURE — 1125F AMNT PAIN NOTED PAIN PRSNT: CPT | Mod: CPTII,S$GLB,, | Performed by: PODIATRIST

## 2023-06-29 PROCEDURE — 1159F PR MEDICATION LIST DOCUMENTED IN MEDICAL RECORD: ICD-10-PCS | Mod: CPTII,S$GLB,, | Performed by: PODIATRIST

## 2023-06-29 PROCEDURE — 1101F PT FALLS ASSESS-DOCD LE1/YR: CPT | Mod: CPTII,S$GLB,, | Performed by: PODIATRIST

## 2023-06-29 NOTE — PROGRESS NOTES
Subjective:      Patient ID: Nick De Anda is a 82 y.o. male.    Chief Complaint:   Diabetic Foot Exam (4/5/2023/MAGGIE Gonzalez)    Nick is a 82 y.o. male who returns to the clinic for evaluation and treatment of high risk feet. 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  This patient has documented high risk feet requiring routine maintenance secondary to peripheral neuropathy.     Pt requesting more DM shoes   He has been using a cream on his feet mixed with goat milk  He has difficulty seeing but he is trying to take care of his feet as best as possible   ** pt is hard of hearing.    Patient relates he is taken his 2 blood thinners Plavix and Eliquis  PCP: Elma Gonzalez, PAR[facility]    Date Last Seen by PCP: 4/5/23    Current shoe gear:  Affected Foot: Extra depth shoes with custome accommodative insoles     Unaffected Foot: Extra depth shoes with custome accommodative insoles    Hemoglobin A1C   Date Value Ref Range Status   06/13/2022 5.3 4.0 - 5.6 % Final     Comment:     ADA Screening Guidelines:  5.7-6.4%  Consistent with prediabetes  >or=6.5%  Consistent with diabetes    High levels of fetal hemoglobin interfere with the HbA1C  assay. Heterozygous hemoglobin variants (HbS, HgC, etc)do  not significantly interfere with this assay.   However, presence of multiple variants may affect accuracy.      09/17/2021 5.3 4.0 - 5.6 % Final     Comment:     ADA Screening Guidelines:  5.7-6.4%  Consistent with prediabetes  >or=6.5%  Consistent with diabetes    High levels of fetal hemoglobin interfere with the HbA1C  assay. Heterozygous hemoglobin variants (HbS, HgC, etc)do  not significantly interfere with this assay.   However, presence of multiple variants may affect accuracy.     04/15/2021 5.9 (H) 4.0 - 5.6 % Final     Comment:     ADA Screening Guidelines:  5.7-6.4%  Consistent with prediabetes  >or=6.5%  Consistent with diabetes    High levels of fetal hemoglobin interfere with the HbA1C  assay. Heterozygous hemoglobin variants (HbS, HgC, etc)do  not significantly interfere with this assay.   However, presence of multiple variants may affect accuracy.          Past Medical History:   Diagnosis Date    Anticoagulant long-term use     Arthritis     Cancer     skin    Carcinoma larynx 01/16/2023    CHF (congestive heart failure)     COPD (chronic obstructive pulmonary disease)     Coronary artery disease     Cough, persistent 03/30/2020    Diabetes mellitus     diet    Diabetes mellitus, type 2     Dyslipidemia 02/13/2019    Encounter for blood transfusion     Encounter for monitoring sotalol therapy 08/01/2019    History of staphylococcal infection 2012    after CABG    History of UTI 02/02/2019    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    Hypertension     Hyponatremia 02/02/2019    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    Persistent atrial fibrillation 02/02/2019    Post PTCA 02/13/2019     Past Surgical History:   Procedure Laterality Date    ABLATION OF ARRHYTHMOGENIC FOCUS FOR ATRIAL FIBRILLATION N/A 12/02/2020    Procedure: Ablation atrial fibrillation;  Surgeon: Yung Paez MD;  Location: [facility];  Service: Cardiology;  Laterality: N/A;  PAF, AFl, DEANGELO (Cx if SR), PVI, CTI, RFA, Carto, Gen, [place], 3 Prep    ABLATION OF ARRHYTHMOGENIC FOCUS FOR ATRIAL FIBRILLATION N/A 12/08/2021    Procedure: Ablation atrial  fibrillation;  Surgeon: Yung Paez MD;  Location: [facility] EP LAB;  Service: Cardiology;  Laterality: N/A;  AF/AFL (atypical), DEANGELO (Cx if SR), PVI re-do, RFA for Atypical AFL, Carto, Gen, [place], 3 Prep    CARDIAC SURGERY      cabg    CORONARY ARTERY BYPASS GRAFT      CORONARY STENT PLACEMENT      CYSTOSCOPY      prostate infection    FOOT SURGERY      RECTOPERITONEAL FISTULA CLOSURE      SHOULDER SURGERY      Triple bypass      VASECTOMY      VOCAL FOLD LESION EXCISION N/A 09/28/2021    Procedure: EXCISION, LESION, VOCAL CORD;  Surgeon: Rommel Stubbs MD;  Location: [facility] OR [facility] FLR;  Service: ENT;  Laterality: N/A;     Current Outpatient Medications on File Prior to Visit   Medication Sig Dispense Refill    albuterol (PROVENTIL HFA) 90 mcg/actuation inhaler Inhale 2 puffs into the lungs every 6 (six) hours as needed for Wheezing. Rescue 85 g 4    albuterol (PROVENTIL) 2.5 mg /3 mL (0.083 %) nebulizer solution Inhale one vial (3 ml's) (2.5 mg total) by nebulization every 6 (six) hours. 90 mL 4    amLODIPine (NORVASC) 5 MG tablet TAKE 1 TO 2 TABLETS BY MOUTH ONCE DAILY AS DIRECTED 90 tablet 3    ammonium lactate (LAC-HYDRIN) 12 % lotion Apply topically every evening. Hands and arms and then later scalp, forehead, temples 225 g 4    ascorbic acid, vitamin C, (VITAMIN C) 100 MG tablet Take 500 mg by mouth once daily.      atorvastatin (LIPITOR) 80 MG tablet TAKE 1 TABLET BY MOUTH ONCE DAILY IN THE EVENING 90 tablet 3    blood sugar diagnostic Strp To check BG 2 times daily, to use with insurance preferred meter 100 strip 5    clopidogreL (PLAVIX) 75 mg tablet Take 1 tablet by mouth once daily 90 tablet 3    diphenhydrAMINE (BENADRYL) 25 mg capsule Take 25 mg by mouth every 6 (six) hours as needed for Allergies.      famotidine (PEPCID) 40 MG tablet Take 1 tablet (40 mg total) by mouth daily as needed for Heartburn. 30 tablet 2    hydroCHLOROthiazide (MICROZIDE) 12.5 mg capsule Take 1 capsule by mouth once daily 90  capsule 3    irbesartan (AVAPRO) 300 MG tablet TAKE 1 TABLET BY MOUTH ONCE DAILY IN THE EVENING 90 tablet 3    lancets Misc To check BG 2 times daily, to use with insurance preferred meter 100 each 5    metFORMIN (GLUCOPHAGE) 500 MG tablet TAKE 1 TABLET BY MOUTH TWICE DAILY WITH MEALS 180 tablet 0    methocarbamoL (ROBAXIN) 500 MG Tab 1 po bid prn muscle spasm 30 tablet 1    metoprolol tartrate (LOPRESSOR) 25 MG tablet TAKE 1 TABLET BY MOUTH ONCE DAILY AS NEEDED FOR  PALPITATIONS 90 tablet 1    multivitamin (THERAGRAN) per tablet Take 1 tablet by mouth once daily.      nystatin (MYCOSTATIN) cream Apply topically 2 (two) times daily. groin 60 g 6    nystatin (MYCOSTATIN) powder Apply topically 3 (three) times daily. Prn groin 60 g 5    XARELTO 20 mg Tab TAKE 1 TABLET BY MOUTH ONCE DAILY IN THE EVENING 90 tablet 3    blood-glucose meter kit To check BG 2 times daily, to use with insurance preferred meter 1 each 0    diazePAM (VALIUM) 10 MG Tab Take 1 tablet (10 mg total) by mouth as needed (anxiety). Just half to one and use very seldom-get refills from PCP 30 tablet 3    nitroGLYCERIN (NITROSTAT) 0.4 MG SL tablet Place 1 tablet (0.4 mg total) under the tongue every 5 (five) minutes as needed for Chest pain. 25 tablet 3     Current Facility-Administered Medications on File Prior to Visit   Medication Dose Route Frequency Provider Last Rate Last Admin    0.9%  NaCl infusion   Intravenous Continuous Rebecca Dickinson NP   Stopped at 12/02/20 1636    sodium chloride 0.9% bolus 1,000 mL  1,000 mL Intravenous Once Winnie Larkin NP        sodium chloride 0.9% flush 10 mL  10 mL Intravenous PRN DELICIA Jaramillo MD        sodium chloride 0.9% flush 5 mL  5 mL Intravenous PRN Rebecca Dickinson NP         Review of patient's allergies indicates:   Allergen Reactions    Penicillins Anaphylaxis     Can take keflex    Hydrocodone      Causes constipation    Amoxicillin Swelling and Rash       Review of Systems  "  Constitutional: Negative for chills, decreased appetite, fever, malaise/fatigue, night sweats, weight gain and weight loss.   Cardiovascular:  Positive for dyspnea on exertion and leg swelling. Negative for chest pain, claudication, palpitations and syncope.   Respiratory:  Negative for cough and shortness of breath.    Endocrine: Negative for cold intolerance and heat intolerance.   Hematologic/Lymphatic: Negative for bleeding problem. Bruises/bleeds easily.   Skin:  Positive for nail changes. Negative for color change, dry skin, flushing, itching, poor wound healing, rash, skin cancer, suspicious lesions and unusual hair distribution.   Musculoskeletal:  Positive for arthritis, back pain and stiffness. Negative for falls, gout, joint pain, joint swelling, muscle cramps, muscle weakness, myalgias and neck pain.   Gastrointestinal:  Negative for diarrhea, nausea and vomiting.   Neurological:  Positive for numbness. Negative for dizziness, focal weakness, light-headedness, paresthesias, tremors, vertigo and weakness.   Psychiatric/Behavioral:  Negative for altered mental status and depression. The patient does not have insomnia.    Allergic/Immunologic: Negative.          Objective:       Vitals:    23 0836   BP: 122/62   Pulse: (!) 56   Weight: 121.1 kg (266 lb 15.6 oz)   Height: 6' 4" (1.93 m)   PainSc:   3   PainLoc: Foot   121.1 kg (266 lb 15.6 oz)     Physical Exam  Vitals reviewed.   Constitutional:       General: He is not in acute distress.     Appearance: He is well-developed. He is not ill-appearing, toxic-appearing or diaphoretic.   Cardiovascular:      Pulses:           Dorsalis pedis pulses are 1+ on the right side and 1+ on the left side.        Posterior tibial pulses are 2+ on the right side and 2+ on the left side.      Comments: Decreased hair growth to lower shins with evidence of chronic venous stasis dz.   Musculoskeletal:         General: No tenderness.      Right lower le+ Edema " present.      Left lower le+ Edema present.      Right foot: Decreased range of motion. Deformity, bunion and prominent metatarsal heads present. No tenderness or bony tenderness.      Left foot: Decreased range of motion. Deformity, bunion and prominent metatarsal heads present. No tenderness or bony tenderness.      Comments: Flexible pes planus foot type w/ medial arch collapse and mild gastroc equinus      Semi-Reducible and non-reducable extensor and flexor contractures at the MTPJ and PIPJ of toes 2-5, bilat.      Hallux malleus contractures with mild lateral deviation    No pain on palpation or with range of motion           Feet:      Right foot:      Protective Sensation: 10 sites tested.  0 sites sensed.      Skin integrity: Skin breakdown present. No ulcer, blister, erythema, warmth or callus.      Toenail Condition: Right toenails are abnormally thick and long.      Left foot:      Protective Sensation: 10 sites tested.  0 sites sensed.      Skin integrity: Skin breakdown present. No ulcer, blister, erythema, warmth or callus.      Toenail Condition: Left toenails are abnormally thick and long.      Comments: Superficial ulcer skin tears to sulcus of left 2nd digit metatarsophalangeal joint as well as right 1st interspace all appear healthy no acute signs of infection no active drainage or bleeding there is some dry blood surrounding the area      Toenails 1-3 bilaterally are elongated by 2-3 mm, thickened by 2-3 mm, discolored/yellowed, dystrophic, brittle with subungual debris.   Dried blood to left 2nd digit nail bed       Skin:     General: Skin is warm.      Capillary Refill: Capillary refill takes 2 to 3 seconds.      Coloration: Skin is pale.      Findings: No erythema.      Nails: There is no clubbing.   Neurological:      Mental Status: He is alert and oriented to person, place, and time.      Gait: Gait abnormal.   Psychiatric:         Attention and Perception: Attention normal.          Mood and Affect: Mood normal.         Speech: Speech normal.         Behavior: Behavior normal.         Thought Content: Thought content normal.         Cognition and Memory: Cognition normal.         Judgment: Judgment normal.      Comments: ** hard of hearing             Assessment:       Encounter Diagnoses   Name Primary?    Long term (current) use of anticoagulants Yes    Diabetic polyneuropathy associated with type 2 diabetes mellitus     Hallux, malleus acquired     Onychomycosis due to dermatophyte     Tinea pedis of both feet     Hammer toes of both feet     Hx of diabetic foot ulcer     Skin ulcer of toe of right foot, limited to breakdown of skin          Plan:       Nick was seen today for diabetic foot exam.    Diagnoses and all orders for this visit:    Long term (current) use of anticoagulants    Diabetic polyneuropathy associated with type 2 diabetes mellitus  -     DIABETIC SHOES FOR HOME USE    Hallux, malleus acquired  -     DIABETIC SHOES FOR HOME USE    Onychomycosis due to dermatophyte    Tinea pedis of both feet    Hammer toes of both feet  -     DIABETIC SHOES FOR HOME USE    Hx of diabetic foot ulcer  -     DIABETIC SHOES FOR HOME USE    Skin ulcer of toe of right foot, limited to breakdown of skin      I counseled the patient on his conditions, their implications and medical management.     - - Shoe inspection. Diabetic Foot Education. Patient reminded of the importance of good nutrition and blood sugar control to help prevent podiatric complications of diabetes. Patient instructed on proper foot hygeine. We discussed wearing proper shoe gear, daily foot inspections, never walking without protective shoe gear, never putting sharp instruments to feet, routine podiatric nail visits every 2-3 months.      - With patient's permission, nails were aggressively reduced and debrided x 4 to their soft tissue attachment mechanically and with electric , removing all offending nail and debris.  Patient relates relief following the procedure. He will continue to monitor the areas daily, inspect his feet, wear protective shoe gear when ambulatory, moisturizer to maintain skin integrity and follow in this office in approximately 2-3 months, sooner p.r.n.          - continue diabetic shoes; rx new ones  Patient relates penny makes his inserts    - discussed interspace skin tear ulcerations cleaned well with saline apply gentian violet, lamb's wool to left 2nd digit gauze and tape to right 1st interspace  No indication for antibiotics  Discussed with patient to have wife help monitor    3-4 weeks for check; sooner if needed

## 2023-07-06 ENCOUNTER — LAB VISIT (OUTPATIENT)
Dept: LAB | Facility: HOSPITAL | Age: 83
End: 2023-07-06
Attending: STUDENT IN AN ORGANIZED HEALTH CARE EDUCATION/TRAINING PROGRAM
Payer: MEDICARE

## 2023-07-06 ENCOUNTER — OFFICE VISIT (OUTPATIENT)
Dept: PRIMARY CARE CLINIC | Facility: CLINIC | Age: 83
End: 2023-07-06
Payer: MEDICARE

## 2023-07-06 VITALS
SYSTOLIC BLOOD PRESSURE: 118 MMHG | OXYGEN SATURATION: 97 % | HEIGHT: 76 IN | DIASTOLIC BLOOD PRESSURE: 62 MMHG | BODY MASS INDEX: 32.75 KG/M2 | HEART RATE: 86 BPM | WEIGHT: 268.94 LBS

## 2023-07-06 DIAGNOSIS — Z12.5 ENCOUNTER FOR SCREENING FOR MALIGNANT NEOPLASM OF PROSTATE: ICD-10-CM

## 2023-07-06 DIAGNOSIS — F41.9 ANXIETY: ICD-10-CM

## 2023-07-06 DIAGNOSIS — N40.0 BENIGN PROSTATIC HYPERPLASIA, UNSPECIFIED WHETHER LOWER URINARY TRACT SYMPTOMS PRESENT: ICD-10-CM

## 2023-07-06 DIAGNOSIS — M54.41 CHRONIC MIDLINE LOW BACK PAIN WITH RIGHT-SIDED SCIATICA: ICD-10-CM

## 2023-07-06 DIAGNOSIS — N39.0 RECURRENT UTI: Primary | ICD-10-CM

## 2023-07-06 DIAGNOSIS — G89.29 CHRONIC MIDLINE LOW BACK PAIN WITH RIGHT-SIDED SCIATICA: ICD-10-CM

## 2023-07-06 DIAGNOSIS — N39.0 RECURRENT UTI: ICD-10-CM

## 2023-07-06 PROBLEM — E66.3 OVERWEIGHT WITH BODY MASS INDEX (BMI) OF 29 TO 29.9 IN ADULT: Status: RESOLVED | Noted: 2022-09-20 | Resolved: 2023-07-06

## 2023-07-06 LAB
BACTERIA #/AREA URNS AUTO: ABNORMAL /HPF
BILIRUB UR QL STRIP: NEGATIVE
CLARITY UR REFRACT.AUTO: ABNORMAL
COLOR UR AUTO: YELLOW
COMPLEXED PSA SERPL-MCNC: 17 NG/ML (ref 0–4)
GLUCOSE UR QL STRIP: NEGATIVE
HGB UR QL STRIP: NEGATIVE
KETONES UR QL STRIP: ABNORMAL
LEUKOCYTE ESTERASE UR QL STRIP: ABNORMAL
MICROSCOPIC COMMENT: ABNORMAL
NITRITE UR QL STRIP: NEGATIVE
PH UR STRIP: 5 [PH] (ref 5–8)
PROT UR QL STRIP: NEGATIVE
RBC #/AREA URNS AUTO: 5 /HPF (ref 0–4)
SP GR UR STRIP: 1.01 (ref 1–1.03)
SQUAMOUS #/AREA URNS AUTO: 2 /HPF
URN SPEC COLLECT METH UR: ABNORMAL
WBC #/AREA URNS AUTO: 81 /HPF (ref 0–5)

## 2023-07-06 PROCEDURE — 99999 PR PBB SHADOW E&M-EST. PATIENT-LVL V: CPT | Mod: PBBFAC,,, | Performed by: STUDENT IN AN ORGANIZED HEALTH CARE EDUCATION/TRAINING PROGRAM

## 2023-07-06 PROCEDURE — 99214 PR OFFICE/OUTPT VISIT, EST, LEVL IV, 30-39 MIN: ICD-10-PCS | Mod: S$GLB,,, | Performed by: STUDENT IN AN ORGANIZED HEALTH CARE EDUCATION/TRAINING PROGRAM

## 2023-07-06 PROCEDURE — 99214 OFFICE O/P EST MOD 30 MIN: CPT | Mod: S$GLB,,, | Performed by: STUDENT IN AN ORGANIZED HEALTH CARE EDUCATION/TRAINING PROGRAM

## 2023-07-06 PROCEDURE — 1159F PR MEDICATION LIST DOCUMENTED IN MEDICAL RECORD: ICD-10-PCS | Mod: CPTII,S$GLB,, | Performed by: STUDENT IN AN ORGANIZED HEALTH CARE EDUCATION/TRAINING PROGRAM

## 2023-07-06 PROCEDURE — 3078F DIAST BP <80 MM HG: CPT | Mod: CPTII,S$GLB,, | Performed by: STUDENT IN AN ORGANIZED HEALTH CARE EDUCATION/TRAINING PROGRAM

## 2023-07-06 PROCEDURE — 87086 URINE CULTURE/COLONY COUNT: CPT | Performed by: STUDENT IN AN ORGANIZED HEALTH CARE EDUCATION/TRAINING PROGRAM

## 2023-07-06 PROCEDURE — 1101F PT FALLS ASSESS-DOCD LE1/YR: CPT | Mod: CPTII,S$GLB,, | Performed by: STUDENT IN AN ORGANIZED HEALTH CARE EDUCATION/TRAINING PROGRAM

## 2023-07-06 PROCEDURE — 3074F PR MOST RECENT SYSTOLIC BLOOD PRESSURE < 130 MM HG: ICD-10-PCS | Mod: CPTII,S$GLB,, | Performed by: STUDENT IN AN ORGANIZED HEALTH CARE EDUCATION/TRAINING PROGRAM

## 2023-07-06 PROCEDURE — 1159F MED LIST DOCD IN RCRD: CPT | Mod: CPTII,S$GLB,, | Performed by: STUDENT IN AN ORGANIZED HEALTH CARE EDUCATION/TRAINING PROGRAM

## 2023-07-06 PROCEDURE — 3078F PR MOST RECENT DIASTOLIC BLOOD PRESSURE < 80 MM HG: ICD-10-PCS | Mod: CPTII,S$GLB,, | Performed by: STUDENT IN AN ORGANIZED HEALTH CARE EDUCATION/TRAINING PROGRAM

## 2023-07-06 PROCEDURE — 84153 ASSAY OF PSA TOTAL: CPT | Performed by: STUDENT IN AN ORGANIZED HEALTH CARE EDUCATION/TRAINING PROGRAM

## 2023-07-06 PROCEDURE — 99999 PR PBB SHADOW E&M-EST. PATIENT-LVL V: ICD-10-PCS | Mod: PBBFAC,,, | Performed by: STUDENT IN AN ORGANIZED HEALTH CARE EDUCATION/TRAINING PROGRAM

## 2023-07-06 PROCEDURE — 3074F SYST BP LT 130 MM HG: CPT | Mod: CPTII,S$GLB,, | Performed by: STUDENT IN AN ORGANIZED HEALTH CARE EDUCATION/TRAINING PROGRAM

## 2023-07-06 PROCEDURE — 1125F AMNT PAIN NOTED PAIN PRSNT: CPT | Mod: CPTII,S$GLB,, | Performed by: STUDENT IN AN ORGANIZED HEALTH CARE EDUCATION/TRAINING PROGRAM

## 2023-07-06 PROCEDURE — 3288F FALL RISK ASSESSMENT DOCD: CPT | Mod: CPTII,S$GLB,, | Performed by: STUDENT IN AN ORGANIZED HEALTH CARE EDUCATION/TRAINING PROGRAM

## 2023-07-06 PROCEDURE — 81001 URINALYSIS AUTO W/SCOPE: CPT | Performed by: STUDENT IN AN ORGANIZED HEALTH CARE EDUCATION/TRAINING PROGRAM

## 2023-07-06 PROCEDURE — 3288F PR FALLS RISK ASSESSMENT DOCUMENTED: ICD-10-PCS | Mod: CPTII,S$GLB,, | Performed by: STUDENT IN AN ORGANIZED HEALTH CARE EDUCATION/TRAINING PROGRAM

## 2023-07-06 PROCEDURE — 1101F PR PT FALLS ASSESS DOC 0-1 FALLS W/OUT INJ PAST YR: ICD-10-PCS | Mod: CPTII,S$GLB,, | Performed by: STUDENT IN AN ORGANIZED HEALTH CARE EDUCATION/TRAINING PROGRAM

## 2023-07-06 PROCEDURE — 36415 COLL VENOUS BLD VENIPUNCTURE: CPT | Mod: PN | Performed by: STUDENT IN AN ORGANIZED HEALTH CARE EDUCATION/TRAINING PROGRAM

## 2023-07-06 PROCEDURE — 1125F PR PAIN SEVERITY QUANTIFIED, PAIN PRESENT: ICD-10-PCS | Mod: CPTII,S$GLB,, | Performed by: STUDENT IN AN ORGANIZED HEALTH CARE EDUCATION/TRAINING PROGRAM

## 2023-07-06 RX ORDER — METHOCARBAMOL 500 MG/1
TABLET, FILM COATED ORAL
Qty: 30 TABLET | Refills: 1 | Status: SHIPPED | OUTPATIENT
Start: 2023-07-06 | End: 2023-11-01 | Stop reason: SDUPTHER

## 2023-07-06 RX ORDER — TAMSULOSIN HYDROCHLORIDE 0.4 MG/1
0.4 CAPSULE ORAL DAILY
Qty: 30 CAPSULE | Refills: 0 | Status: SHIPPED | OUTPATIENT
Start: 2023-07-06 | End: 2023-07-24 | Stop reason: SDUPTHER

## 2023-07-06 RX ORDER — CIPROFLOXACIN 500 MG/1
500 TABLET ORAL 2 TIMES DAILY
COMMUNITY
End: 2023-09-28 | Stop reason: CLARIF

## 2023-07-06 NOTE — PROGRESS NOTES
Nick De Anda  1940        Subjective     Chief Complaint: ED follow-up     History of Present Illness:  Mr. Nick De Anda is a 82 y.o. male who presents to clinic for ED f/u. Follows with Elma Gonzalez NP.     Seen on 7/4/23 for UTI. At [facility]. Given Cipro. Has hx of recurrent UTIs. Reports about 10 UTIs infections in the past. None this year. Was following with Urology but has been some years. Due for PSA.    No fever since discharge. Symptoms resolving.    Wants refills of his robaxin.       Also has a history of:   Hypertension  Coronary artery disease  Atrial fibrillation, paroxysmal  Diabetes mellitus type 2  Laryngeal lesion- follows with ENT, Dr. Stubbs    Review of Systems   Constitutional:  Negative for chills and fever.   HENT:  Positive for hearing loss.    Respiratory:  Negative for cough.    Gastrointestinal:  Negative for abdominal pain, nausea and vomiting.   Genitourinary:  Positive for dysuria. Negative for flank pain and hematuria.        Improving   Neurological:  Positive for headaches.   Psychiatric/Behavioral:  The patient has insomnia.       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    Hypertension     Hyponatremia 02/02/2019    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    Persistent atrial fibrillation 02/02/2019    Post PTCA 02/13/2019       Past Surgical History:   Procedure Laterality Date    ABLATION OF  ARRHYTHMOGENIC FOCUS FOR ATRIAL FIBRILLATION N/A 2020    Procedure: Ablation atrial fibrillation;  Surgeon: Yung Paez MD;  Location: [facility] EP LAB;  Service: Cardiology;  Laterality: N/A;  PAF, AFl, DEANGELO (Cx if SR), PVI, CTI, RFA, Carto, Gen, AL, 3 Prep    ABLATION OF ARRHYTHMOGENIC FOCUS FOR ATRIAL FIBRILLATION N/A 2021    Procedure: Ablation atrial fibrillation;  Surgeon: Yung Paez MD;  Location: [facility] EP LAB;  Service: Cardiology;  Laterality: N/A;  AF/AFL (atypical), DEANGELO (Cx if SR), PVI re-do, RFA for Atypical AFL, Carto, Gen, AL, 3 Prep    CARDIAC SURGERY      cabg    CORONARY ARTERY BYPASS GRAFT      CORONARY STENT PLACEMENT      CYSTOSCOPY      prostate infection    FOOT SURGERY      RECTOPERITONEAL FISTULA CLOSURE      SHOULDER SURGERY      Triple bypass      VASECTOMY      VOCAL FOLD LESION EXCISION N/A 2021    Procedure: EXCISION, LESION, VOCAL CORD;  Surgeon: Rommel Stubbs MD;  Location: [facility] OR [address] [place] [address];  Service: ENT;  Laterality: N/A;       Family History   Problem Relation Age of Onset    Heart attack Father        Social History     Socioeconomic History    Marital status:    Tobacco Use    Smoking status: Former     Packs/day: 0.50     Years: 15.00     Pack years: 7.50     Types: Cigarettes     Start date: 1956     Quit date: 10/1/1979     Years since quittin.7    Smokeless tobacco: Never   Substance and Sexual Activity    Alcohol use: No    Drug use: Never    Sexual activity: Not Currently     Partners: Female     Birth control/protection: None     Social Determinants of Health     Financial Resource Strain: Medium Risk    Difficulty of Paying Living Expenses: Somewhat hard   Food Insecurity: No Food Insecurity    Worried About Running Out of Food in the Last Year: Never true    Ran Out of Food in the Last Year: Never true   Transportation Needs: No Transportation Needs    Lack of Transportation (Medical): No    Lack of Transportation (Non-Medical): No    Physical Activity: Unknown    Days of Exercise per Week: 0 days   Stress: Stress Concern Present    Feeling of Stress : Rather much   Social Connections: Moderately Integrated    Frequency of Communication with Friends and Family: More than three times a week    Frequency of Social Gatherings with Friends and Family: Once a week    Attends Nondenominational Services: More than 4 times per year    Active Member of Clubs or Organizations: No    Attends Club or Organization Meetings: Never    Marital Status:    Housing Stability: Unknown    Unable to Pay for Housing in the Last Year: No    Unstable Housing in the Last Year: No       MEDICATIONS & ALLERGIES:     Current Outpatient Medications on File Prior to Visit   Medication Sig    albuterol (PROVENTIL HFA) 90 mcg/actuation inhaler Inhale 2 puffs into the lungs every 6 (six) hours as needed for Wheezing. Rescue    albuterol (PROVENTIL) 2.5 mg /3 mL (0.083 %) nebulizer solution Inhale one vial (3 ml's) (2.5 mg total) by nebulization every 6 (six) hours.    ammonium lactate (LAC-HYDRIN) 12 % lotion Apply topically every evening. Hands and arms and then later scalp, forehead, temples    ascorbic acid, vitamin C, (VITAMIN C) 100 MG tablet Take 500 mg by mouth once daily.    atorvastatin (LIPITOR) 80 MG tablet TAKE 1 TABLET BY MOUTH ONCE DAILY IN THE EVENING    blood sugar diagnostic Strp To check BG 2 times daily, to use with insurance preferred meter    blood-glucose meter kit To check BG 2 times daily, to use with insurance preferred meter    ciprofloxacin HCl (CIPRO) 500 MG tablet Take 500 mg by mouth 2 (two) times daily.    clopidogreL (PLAVIX) 75 mg tablet Take 1 tablet by mouth once daily    diazePAM (VALIUM) 10 MG Tab Take 1 tablet (10 mg total) by mouth as needed (anxiety). Just half to one and use very seldom-get refills from PCP    diphenhydrAMINE (BENADRYL) 25 mg capsule Take 25 mg by mouth every 6 (six) hours as needed for Allergies.    famotidine (PEPCID)  40 MG tablet Take 1 tablet (40 mg total) by mouth daily as needed for Heartburn.    irbesartan (AVAPRO) 300 MG tablet TAKE 1 TABLET BY MOUTH ONCE DAILY IN THE EVENING    lancets Misc To check BG 2 times daily, to use with insurance preferred meter    metFORMIN (GLUCOPHAGE) 500 MG tablet TAKE 1 TABLET BY MOUTH TWICE DAILY WITH MEALS    methocarbamoL (ROBAXIN) 500 MG Tab 1 po bid prn muscle spasm    metoprolol tartrate (LOPRESSOR) 25 MG tablet TAKE 1 TABLET BY MOUTH ONCE DAILY AS NEEDED FOR  PALPITATIONS    multivitamin (THERAGRAN) per tablet Take 1 tablet by mouth once daily.    nitroGLYCERIN (NITROSTAT) 0.4 MG SL tablet Place 1 tablet (0.4 mg total) under the tongue every 5 (five) minutes as needed for Chest pain.    nystatin (MYCOSTATIN) cream Apply topically 2 (two) times daily. groin    nystatin (MYCOSTATIN) powder Apply topically 3 (three) times daily. Prn groin    XARELTO 20 mg Tab TAKE 1 TABLET BY MOUTH ONCE DAILY IN THE EVENING    amLODIPine (NORVASC) 5 MG tablet TAKE 1 TO 2 TABLETS BY MOUTH ONCE DAILY AS DIRECTED (Patient not taking: Reported on 7/6/2023)    hydroCHLOROthiazide (MICROZIDE) 12.5 mg capsule Take 1 capsule by mouth once daily (Patient not taking: Reported on 7/6/2023)     Current Facility-Administered Medications on File Prior to Visit   Medication    0.9%  NaCl infusion    sodium chloride 0.9% bolus 1,000 mL    sodium chloride 0.9% flush 10 mL    sodium chloride 0.9% flush 5 mL    [DISCONTINUED] dextrose 40 % gel    [DISCONTINUED] dextrose 50 % in water (D50W) injection    [DISCONTINUED] glucagon SolR    [DISCONTINUED] ibuprofen tablet    [DISCONTINUED] rivaroxaban tablet       Review of patient's allergies indicates:   Allergen Reactions    Penicillins Anaphylaxis     Can take keflex    Hydrocodone      Causes constipation    Amoxicillin Swelling and Rash       OBJECTIVE:     Vital Signs:  Vitals:    07/06/23 0906   BP: 118/62   BP Location: Right arm   Patient Position: Sitting   BP Method:  "Large (Manual)   Pulse: 86   SpO2: 97%   Weight: 122 kg (268 lb 15.4 oz)   Height: 6' 4" (1.93 m)       Body mass index is 32.74 kg/m².     Physical Exam:  Physical Exam  Vitals and nursing note reviewed. Exam conducted with a chaperone present.   Constitutional:       General: He is not in acute distress.     Appearance: Normal appearance. He is not ill-appearing, toxic-appearing or diaphoretic.   HENT:      Head: Normocephalic and atraumatic.      Nose: Congestion present.   Eyes:      General: No scleral icterus.  Pulmonary:      Effort: Pulmonary effort is normal. No respiratory distress.   Abdominal:      General: There is no distension.      Palpations: Abdomen is soft.      Tenderness: There is no abdominal tenderness. There is no right CVA tenderness, left CVA tenderness or guarding.   Skin:     General: Skin is warm and dry.   Neurological:      Mental Status: He is alert and oriented to person, place, and time.      Gait: Gait abnormal.          Laboratory  Lab Results   Component Value Date    WBC 6.26 12/01/2021    HGB 13.7 (L) 12/01/2021    HCT 37 12/08/2021    MCV 89 12/01/2021     12/01/2021     Lab Results   Component Value Date    GLU 92 06/13/2022     06/13/2022    K 4.2 06/13/2022     06/13/2022    CO2 24 06/13/2022    BUN 16 06/13/2022    CREATININE 0.8 06/13/2022    CALCIUM 9.5 06/13/2022     Lab Results   Component Value Date    INR 1.1 12/01/2021    INR 1.1 11/25/2020    INR 1.5 (H) 09/03/2019     Lab Results   Component Value Date    HGBA1C 5.6 07/05/2023           ASSESSMENT & PLAN:   Mr. Nick De Anda is a 82 y.o. male who was seen today in clinic for ED f/u. Will check urine culture (can't see in records).      1. Recurrent UTI  -     Urinalysis, Reflex to Urine Culture Urine, Clean Catch  -     Ambulatory referral/consult to Urology; Future; Expected date: 07/13/2023  -     tamsulosin (FLOMAX) 0.4 mg Cap; Take 1 capsule (0.4 mg total) by mouth once daily.  " Dispense: 30 capsule; Refill: 0    2. Anxiety  -     Ambulatory referral/consult to Psychiatry; Future; Expected date: 07/13/2023    3. Chronic midline low back pain with right-sided sciatica           Catrachita Delgado MD  Internal Medicine         Portions of this note may have been generated using voice recognition software.  Please excuse any spelling/grammatical errors. Occasional wrong-word or sound-a-like substitutions may have also occurred due to the inherent limitations of voice recognition software. Please read the chart carefully and recognize, using context, where substitutions have occurred.

## 2023-07-07 NOTE — PROGRESS NOTES
Hi,     Could you please contact the patient and let them know that their results?    His PSA is very elevated. I see he is scheduled with Urology already.

## 2023-07-08 LAB — BACTERIA UR CULT: NO GROWTH

## 2023-07-11 DIAGNOSIS — I10 ESSENTIAL HYPERTENSION: ICD-10-CM

## 2023-07-11 RX ORDER — HYDROCHLOROTHIAZIDE 12.5 MG/1
12.5 CAPSULE ORAL DAILY
Qty: 90 CAPSULE | Refills: 0 | Status: SHIPPED | OUTPATIENT
Start: 2023-07-11 | End: 2023-10-02

## 2023-07-12 DIAGNOSIS — I48.0 PAROXYSMAL ATRIAL FIBRILLATION: Primary | ICD-10-CM

## 2023-07-24 DIAGNOSIS — N39.0 RECURRENT UTI: ICD-10-CM

## 2023-07-24 RX ORDER — TAMSULOSIN HYDROCHLORIDE 0.4 MG/1
0.4 CAPSULE ORAL DAILY
Qty: 30 CAPSULE | Refills: 0 | Status: SHIPPED | OUTPATIENT
Start: 2023-07-24 | End: 2023-07-28 | Stop reason: SDUPTHER

## 2023-07-24 NOTE — TELEPHONE ENCOUNTER
----- Message from Yolanda Lino sent at 7/24/2023 10:56 AM CDT -----  Contact: [phone]  Requesting an RX refill or new RX. new  Is this a refill or new RX: new      RX name and strength (copy/paste from chart):  tamsulosin (FLOMAX) 0.4 mg Cap 30 capsule 0 7/6/2023   Sig: Take 1 capsule (0.4 mg total) by mouth once daily.  Route: Oral        Is this a 30 day or 90 day RX: 90      Pharmacy name and phone # (copy/paste from chart):    [facility] [address] [place] [address]  Phone: [phone] Fax: [phone]

## 2023-07-27 NOTE — PROGRESS NOTES
[facility] Note    Chief Complaint      Chief Complaint   Patient presents with    Pre-op Exam       History of Present Illness      Nick De Anda is a 82 y.o. male who presents today for   Chief Complaint   Patient presents with    Pre-op Exam         Patient presents for pre-op clearance prior to left eye cataract surgery to be done on 0/02/23. He reports feeling well today. No complaints at this time.       Review of Systems   HENT:  Positive for hearing loss.         + chronic hearing loss bilat   All 12 systems otherwise negative.     Family History:  family history includes Heart attack in his father.   Family history was reviewed with patient.     Medications:  Outpatient Encounter Medications as of 7/28/2023   Medication Sig Note Dispense Refill    [DISCONTINUED] amLODIPine (NORVASC) 5 MG tablet TAKE 1 TO 2 TABLETS BY MOUTH ONCE DAILY AS DIRECTED  90 tablet 3    albuterol (PROVENTIL HFA) 90 mcg/actuation inhaler Inhale 2 puffs into the lungs every 6 (six) hours as needed for Wheezing. Rescue  85 g 4    ammonium lactate (LAC-HYDRIN) 12 % lotion Apply topically every evening. Hands and arms and then later scalp, forehead, temples  225 g 4    ascorbic acid, vitamin C, (VITAMIN C) 100 MG tablet Take 500 mg by mouth once daily. 9/21/2021: Hold 1 week prior to surgery       atorvastatin (LIPITOR) 80 MG tablet TAKE 1 TABLET BY MOUTH ONCE DAILY IN THE EVENING  90 tablet 3    blood sugar diagnostic Strp To check BG 2 times daily, to use with insurance preferred meter  100 strip 5    blood-glucose meter kit To check BG 2 times daily, to use with insurance preferred meter  1 each 0    ciprofloxacin HCl (CIPRO) 500 MG tablet Take 500 mg by mouth 2 (two) times daily.       clopidogreL (PLAVIX) 75 mg tablet Take 1 tablet by mouth once daily  90 tablet 3    diazePAM (VALIUM) 10 MG Tab Take 1 tablet (10 mg total) by mouth as needed (anxiety). Just half to one and use very seldom-get refills from PCP  6/29/2023: Pt still taking 30 tablet 3    diphenhydrAMINE (BENADRYL) 25 mg capsule Take 25 mg by mouth every 6 (six) hours as needed for Allergies.       famotidine (PEPCID) 40 MG tablet Take 1 tablet (40 mg total) by mouth daily as needed for Heartburn.  30 tablet 2    hydroCHLOROthiazide (MICROZIDE) 12.5 mg capsule Take 1 capsule (12.5 mg total) by mouth once daily.  90 capsule 0    irbesartan (AVAPRO) 300 MG tablet TAKE 1 TABLET BY MOUTH ONCE DAILY IN THE EVENING  90 tablet 3    lancets Misc To check BG 2 times daily, to use with insurance preferred meter  100 each 5    metFORMIN (GLUCOPHAGE) 500 MG tablet TAKE 1 TABLET BY MOUTH TWICE DAILY WITH MEALS  180 tablet 0    methocarbamoL (ROBAXIN) 500 MG Tab Take 1 tablet by mouth twice daily as needed for muscle spasm  30 tablet 1    metoprolol tartrate (LOPRESSOR) 25 MG tablet TAKE 1 TABLET BY MOUTH ONCE DAILY AS NEEDED FOR  PALPITATIONS  90 tablet 1    multivitamin (THERAGRAN) per tablet Take 1 tablet by mouth once daily. 9/21/2021: Hold 1 week prior to surgery       nitroGLYCERIN (NITROSTAT) 0.4 MG SL tablet Place 1 tablet (0.4 mg total) under the tongue every 5 (five) minutes as needed for Chest pain. 3/13/2023: PRN 25 tablet 3    nystatin (MYCOSTATIN) cream Apply topically 2 (two) times daily. groin  60 g 6    nystatin (MYCOSTATIN) powder Apply topically 3 (three) times daily. Prn groin  60 g 5    tamsulosin (FLOMAX) 0.4 mg Cap Take 1 capsule (0.4 mg total) by mouth once daily.  30 capsule 0    XARELTO 20 mg Tab TAKE 1 TABLET BY MOUTH ONCE DAILY IN THE EVENING  90 tablet 3    [DISCONTINUED] albuterol (PROVENTIL) 2.5 mg /3 mL (0.083 %) nebulizer solution Inhale one vial (3 ml's) (2.5 mg total) by nebulization every 6 (six) hours.  90 mL 4     Facility-Administered Encounter Medications as of 7/28/2023   Medication Dose Route Frequency Provider Last Rate Last Admin    0.9%  NaCl infusion   Intravenous Continuous Rebecca Dickinson NP   Stopped at 12/02/20 1052     sodium chloride 0.9% bolus 1,000 mL  1,000 mL Intravenous Once Winnie Larkin NP        sodium chloride 0.9% flush 10 mL  10 mL Intravenous PRN DELICIA Jaramillo MD        sodium chloride 0.9% flush 5 mL  5 mL Intravenous PRN Rebecca Dickinson NP           Allergies:  Review of patient's allergies indicates:   Allergen Reactions    Penicillins Anaphylaxis     Can take keflex    Hydrocodone      Causes constipation    Amoxicillin Swelling and Rash       Health Maintenance:  Health Maintenance   Topic Date Due    Lipid Panel  06/13/2023    Hemoglobin A1c  01/05/2024    Eye Exam  06/08/2024    TETANUS VACCINE  06/09/2033     Health Maintenance Topics with due status: Not Due       Topic Last Completion Date    Influenza Vaccine 09/27/2022    Eye Exam 06/08/2023    TETANUS VACCINE 06/09/2023    Hemoglobin A1c 07/05/2023       Physical Exam       ]    Physical Exam  Vitals reviewed.   Constitutional:       Appearance: Normal appearance. He is normal weight.   HENT:      Head: Normocephalic and atraumatic.      Nose: Nose normal.      Mouth/Throat:      Mouth: Mucous membranes are moist.      Pharynx: Oropharynx is clear.   Eyes:      Extraocular Movements: Extraocular movements intact.      Conjunctiva/sclera: Conjunctivae normal.      Pupils: Pupils are equal, round, and reactive to light.   Cardiovascular:      Rate and Rhythm: Normal rate and regular rhythm.      Pulses: Normal pulses.      Heart sounds: Normal heart sounds.   Pulmonary:      Effort: Pulmonary effort is normal.      Breath sounds: Normal breath sounds.   Musculoskeletal:         General: Normal range of motion.      Cervical back: Normal range of motion and neck supple.      Comments: + ambulates with assistance of a cane.   Skin:     General: Skin is warm and dry.      Capillary Refill: Capillary refill takes less than 2 seconds.   Neurological:      General: No focal deficit present.      Mental Status: He is alert and oriented to person,  place, and time. Mental status is at baseline.   Psychiatric:         Mood and Affect: Mood normal.         Behavior: Behavior normal.         Thought Content: Thought content normal.         Judgment: Judgment normal.          Assessment/Plan     Nick De Anda is a 82 y.o.male with:    There are no diagnoses linked to this encounter.    As above, continue current medications and maintain follow up with specialists.  Return to clinic as needed.    Greater than 50% of visit was spent face to face with patient.  All questions were answered to patient's satisfaction.      I have reviewed chest xray results from 07/04/23 and ECG result done today, 07/28/23. No labs were required for this surgery. Patient is in optimized health to undergo cataract surgery on 08/02/23.        Karen L Spencer, NP-C [facility]

## 2023-07-28 ENCOUNTER — OFFICE VISIT (OUTPATIENT)
Dept: PRIMARY CARE CLINIC | Facility: CLINIC | Age: 83
End: 2023-07-28
Payer: MEDICARE

## 2023-07-28 ENCOUNTER — OFFICE VISIT (OUTPATIENT)
Dept: UROLOGY | Facility: CLINIC | Age: 83
End: 2023-07-28
Payer: MEDICARE

## 2023-07-28 VITALS
WEIGHT: 268 LBS | SYSTOLIC BLOOD PRESSURE: 151 MMHG | HEIGHT: 76 IN | BODY MASS INDEX: 32.63 KG/M2 | DIASTOLIC BLOOD PRESSURE: 75 MMHG | HEART RATE: 75 BPM

## 2023-07-28 VITALS
OXYGEN SATURATION: 95 % | HEART RATE: 78 BPM | HEIGHT: 76 IN | SYSTOLIC BLOOD PRESSURE: 112 MMHG | WEIGHT: 268.06 LBS | DIASTOLIC BLOOD PRESSURE: 68 MMHG | BODY MASS INDEX: 32.64 KG/M2

## 2023-07-28 DIAGNOSIS — Z01.818 PRE-OP EXAM: Primary | ICD-10-CM

## 2023-07-28 DIAGNOSIS — N39.0 RECURRENT UTI: ICD-10-CM

## 2023-07-28 DIAGNOSIS — R97.20 ELEVATED PSA: ICD-10-CM

## 2023-07-28 DIAGNOSIS — N13.8 BPH WITH OBSTRUCTION/LOWER URINARY TRACT SYMPTOMS: Primary | ICD-10-CM

## 2023-07-28 DIAGNOSIS — N40.1 BPH WITH OBSTRUCTION/LOWER URINARY TRACT SYMPTOMS: Primary | ICD-10-CM

## 2023-07-28 PROCEDURE — 3078F DIAST BP <80 MM HG: CPT | Mod: CPTII,S$GLB,, | Performed by: UROLOGY

## 2023-07-28 PROCEDURE — 99999 PR PBB SHADOW E&M-EST. PATIENT-LVL V: CPT | Mod: PBBFAC,,, | Performed by: NURSE PRACTITIONER

## 2023-07-28 PROCEDURE — 99999 PR PBB SHADOW E&M-EST. PATIENT-LVL V: CPT | Mod: PBBFAC,,, | Performed by: UROLOGY

## 2023-07-28 PROCEDURE — 3288F PR FALLS RISK ASSESSMENT DOCUMENTED: ICD-10-PCS | Mod: CPTII,S$GLB,, | Performed by: NURSE PRACTITIONER

## 2023-07-28 PROCEDURE — 3077F PR MOST RECENT SYSTOLIC BLOOD PRESSURE >= 140 MM HG: ICD-10-PCS | Mod: CPTII,S$GLB,, | Performed by: UROLOGY

## 2023-07-28 PROCEDURE — 93010 EKG 12-LEAD: ICD-10-PCS | Mod: S$GLB,,, | Performed by: INTERNAL MEDICINE

## 2023-07-28 PROCEDURE — 3078F DIAST BP <80 MM HG: CPT | Mod: CPTII,S$GLB,, | Performed by: NURSE PRACTITIONER

## 2023-07-28 PROCEDURE — 1159F PR MEDICATION LIST DOCUMENTED IN MEDICAL RECORD: ICD-10-PCS | Mod: CPTII,S$GLB,, | Performed by: UROLOGY

## 2023-07-28 PROCEDURE — 99999 PR PBB SHADOW E&M-EST. PATIENT-LVL V: ICD-10-PCS | Mod: PBBFAC,,, | Performed by: NURSE PRACTITIONER

## 2023-07-28 PROCEDURE — 1101F PT FALLS ASSESS-DOCD LE1/YR: CPT | Mod: CPTII,S$GLB,, | Performed by: NURSE PRACTITIONER

## 2023-07-28 PROCEDURE — 1160F PR REVIEW ALL MEDS BY PRESCRIBER/CLIN PHARMACIST DOCUMENTED: ICD-10-PCS | Mod: CPTII,S$GLB,, | Performed by: UROLOGY

## 2023-07-28 PROCEDURE — 3074F PR MOST RECENT SYSTOLIC BLOOD PRESSURE < 130 MM HG: ICD-10-PCS | Mod: CPTII,S$GLB,, | Performed by: NURSE PRACTITIONER

## 2023-07-28 PROCEDURE — 93005 EKG 12-LEAD: ICD-10-PCS | Mod: S$GLB,,, | Performed by: NURSE PRACTITIONER

## 2023-07-28 PROCEDURE — 3078F PR MOST RECENT DIASTOLIC BLOOD PRESSURE < 80 MM HG: ICD-10-PCS | Mod: CPTII,S$GLB,, | Performed by: UROLOGY

## 2023-07-28 PROCEDURE — 1126F PR PAIN SEVERITY QUANTIFIED, NO PAIN PRESENT: ICD-10-PCS | Mod: CPTII,S$GLB,, | Performed by: NURSE PRACTITIONER

## 2023-07-28 PROCEDURE — 1160F RVW MEDS BY RX/DR IN RCRD: CPT | Mod: CPTII,S$GLB,, | Performed by: NURSE PRACTITIONER

## 2023-07-28 PROCEDURE — 1160F RVW MEDS BY RX/DR IN RCRD: CPT | Mod: CPTII,S$GLB,, | Performed by: UROLOGY

## 2023-07-28 PROCEDURE — 93010 ELECTROCARDIOGRAM REPORT: CPT | Mod: S$GLB,,, | Performed by: INTERNAL MEDICINE

## 2023-07-28 PROCEDURE — 99214 OFFICE O/P EST MOD 30 MIN: CPT | Mod: S$GLB,,, | Performed by: NURSE PRACTITIONER

## 2023-07-28 PROCEDURE — 1159F PR MEDICATION LIST DOCUMENTED IN MEDICAL RECORD: ICD-10-PCS | Mod: CPTII,S$GLB,, | Performed by: NURSE PRACTITIONER

## 2023-07-28 PROCEDURE — 99204 OFFICE O/P NEW MOD 45 MIN: CPT | Mod: S$GLB,,, | Performed by: UROLOGY

## 2023-07-28 PROCEDURE — 99214 PR OFFICE/OUTPT VISIT, EST, LEVL IV, 30-39 MIN: ICD-10-PCS | Mod: S$GLB,,, | Performed by: NURSE PRACTITIONER

## 2023-07-28 PROCEDURE — 3074F SYST BP LT 130 MM HG: CPT | Mod: CPTII,S$GLB,, | Performed by: NURSE PRACTITIONER

## 2023-07-28 PROCEDURE — 99999 PR PBB SHADOW E&M-EST. PATIENT-LVL V: ICD-10-PCS | Mod: PBBFAC,,, | Performed by: UROLOGY

## 2023-07-28 PROCEDURE — 1160F PR REVIEW ALL MEDS BY PRESCRIBER/CLIN PHARMACIST DOCUMENTED: ICD-10-PCS | Mod: CPTII,S$GLB,, | Performed by: NURSE PRACTITIONER

## 2023-07-28 PROCEDURE — 99204 PR OFFICE/OUTPT VISIT, NEW, LEVL IV, 45-59 MIN: ICD-10-PCS | Mod: S$GLB,,, | Performed by: UROLOGY

## 2023-07-28 PROCEDURE — 1126F AMNT PAIN NOTED NONE PRSNT: CPT | Mod: CPTII,S$GLB,, | Performed by: UROLOGY

## 2023-07-28 PROCEDURE — 1126F AMNT PAIN NOTED NONE PRSNT: CPT | Mod: CPTII,S$GLB,, | Performed by: NURSE PRACTITIONER

## 2023-07-28 PROCEDURE — 3288F FALL RISK ASSESSMENT DOCD: CPT | Mod: CPTII,S$GLB,, | Performed by: NURSE PRACTITIONER

## 2023-07-28 PROCEDURE — 1101F PR PT FALLS ASSESS DOC 0-1 FALLS W/OUT INJ PAST YR: ICD-10-PCS | Mod: CPTII,S$GLB,, | Performed by: NURSE PRACTITIONER

## 2023-07-28 PROCEDURE — 3078F PR MOST RECENT DIASTOLIC BLOOD PRESSURE < 80 MM HG: ICD-10-PCS | Mod: CPTII,S$GLB,, | Performed by: NURSE PRACTITIONER

## 2023-07-28 PROCEDURE — 1126F PR PAIN SEVERITY QUANTIFIED, NO PAIN PRESENT: ICD-10-PCS | Mod: CPTII,S$GLB,, | Performed by: UROLOGY

## 2023-07-28 PROCEDURE — 3077F SYST BP >= 140 MM HG: CPT | Mod: CPTII,S$GLB,, | Performed by: UROLOGY

## 2023-07-28 PROCEDURE — 1159F MED LIST DOCD IN RCRD: CPT | Mod: CPTII,S$GLB,, | Performed by: UROLOGY

## 2023-07-28 PROCEDURE — 1159F MED LIST DOCD IN RCRD: CPT | Mod: CPTII,S$GLB,, | Performed by: NURSE PRACTITIONER

## 2023-07-28 PROCEDURE — 93005 ELECTROCARDIOGRAM TRACING: CPT | Mod: S$GLB,,, | Performed by: NURSE PRACTITIONER

## 2023-07-28 RX ORDER — TIMOLOL MALEATE 5 MG/ML
1 SOLUTION/ DROPS OPHTHALMIC 2 TIMES DAILY
COMMUNITY
Start: 2023-06-27 | End: 2023-09-28 | Stop reason: CLARIF

## 2023-07-28 RX ORDER — TAMSULOSIN HYDROCHLORIDE 0.4 MG/1
0.4 CAPSULE ORAL DAILY
Qty: 90 CAPSULE | Refills: 3 | Status: SHIPPED | OUTPATIENT
Start: 2023-07-28

## 2023-07-28 NOTE — PROGRESS NOTES
"Subjective:      Nick De Anda is a 82 y.o. male who was referred by Catrachita Delgado MD for evaluation of UTI.      He had TURP > 10 years ago. He also had 3 negative prostate biopsies > 10 years ago.     His AUASS today is 3/1.     Seen in ER at [facility] for UTI on July 4. Had PSA done at that time, despite infection, which was 17. Previously PSA was 2.4 in 2022. His only prior UTI was > 15 years ago.    He just restarted flomax.    The following portions of the patient's history were reviewed and updated as appropriate: allergies, current medications, past family history, past medical history, past social history, past surgical history and problem list.    Review of Systems  Constitutional: no fever or chills  ENT: no nasal congestion or sore throat  Respiratory: no cough or shortness of breath  Cardiovascular: no chest pain or palpitations  Gastrointestinal: no nausea or vomiting, tolerating diet  Genitourinary: as per HPI  Hematologic/Lymphatic: no easy bruising or lymphadenopathy  Musculoskeletal: no arthralgias or myalgias  Neurological: no seizures or tremors  Behavioral/Psych: no auditory or visual hallucinations     Objective:   Vitals: BP (!) 151/75   Pulse 75   Ht 6' 4" (1.93 m)   Wt 121.6 kg (268 lb)   BMI 32.62 kg/m²     Physical Exam   General: alert and oriented, no acute distress  Head: normocephalic, atraumatic  Neck: supple, no lymphadenopathy, normal ROM, no masses  Respiratory: Symmetric expansion, non-labored breathing  Neuro: alert and oriented x3, no gross deficits  Psych: normal judgment and insight, normal mood/affect, and non-anxious    Lab Review   Urinalysis demonstrates negative for all components  Lab Results   Component Value Date    WBC 13.2 (H) 07/05/2023    WBC 6.26 12/01/2021    HGB 12.6 (L) 07/05/2023    HGB 13.7 (L) 12/01/2021    HCT 36.8 (L) 07/05/2023    HCT 37 12/08/2021    MCV 86.1 07/05/2023    MCV 89 12/01/2021     12/01/2021     Lab Results   Component " Value Date    CREATININE 0.8 06/13/2022    BUN 16 06/13/2022     Component PSA, Screen   Latest Ref Rng & Units 0.00 - 4.00 ng/mL   7/6/2023 17.0 (H)   3/17/2022 2.4   10/1/2020 1.4   9/10/2019 1.4   1/30/2019 1.4       Assessment:     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 Elevated PSA        Plan:   Continue flomax per PCP  No other recent UTI history, therefore no further workup indicated  Recent PSA elevation clearly associated with known UTI and not concerning for malignancy. Given patient's age, further prostate cancer screening with PSA testing is not recommended.  FU here PRN

## 2023-08-07 ENCOUNTER — OFFICE VISIT (OUTPATIENT)
Dept: PODIATRY | Facility: CLINIC | Age: 83
End: 2023-08-07
Payer: MEDICARE

## 2023-08-07 VITALS
HEIGHT: 76 IN | BODY MASS INDEX: 32.48 KG/M2 | DIASTOLIC BLOOD PRESSURE: 55 MMHG | SYSTOLIC BLOOD PRESSURE: 119 MMHG | WEIGHT: 266.75 LBS | HEART RATE: 60 BPM

## 2023-08-07 DIAGNOSIS — B35.3 TINEA PEDIS OF BOTH FEET: ICD-10-CM

## 2023-08-07 DIAGNOSIS — Z86.31 HX OF DIABETIC FOOT ULCER: ICD-10-CM

## 2023-08-07 DIAGNOSIS — E11.42 DIABETIC POLYNEUROPATHY ASSOCIATED WITH TYPE 2 DIABETES MELLITUS: ICD-10-CM

## 2023-08-07 DIAGNOSIS — M20.42 HAMMER TOES OF BOTH FEET: ICD-10-CM

## 2023-08-07 DIAGNOSIS — M20.30 HALLUX, MALLEUS ACQUIRED: ICD-10-CM

## 2023-08-07 DIAGNOSIS — Z79.01 LONG TERM (CURRENT) USE OF ANTICOAGULANTS: Primary | ICD-10-CM

## 2023-08-07 DIAGNOSIS — M20.41 HAMMER TOES OF BOTH FEET: ICD-10-CM

## 2023-08-07 PROCEDURE — 99213 PR OFFICE/OUTPT VISIT, EST, LEVL III, 20-29 MIN: ICD-10-PCS | Mod: S$GLB,,, | Performed by: PODIATRIST

## 2023-08-07 PROCEDURE — 1160F RVW MEDS BY RX/DR IN RCRD: CPT | Mod: CPTII,S$GLB,, | Performed by: PODIATRIST

## 2023-08-07 PROCEDURE — 99999 PR PBB SHADOW E&M-EST. PATIENT-LVL IV: ICD-10-PCS | Mod: PBBFAC,,, | Performed by: PODIATRIST

## 2023-08-07 PROCEDURE — 3078F PR MOST RECENT DIASTOLIC BLOOD PRESSURE < 80 MM HG: ICD-10-PCS | Mod: CPTII,S$GLB,, | Performed by: PODIATRIST

## 2023-08-07 PROCEDURE — 1159F MED LIST DOCD IN RCRD: CPT | Mod: CPTII,S$GLB,, | Performed by: PODIATRIST

## 2023-08-07 PROCEDURE — 3078F DIAST BP <80 MM HG: CPT | Mod: CPTII,S$GLB,, | Performed by: PODIATRIST

## 2023-08-07 PROCEDURE — 1126F PR PAIN SEVERITY QUANTIFIED, NO PAIN PRESENT: ICD-10-PCS | Mod: CPTII,S$GLB,, | Performed by: PODIATRIST

## 2023-08-07 PROCEDURE — 99213 OFFICE O/P EST LOW 20 MIN: CPT | Mod: S$GLB,,, | Performed by: PODIATRIST

## 2023-08-07 PROCEDURE — 3074F PR MOST RECENT SYSTOLIC BLOOD PRESSURE < 130 MM HG: ICD-10-PCS | Mod: CPTII,S$GLB,, | Performed by: PODIATRIST

## 2023-08-07 PROCEDURE — 3074F SYST BP LT 130 MM HG: CPT | Mod: CPTII,S$GLB,, | Performed by: PODIATRIST

## 2023-08-07 PROCEDURE — 1160F PR REVIEW ALL MEDS BY PRESCRIBER/CLIN PHARMACIST DOCUMENTED: ICD-10-PCS | Mod: CPTII,S$GLB,, | Performed by: PODIATRIST

## 2023-08-07 PROCEDURE — 99999 PR PBB SHADOW E&M-EST. PATIENT-LVL IV: CPT | Mod: PBBFAC,,, | Performed by: PODIATRIST

## 2023-08-07 PROCEDURE — 1159F PR MEDICATION LIST DOCUMENTED IN MEDICAL RECORD: ICD-10-PCS | Mod: CPTII,S$GLB,, | Performed by: PODIATRIST

## 2023-08-07 PROCEDURE — 1126F AMNT PAIN NOTED NONE PRSNT: CPT | Mod: CPTII,S$GLB,, | Performed by: PODIATRIST

## 2023-08-07 NOTE — PROGRESS NOTES
Subjective:      Patient ID: Nick De Anda is a 82 y.o. male.    Chief Complaint:   Diabetes Mellitus (Pcp - Elma Gonzalez NP - 4/25/2023) and Nail Care (L foot 3rd toenail f/u; R foot 4th and lesser toenail issues per pt)    Nick is a 82 y.o. male who returns to the clinic for evaluation and treatment of high risk feet. 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  This patient has documented high risk feet requiring routine maintenance secondary to peripheral neuropathy.    Last visit rx  DM shoes; lambswool/check interspaces   he is going to [place]  next week to get his inserts/shoes. They are down providers and this is the 1st appointment that was available patient    Doing ok. Tried to trim some nails  Independent driving,   Pt wants to get his toes surgically fixed... he discusses his old surgery and that he was told he will need a fusion of the toe but does not want that.   He is wondering if he can just get the last 3 toes straightened      ** pt is hard of hearing.       PCP: Elma Gonzalez NP    Date Last Seen by PCP: 4/5/23    Current shoe gear:  Affected Foot: Extra depth shoes with custome accommodative insoles     Unaffected Foot: Extra depth shoes with custome accommodative insoles    Hemoglobin A1C   Date Value Ref Range Status   07/05/2023 5.6 4.7 - 5.6 % Final    06/13/2022 5.3 4.0 - 5.6 % Final     Comment:     ADA Screening Guidelines:  5.7-6.4%  Consistent with prediabetes  >or=6.5%  Consistent with diabetes    High levels of fetal hemoglobin interfere with the HbA1C  assay. Heterozygous hemoglobin variants (HbS, HgC, etc)do  not significantly interfere with this assay.   However, presence of multiple variants may affect accuracy.     09/17/2021 5.3 4.0 - 5.6 % Final     Comment:     ADA Screening Guidelines:  5.7-6.4%  Consistent with prediabetes  >or=6.5%  Consistent with diabetes    High levels of fetal hemoglobin interfere with the HbA1C  assay. Heterozygous hemoglobin variants (HbS, HgC, etc)do  not significantly interfere with this assay.   However, presence of multiple variants may affect accuracy.     04/15/2021 5.9 (H) 4.0 - 5.6 % Final     Comment:     ADA Screening Guidelines:  5.7-6.4%  Consistent with prediabetes  >or=6.5%  Consistent with diabetes    High levels of fetal hemoglobin interfere with the HbA1C  assay. Heterozygous hemoglobin variants (HbS, HgC, etc)do  not significantly interfere with this assay.   However, presence of multiple variants may affect accuracy.          Past Medical History:   Diagnosis Date    Anticoagulant long-term use     Arthritis     Cancer     skin    Carcinoma larynx 01/16/2023    CHF (congestive heart failure)     COPD (chronic obstructive pulmonary disease)     Coronary artery disease     Cough, persistent 03/30/2020    Diabetes mellitus     diet    Diabetes mellitus, type 2     Dyslipidemia 02/13/2019    Encounter for blood transfusion     Encounter for monitoring sotalol therapy 08/01/2019    History of staphylococcal infection 2012    after CABG    History of UTI 02/02/2019    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    Hypertension     Hyponatremia 02/02/2019    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    Persistent atrial fibrillation 02/02/2019    Post PTCA 02/13/2019     Past Surgical History:   Procedure Laterality Date     ABLATION OF ARRHYTHMOGENIC FOCUS FOR ATRIAL FIBRILLATION N/A 12/02/2020    Procedure: Ablation atrial fibrillation;  Surgeon: Yung Paez MD;  Location: [facility] EP LAB;  Service: Cardiology;  Laterality: N/A;  PAF, AFl, DEANGELO (Cx if SR), PVI, CTI, RFA, Carto, Gen, [place], 3 Prep    ABLATION OF ARRHYTHMOGENIC FOCUS FOR ATRIAL FIBRILLATION N/A 12/08/2021    Procedure: Ablation atrial fibrillation;  Surgeon: Yung Paez MD;  Location: [facility] EP LAB;  Service: Cardiology;  Laterality: N/A;  AF/AFL (atypical), DEANGELO (Cx if SR), PVI re-do, RFA for Atypical AFL, Carto, Gen, [place], 3 Prep    CARDIAC SURGERY      cabg    CORONARY ARTERY BYPASS GRAFT      CORONARY STENT PLACEMENT      CYSTOSCOPY      prostate infection    FOOT SURGERY      RECTOPERITONEAL FISTULA CLOSURE      SHOULDER SURGERY      Triple bypass      VASECTOMY      VOCAL FOLD LESION EXCISION N/A 09/28/2021    Procedure: EXCISION, LESION, VOCAL CORD;  Surgeon: Rommel Stubbs MD;  Location: [facility] OR [address] [place] [address];  Service: ENT;  Laterality: N/A;     Current Outpatient Medications on File Prior to Visit   Medication Sig Dispense Refill    albuterol (PROVENTIL HFA) 90 mcg/actuation inhaler Inhale 2 puffs into the lungs every 6 (six) hours as needed for Wheezing. Rescue 85 g 4    ammonium lactate (LAC-HYDRIN) 12 % lotion Apply topically every evening. Hands and arms and then later scalp, forehead, temples 225 g 4    ascorbic acid, vitamin C, (VITAMIN C) 100 MG tablet Take 500 mg by mouth once daily.      atorvastatin (LIPITOR) 80 MG tablet TAKE 1 TABLET BY MOUTH ONCE DAILY IN THE EVENING 90 tablet 3    blood sugar diagnostic Strp To check BG 2 times daily, to use with insurance preferred meter 100 strip 5    ciprofloxacin HCl (CIPRO) 500 MG tablet Take 500 mg by mouth 2 (two) times daily.      clopidogreL (PLAVIX) 75 mg tablet Take 1 tablet by mouth once daily 90 tablet 3    diphenhydrAMINE (BENADRYL) 25 mg capsule Take 25 mg by mouth every 6 (six) hours as needed for  Allergies.      famotidine (PEPCID) 40 MG tablet Take 1 tablet (40 mg total) by mouth daily as needed for Heartburn. 30 tablet 2    hydroCHLOROthiazide (MICROZIDE) 12.5 mg capsule Take 1 capsule (12.5 mg total) by mouth once daily. 90 capsule 0    irbesartan (AVAPRO) 300 MG tablet TAKE 1 TABLET BY MOUTH ONCE DAILY IN THE EVENING 90 tablet 3    lancets Misc To check BG 2 times daily, to use with insurance preferred meter 100 each 5    metFORMIN (GLUCOPHAGE) 500 MG tablet TAKE 1 TABLET BY MOUTH TWICE DAILY WITH MEALS 180 tablet 0    methocarbamoL (ROBAXIN) 500 MG Tab Take 1 tablet by mouth twice daily as needed for muscle spasm 30 tablet 1    metoprolol tartrate (LOPRESSOR) 25 MG tablet TAKE 1 TABLET BY MOUTH ONCE DAILY AS NEEDED FOR  PALPITATIONS 90 tablet 1    multivitamin (THERAGRAN) per tablet Take 1 tablet by mouth once daily.      nystatin (MYCOSTATIN) cream Apply topically 2 (two) times daily. groin 60 g 6    nystatin (MYCOSTATIN) powder Apply topically 3 (three) times daily. Prn groin 60 g 5    tamsulosin (FLOMAX) 0.4 mg Cap Take 1 capsule (0.4 mg total) by mouth once daily. 90 capsule 3    timolol maleate 0.5% (TIMOPTIC) 0.5 % Drop Place 1 drop into both eyes 2 (two) times daily.      XARELTO 20 mg Tab TAKE 1 TABLET BY MOUTH ONCE DAILY IN THE EVENING 90 tablet 3    blood-glucose meter kit To check BG 2 times daily, to use with insurance preferred meter 1 each 0    diazePAM (VALIUM) 10 MG Tab Take 1 tablet (10 mg total) by mouth as needed (anxiety). Just half to one and use very seldom-get refills from PCP 30 tablet 3    nitroGLYCERIN (NITROSTAT) 0.4 MG SL tablet Place 1 tablet (0.4 mg total) under the tongue every 5 (five) minutes as needed for Chest pain. 25 tablet 3     Current Facility-Administered Medications on File Prior to Visit   Medication Dose Route Frequency Provider Last Rate Last Admin    0.9%  NaCl infusion   Intravenous Continuous Rebecca Dickinson, NP   Stopped at 12/02/20 0416    sodium  "chloride 0.9% bolus 1,000 mL  1,000 mL Intravenous Once Winnie Larkin, PARISA        sodium chloride 0.9% flush 10 mL  10 mL Intravenous PRN DELICIA Jaramillo MD        sodium chloride 0.9% flush 5 mL  5 mL Intravenous PRN Rebecca Dickinson NP         Review of patient's allergies indicates:   Allergen Reactions    Penicillins Anaphylaxis     Can take keflex    Hydrocodone      Causes constipation    Amoxicillin Swelling and Rash       Review of Systems   Constitutional: Negative for chills, decreased appetite, fever, malaise/fatigue, night sweats, weight gain and weight loss.   Cardiovascular:  Positive for dyspnea on exertion and leg swelling. Negative for chest pain, claudication, palpitations and syncope.   Respiratory:  Negative for cough and shortness of breath.    Endocrine: Negative for cold intolerance and heat intolerance.   Hematologic/Lymphatic: Negative for bleeding problem. Bruises/bleeds easily.   Skin:  Positive for nail changes. Negative for color change, dry skin, flushing, itching, poor wound healing, rash, skin cancer, suspicious lesions and unusual hair distribution.   Musculoskeletal:  Positive for arthritis, back pain and stiffness. Negative for falls, gout, joint pain, joint swelling, muscle cramps, muscle weakness, myalgias and neck pain.   Gastrointestinal:  Negative for diarrhea, nausea and vomiting.   Neurological:  Positive for numbness. Negative for dizziness, focal weakness, light-headedness, paresthesias, tremors, vertigo and weakness.   Psychiatric/Behavioral:  Negative for altered mental status and depression. The patient does not have insomnia.    Allergic/Immunologic: Negative.            Objective:       Vitals:    08/07/23 0912   BP: (!) 119/55   Pulse: 60   Weight: 121 kg (266 lb 12.1 oz)   Height: 6' 4" (1.93 m)   PainSc: 0-No pain   121 kg (266 lb 12.1 oz)     Physical Exam  Vitals reviewed.   Constitutional:       General: He is not in acute distress.     Appearance: " He is well-developed. He is not ill-appearing, toxic-appearing or diaphoretic.   Cardiovascular:      Pulses:           Dorsalis pedis pulses are 1+ on the right side and 1+ on the left side.        Posterior tibial pulses are 2+ on the right side and 2+ on the left side.      Comments: Decreased hair growth to lower shins with evidence of chronic venous stasis dz.   Musculoskeletal:         General: No tenderness.      Right lower le+ Edema present.      Left lower le+ Edema present.      Right foot: Decreased range of motion. Deformity, bunion and prominent metatarsal heads present. No tenderness or bony tenderness.      Left foot: Decreased range of motion. Deformity, bunion and prominent metatarsal heads present. No tenderness or bony tenderness.      Comments: Flexible pes planus foot type w/ medial arch collapse and mild gastroc equinus      Semi-Reducible and non-reducable extensor and flexor contractures at the MTPJ and PIPJ of toes 2-5, bilat.      Hallux malleus contractures with mild lateral deviation    No pain on palpation or with range of motion           Feet:      Right foot:      Protective Sensation: 10 sites tested.  0 sites sensed.      Skin integrity: No ulcer, blister, skin breakdown, erythema, warmth or callus.      Toenail Condition: Right toenails are abnormally thick and long.      Left foot:      Protective Sensation: 10 sites tested.  0 sites sensed.      Skin integrity: No ulcer, blister, skin breakdown, erythema, warmth or callus.      Toenail Condition: Left toenails are abnormally thick and long.      Comments: Superficial ulcer skin tears to sulcus of left 2nd digit = resolved   Minimal maceration      Toenails   bilaterally are  short    Skin:     General: Skin is warm.      Capillary Refill: Capillary refill takes 2 to 3 seconds.      Coloration: Skin is pale.      Findings: No erythema.      Nails: There is no clubbing.   Neurological:      Mental Status: He is alert and  oriented to person, place, and time.      Gait: Gait abnormal.   Psychiatric:         Attention and Perception: Attention normal.         Mood and Affect: Mood normal.         Speech: Speech normal.         Behavior: Behavior normal.         Thought Content: Thought content normal.         Cognition and Memory: Cognition normal.         Judgment: Judgment normal.      Comments: ** hard of hearing               Assessment:       Encounter Diagnoses   Name Primary?    Long term (current) use of anticoagulants Yes    Diabetic polyneuropathy associated with type 2 diabetes mellitus     Hallux, malleus acquired     Hammer toes of both feet     Tinea pedis of both feet     Hx of diabetic foot ulcer            Plan:       Nick was seen today for diabetes mellitus and nail care.    Diagnoses and all orders for this visit:    Long term (current) use of anticoagulants    Diabetic polyneuropathy associated with type 2 diabetes mellitus    Hallux, malleus acquired    Hammer toes of both feet    Tinea pedis of both feet    Hx of diabetic foot ulcer        I counseled the patient on his conditions, their implications and medical management.     Going to get fitted for new dm shoes   - - Shoe inspection. Diabetic Foot Education. Patient reminded of the importance of good nutrition and blood sugar control to help prevent podiatric complications of diabetes. Patient instructed on proper foot hygeine. We discussed wearing proper shoe gear, daily foot inspections, never walking without protective shoe gear, never putting sharp instruments to feet, routine podiatric nail visits every 2-3 months.      - With patient's permission, nails were aggressively reduced and debrided x  4 to their soft tissue attachment mechanically   removing all offending nail and debris. Patient relates relief following the procedure. He will continue to monitor the areas daily, inspect his feet, wear protective shoe gear when ambulatory, moisturizer to  maintain skin integrity and follow in this office in approximately 2-3 months, sooner p.r.n.      Advised against an major foot reconstruction; fall risk  Pt has been inquring about surgical intervention at each visit    Possibly consider tenotomies right 3,4,5  Will sent to dr. Lr to discuss surgical options

## 2023-08-20 DIAGNOSIS — F41.9 ANXIETY: ICD-10-CM

## 2023-08-21 RX ORDER — DIAZEPAM 10 MG/1
10 TABLET ORAL
Qty: 30 TABLET | Refills: 3 | Status: SHIPPED | OUTPATIENT
Start: 2023-08-21 | End: 2023-12-20

## 2023-09-18 ENCOUNTER — PATIENT MESSAGE (OUTPATIENT)
Dept: PRIMARY CARE CLINIC | Facility: CLINIC | Age: 83
End: 2023-09-18
Payer: MEDICARE

## 2023-09-27 ENCOUNTER — ANESTHESIA EVENT (OUTPATIENT)
Dept: SURGERY | Facility: OTHER | Age: 83
End: 2023-09-27
Payer: MEDICARE

## 2023-09-28 NOTE — PRE ADMISSION SCREENING
Pre admit phone call completed.    Instructions given to patient about NPO status as follows:     The evening before surgery do not eat anything after 9 p.m. ( this includes hard candy, chewing gum and mints).  You may only have WATER from 9 p.m. until you leave your home. DO NOT  DRINK ANY LIQUIDS ON THE WAY TO THE HOSPITAL.      Patient was also instructed on the below information:    Park in the Parking lot behind the hospital or in the [employer] [facility] across the street from the parking lot.  Parking is complimentary.  If you will be discharged the same day as your procedure, please arrange for a responsible adult to drive you home or  to accompany you if traveling by taxi.  YOU WILL NOT BE PERMITTED TO DRIVE OR TO LEAVE THE HOSPITAL ALONE AFTER SURGERY.  It is strongly recommended that you arrange for someone to remain with you for the first 24 hrs following your surgery.  2 adult visitors are allowed.     Patient verbalized understanding of above instructions.

## 2023-09-29 ENCOUNTER — ANESTHESIA (OUTPATIENT)
Dept: SURGERY | Facility: OTHER | Age: 83
End: 2023-09-29
Payer: MEDICARE

## 2023-09-29 ENCOUNTER — HOSPITAL ENCOUNTER (OUTPATIENT)
Facility: OTHER | Age: 83
Discharge: HOME | End: 2023-09-29
Attending: ORTHOPAEDIC SURGERY | Admitting: ORTHOPAEDIC SURGERY
Payer: MEDICARE

## 2023-09-29 DIAGNOSIS — G56.01 CARPAL TUNNEL SYNDROME, RIGHT: Primary | ICD-10-CM

## 2023-09-29 DIAGNOSIS — M67.431 GANGLION OF RIGHT WRIST: ICD-10-CM

## 2023-09-29 LAB — POCT GLUCOSE: 90 MG/DL (ref 70–110)

## 2023-09-29 PROCEDURE — 25000003 PHARM REV CODE 250: Performed by: STUDENT IN AN ORGANIZED HEALTH CARE EDUCATION/TRAINING PROGRAM

## 2023-09-29 PROCEDURE — 82962 GLUCOSE BLOOD TEST: CPT | Performed by: ORTHOPAEDIC SURGERY

## 2023-09-29 PROCEDURE — 71000015 HC POSTOP RECOV 1ST HR: Performed by: ORTHOPAEDIC SURGERY

## 2023-09-29 PROCEDURE — 88307 TISSUE EXAM BY PATHOLOGIST: CPT | Performed by: STUDENT IN AN ORGANIZED HEALTH CARE EDUCATION/TRAINING PROGRAM

## 2023-09-29 PROCEDURE — 27201423 OPTIME MED/SURG SUP & DEVICES STERILE SUPPLY: Performed by: ORTHOPAEDIC SURGERY

## 2023-09-29 PROCEDURE — D9220A PRA ANESTHESIA: Mod: CRNA,,, | Performed by: STUDENT IN AN ORGANIZED HEALTH CARE EDUCATION/TRAINING PROGRAM

## 2023-09-29 PROCEDURE — 36000707: Performed by: ORTHOPAEDIC SURGERY

## 2023-09-29 PROCEDURE — 63600175 PHARM REV CODE 636 W HCPCS: Performed by: STUDENT IN AN ORGANIZED HEALTH CARE EDUCATION/TRAINING PROGRAM

## 2023-09-29 PROCEDURE — D9220A PRA ANESTHESIA: ICD-10-PCS | Mod: CRNA,,, | Performed by: STUDENT IN AN ORGANIZED HEALTH CARE EDUCATION/TRAINING PROGRAM

## 2023-09-29 PROCEDURE — D9220A PRA ANESTHESIA: Mod: ANES,,, | Performed by: ANESTHESIOLOGY

## 2023-09-29 PROCEDURE — 25000003 PHARM REV CODE 250: Performed by: ANESTHESIOLOGY

## 2023-09-29 PROCEDURE — 71000016 HC POSTOP RECOV ADDL HR: Performed by: ORTHOPAEDIC SURGERY

## 2023-09-29 PROCEDURE — 37000008 HC ANESTHESIA 1ST 15 MINUTES: Performed by: ORTHOPAEDIC SURGERY

## 2023-09-29 PROCEDURE — 88304 TISSUE EXAM BY PATHOLOGIST: CPT | Mod: 26,,, | Performed by: STUDENT IN AN ORGANIZED HEALTH CARE EDUCATION/TRAINING PROGRAM

## 2023-09-29 PROCEDURE — 37000009 HC ANESTHESIA EA ADD 15 MINS: Performed by: ORTHOPAEDIC SURGERY

## 2023-09-29 PROCEDURE — 88304 PR  SURG PATH,LEVEL III: ICD-10-PCS | Mod: 26,,, | Performed by: STUDENT IN AN ORGANIZED HEALTH CARE EDUCATION/TRAINING PROGRAM

## 2023-09-29 PROCEDURE — D9220A PRA ANESTHESIA: ICD-10-PCS | Mod: ANES,,, | Performed by: ANESTHESIOLOGY

## 2023-09-29 PROCEDURE — 36000706: Performed by: ORTHOPAEDIC SURGERY

## 2023-09-29 PROCEDURE — 88304 TISSUE EXAM BY PATHOLOGIST: CPT | Performed by: STUDENT IN AN ORGANIZED HEALTH CARE EDUCATION/TRAINING PROGRAM

## 2023-09-29 PROCEDURE — 25000003 PHARM REV CODE 250: Performed by: ORTHOPAEDIC SURGERY

## 2023-09-29 RX ORDER — LIDOCAINE HYDROCHLORIDE 10 MG/ML
0.5 INJECTION, SOLUTION EPIDURAL; INFILTRATION; INTRACAUDAL; PERINEURAL ONCE
Status: ACTIVE | OUTPATIENT
Start: 2023-09-29

## 2023-09-29 RX ORDER — MEPERIDINE HYDROCHLORIDE 25 MG/ML
12.5 INJECTION INTRAMUSCULAR; INTRAVENOUS; SUBCUTANEOUS ONCE AS NEEDED
Status: DISCONTINUED | OUTPATIENT
Start: 2023-09-29 | End: 2023-09-29 | Stop reason: HOSPADM

## 2023-09-29 RX ORDER — PROCHLORPERAZINE EDISYLATE 5 MG/ML
5 INJECTION INTRAMUSCULAR; INTRAVENOUS EVERY 30 MIN PRN
Status: DISCONTINUED | OUTPATIENT
Start: 2023-09-29 | End: 2023-09-29 | Stop reason: HOSPADM

## 2023-09-29 RX ORDER — EPHEDRINE SULFATE 50 MG/ML
INJECTION, SOLUTION INTRAVENOUS
Status: DISCONTINUED | OUTPATIENT
Start: 2023-09-29 | End: 2023-09-29

## 2023-09-29 RX ORDER — HYDROMORPHONE HYDROCHLORIDE 2 MG/ML
0.4 INJECTION, SOLUTION INTRAMUSCULAR; INTRAVENOUS; SUBCUTANEOUS EVERY 5 MIN PRN
Status: DISCONTINUED | OUTPATIENT
Start: 2023-09-29 | End: 2023-09-29 | Stop reason: HOSPADM

## 2023-09-29 RX ORDER — PROPOFOL 10 MG/ML
VIAL (ML) INTRAVENOUS CONTINUOUS PRN
Status: DISCONTINUED | OUTPATIENT
Start: 2023-09-29 | End: 2023-09-29

## 2023-09-29 RX ORDER — ACETAMINOPHEN 500 MG
1000 TABLET ORAL
Status: COMPLETED | OUTPATIENT
Start: 2023-09-29 | End: 2023-09-29

## 2023-09-29 RX ORDER — SODIUM CHLORIDE 0.9 % (FLUSH) 0.9 %
3 SYRINGE (ML) INJECTION
Status: DISCONTINUED | OUTPATIENT
Start: 2023-09-29 | End: 2023-09-29 | Stop reason: HOSPADM

## 2023-09-29 RX ORDER — CLINDAMYCIN PHOSPHATE 900 MG/50ML
INJECTION, SOLUTION INTRAVENOUS
Status: DISCONTINUED | OUTPATIENT
Start: 2023-09-29 | End: 2023-09-29

## 2023-09-29 RX ORDER — PROPOFOL 10 MG/ML
VIAL (ML) INTRAVENOUS
Status: DISCONTINUED | OUTPATIENT
Start: 2023-09-29 | End: 2023-09-29

## 2023-09-29 RX ORDER — LIDOCAINE HYDROCHLORIDE 20 MG/ML
INJECTION INTRAVENOUS
Status: DISCONTINUED | OUTPATIENT
Start: 2023-09-29 | End: 2023-09-29

## 2023-09-29 RX ORDER — SODIUM CHLORIDE, SODIUM LACTATE, POTASSIUM CHLORIDE, CALCIUM CHLORIDE 600; 310; 30; 20 MG/100ML; MG/100ML; MG/100ML; MG/100ML
INJECTION, SOLUTION INTRAVENOUS CONTINUOUS
Status: ACTIVE | OUTPATIENT
Start: 2023-09-29

## 2023-09-29 RX ORDER — OXYCODONE HYDROCHLORIDE 5 MG/1
5 TABLET ORAL
Status: DISCONTINUED | OUTPATIENT
Start: 2023-09-29 | End: 2023-09-29 | Stop reason: HOSPADM

## 2023-09-29 RX ORDER — BUPIVACAINE HYDROCHLORIDE AND EPINEPHRINE 2.5; 5 MG/ML; UG/ML
INJECTION, SOLUTION EPIDURAL; INFILTRATION; INTRACAUDAL; PERINEURAL
Status: DISCONTINUED | OUTPATIENT
Start: 2023-09-29 | End: 2023-09-29 | Stop reason: HOSPADM

## 2023-09-29 RX ORDER — FENTANYL CITRATE 50 UG/ML
INJECTION, SOLUTION INTRAMUSCULAR; INTRAVENOUS
Status: DISCONTINUED | OUTPATIENT
Start: 2023-09-29 | End: 2023-09-29

## 2023-09-29 RX ORDER — DIPHENHYDRAMINE HYDROCHLORIDE 50 MG/ML
25 INJECTION INTRAMUSCULAR; INTRAVENOUS EVERY 6 HOURS PRN
Status: DISCONTINUED | OUTPATIENT
Start: 2023-09-29 | End: 2023-09-29 | Stop reason: HOSPADM

## 2023-09-29 RX ORDER — PREGABALIN 75 MG/1
75 CAPSULE ORAL ONCE
Status: COMPLETED | OUTPATIENT
Start: 2023-09-29 | End: 2023-09-29

## 2023-09-29 RX ADMIN — FENTANYL CITRATE 50 MCG: 50 INJECTION, SOLUTION INTRAMUSCULAR; INTRAVENOUS at 08:09

## 2023-09-29 RX ADMIN — PROPOFOL 30 MG: 10 INJECTION, EMULSION INTRAVENOUS at 08:09

## 2023-09-29 RX ADMIN — PROPOFOL 100 MCG/KG/MIN: 10 INJECTION, EMULSION INTRAVENOUS at 08:09

## 2023-09-29 RX ADMIN — EPHEDRINE SULFATE 10 MG: 50 INJECTION INTRAVENOUS at 09:09

## 2023-09-29 RX ADMIN — LIDOCAINE HYDROCHLORIDE 60 MG: 20 INJECTION, SOLUTION INTRAVENOUS at 08:09

## 2023-09-29 RX ADMIN — PREGABALIN 75 MG: 75 CAPSULE ORAL at 07:09

## 2023-09-29 RX ADMIN — ACETAMINOPHEN 1000 MG: 500 TABLET ORAL at 07:09

## 2023-09-29 RX ADMIN — CLINDAMYCIN PHOSPHATE 900 MG: 18 INJECTION, SOLUTION INTRAVENOUS at 08:09

## 2023-09-29 RX ADMIN — SODIUM CHLORIDE, SODIUM LACTATE, POTASSIUM CHLORIDE, AND CALCIUM CHLORIDE: .6; .31; .03; .02 INJECTION, SOLUTION INTRAVENOUS at 08:09

## 2023-09-29 NOTE — BRIEF OP NOTE
Surgery Date: 09/29/2023  Patient Name: Nick De Anda  CSN: [id number]  Surgeon(s) and Role:    Claude S. Williams IV, MD - Primary    Pre-op Diagnosis:  Carpal tunnel syndrome, right [G56.01]  Ganglion of right wrist [M67.431]    Post-op Diagnosis:  Carpal tunnel syndrome, right [G56.01]  Ganglion of right wrist [M67.431]    Procedure(s) (LRB):  RELEASE, CARPAL TUNNEL / RIGHT (Right)  EXCISION, GANGLION CYST, WRIST / RIGHT (Right)    INDICATIONS: This patient has had pain and numbness  which has been unrelieved   with conservative measures.  Additionally he has a painful volar soft mass consistent with a ganglion cyst After the potential benefits as well as potential risks and   complications of operative decompression of the carpal canal were reviewed with   the patient, she has elected to undergo the above procedure.     PROCEDURE IN DETAIL: After proper informed consent, the patient was transported   to the Operating Suite. The right hand was thoroughly prepped with alcohol,   Betadine and draped in the usual sterile fashion. Preoperative routine timeout   was taken, and the operative site was identified by the operative team. After   Esmarch exsanguination, a padded upper arm tourniquet was then elevated to 250   mmHg. An incision was then made in the right palm in line with the radial border of the ring finger and careful dissection was carried down through the subdermal tissue using bipolar cautery for hemostasis.  The palmar fascia was divided and the transverse carpal ligament was identified.  The ligament was divided longitudinally using a 15.  Blade scalpel completely to the distal margin.  The contents of the carpal canal were then freed from the deep surface of the transverse carpal ligament. A carpal tunnel guide was then used to protect the contents of the carpal canal. A carpal tunnel knife was then used to further divide the transverse carpal ligament longitudinally from distal to proximal to  approximately 3 cm proximal to the wrist crease under direct visualization.  The median nerve was then inspected and found to be fully decompressed proximally and distally including the motor branch.  There was slight narrowing of the nerve at the level of the carpal canal. No masses or other pathology was noted.  There was minimal synovial proliferation.  Attention was then turned to the volar wrist mass where a separate longitudinal incision was then made over the volar distal forearm and careful dissection was carried down through the subdermal tissue using bipolar cautery for hemostasis.  Care was taken to protect the radial artery and the flexor carpi radialis tendon was noted to have significant synovitis with some dark brownish orange mass which appeared to be emanating from the flexor carpi radialis tendon sheath and also from the radiocarpal joint.  The mass was excised and sent for histopathology evaluation.  No other pathology was noted.  The tourniquet was deflated and hemostasis was confirmed.  The wounds were copiously irrigated.  The incisions were then closed with nylon interrupted suture. A sterile soft dressing was then applied. The patient was awakened in the operative suite and taken to the recovery area in stable condition. The procedure was tolerated very well.  Lap instrument and needle counts were correct.        COMPLICATIONS: None.      Anesthesia: Local MAC    Estimated Blood Loss: minimal         Specimens     None          Discharge Note    SUMMARY     Admit Date: 9/29/2023    Discharge Date and Time:  09/29/2023 9:30 AM    Hospital Course (synopsis of major diagnoses, care, treatment, and services provided during the course of the hospital stay):  Uneventful     Final Diagnosis: Post-Op Diagnosis Codes:     * Carpal tunnel syndrome, right [G56.01]     * Ganglion of right wrist [M67.431]    Disposition: Home or Self Care    Follow Up/Patient Instructions:     Medications:  Reconciled  Home Medications:      Medication List        CONTINUE taking these medications      albuterol 90 mcg/actuation inhaler  Commonly known as: PROVENTIL HFA  Inhale 2 puffs into the lungs every 6 (six) hours as needed for Wheezing. Rescue     ammonium lactate 12 % lotion  Commonly known as: LAC-HYDRIN  Apply topically every evening. Hands and arms and then later scalp, forehead, temples     ascorbic acid (vitamin C) 100 MG tablet  Commonly known as: VITAMIN C  Take 500 mg by mouth once daily.     atorvastatin 80 MG tablet  Commonly known as: LIPITOR  TAKE 1 TABLET BY MOUTH ONCE DAILY IN THE EVENING     blood sugar diagnostic Strp  To check BG 2 times daily, to use with insurance preferred meter     blood-glucose meter kit  To check BG 2 times daily, to use with insurance preferred meter     clopidogreL 75 mg tablet  Commonly known as: PLAVIX  Take 1 tablet by mouth once daily     diazePAM 10 MG Tab  Commonly known as: VALIUM  Take 1 tablet (10 mg total) by mouth as needed (anxiety). Just half to one and use very seldom-get refills from PCP     hydroCHLOROthiazide 12.5 mg capsule  Commonly known as: MICROZIDE  Take 1 capsule (12.5 mg total) by mouth once daily.     irbesartan 300 MG tablet  Commonly known as: AVAPRO  TAKE 1 TABLET BY MOUTH ONCE DAILY IN THE EVENING     lancets Misc  To check BG 2 times daily, to use with insurance preferred meter     metFORMIN 500 MG tablet  Commonly known as: GLUCOPHAGE  TAKE 1 TABLET BY MOUTH TWICE DAILY WITH MEALS     methocarbamoL 500 MG Tab  Commonly known as: ROBAXIN  Take 1 tablet by mouth twice daily as needed for muscle spasm     metoprolol tartrate 25 MG tablet  Commonly known as: LOPRESSOR  TAKE 1 TABLET BY MOUTH ONCE DAILY AS NEEDED FOR  PALPITATIONS     multivitamin per tablet  Commonly known as: THERAGRAN  Take 1 tablet by mouth once daily.     nitroGLYCERIN 0.4 MG SL tablet  Commonly known as: NITROSTAT  Place 1 tablet (0.4 mg total) under the tongue every 5 (five)  minutes as needed for Chest pain.     * nystatin cream  Commonly known as: MYCOSTATIN  Apply topically 2 (two) times daily. groin     * nystatin powder  Commonly known as: MYCOSTATIN  Apply topically 3 (three) times daily. Prn groin     tamsulosin 0.4 mg Cap  Commonly known as: FLOMAX  Take 1 capsule (0.4 mg total) by mouth once daily.     XARELTO 20 mg Tab  Generic drug: rivaroxaban  TAKE 1 TABLET BY MOUTH ONCE DAILY IN THE EVENING           * This list has 2 medication(s) that are the same as other medications prescribed for you. Read the directions carefully, and ask your doctor or other care provider to review them with you.                STOP taking these medications      FLUAD QUAD 2023-24(65Y UP)(PF) 60 mcg (15 mcg x 4)/0.5 mL Syrg  Generic drug: flu vac 2023 [id number](PF)            Discharge Procedure Orders   Diet general     Leave dressing on - Keep it clean, dry, and intact until clinic visit     Call MD for:  temperature >100.4     Call MD for:  persistent nausea and vomiting     Call MD for:  severe uncontrolled pain     Call MD for:  difficulty breathing, headache or visual disturbances     Call MD for:  redness, tenderness, or signs of infection (pain, swelling, redness, odor or green/yellow discharge around incision site)     Call MD for:  hives     Call MD for:  persistent dizziness or light-headedness     Call MD for:  extreme fatigue     Keep surgical extremity elevated     Lifting restrictions     No driving, operating heavy equipment or signing legal documents while taking pain medication      Follow-up Information       Williams, Claude S. IV, MD Follow up in 1 week(s).    Specialty: Orthopedic Surgery  Why: For wound re-check, For suture removal  Contact information:  [address]  [facility] [address] [phone]

## 2023-09-29 NOTE — ANESTHESIA PREPROCEDURE EVALUATION
09/29/2023  Nick De Anda is a 83 y.o., male.      Pre-op Assessment    I have reviewed the Patient Summary Reports.     I have reviewed the Nursing Notes. I have reviewed the NPO Status.   I have reviewed the Medications.     Review of Systems  Anesthesia Hx:  No problems with previous Anesthesia  Denies Family Hx of Anesthesia complications.   Denies Personal Hx of Anesthesia complications.   Social:  No Alcohol Use    Cardiovascular:   Hypertension CAD      Pulmonary:   COPD    Endocrine:   Diabetes        Physical Exam  General: Well nourished and Alert    Airway:  Mallampati: II   Mouth Opening: Normal  Tongue: Normal    Dental:  Intact        Anesthesia Plan  Type of Anesthesia, risks & benefits discussed:    Anesthesia Type: MAC  Intra-op Monitoring Plan: Standard ASA Monitors  Post Op Pain Control Plan: multimodal analgesia  Induction:  IV  Informed Consent: Informed consent signed with the Patient and all parties understand the risks and agree with anesthesia plan.  All questions answered.   ASA Score: 3  Anesthesia Plan Notes: IV propofol for local by surgeon  Normal EKG      Ready For Surgery From Anesthesia Perspective.     .

## 2023-09-29 NOTE — ANESTHESIA POSTPROCEDURE EVALUATION
Anesthesia Post Evaluation    Patient: Nick De Anda    Procedure(s) Performed: Procedure(s) (LRB):  RELEASE, CARPAL TUNNEL / RIGHT (Right)  EXCISION, GANGLION CYST, WRIST / RIGHT (Right)    Final Anesthesia Type: MAC      Patient location during evaluation: [facility]  Patient participation: Yes- Able to Participate  Level of consciousness: awake and alert  Post-procedure vital signs: reviewed and stable  Pain management: adequate  Airway patency: patent    PONV status at discharge: No PONV  Anesthetic complications: no      Cardiovascular status: blood pressure returned to baseline and hemodynamically stable  Respiratory status: unassisted, spontaneous ventilation and room air  Hydration status: euvolemic  Follow-up not needed.          Vitals Value Taken Time   /75 09/29/23 0740   Temp 36.7 °C (98.1 °F) 09/29/23 0740   Pulse 54 09/29/23 0740   Resp 20 09/29/23 0740   SpO2 96 % 09/29/23 0740         No case tracking events are documented in the log.      Pain/Laurence Score: Pain Rating Prior to Med Admin: 0 (9/29/2023  7:22 AM)

## 2023-09-29 NOTE — PLAN OF CARE
Nick De Anda has met all discharge criteria from Phase II. Vital Signs are stable, ambulating  without difficulty. Discharge instructions given, patient verbalized understanding. Discharged from facility via wheelchair in stable condition.

## 2023-10-02 VITALS
TEMPERATURE: 98 F | RESPIRATION RATE: 16 BRPM | OXYGEN SATURATION: 97 % | SYSTOLIC BLOOD PRESSURE: 140 MMHG | DIASTOLIC BLOOD PRESSURE: 61 MMHG | HEART RATE: 48 BPM | HEIGHT: 76 IN | WEIGHT: 260 LBS | BODY MASS INDEX: 31.66 KG/M2

## 2023-10-02 DIAGNOSIS — I10 ESSENTIAL HYPERTENSION: ICD-10-CM

## 2023-10-02 RX ORDER — HYDROCHLOROTHIAZIDE 12.5 MG/1
12.5 CAPSULE ORAL DAILY
Qty: 90 CAPSULE | Refills: 0 | Status: SHIPPED | OUTPATIENT
Start: 2023-10-02 | End: 2024-01-02

## 2023-10-02 NOTE — TELEPHONE ENCOUNTER
Refill Routing Note   Medication(s) are not appropriate for processing by [facility] for the following reason(s):      Required labs outdated  Required vitals abnormal  Responsible provider unclear    [facility] action(s):  Defer Care Due:  None identified            Appointments  past 12m or future 3m with PCP    Date Provider   Last Visit   1/13/2023 Santiago Adler MD   Next Visit   Visit date not found Santiago Adler MD   ED visits in past 90 days: 0        Note composed:11:30 AM 10/02/2023

## 2023-10-11 LAB
COMMENT: NORMAL
FINAL PATHOLOGIC DIAGNOSIS: NORMAL
GROSS: NORMAL
Lab: NORMAL
MICROSCOPIC EXAM: NORMAL

## 2023-10-16 ENCOUNTER — OFFICE VISIT (OUTPATIENT)
Dept: OTOLARYNGOLOGY | Facility: CLINIC | Age: 83
End: 2023-10-16
Payer: MEDICARE

## 2023-10-16 VITALS — DIASTOLIC BLOOD PRESSURE: 64 MMHG | SYSTOLIC BLOOD PRESSURE: 117 MMHG | HEART RATE: 91 BPM

## 2023-10-16 DIAGNOSIS — C32.9 LARYNGEAL CANCER: ICD-10-CM

## 2023-10-16 DIAGNOSIS — R49.0 DYSPHONIA: Primary | ICD-10-CM

## 2023-10-16 PROCEDURE — 31579 PR LARYNGOSCOPY, FLEX/RIGID TELESCOPIC, W/STROBOSCOPY: ICD-10-PCS | Mod: S$GLB,,, | Performed by: OTOLARYNGOLOGY

## 2023-10-16 PROCEDURE — 3074F SYST BP LT 130 MM HG: CPT | Mod: CPTII,S$GLB,, | Performed by: OTOLARYNGOLOGY

## 2023-10-16 PROCEDURE — 1125F AMNT PAIN NOTED PAIN PRSNT: CPT | Mod: CPTII,S$GLB,, | Performed by: OTOLARYNGOLOGY

## 2023-10-16 PROCEDURE — 99999 PR PBB SHADOW E&M-EST. PATIENT-LVL III: ICD-10-PCS | Mod: PBBFAC,,, | Performed by: OTOLARYNGOLOGY

## 2023-10-16 PROCEDURE — 3074F PR MOST RECENT SYSTOLIC BLOOD PRESSURE < 130 MM HG: ICD-10-PCS | Mod: CPTII,S$GLB,, | Performed by: OTOLARYNGOLOGY

## 2023-10-16 PROCEDURE — 31579 LARYNGOSCOPY TELESCOPIC: CPT | Mod: S$GLB,,, | Performed by: OTOLARYNGOLOGY

## 2023-10-16 PROCEDURE — 99213 PR OFFICE/OUTPT VISIT, EST, LEVL III, 20-29 MIN: ICD-10-PCS | Mod: 25,S$GLB,, | Performed by: OTOLARYNGOLOGY

## 2023-10-16 PROCEDURE — 3078F DIAST BP <80 MM HG: CPT | Mod: CPTII,S$GLB,, | Performed by: OTOLARYNGOLOGY

## 2023-10-16 PROCEDURE — 99213 OFFICE O/P EST LOW 20 MIN: CPT | Mod: 25,S$GLB,, | Performed by: OTOLARYNGOLOGY

## 2023-10-16 PROCEDURE — 1125F PR PAIN SEVERITY QUANTIFIED, PAIN PRESENT: ICD-10-PCS | Mod: CPTII,S$GLB,, | Performed by: OTOLARYNGOLOGY

## 2023-10-16 PROCEDURE — 1159F PR MEDICATION LIST DOCUMENTED IN MEDICAL RECORD: ICD-10-PCS | Mod: CPTII,S$GLB,, | Performed by: OTOLARYNGOLOGY

## 2023-10-16 PROCEDURE — 1159F MED LIST DOCD IN RCRD: CPT | Mod: CPTII,S$GLB,, | Performed by: OTOLARYNGOLOGY

## 2023-10-16 PROCEDURE — 1160F PR REVIEW ALL MEDS BY PRESCRIBER/CLIN PHARMACIST DOCUMENTED: ICD-10-PCS | Mod: CPTII,S$GLB,, | Performed by: OTOLARYNGOLOGY

## 2023-10-16 PROCEDURE — 99999 PR PBB SHADOW E&M-EST. PATIENT-LVL III: CPT | Mod: PBBFAC,,, | Performed by: OTOLARYNGOLOGY

## 2023-10-16 PROCEDURE — 1160F RVW MEDS BY RX/DR IN RCRD: CPT | Mod: CPTII,S$GLB,, | Performed by: OTOLARYNGOLOGY

## 2023-10-16 PROCEDURE — 3078F PR MOST RECENT DIASTOLIC BLOOD PRESSURE < 80 MM HG: ICD-10-PCS | Mod: CPTII,S$GLB,, | Performed by: OTOLARYNGOLOGY

## 2023-10-16 NOTE — PROGRESS NOTES
[facility]  Department of Otorhinolaryngology and Communication Sciences    Subjective:      Nick De Anda is a 83 y.o. male who presents for follow-up. He has glottic dysplasia.    9/28/2021: SML KTP assisted excision right TVF lesion.  1. BIOPSY OF RIGHT FALSE VOCAL FOLD:   MODERATE NONSPECIFIC SUBMUCOSAL CHRONIC INFLAMMATION   NO NEOPLASIA IDENTIFIED   2. BIOPSY OF VENTRICLE:   NO SIGNIFICANT HISTOLOGIC ALTERATION   NO NEOPLASIA IDENTIFIED   3. BIOPSY OF RIGHT TRUE VOCAL FOLD:   NO CARCINOMA IDENTIFIED   FOCAL MODERATE SQUAMOUS MUCOSAL DYSPLASIA    He continues to do well.  His voice overall remains good, but it does get weak sometimes with prolonged use. He denies dysphagia, throat pain, odynophagia, otalgia, hemoptysis, hematemesis, neck mass.     The patient's medications, allergies, past medical, surgical, social and family histories were reviewed and updated as appropriate.    A detailed review of systems was obtained with pertinent positives as per the above HPI, and otherwise negative.      Objective:     /64 (BP Location: Left forearm, Patient Position: Sitting, BP Method: Large (Automatic))   Pulse 91   Constitutional: comfortable, well dressed  Psychiatric: appropriate affect  Respiratory: comfortably breathing, symmetric chest rise, no stridor  Voice: minimal variable roughness/strain  Head: normocephalic  Eyes: conjunctivae and sclerae clear  Indirect laryngoscopy is limited due to gag    Procedure  Rigid Laryngeal Videostroboscopy (99156): Laryngeal videostroboscopy is indicated to assess the laryngeal vibratory biomechanics and vocal fold oscillation, which cannot be assessed with a plain light examination. This was carried out with a 70 degree endoscope. After verbal consent was obtained, the patient was positioned and the tongue was gently secured with a gauze sponge. The endoscope was passed transorally and positioned to image the larynx and hypopharynx in detail. The  following features were examined: laryngeal and hypopharyngeal masses; vocal fold range and symmetry of motion; laryngeal mucosal edema, erythema, inflammation, and hydration; salivary pooling; and gross laryngeal sensation. During phonation, the vocal folds were assessed for glottal closure; mucosal wave; vocal fold lesions; vibratory periodicity, amplitude, and phase symmetry; and vertical height match. The equipment was removed. The patient tolerated the procedure well without complication. 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 male with glottic dysplasia, progressing well following microsurgical intervention.     Plan:     Options discussed included continued surveillance versus procedural/surgical treatment of the residual/recurrent leukoplakia.  Together we decided on continued surveillance.  He will follow up with me in 4 months, or sooner if needed.    All questions were answered, and the patient is in agreement with the plan.    Rommel Stubbs M.D.  [facility]  Department of Otorhinolaryngology and Communication Sciences

## 2023-10-18 ENCOUNTER — PATIENT MESSAGE (OUTPATIENT)
Dept: CARDIOLOGY | Facility: CLINIC | Age: 83
End: 2023-10-18
Payer: MEDICARE

## 2023-10-31 DIAGNOSIS — Z98.61 POST PTCA: ICD-10-CM

## 2023-10-31 RX ORDER — CLOPIDOGREL BISULFATE 75 MG/1
TABLET ORAL
Qty: 90 TABLET | Refills: 3 | Status: SHIPPED | OUTPATIENT
Start: 2023-10-31

## 2023-11-01 DIAGNOSIS — G89.29 CHRONIC MIDLINE LOW BACK PAIN WITH RIGHT-SIDED SCIATICA: ICD-10-CM

## 2023-11-01 DIAGNOSIS — M54.41 CHRONIC MIDLINE LOW BACK PAIN WITH RIGHT-SIDED SCIATICA: ICD-10-CM

## 2023-11-01 RX ORDER — METHOCARBAMOL 500 MG/1
TABLET, FILM COATED ORAL
Qty: 30 TABLET | Refills: 1 | Status: SHIPPED | OUTPATIENT
Start: 2023-11-01

## 2023-11-01 NOTE — TELEPHONE ENCOUNTER
Refill Routing Note   Medication(s) are not appropriate for processing by [facility] for the following reason(s):      Non-participating provider    [facility] action(s):  Route Care Due:  None identified              Appointments  past 12m or future 3m with PCP    Date Provider   Last Visit   7/28/2023 Elma Gonzalez NP   Next Visit   Visit date not found Elma Gonzalez NP   ED visits in past 90 days: 0        Note composed:9:42 AM 11/01/2023

## 2023-11-07 ENCOUNTER — TELEPHONE (OUTPATIENT)
Dept: CARDIOLOGY | Facility: CLINIC | Age: 83
End: 2023-11-07
Payer: MEDICARE

## 2023-11-07 DIAGNOSIS — E11.9 TYPE 2 DIABETES MELLITUS WITHOUT COMPLICATION, WITHOUT LONG-TERM CURRENT USE OF INSULIN: ICD-10-CM

## 2023-11-07 DIAGNOSIS — E78.5 DYSLIPIDEMIA: ICD-10-CM

## 2023-11-07 DIAGNOSIS — I10 ESSENTIAL HYPERTENSION: ICD-10-CM

## 2023-11-07 DIAGNOSIS — D69.6 THROMBOCYTOPENIA: ICD-10-CM

## 2023-11-07 DIAGNOSIS — I25.10 CORONARY ARTERY DISEASE INVOLVING NATIVE CORONARY ARTERY OF NATIVE HEART WITHOUT ANGINA PECTORIS: Primary | ICD-10-CM

## 2023-11-08 ENCOUNTER — TELEPHONE (OUTPATIENT)
Dept: PODIATRY | Facility: CLINIC | Age: 83
End: 2023-11-08
Payer: MEDICARE

## 2023-11-08 ENCOUNTER — NURSE TRIAGE (OUTPATIENT)
Dept: ADMINISTRATIVE | Facility: CLINIC | Age: 83
End: 2023-11-08
Payer: MEDICARE

## 2023-11-08 NOTE — TELEPHONE ENCOUNTER
Pt calling for c/o stubbed toe last night and it is bruised and has bleeding under the nail and then on the other toe the nail is hanging on. Pt said that he is on Xarelto and it starts bleeding every time he moves or tries to clean it. Pt has a podiatrist and is trying to make an appt. Pt triaged and care advice to be seen In office today. Pt is trying to make appt. I reached out to office and they are trying to make appt for today. Pt doesn't want to have to go to the ED              Reason for Disposition   MODERATE-SEVERE pain and blood present under the nail (usually > 50% of nail bed)    Additional Information   Negative: Major bleeding (actively dripping or spurting) that can't be stopped   Negative: Amputation of toe   Negative: Sounds like a life-threatening emergency to the triager   Negative: High pressure injection injury (e.g., from paint gun, usually work-related   Negative: Skin is split open or gaping (length > 1/2 inch or 12 mm)   Negative: Looks like a broken bone or dislocated joint (e.g., crooked or deformed)   Negative: Bleeding won't stop after 10 minutes of direct pressure (using correct technique)   Negative: Dirt in the wound and not removed after 15 minutes of scrubbing   Negative: Sounds like a serious injury to the triager   Negative: Toenail is completely torn off   Negative: Looks infected (e.g., spreading redness, red streak, pus)   Negative: Base of toenail has popped out from under skin fold   Negative: SEVERE pain (e.g., excruciating)    Protocols used: Toe Injury-A-OH

## 2023-11-08 NOTE — TELEPHONE ENCOUNTER
Spoke with patient to help with scheduling appointment for toe injury. Patient states he understands appointment date and time.

## 2023-11-09 ENCOUNTER — OFFICE VISIT (OUTPATIENT)
Dept: PODIATRY | Facility: CLINIC | Age: 83
End: 2023-11-09
Payer: MEDICARE

## 2023-11-09 VITALS
BODY MASS INDEX: 32.62 KG/M2 | DIASTOLIC BLOOD PRESSURE: 64 MMHG | HEIGHT: 76 IN | SYSTOLIC BLOOD PRESSURE: 122 MMHG | WEIGHT: 267.88 LBS | HEART RATE: 67 BPM

## 2023-11-09 DIAGNOSIS — S91.209A TRAUMATIC AVULSION OF NAIL PLATE OF TOE, INITIAL ENCOUNTER: ICD-10-CM

## 2023-11-09 DIAGNOSIS — S90.229A SUBUNGUAL HEMATOMA OF LESSER TOE: ICD-10-CM

## 2023-11-09 DIAGNOSIS — S99.922A FOOT TRAUMA, LEFT, INITIAL ENCOUNTER: ICD-10-CM

## 2023-11-09 DIAGNOSIS — E11.42 DIABETIC POLYNEUROPATHY ASSOCIATED WITH TYPE 2 DIABETES MELLITUS: ICD-10-CM

## 2023-11-09 DIAGNOSIS — Z79.01 LONG TERM (CURRENT) USE OF ANTICOAGULANTS: Primary | ICD-10-CM

## 2023-11-09 PROCEDURE — 3074F PR MOST RECENT SYSTOLIC BLOOD PRESSURE < 130 MM HG: ICD-10-PCS | Mod: CPTII,S$GLB,, | Performed by: PODIATRIST

## 2023-11-09 PROCEDURE — 11730 NAIL REMOVAL: ICD-10-PCS | Mod: T1,S$GLB,, | Performed by: PODIATRIST

## 2023-11-09 PROCEDURE — 1159F PR MEDICATION LIST DOCUMENTED IN MEDICAL RECORD: ICD-10-PCS | Mod: CPTII,S$GLB,, | Performed by: PODIATRIST

## 2023-11-09 PROCEDURE — 3074F SYST BP LT 130 MM HG: CPT | Mod: CPTII,S$GLB,, | Performed by: PODIATRIST

## 2023-11-09 PROCEDURE — 99999 PR PBB SHADOW E&M-EST. PATIENT-LVL III: ICD-10-PCS | Mod: PBBFAC,,, | Performed by: PODIATRIST

## 2023-11-09 PROCEDURE — 99214 PR OFFICE/OUTPT VISIT, EST, LEVL IV, 30-39 MIN: ICD-10-PCS | Mod: 25,S$GLB,, | Performed by: PODIATRIST

## 2023-11-09 PROCEDURE — 3078F PR MOST RECENT DIASTOLIC BLOOD PRESSURE < 80 MM HG: ICD-10-PCS | Mod: CPTII,S$GLB,, | Performed by: PODIATRIST

## 2023-11-09 PROCEDURE — 99214 OFFICE O/P EST MOD 30 MIN: CPT | Mod: 25,S$GLB,, | Performed by: PODIATRIST

## 2023-11-09 PROCEDURE — 1126F AMNT PAIN NOTED NONE PRSNT: CPT | Mod: CPTII,S$GLB,, | Performed by: PODIATRIST

## 2023-11-09 PROCEDURE — 3078F DIAST BP <80 MM HG: CPT | Mod: CPTII,S$GLB,, | Performed by: PODIATRIST

## 2023-11-09 PROCEDURE — 11730 AVULSION NAIL PLATE SIMPLE 1: CPT | Mod: T1,S$GLB,, | Performed by: PODIATRIST

## 2023-11-09 PROCEDURE — 1126F PR PAIN SEVERITY QUANTIFIED, NO PAIN PRESENT: ICD-10-PCS | Mod: CPTII,S$GLB,, | Performed by: PODIATRIST

## 2023-11-09 PROCEDURE — 1160F PR REVIEW ALL MEDS BY PRESCRIBER/CLIN PHARMACIST DOCUMENTED: ICD-10-PCS | Mod: CPTII,S$GLB,, | Performed by: PODIATRIST

## 2023-11-09 PROCEDURE — 1159F MED LIST DOCD IN RCRD: CPT | Mod: CPTII,S$GLB,, | Performed by: PODIATRIST

## 2023-11-09 PROCEDURE — 1160F RVW MEDS BY RX/DR IN RCRD: CPT | Mod: CPTII,S$GLB,, | Performed by: PODIATRIST

## 2023-11-09 PROCEDURE — 99999 PR PBB SHADOW E&M-EST. PATIENT-LVL III: CPT | Mod: PBBFAC,,, | Performed by: PODIATRIST

## 2023-11-09 RX ORDER — TIMOLOL MALEATE 5 MG/ML
1 SOLUTION/ DROPS OPHTHALMIC 2 TIMES DAILY
COMMUNITY
Start: 2023-11-04 | End: 2023-12-04

## 2023-11-09 RX ORDER — TIMOLOL MALEATE 6.8 MG/ML
SOLUTION/ DROPS OPHTHALMIC
COMMUNITY
Start: 2023-07-24

## 2023-11-09 NOTE — PROCEDURES
Nail Removal    Date/Time: 11/9/2023 11:00 AM    Performed by: Silvia Parra DPM  Authorized by: Silvia Parra DPM    Consent Done?:  Yes (Verbal)  Time out: Immediately prior to the procedure a time out was called    Prep: patient was prepped and draped in usual sterile fashion    Location:     Location:  Left foot    Location detail:  Left second toe  Anesthesia:     Anesthesia:  Local infiltration (none needed due to neuropathy)    Anesthetic total (ml):  0  Procedure Details:     Preparation:  Skin prepped with alcohol    Amount removed:  Complete    Wedge excision of skin of nail fold: No      Nail bed sutured?: No      Nail matrix removed:  None    Removed nail replaced and anchored: No      Dressing applied:  4x4, antibiotic ointment and gauze roll    Patient tolerance:  Patient tolerated the procedure well with no immediate complications     Silvernitrate applied    Obtained written consent for non-permanent removal of left 2nd toe nail plate borders. Skin prepped with alcohol.  No  digital nerve block needed due to neuropathy. Nail folds were freed from the offending nail borders followed by excision of nail using an hemostat. No remaining nail spicule noted. No laceration to nail bed. + oozing but no active bleeding.  Wound site irrigated with NS, Triple antibiotic cream was applied then covered with gauze and secured with coban.

## 2023-11-09 NOTE — PROGRESS NOTES
Subjective:      Patient ID: Nick De Anda is a 83 y.o. male.    Chief Complaint:   Toe Injury (2nd and 3rd toe) and Diabetes Mellitus (PCP- 07/28/2023/Elma Gonzalez NP)    Nick is a 83 y.o. male who returns to the clinic for evaluation and treatment of high risk feet. 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  This patient has documented high risk feet requiring routine maintenance secondary to peripheral neuropathy.     Pt relates hit his foot Tuesday... wearing slippers. Bled but not bad. + pain 2nd toe. That night took a bath and noted blood after trying to trim the loose nail  No fever/chills/    + xerolto and plavix.   Changing dressing/bleeding/ointment      ** pt is hard of hearing.       PCP: Elma Gonzalez NP    Date Last Seen by PCP: 7/28/23    Current shoe gear:  Affected Foot: Extra depth shoes with custome accommodative insoles     Unaffected Foot: Extra depth shoes with custome accommodative insoles    Hemoglobin A1C   Date Value Ref Range Status   07/05/2023 5.6 4.7 - 5.6 % Final   06/13/2022 5.3 4.0 - 5.6 % Final     Comment:     ADA Screening Guidelines:  5.7-6.4%  Consistent with prediabetes  >or=6.5%  Consistent with diabetes    High levels of fetal hemoglobin interfere with the HbA1C  assay. Heterozygous hemoglobin variants (HbS, HgC, etc)do  not significantly  interfere with this assay.   However, presence of multiple variants may affect accuracy.     09/17/2021 5.3 4.0 - 5.6 % Final     Comment:     ADA Screening Guidelines:  5.7-6.4%  Consistent with prediabetes  >or=6.5%  Consistent with diabetes    High levels of fetal hemoglobin interfere with the HbA1C  assay. Heterozygous hemoglobin variants (HbS, HgC, etc)do  not significantly interfere with this assay.   However, presence of multiple variants may affect accuracy.     04/15/2021 5.9 (H) 4.0 - 5.6 % Final     Comment:     ADA Screening Guidelines:  5.7-6.4%  Consistent with prediabetes  >or=6.5%  Consistent with diabetes    High levels of fetal hemoglobin interfere with the HbA1C  assay. Heterozygous hemoglobin variants (HbS, HgC, etc)do  not significantly interfere with this assay.   However, presence of multiple variants may affect accuracy.          Past Medical History:   Diagnosis Date    Anticoagulant long-term use     Arthritis     Cancer     skin    Carcinoma larynx 01/16/2023    CHF (congestive heart failure)     COPD (chronic obstructive pulmonary disease)     Coronary artery disease     Cough, persistent 03/30/2020    Diabetes mellitus     diet    Diabetes mellitus, type 2     Dyslipidemia 02/13/2019    Encounter for blood transfusion     Encounter for monitoring sotalol therapy 08/01/2019    History of staphylococcal infection 2012    after CABG    History of UTI 02/02/2019    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    Hypertension     Hyponatremia 02/02/2019    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    Persistent atrial fibrillation 02/02/2019    Post PTCA 02/13/2019     Past Surgical History:   Procedure Laterality Date    ABLATION OF ARRHYTHMOGENIC FOCUS FOR ATRIAL FIBRILLATION N/A 12/02/2020    Procedure: Ablation atrial fibrillation;  Surgeon: Yung Paez MD;  Location: [facility] EP LAB;  Service: Cardiology;  Laterality: N/A;  PAF, AFl, DEANGELO (Cx if SR), PVI, CTI, RFA, Carto, Gen, [place], 3 Prep    ABLATION OF  ARRHYTHMOGENIC FOCUS FOR ATRIAL FIBRILLATION N/A 12/08/2021    Procedure: Ablation atrial fibrillation;  Surgeon: Yung Paez MD;  Location: [facility] EP LAB;  Service: Cardiology;  Laterality: N/A;  AF/AFL (atypical), DEANGELO (Cx if SR), PVI re-do, RFA for Atypical AFL, Carto, Gen, [place], 3 Prep    CARDIAC SURGERY      cabg    CARPAL TUNNEL RELEASE Right 9/29/2023    Procedure: RELEASE, CARPAL TUNNEL;  Surgeon: Williams, Claude S. IV, MD;  Location: [facility] OR;  Service: Orthopedics;  Laterality: Right;    CORONARY ARTERY BYPASS GRAFT      CORONARY STENT PLACEMENT      CYSTOSCOPY      prostate infection    EXCISION OF GANGLION OF WRIST Right 9/29/2023    Procedure: EXCISION, GANGLION CYST, WRIST;  Surgeon: Williams, Claude S. IV, MD;  Location: [facility] OR;  Service: Orthopedics;  Laterality: Right;    FOOT SURGERY      RECTOPERITONEAL FISTULA CLOSURE      SHOULDER SURGERY      Triple bypass      VASECTOMY      VOCAL FOLD LESION EXCISION N/A 09/28/2021    Procedure: EXCISION, LESION, VOCAL CORD;  Surgeon: Rommel Stubbs MD;  Location: [address] FLR;  Service: ENT;  Laterality: N/A;     Current Outpatient Medications on File Prior to Visit   Medication Sig Dispense Refill    albuterol (PROVENTIL HFA) 90 mcg/actuation inhaler Inhale 2 puffs into the lungs every 6 (six) hours as needed for Wheezing. Rescue 85 g 4    ammonium lactate (LAC-HYDRIN) 12 % lotion Apply topically every evening. Hands and arms and then later scalp, forehead, temples 225 g 4    ascorbic acid, vitamin C, (VITAMIN C) 100 MG tablet Take 500 mg by mouth once daily.      atorvastatin (LIPITOR) 80 MG tablet TAKE 1 TABLET BY MOUTH ONCE DAILY IN THE EVENING 90 tablet 3    blood sugar diagnostic Strp To check BG 2 times daily, to use with insurance preferred meter 100 strip 5    clopidogreL (PLAVIX) 75 mg tablet Take 1 tablet by mouth once daily 90 tablet 3    hydroCHLOROthiazide (MICROZIDE) 12.5 mg capsule Take 1 capsule (12.5 mg total) by mouth once daily. 90  capsule 0    irbesartan (AVAPRO) 300 MG tablet TAKE 1 TABLET BY MOUTH ONCE DAILY IN THE EVENING 90 tablet 3    lancets Misc To check BG 2 times daily, to use with insurance preferred meter 100 each 5    metFORMIN (GLUCOPHAGE) 500 MG tablet TAKE 1 TABLET BY MOUTH TWICE DAILY WITH MEALS 180 tablet 0    methocarbamoL (ROBAXIN) 500 MG Tab Take 1 tablet by mouth twice daily as needed for muscle spasm 30 tablet 1    metoprolol tartrate (LOPRESSOR) 25 MG tablet TAKE 1 TABLET BY MOUTH ONCE DAILY AS NEEDED FOR  PALPITATIONS 90 tablet 1    multivitamin (THERAGRAN) per tablet Take 1 tablet by mouth once daily.      nystatin (MYCOSTATIN) cream Apply topically 2 (two) times daily. groin 60 g 6    nystatin (MYCOSTATIN) powder Apply topically 3 (three) times daily. Prn groin 60 g 5    tamsulosin (FLOMAX) 0.4 mg Cap Take 1 capsule (0.4 mg total) by mouth once daily. 90 capsule 3    timoloL (BETIMOL) 0.5 % ophthalmic solution       timoloL maleate (ISTALOL/TIMOLOL) 0.5 % DrpD       timolol maleate 0.5% (TIMOPTIC) 0.5 % Drop Place 1 drop into both eyes 2 (two) times daily.      XARELTO 20 mg Tab TAKE 1 TABLET BY MOUTH ONCE DAILY IN THE EVENING 90 tablet 3    blood-glucose meter kit To check BG 2 times daily, to use with insurance preferred meter 1 each 0    diazePAM (VALIUM) 10 MG Tab Take 1 tablet (10 mg total) by mouth as needed (anxiety). Just half to one and use very seldom-get refills from PCP 30 tablet 3    nitroGLYCERIN (NITROSTAT) 0.4 MG SL tablet Place 1 tablet (0.4 mg total) under the tongue every 5 (five) minutes as needed for Chest pain. 25 tablet 3     Current Facility-Administered Medications on File Prior to Visit   Medication Dose Route Frequency Provider Last Rate Last Admin    0.9%  NaCl infusion   Intravenous Continuous Rebecca Dickinson NP   Stopped at 12/02/20 1636    lactated ringers infusion   Intravenous Continuous Tyler Arana MD        LIDOcaine (PF) 10 mg/ml (1%) injection 5 mg  0.5 mL  "Intradermal Once Tyler Araan MD        sodium chloride 0.9% bolus 1,000 mL  1,000 mL Intravenous Once Winnie Larkin, PARISA        sodium chloride 0.9% flush 10 mL  10 mL Intravenous PRN DELICIA Jaramillo MD        sodium chloride 0.9% flush 5 mL  5 mL Intravenous PRN Rebecca Dickinson NP         Review of patient's allergies indicates:   Allergen Reactions    Penicillins Anaphylaxis     Can take keflex    Hydrocodone      Causes constipation    Amoxicillin Swelling and Rash       Review of Systems   Constitutional: Negative for chills, decreased appetite, fever, malaise/fatigue, night sweats, weight gain and weight loss.   Cardiovascular:  Positive for dyspnea on exertion and leg swelling. Negative for chest pain, claudication, palpitations and syncope.   Respiratory:  Negative for cough and shortness of breath.    Endocrine: Negative for cold intolerance and heat intolerance.   Hematologic/Lymphatic: Negative for bleeding problem. Bruises/bleeds easily.   Skin:  Positive for nail changes. Negative for color change, dry skin, flushing, itching, poor wound healing, rash, skin cancer, suspicious lesions and unusual hair distribution.   Musculoskeletal:  Positive for arthritis, back pain and stiffness. Negative for falls, gout, joint pain, joint swelling, muscle cramps, muscle weakness, myalgias and neck pain.   Gastrointestinal:  Negative for diarrhea, nausea and vomiting.   Neurological:  Positive for numbness. Negative for dizziness, focal weakness, light-headedness, paresthesias, tremors, vertigo and weakness.   Psychiatric/Behavioral:  Negative for altered mental status and depression. The patient does not have insomnia.    Allergic/Immunologic: Negative.            Objective:       Vitals:    11/09/23 1102   BP: 122/64   Pulse: 67   Weight: 121.5 kg (267 lb 13.7 oz)   Height: 6' 4" (1.93 m)   PainSc: 0-No pain   121.5 kg (267 lb 13.7 oz)     Physical Exam  Vitals reviewed.   Constitutional:     "   General: He is not in acute distress.     Appearance: He is well-developed. He is not ill-appearing, toxic-appearing or diaphoretic.   Cardiovascular:      Pulses:           Dorsalis pedis pulses are 1+ on the right side and 1+ on the left side.        Posterior tibial pulses are 2+ on the right side and 2+ on the left side.      Comments: Decreased hair growth to lower shins with evidence of chronic venous stasis dz.   Musculoskeletal:         General: No tenderness.      Right lower le+ Edema present.      Left lower le+ Edema present.      Right foot: Decreased range of motion. Deformity, bunion and prominent metatarsal heads present. No tenderness or bony tenderness.      Left foot: Decreased range of motion. Deformity, bunion and prominent metatarsal heads present. No tenderness or bony tenderness.      Comments: Flexible pes planus foot type w/ medial arch collapse and mild gastroc equinus      Semi-Reducible and non-reducable extensor and flexor contractures at the MTPJ and PIPJ of toes 2-5, bilat.      Hallux malleus contractures with mild lateral deviation    No pain on palpation or with range of motion           Feet:      Right foot:      Protective Sensation: 10 sites tested.  0 sites sensed.      Skin integrity: No ulcer, blister, skin breakdown, erythema, warmth or callus.      Toenail Condition: Right toenails are abnormally thick and long.      Left foot:      Protective Sensation: 10 sites tested.  0 sites sensed.      Skin integrity: No ulcer, blister, skin breakdown, erythema, warmth or callus.      Toenail Condition: Left toenails are abnormally thick and long.      Comments: + dried blook to left 2nd/3rd nail beds with left 2nd nail lysis. No acute soi. No laceration or active bleeding.   + eccymosis to dorsal 2nd digit    Skin:     General: Skin is warm.      Capillary Refill: Capillary refill takes 2 to 3 seconds.      Coloration: Skin is pale.      Findings: No erythema.      Nails:  There is no clubbing.   Neurological:      Mental Status: He is alert and oriented to person, place, and time.      Gait: Gait abnormal.   Psychiatric:         Attention and Perception: Attention normal.         Mood and Affect: Mood normal.         Speech: Speech normal.         Behavior: Behavior normal.         Thought Content: Thought content normal.         Cognition and Memory: Cognition normal.         Judgment: Judgment normal.      Comments: ** hard of hearing               Assessment:       Encounter Diagnoses   Name Primary?    Long term (current) use of anticoagulants Yes    Diabetic polyneuropathy associated with type 2 diabetes mellitus     Foot trauma, left, initial encounter     Subungual hematoma of lesser toe     Traumatic avulsion of nail plate of toe, initial encounter              Plan:       Nick was seen today for toe injury and diabetes mellitus.    Diagnoses and all orders for this visit:    Long term (current) use of anticoagulants    Diabetic polyneuropathy associated with type 2 diabetes mellitus    Foot trauma, left, initial encounter  -     Nail Removal    Subungual hematoma of lesser toe  -     Nail Removal    Traumatic avulsion of nail plate of toe, initial encounter  -     Nail Removal          I counseled the patient on his conditions, their implications and medical management.     Going to get fitted for new dm shoes   - - Shoe inspection. Diabetic Foot Education. Patient reminded of the importance of good nutrition and blood sugar control to help prevent podiatric complications of diabetes. Patient instructed on proper foot hygeine. We discussed wearing proper shoe gear, daily foot inspections, never walking without protective shoe gear, never putting sharp instruments to feet, routine podiatric nail visits every 2-3 months.      - With patient's permission, nails were aggressively reduced and debrided x  6 to their soft tissue attachment mechanically   removing all offending  nail and debris. Patient relates relief following the procedure. He will continue to monitor the areas daily, inspect his feet, wear protective shoe gear when ambulatory, moisturizer to maintain skin integrity and follow in this office in approximately 2-3 months, sooner p.r.n.        Tolerated nail procedure well    D/w pt wound care

## 2023-11-13 NOTE — PROGRESS NOTES
Mr. De Anda is a patient of Dr. Paez and was last seen in clinic 5/9/2023.      Subjective:   Patient ID:  Nick De Anda is an 83 y.o. male who presents for follow up of Atrial Fibrillation  .     HPI:    Mr. De Anda is an 83 y.o. male with CAD (CABG), DM, HTN, pAF (PVI 10/2020, 12/2021), AFL (RFA CTI 10/2020) here for follow up.    Background:    Primary Cardiologist: Robert Alas MD  Primary Care Physician: Santiago Adler MD    Mr. De Anda has a hx of coronary artery disease s/p CABG with preserved LVEF, type 2 diabetes, hypertension and recently discovered paroxysmal atrial fibrillation and typical atrial flutter. He recently transferred care from [facility] where his AF was diagnosed and he was initiated on xarelto. He was recently admitted for a symptomatic UTI and observed to have a paroxysm of atrial fibrillation with ventricular rates in the 40s-60s (slower rates appeared to correlate during overnight hours). He monitors his vitals at home and for a while notes it indicates he has an irregular heart beat however he feels normal. He has established care with Dr. Dean in cardiology on 2/13/2019 and was noted to be in atrial fibrillation. He has no obvious symptoms. I reviewed all available electrocardiograms in Epic which show sinus rhythm on 2/5/2014 and 2/2/2019. An ECG dated 2/3/2019 notes rate controlled atrial fibrillation. When I saw him in clinic 2/2019 he was in sinus rhythm. At that visit since he had no symptoms with AF and was rate controlled when in AF I recommended no aggressive rhythm control strategy and would continue his current management. He was to see me prn.    Mr. De Anda returned for discussion of his AF 6/2019. He reported he believed AF was causing fatigue. Notes episodes every week or so that correlate to being fatigue. He was admitted for DCCV and sotalol initiation. He converted when propofol was administered and the DEANGELO/DCCV was cancelled. He saw Faina Espinosa in  EP clinic 8/1/2019 and was in sinus rhythm. He felt better in sinus rhythm.    Mr. De Anda was seen in clinic 10/2019 and noted paroxysms of AF since starting sotalol. One episode lasted 2 days and another 3 days. He noted his pulse was irregular in this setting. He came in for an ECG which noted sinus rhythm however he felt he had already converted. We discussed increasing dosage to 120mg bid however he declined.    Mr. De Anda presented for routine AF follow-up 7/2020. He reported he continues to have some paroxysms of AF, at times last 1/2 day- 1 day. We discussed PVI, he desired to think about it.    Mr. De Anda returned for follow-up 10/2020. His AF burden had significantly increased and he felt poorly. He noted he was still paroxysmal. He desired ablation.    Mr. De Anda underwent RF-PVI and RFA of the CTI in December of 2020.     3/2021: Saw Faina Espinosa. Reported no arrhythmias. Repeat ECHO noted LVEF of 60%. Sotalol was reduced to 40mg bid.    10/2021: Saw Faina Espinosa. He was in sinus rhythm at that time however ECG from 9/2021 prior to a surgery noted atrial flutter (atypical). He wore a 14 day holter that noted a 6.4% burden of atrial flutter. There were R-R pauses up to 6.2 seconds during flutter while sleeping. He was instructed to stop sotalol.    11/16/2021: He now has recurrent atypical atrial flutter despite sotalol with periods of bradycardia while asleep. This arrhythmia has become symptomatic. He feels significantly better and is in sinus rhythm. He is having symptomatic breakthrough on sotalol. His [id number] score is 5 and long-term anticoagulation is recommended.    12/8/2021:  Successful pulmonary vein RF ablation (reisolation of the LIPV and RSPV)   Successful left atrial posterior wall isolation targeting CFEs   Successful anterior mitral isthmus ablation with bidirectional block for atypical atrial flutter that was observed as an outpatient but unable to be induced   Unsuccessful  difficult lateral mitral isthmus ablation (unable to get bidirectional block and thus created the anterior mitral isthmus ablation lesion set)  Complex modifier requested for extensive ablation and considerable time spent trying to obtain lateral mitral isthmus block.  Discharged on sotalol 40mg BID    2/7/2022: He is 3 mo s/p repeat ablation (reisolation of LIPV and RSPV, PWI, successful RFA of anterior mitral isthmus).   Doing well from a rhythm standpoint, with minimal palpitations on sotalol 40mg BID. EKG with stable intervals. [facility] 5 on xarelto.    5/6/2022: He is now 5 months s/p redo ablation. He is doing well from a rhythm standpoint, with no documented or symptomatic recurrence of arrhythmia since procedure.  He weaned himself off sotalol about 2 months ago and feels wonderful. Very active and exercises regularly. [facility] 5 on xarelto.    11/8/2022: He is 11 months s/p repeat ablation. He says he is feeling well. One episode of irreg HB after drinking a lot of coffee. He took a leftover sotalol and it stopped. Will provide with PRN lopressor instead. He will notify clinic if episodes become more frequent. [facility] 5 on xarelto.     2/2022 PNA    5/9/2023:  Stable from rhythm standpoint. Only brief palps which resolve with PRN metoprolol. [facility] 6 on xarelto.  He has SOB since PNA 2/2023. Following with pulmonology and ENT.    Update (11/14/2023):    Today he says he has not had any palpitations - has not needed his PRN metoprolol. Checks rhythm at home with no documented AF. Continues to have chronic SOB and productive cough - symptoms managed with inhaler and mucinex. Follows with pulmonology. Stopped going to gym over the summer and has gained some weight. Noticed more fatigue, less stamina, more off balance. Plans on restarting. No CP, LH, syncope reported.    He is currently taking xarelto 20mg daily for stroke prophylaxis and denies significant bleeding episodes. He is currently being  treated with lopressor 25mg PRN for palps for HR control.  Kidney function is stable, with a creatinine of 0.8 on 6/13/2022.    I have personally reviewed the patient's EKG today, which shows sinus rhythm with 1st deg AVB at 84bpm. NV interval is 222. QRS is 92. QTc is 458.    Relevant Cardiac Test Results:    2D Echo (3/16/2021):  The left ventricle is normal in size with mild concentric hypertrophy and normal systolic function. The estimated ejection fraction is 60%  Grade II left ventricular diastolic dysfunction.  Normal right ventricular size with normal right ventricular systolic function.  Moderate left atrial enlargement.  Normal central venous pressure (3 mmHg).    Current Outpatient Medications   Medication Sig    albuterol (PROVENTIL HFA) 90 mcg/actuation inhaler Inhale 2 puffs into the lungs every 6 (six) hours as needed for Wheezing. Rescue    ammonium lactate (LAC-HYDRIN) 12 % lotion Apply topically every evening. Hands and arms and then later scalp, forehead, temples    ascorbic acid, vitamin C, (VITAMIN C) 100 MG tablet Take 500 mg by mouth once daily.    atorvastatin (LIPITOR) 80 MG tablet TAKE 1 TABLET BY MOUTH ONCE DAILY IN THE EVENING    blood sugar diagnostic Strp To check BG 2 times daily, to use with insurance preferred meter    blood-glucose meter kit To check BG 2 times daily, to use with insurance preferred meter    clopidogreL (PLAVIX) 75 mg tablet Take 1 tablet by mouth once daily    diazePAM (VALIUM) 10 MG Tab Take 1 tablet (10 mg total) by mouth as needed (anxiety). Just half to one and use very seldom-get refills from PCP    hydroCHLOROthiazide (MICROZIDE) 12.5 mg capsule Take 1 capsule (12.5 mg total) by mouth once daily.    irbesartan (AVAPRO) 300 MG tablet TAKE 1 TABLET BY MOUTH ONCE DAILY IN THE EVENING    lancets Misc To check BG 2 times daily, to use with insurance preferred meter    metFORMIN (GLUCOPHAGE) 500 MG tablet TAKE 1 TABLET BY MOUTH TWICE DAILY WITH MEALS     methocarbamoL (ROBAXIN) 500 MG Tab Take 1 tablet by mouth twice daily as needed for muscle spasm    metoprolol tartrate (LOPRESSOR) 25 MG tablet TAKE 1 TABLET BY MOUTH ONCE DAILY AS NEEDED FOR  PALPITATIONS    multivitamin (THERAGRAN) per tablet Take 1 tablet by mouth once daily.    nitroGLYCERIN (NITROSTAT) 0.4 MG SL tablet Place 1 tablet (0.4 mg total) under the tongue every 5 (five) minutes as needed for Chest pain.    nystatin (MYCOSTATIN) cream Apply topically 2 (two) times daily. groin    nystatin (MYCOSTATIN) powder Apply topically 3 (three) times daily. Prn groin    tamsulosin (FLOMAX) 0.4 mg Cap Take 1 capsule (0.4 mg total) by mouth once daily.    timoloL (BETIMOL) 0.5 % ophthalmic solution     timoloL maleate (ISTALOL/TIMOLOL) 0.5 % DrpD     timolol maleate 0.5% (TIMOPTIC) 0.5 % Drop Place 1 drop into both eyes 2 (two) times daily.    XARELTO 20 mg Tab TAKE 1 TABLET BY MOUTH ONCE DAILY IN THE EVENING     No current facility-administered medications for this visit.     Facility-Administered Medications Ordered in Other Visits   Medication    0.9%  NaCl infusion    lactated ringers infusion    LIDOcaine (PF) 10 mg/ml (1%) injection 5 mg    sodium chloride 0.9% bolus 1,000 mL    sodium chloride 0.9% flush 10 mL    sodium chloride 0.9% flush 5 mL       Review of Systems   Constitutional: Positive for weight gain. Negative for malaise/fatigue.   Cardiovascular:  Negative for chest pain, dyspnea on exertion, irregular heartbeat, leg swelling and palpitations.   Respiratory:  Positive for cough and shortness of breath.    Hematologic/Lymphatic: Negative for bleeding problem.   Skin:  Negative for rash.   Musculoskeletal:  Negative for myalgias.   Gastrointestinal:  Negative for hematemesis, hematochezia and nausea.   Genitourinary:  Negative for hematuria.   Neurological:  Positive for loss of balance. Negative for light-headedness.   Psychiatric/Behavioral:  Negative for altered mental status.   "  Allergic/Immunologic: Negative for persistent infections.       Objective:          BP (!) 149/66   Pulse 84   Ht 6' 4" (1.93 m)   Wt 121.1 kg (266 lb 15.6 oz)   BMI 32.50 kg/m²     Physical Exam  Vitals and nursing note reviewed.   Constitutional:       Appearance: Normal appearance. He is well-developed.   HENT:      Head: Normocephalic.      Nose: Nose normal.   Eyes:      Pupils: Pupils are equal, round, and reactive to light.   Cardiovascular:      Rate and Rhythm: Normal rate and regular rhythm.   Pulmonary:      Effort: No respiratory distress.      Breath sounds: Normal breath sounds.   Musculoskeletal:         General: Normal range of motion.   Skin:     General: Skin is warm and dry.      Findings: No erythema.   Neurological:      Mental Status: He is alert and oriented to person, place, and time.   Psychiatric:         Speech: Speech normal.         Behavior: Behavior normal.           Lab Results   Component Value Date     11/14/2023    K 4.1 11/14/2023    BUN 16 11/14/2023    CREATININE 0.8 11/14/2023    ALT 21 11/14/2023    AST 21 11/14/2023    HGB 13.8 (L) 11/14/2023    HCT 41.6 11/14/2023    HCT 37 12/08/2021    TSH 3.388 11/14/2023    LDLCALC 52.8 (L) 11/14/2023       Recent Labs   Lab 11/25/20  0951 12/01/21  1249   INR 1.1 1.1       Assessment:     1. Paroxysmal atrial fibrillation    2. Typical atrial flutter    3. Essential hypertension    4. Long term (current) use of anticoagulants      Plan:     In summary, Mr. De Anda is an 83 y.o. male with CAD (CABG), DM, HTN, pAF (PVI 10/2020, 12/2021), AFL (RFA CTI 10/2020) here for follow up.  He is now almost 2 years s/p redo ablation. Has done well from a rhythm standpoint, with no documented AF recurrence. He checks rhythm regularly at home.  BP elevated today but has not had his BP meds yet today (was fasting for labs). [facility] 4 on xarelto. Follows with general cardiology. He plans to go back to the gym after the holidays.    Restart " exercise  Continue current meds  RTC 6 mo, sooner if needed    *A copy of this note has been sent to Dr. Paez*    Follow up in about 6 months (around 5/14/2024).    ------------------------------------------------------------------    WILLIAM Bach, NP-C  Cardiac Electrophysiology

## 2023-11-14 ENCOUNTER — HOSPITAL ENCOUNTER (OUTPATIENT)
Dept: CARDIOLOGY | Facility: CLINIC | Age: 83
Discharge: HOME | End: 2023-11-14
Payer: MEDICARE

## 2023-11-14 ENCOUNTER — LAB VISIT (OUTPATIENT)
Dept: LAB | Facility: HOSPITAL | Age: 83
End: 2023-11-14
Attending: INTERNAL MEDICINE
Payer: MEDICARE

## 2023-11-14 ENCOUNTER — TELEPHONE (OUTPATIENT)
Dept: PODIATRY | Facility: CLINIC | Age: 83
End: 2023-11-14
Payer: MEDICARE

## 2023-11-14 ENCOUNTER — OFFICE VISIT (OUTPATIENT)
Dept: ELECTROPHYSIOLOGY | Facility: CLINIC | Age: 83
End: 2023-11-14
Payer: MEDICARE

## 2023-11-14 VITALS
HEIGHT: 76 IN | BODY MASS INDEX: 32.51 KG/M2 | SYSTOLIC BLOOD PRESSURE: 149 MMHG | HEART RATE: 84 BPM | WEIGHT: 267 LBS | DIASTOLIC BLOOD PRESSURE: 66 MMHG

## 2023-11-14 DIAGNOSIS — I48.0 PAROXYSMAL ATRIAL FIBRILLATION: ICD-10-CM

## 2023-11-14 DIAGNOSIS — I10 ESSENTIAL HYPERTENSION: ICD-10-CM

## 2023-11-14 DIAGNOSIS — E11.9 TYPE 2 DIABETES MELLITUS WITHOUT COMPLICATION, WITHOUT LONG-TERM CURRENT USE OF INSULIN: ICD-10-CM

## 2023-11-14 DIAGNOSIS — I25.10 CORONARY ARTERY DISEASE INVOLVING NATIVE CORONARY ARTERY OF NATIVE HEART WITHOUT ANGINA PECTORIS: ICD-10-CM

## 2023-11-14 DIAGNOSIS — D69.6 THROMBOCYTOPENIA: ICD-10-CM

## 2023-11-14 DIAGNOSIS — I48.3 TYPICAL ATRIAL FLUTTER: ICD-10-CM

## 2023-11-14 DIAGNOSIS — E78.5 DYSLIPIDEMIA: ICD-10-CM

## 2023-11-14 DIAGNOSIS — I48.0 PAROXYSMAL ATRIAL FIBRILLATION: Primary | ICD-10-CM

## 2023-11-14 DIAGNOSIS — Z79.01 LONG TERM (CURRENT) USE OF ANTICOAGULANTS: ICD-10-CM

## 2023-11-14 LAB
ALBUMIN SERPL BCP-MCNC: 3.4 G/DL (ref 3.5–5.2)
ALP SERPL-CCNC: 101 U/L (ref 55–135)
ALT SERPL W/O P-5'-P-CCNC: 21 U/L (ref 10–44)
ANION GAP SERPL CALC-SCNC: 9 MMOL/L (ref 8–16)
AST SERPL-CCNC: 21 U/L (ref 10–40)
BASOPHILS # BLD AUTO: 0.06 K/UL (ref 0–0.2)
BASOPHILS NFR BLD: 0.7 % (ref 0–1.9)
BILIRUB SERPL-MCNC: 0.8 MG/DL (ref 0.1–1)
BUN SERPL-MCNC: 16 MG/DL (ref 8–23)
CALCIUM SERPL-MCNC: 9.2 MG/DL (ref 8.7–10.5)
CHLORIDE SERPL-SCNC: 104 MMOL/L (ref 95–110)
CHOLEST SERPL-MCNC: 107 MG/DL (ref 120–199)
CHOLEST/HDLC SERPL: 2.6 {RATIO} (ref 2–5)
CO2 SERPL-SCNC: 26 MMOL/L (ref 23–29)
CREAT SERPL-MCNC: 0.8 MG/DL (ref 0.5–1.4)
DIFFERENTIAL METHOD: ABNORMAL
EOSINOPHIL # BLD AUTO: 0.4 K/UL (ref 0–0.5)
EOSINOPHIL NFR BLD: 4.6 % (ref 0–8)
ERYTHROCYTE [DISTWIDTH] IN BLOOD BY AUTOMATED COUNT: 13.7 % (ref 11.5–14.5)
EST. GFR  (NO RACE VARIABLE): >60 ML/MIN/1.73 M^2
ESTIMATED AVG GLUCOSE: 117 MG/DL (ref 68–131)
GLUCOSE SERPL-MCNC: 112 MG/DL (ref 70–110)
HBA1C MFR BLD: 5.7 % (ref 4–5.6)
HCT VFR BLD AUTO: 41.6 % (ref 40–54)
HDLC SERPL-MCNC: 41 MG/DL (ref 40–75)
HDLC SERPL: 38.3 % (ref 20–50)
HGB BLD-MCNC: 13.8 G/DL (ref 14–18)
IMM GRANULOCYTES # BLD AUTO: 0.02 K/UL (ref 0–0.04)
IMM GRANULOCYTES NFR BLD AUTO: 0.2 % (ref 0–0.5)
LDLC SERPL CALC-MCNC: 52.8 MG/DL (ref 63–159)
LYMPHOCYTES # BLD AUTO: 2.2 K/UL (ref 1–4.8)
LYMPHOCYTES NFR BLD: 27.1 % (ref 18–48)
MCH RBC QN AUTO: 29.4 PG (ref 27–31)
MCHC RBC AUTO-ENTMCNC: 33.2 G/DL (ref 32–36)
MCV RBC AUTO: 89 FL (ref 82–98)
MONOCYTES # BLD AUTO: 0.8 K/UL (ref 0.3–1)
MONOCYTES NFR BLD: 9.7 % (ref 4–15)
NEUTROPHILS # BLD AUTO: 4.7 K/UL (ref 1.8–7.7)
NEUTROPHILS NFR BLD: 57.7 % (ref 38–73)
NONHDLC SERPL-MCNC: 66 MG/DL
NRBC BLD-RTO: 0 /100 WBC
PLATELET # BLD AUTO: 198 K/UL (ref 150–450)
PMV BLD AUTO: 11.5 FL (ref 9.2–12.9)
POTASSIUM SERPL-SCNC: 4.1 MMOL/L (ref 3.5–5.1)
PROT SERPL-MCNC: 6.6 G/DL (ref 6–8.4)
RBC # BLD AUTO: 4.69 M/UL (ref 4.6–6.2)
SODIUM SERPL-SCNC: 139 MMOL/L (ref 136–145)
TRIGL SERPL-MCNC: 66 MG/DL (ref 30–150)
TSH SERPL DL<=0.005 MIU/L-ACNC: 3.39 UIU/ML (ref 0.4–4)
WBC # BLD AUTO: 8.22 K/UL (ref 3.9–12.7)

## 2023-11-14 PROCEDURE — 99214 OFFICE O/P EST MOD 30 MIN: CPT | Mod: S$GLB,,, | Performed by: NURSE PRACTITIONER

## 2023-11-14 PROCEDURE — 85025 COMPLETE CBC W/AUTO DIFF WBC: CPT | Performed by: INTERNAL MEDICINE

## 2023-11-14 PROCEDURE — 99999 PR PBB SHADOW E&M-EST. PATIENT-LVL III: ICD-10-PCS | Mod: PBBFAC,,, | Performed by: NURSE PRACTITIONER

## 2023-11-14 PROCEDURE — 3078F PR MOST RECENT DIASTOLIC BLOOD PRESSURE < 80 MM HG: ICD-10-PCS | Mod: CPTII,S$GLB,, | Performed by: NURSE PRACTITIONER

## 2023-11-14 PROCEDURE — 84443 ASSAY THYROID STIM HORMONE: CPT | Performed by: INTERNAL MEDICINE

## 2023-11-14 PROCEDURE — 83036 HEMOGLOBIN GLYCOSYLATED A1C: CPT | Performed by: INTERNAL MEDICINE

## 2023-11-14 PROCEDURE — 80061 LIPID PANEL: CPT | Performed by: INTERNAL MEDICINE

## 2023-11-14 PROCEDURE — 3077F PR MOST RECENT SYSTOLIC BLOOD PRESSURE >= 140 MM HG: ICD-10-PCS | Mod: CPTII,S$GLB,, | Performed by: NURSE PRACTITIONER

## 2023-11-14 PROCEDURE — 93010 ELECTROCARDIOGRAM REPORT: CPT | Mod: S$GLB,,, | Performed by: INTERNAL MEDICINE

## 2023-11-14 PROCEDURE — 99999 PR PBB SHADOW E&M-EST. PATIENT-LVL III: CPT | Mod: PBBFAC,,, | Performed by: NURSE PRACTITIONER

## 2023-11-14 PROCEDURE — 93005 ELECTROCARDIOGRAM TRACING: CPT | Mod: S$GLB,,, | Performed by: NURSE PRACTITIONER

## 2023-11-14 PROCEDURE — 93005 RHYTHM STRIP: ICD-10-PCS | Mod: S$GLB,,, | Performed by: NURSE PRACTITIONER

## 2023-11-14 PROCEDURE — 80053 COMPREHEN METABOLIC PANEL: CPT | Performed by: INTERNAL MEDICINE

## 2023-11-14 PROCEDURE — 1159F PR MEDICATION LIST DOCUMENTED IN MEDICAL RECORD: ICD-10-PCS | Mod: CPTII,S$GLB,, | Performed by: NURSE PRACTITIONER

## 2023-11-14 PROCEDURE — 1126F AMNT PAIN NOTED NONE PRSNT: CPT | Mod: CPTII,S$GLB,, | Performed by: NURSE PRACTITIONER

## 2023-11-14 PROCEDURE — 3288F PR FALLS RISK ASSESSMENT DOCUMENTED: ICD-10-PCS | Mod: CPTII,S$GLB,, | Performed by: NURSE PRACTITIONER

## 2023-11-14 PROCEDURE — 1101F PT FALLS ASSESS-DOCD LE1/YR: CPT | Mod: CPTII,S$GLB,, | Performed by: NURSE PRACTITIONER

## 2023-11-14 PROCEDURE — 3078F DIAST BP <80 MM HG: CPT | Mod: CPTII,S$GLB,, | Performed by: NURSE PRACTITIONER

## 2023-11-14 PROCEDURE — 1126F PR PAIN SEVERITY QUANTIFIED, NO PAIN PRESENT: ICD-10-PCS | Mod: CPTII,S$GLB,, | Performed by: NURSE PRACTITIONER

## 2023-11-14 PROCEDURE — 36415 COLL VENOUS BLD VENIPUNCTURE: CPT | Performed by: INTERNAL MEDICINE

## 2023-11-14 PROCEDURE — 1159F MED LIST DOCD IN RCRD: CPT | Mod: CPTII,S$GLB,, | Performed by: NURSE PRACTITIONER

## 2023-11-14 PROCEDURE — 3288F FALL RISK ASSESSMENT DOCD: CPT | Mod: CPTII,S$GLB,, | Performed by: NURSE PRACTITIONER

## 2023-11-14 PROCEDURE — 3077F SYST BP >= 140 MM HG: CPT | Mod: CPTII,S$GLB,, | Performed by: NURSE PRACTITIONER

## 2023-11-14 PROCEDURE — 99214 PR OFFICE/OUTPT VISIT, EST, LEVL IV, 30-39 MIN: ICD-10-PCS | Mod: S$GLB,,, | Performed by: NURSE PRACTITIONER

## 2023-11-14 PROCEDURE — 1160F RVW MEDS BY RX/DR IN RCRD: CPT | Mod: CPTII,S$GLB,, | Performed by: NURSE PRACTITIONER

## 2023-11-14 PROCEDURE — 93010 RHYTHM STRIP: ICD-10-PCS | Mod: S$GLB,,, | Performed by: INTERNAL MEDICINE

## 2023-11-14 PROCEDURE — 1101F PR PT FALLS ASSESS DOC 0-1 FALLS W/OUT INJ PAST YR: ICD-10-PCS | Mod: CPTII,S$GLB,, | Performed by: NURSE PRACTITIONER

## 2023-11-14 PROCEDURE — 1160F PR REVIEW ALL MEDS BY PRESCRIBER/CLIN PHARMACIST DOCUMENTED: ICD-10-PCS | Mod: CPTII,S$GLB,, | Performed by: NURSE PRACTITIONER

## 2023-11-14 RX ORDER — RIVAROXABAN 20 MG/1
20 TABLET, FILM COATED ORAL NIGHTLY
Qty: 90 TABLET | Refills: 3 | Status: SHIPPED | OUTPATIENT
Start: 2023-11-14

## 2023-11-14 NOTE — PROGRESS NOTES
Your results look fine and do not require any change in treatment.     Please contact me if you have any additional concerns.    Sincerely,    Romel Alas

## 2023-11-27 ENCOUNTER — OFFICE VISIT (OUTPATIENT)
Dept: PODIATRY | Facility: CLINIC | Age: 83
End: 2023-11-27
Payer: MEDICARE

## 2023-11-27 VITALS
HEIGHT: 76 IN | SYSTOLIC BLOOD PRESSURE: 123 MMHG | HEART RATE: 69 BPM | BODY MASS INDEX: 32.51 KG/M2 | DIASTOLIC BLOOD PRESSURE: 68 MMHG | WEIGHT: 267 LBS

## 2023-11-27 DIAGNOSIS — S91.209D TRAUMATIC AVULSION OF NAIL PLATE OF TOE, SUBSEQUENT ENCOUNTER: ICD-10-CM

## 2023-11-27 DIAGNOSIS — I10 ESSENTIAL HYPERTENSION: ICD-10-CM

## 2023-11-27 DIAGNOSIS — Z79.01 LONG TERM (CURRENT) USE OF ANTICOAGULANTS: ICD-10-CM

## 2023-11-27 DIAGNOSIS — E11.42 DIABETIC POLYNEUROPATHY ASSOCIATED WITH TYPE 2 DIABETES MELLITUS: Primary | ICD-10-CM

## 2023-11-27 PROCEDURE — 99024 POSTOP FOLLOW-UP VISIT: CPT | Mod: S$GLB,,, | Performed by: PODIATRIST

## 2023-11-27 PROCEDURE — 1160F RVW MEDS BY RX/DR IN RCRD: CPT | Mod: CPTII,S$GLB,, | Performed by: PODIATRIST

## 2023-11-27 PROCEDURE — 1126F AMNT PAIN NOTED NONE PRSNT: CPT | Mod: CPTII,S$GLB,, | Performed by: PODIATRIST

## 2023-11-27 PROCEDURE — 3288F FALL RISK ASSESSMENT DOCD: CPT | Mod: CPTII,S$GLB,, | Performed by: PODIATRIST

## 2023-11-27 PROCEDURE — 3288F PR FALLS RISK ASSESSMENT DOCUMENTED: ICD-10-PCS | Mod: CPTII,S$GLB,, | Performed by: PODIATRIST

## 2023-11-27 PROCEDURE — 3074F SYST BP LT 130 MM HG: CPT | Mod: CPTII,S$GLB,, | Performed by: PODIATRIST

## 2023-11-27 PROCEDURE — 1101F PR PT FALLS ASSESS DOC 0-1 FALLS W/OUT INJ PAST YR: ICD-10-PCS | Mod: CPTII,S$GLB,, | Performed by: PODIATRIST

## 2023-11-27 PROCEDURE — 99999 PR PBB SHADOW E&M-EST. PATIENT-LVL IV: CPT | Mod: PBBFAC,,, | Performed by: PODIATRIST

## 2023-11-27 PROCEDURE — 3078F DIAST BP <80 MM HG: CPT | Mod: CPTII,S$GLB,, | Performed by: PODIATRIST

## 2023-11-27 PROCEDURE — 1159F MED LIST DOCD IN RCRD: CPT | Mod: CPTII,S$GLB,, | Performed by: PODIATRIST

## 2023-11-27 PROCEDURE — 3078F PR MOST RECENT DIASTOLIC BLOOD PRESSURE < 80 MM HG: ICD-10-PCS | Mod: CPTII,S$GLB,, | Performed by: PODIATRIST

## 2023-11-27 PROCEDURE — 1160F PR REVIEW ALL MEDS BY PRESCRIBER/CLIN PHARMACIST DOCUMENTED: ICD-10-PCS | Mod: CPTII,S$GLB,, | Performed by: PODIATRIST

## 2023-11-27 PROCEDURE — 1101F PT FALLS ASSESS-DOCD LE1/YR: CPT | Mod: CPTII,S$GLB,, | Performed by: PODIATRIST

## 2023-11-27 PROCEDURE — 1126F PR PAIN SEVERITY QUANTIFIED, NO PAIN PRESENT: ICD-10-PCS | Mod: CPTII,S$GLB,, | Performed by: PODIATRIST

## 2023-11-27 PROCEDURE — 99024 PR POST-OP FOLLOW-UP VISIT: ICD-10-PCS | Mod: S$GLB,,, | Performed by: PODIATRIST

## 2023-11-27 PROCEDURE — 3074F PR MOST RECENT SYSTOLIC BLOOD PRESSURE < 130 MM HG: ICD-10-PCS | Mod: CPTII,S$GLB,, | Performed by: PODIATRIST

## 2023-11-27 PROCEDURE — 1159F PR MEDICATION LIST DOCUMENTED IN MEDICAL RECORD: ICD-10-PCS | Mod: CPTII,S$GLB,, | Performed by: PODIATRIST

## 2023-11-27 PROCEDURE — 99999 PR PBB SHADOW E&M-EST. PATIENT-LVL IV: ICD-10-PCS | Mod: PBBFAC,,, | Performed by: PODIATRIST

## 2023-11-27 RX ORDER — IRBESARTAN 300 MG/1
TABLET ORAL
Qty: 90 TABLET | Refills: 3 | Status: SHIPPED | OUTPATIENT
Start: 2023-11-27

## 2023-11-27 NOTE — PROGRESS NOTES
SUBJECTIVE: Patient returns to the clinic   status post nail removal procedure.  Patient has no complaints of fever chills or sweats.  No pain. No drainage. Some bleeding 2nd toe     PAST MEDICAL HISTORY: Unchanged    Physical examination: General: Pt. is in no acute distress, alert and oriented x 3.    Podiatric examination: Vascular:Capillary refill time is within normal limits.    Dermatologic: Surgical site is clean without any signs of infection. No drainage.  No significant erythema. Eschar base to nail bed. Upon debridement no depth or soi    IMPRESSION:  post  nail procedure with satisfactory progress no signs of infection.    PLAN:   After debridement medihoney applied.     Patient should call the clinic immediately if any signs of infection such as fever chills sweats increased redness or pain but was otherwise discharged.     F/u ricki

## 2023-12-01 ENCOUNTER — HOSPITAL ENCOUNTER (OUTPATIENT)
Dept: CARDIOLOGY | Facility: HOSPITAL | Age: 83
Discharge: HOME | End: 2023-12-01
Attending: INTERNAL MEDICINE
Payer: MEDICARE

## 2023-12-01 VITALS
DIASTOLIC BLOOD PRESSURE: 68 MMHG | WEIGHT: 266 LBS | SYSTOLIC BLOOD PRESSURE: 123 MMHG | HEIGHT: 76 IN | HEART RATE: 65 BPM | BODY MASS INDEX: 32.39 KG/M2

## 2023-12-01 LAB
ASCENDING AORTA: 3.31 CM
AV INDEX (PROSTH): 0.82
AV MEAN GRADIENT: 3 MMHG
AV PEAK GRADIENT: 6 MMHG
AV VALVE AREA BY VELOCITY RATIO: 2.98 CM²
AV VALVE AREA: 2.96 CM²
AV VELOCITY RATIO: 0.83
BSA FOR ECHO PROCEDURE: 2.54 M2
CV ECHO LV RWT: 0.56 CM
DOP CALC AO PEAK VEL: 1.23 M/S
DOP CALC AO VTI: 28.24 CM
DOP CALC LVOT AREA: 3.6 CM2
DOP CALC LVOT DIAMETER: 2.14 CM
DOP CALC LVOT PEAK VEL: 1.02 M/S
DOP CALC LVOT STROKE VOLUME: 83.58 CM3
DOP CALCLVOT PEAK VEL VTI: 23.25 CM
E WAVE DECELERATION TIME: 286.48 MSEC
E/A RATIO: 1.14
E/E' RATIO: 8.89 M/S
ECHO LV POSTERIOR WALL: 1.16 CM (ref 0.6–1.1)
FRACTIONAL SHORTENING: 31 % (ref 28–44)
INTERVENTRICULAR SEPTUM: 1.22 CM (ref 0.6–1.1)
IVRT: 60.89 MSEC
LA MAJOR: 5.25 CM
LA MINOR: 5.1 CM
LA WIDTH: 4.09 CM
LEFT ATRIUM SIZE: 4 CM
LEFT ATRIUM VOLUME INDEX MOD: 26.3 ML/M2
LEFT ATRIUM VOLUME INDEX: 28.8 ML/M2
LEFT ATRIUM VOLUME MOD: 65.69 CM3
LEFT ATRIUM VOLUME: 71.95 CM3
LEFT INTERNAL DIMENSION IN SYSTOLE: 2.87 CM (ref 2.1–4)
LEFT VENTRICLE DIASTOLIC VOLUME INDEX: 30.43 ML/M2
LEFT VENTRICLE DIASTOLIC VOLUME: 76.07 ML
LEFT VENTRICLE MASS INDEX: 69 G/M2
LEFT VENTRICLE SYSTOLIC VOLUME INDEX: 12.5 ML/M2
LEFT VENTRICLE SYSTOLIC VOLUME: 31.32 ML
LEFT VENTRICULAR INTERNAL DIMENSION IN DIASTOLE: 4.14 CM (ref 3.5–6)
LEFT VENTRICULAR MASS: 172.17 G
LV LATERAL E/E' RATIO: 8 M/S
LV SEPTAL E/E' RATIO: 10 M/S
MV A" WAVE DURATION": 25.12 MSEC
MV PEAK A VEL: 0.7 M/S
MV PEAK E VEL: 0.8 M/S
MV STENOSIS PRESSURE HALF TIME: 83.08 MS
MV VALVE AREA P 1/2 METHOD: 2.65 CM2
PISA TR MAX VEL: 2.61 M/S
PULM VEIN S/D RATIO: 0.48
PV PEAK D VEL: 0.54 M/S
PV PEAK S VEL: 0.26 M/S
RA MAJOR: 4.97 CM
RA PRESSURE ESTIMATED: 3 MMHG
RA WIDTH: 3.83 CM
RIGHT VENTRICULAR END-DIASTOLIC DIMENSION: 4.16 CM
RV TB RVSP: 6 MMHG
SINUS: 3.47 CM
STJ: 2.96 CM
TDI LATERAL: 0.1 M/S
TDI SEPTAL: 0.08 M/S
TDI: 0.09 M/S
TR MAX PG: 27 MMHG
TRICUSPID ANNULAR PLANE SYSTOLIC EXCURSION: 1.99 CM
TV REST PULMONARY ARTERY PRESSURE: 30 MMHG
Z-SCORE OF LEFT VENTRICULAR DIMENSION IN END DIASTOLE: -12.13
Z-SCORE OF LEFT VENTRICULAR DIMENSION IN END SYSTOLE: -8.36

## 2023-12-01 PROCEDURE — 93306 TTE W/DOPPLER COMPLETE: CPT

## 2023-12-01 PROCEDURE — 93306 ECHO (CUPID ONLY): ICD-10-PCS | Mod: 26,,, | Performed by: INTERNAL MEDICINE

## 2023-12-01 PROCEDURE — 93306 TTE W/DOPPLER COMPLETE: CPT | Mod: 26,,, | Performed by: INTERNAL MEDICINE

## 2023-12-04 ENCOUNTER — OFFICE VISIT (OUTPATIENT)
Dept: CARDIOLOGY | Facility: CLINIC | Age: 83
End: 2023-12-04
Payer: MEDICARE

## 2023-12-04 VITALS
HEIGHT: 76 IN | WEIGHT: 268.75 LBS | HEART RATE: 61 BPM | OXYGEN SATURATION: 97 % | BODY MASS INDEX: 32.73 KG/M2 | SYSTOLIC BLOOD PRESSURE: 135 MMHG | DIASTOLIC BLOOD PRESSURE: 60 MMHG

## 2023-12-04 DIAGNOSIS — I25.10 CORONARY ARTERY DISEASE INVOLVING NATIVE CORONARY ARTERY OF NATIVE HEART WITHOUT ANGINA PECTORIS: Primary | ICD-10-CM

## 2023-12-04 DIAGNOSIS — I10 ESSENTIAL HYPERTENSION: ICD-10-CM

## 2023-12-04 DIAGNOSIS — E11.9 TYPE 2 DIABETES MELLITUS WITHOUT COMPLICATION, WITHOUT LONG-TERM CURRENT USE OF INSULIN: ICD-10-CM

## 2023-12-04 DIAGNOSIS — I48.0 PAROXYSMAL ATRIAL FIBRILLATION: ICD-10-CM

## 2023-12-04 DIAGNOSIS — I48.3 TYPICAL ATRIAL FLUTTER: ICD-10-CM

## 2023-12-04 DIAGNOSIS — J43.9 PULMONARY EMPHYSEMA, UNSPECIFIED EMPHYSEMA TYPE: ICD-10-CM

## 2023-12-04 DIAGNOSIS — E78.2 MIXED HYPERLIPIDEMIA: ICD-10-CM

## 2023-12-04 PROCEDURE — 1101F PR PT FALLS ASSESS DOC 0-1 FALLS W/OUT INJ PAST YR: ICD-10-PCS | Mod: CPTII,S$GLB,, | Performed by: PHYSICIAN ASSISTANT

## 2023-12-04 PROCEDURE — 1159F PR MEDICATION LIST DOCUMENTED IN MEDICAL RECORD: ICD-10-PCS | Mod: CPTII,S$GLB,, | Performed by: PHYSICIAN ASSISTANT

## 2023-12-04 PROCEDURE — 99999 PR PBB SHADOW E&M-EST. PATIENT-LVL V: CPT | Mod: PBBFAC,,, | Performed by: PHYSICIAN ASSISTANT

## 2023-12-04 PROCEDURE — 1160F RVW MEDS BY RX/DR IN RCRD: CPT | Mod: CPTII,S$GLB,, | Performed by: PHYSICIAN ASSISTANT

## 2023-12-04 PROCEDURE — 3288F FALL RISK ASSESSMENT DOCD: CPT | Mod: CPTII,S$GLB,, | Performed by: PHYSICIAN ASSISTANT

## 2023-12-04 PROCEDURE — 3075F SYST BP GE 130 - 139MM HG: CPT | Mod: CPTII,S$GLB,, | Performed by: PHYSICIAN ASSISTANT

## 2023-12-04 PROCEDURE — 3078F PR MOST RECENT DIASTOLIC BLOOD PRESSURE < 80 MM HG: ICD-10-PCS | Mod: CPTII,S$GLB,, | Performed by: PHYSICIAN ASSISTANT

## 2023-12-04 PROCEDURE — 1101F PT FALLS ASSESS-DOCD LE1/YR: CPT | Mod: CPTII,S$GLB,, | Performed by: PHYSICIAN ASSISTANT

## 2023-12-04 PROCEDURE — 1159F MED LIST DOCD IN RCRD: CPT | Mod: CPTII,S$GLB,, | Performed by: PHYSICIAN ASSISTANT

## 2023-12-04 PROCEDURE — 3075F PR MOST RECENT SYSTOLIC BLOOD PRESS GE 130-139MM HG: ICD-10-PCS | Mod: CPTII,S$GLB,, | Performed by: PHYSICIAN ASSISTANT

## 2023-12-04 PROCEDURE — 99214 PR OFFICE/OUTPT VISIT, EST, LEVL IV, 30-39 MIN: ICD-10-PCS | Mod: S$GLB,,, | Performed by: PHYSICIAN ASSISTANT

## 2023-12-04 PROCEDURE — 99999 PR PBB SHADOW E&M-EST. PATIENT-LVL V: ICD-10-PCS | Mod: PBBFAC,,, | Performed by: PHYSICIAN ASSISTANT

## 2023-12-04 PROCEDURE — 99214 OFFICE O/P EST MOD 30 MIN: CPT | Mod: S$GLB,,, | Performed by: PHYSICIAN ASSISTANT

## 2023-12-04 PROCEDURE — 1160F PR REVIEW ALL MEDS BY PRESCRIBER/CLIN PHARMACIST DOCUMENTED: ICD-10-PCS | Mod: CPTII,S$GLB,, | Performed by: PHYSICIAN ASSISTANT

## 2023-12-04 PROCEDURE — 3078F DIAST BP <80 MM HG: CPT | Mod: CPTII,S$GLB,, | Performed by: PHYSICIAN ASSISTANT

## 2023-12-04 PROCEDURE — 1126F PR PAIN SEVERITY QUANTIFIED, NO PAIN PRESENT: ICD-10-PCS | Mod: CPTII,S$GLB,, | Performed by: PHYSICIAN ASSISTANT

## 2023-12-04 PROCEDURE — 3288F PR FALLS RISK ASSESSMENT DOCUMENTED: ICD-10-PCS | Mod: CPTII,S$GLB,, | Performed by: PHYSICIAN ASSISTANT

## 2023-12-04 PROCEDURE — 1126F AMNT PAIN NOTED NONE PRSNT: CPT | Mod: CPTII,S$GLB,, | Performed by: PHYSICIAN ASSISTANT

## 2023-12-04 NOTE — PROGRESS NOTES
General Cardiology Clinic Note  Reason for Visit: Follow up   Last Clinic Visit: 2/27/2023 with Dr. Mario Gross    HPI:   Nick De Anda is a 83 y.o. male who presents for follow up.    Problems:  CAD s/p CABG (remote)  Atrial fibrillation and flutter (s/p PVI/CTI 10/2020, redo PVI and atypical flutter ablation 12/2021)   Hypertension   Hyperlipidemia  DM 2  COPD  Obesity     HPI   Patient presents for routine follow up. His only complaint today is of productive cough in the morning and a lot of phlegm production. He was diagnosed with bronchiectasis in the past and takes Mucinex for this. He has chronic RODRIGEZ that has been stable since he was last seen. He recovers quickly with rest. He uses a cane to ambulate usually, but will use a rolling walker for far distances. He stopped going to the gym over the summer and gained about 13 lbs as a result. He denies angina, orthopnea, PND, edema, palpitations, syncope, near syncope. He checks his BP regularly at home and it runs 125-130/65. He states that overall he is very happy with how he feels.     ROS:      Review of Systems   Constitutional: Positive for weight gain. Negative for diaphoresis, malaise/fatigue and weight loss.   HENT:  Negative for nosebleeds.    Eyes:  Negative for vision loss in left eye, vision loss in right eye and visual disturbance.   Cardiovascular:  Positive for dyspnea on exertion. Negative for chest pain, claudication, irregular heartbeat, leg swelling, near-syncope, orthopnea, palpitations, paroxysmal nocturnal dyspnea and syncope.   Respiratory:  Positive for cough and sputum production. Negative for shortness of breath, sleep disturbances due to breathing, snoring and wheezing.    Hematologic/Lymphatic: Negative for bleeding problem. Does not bruise/bleed easily.   Skin:  Negative for poor wound healing and rash.   Musculoskeletal:  Negative for muscle cramps and myalgias.   Gastrointestinal:  Negative for bloating, abdominal pain,  diarrhea, heartburn, melena, nausea and vomiting.   Genitourinary:  Negative for hematuria and nocturia.   Neurological:  Negative for brief paralysis, dizziness, headaches, light-headedness, numbness and weakness.   Psychiatric/Behavioral:  Negative for depression.    Allergic/Immunologic: Negative for hives.       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    CHF (congestive heart failure)     COPD (chronic obstructive pulmonary disease)     Coronary artery disease     Cough, persistent 03/30/2020    Diabetes mellitus     diet    Diabetes mellitus, type 2     Dyslipidemia 02/13/2019    Encounter for blood transfusion     Encounter for monitoring sotalol therapy 08/01/2019    History of staphylococcal infection 2012    after CABG    History of UTI 02/02/2019    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    Hypertension     Hyponatremia 02/02/2019    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    Persistent atrial fibrillation 02/02/2019    Post PTCA 02/13/2019     Past Surgical History:   Procedure Laterality Date    ABLATION OF ARRHYTHMOGENIC FOCUS FOR ATRIAL FIBRILLATION N/A 12/02/2020    Procedure: Ablation atrial fibrillation;  Surgeon: Yung Paez MD;  Location: [facility] EP LAB;  Service: Cardiology;  Laterality: N/A;  PAF, AFl, DEANGELO (Cx if SR), PVI, CTI, RFA, Carto, Gen, CT, 3 Prep    ABLATION OF ARRHYTHMOGENIC FOCUS FOR ATRIAL FIBRILLATION N/A 12/08/2021    Procedure: Ablation atrial fibrillation;  Surgeon: Yung Paez MD;  Location: [facility] EP LAB;  Service: Cardiology;  Laterality: N/A;  AF/AFL (atypical), DEANGELO (Cx if SR), PVI re-do, RFA for Atypical AFL, Carto, Gen, CT, 3 Prep    CARDIAC SURGERY      cabg    CARPAL TUNNEL RELEASE Right 9/29/2023    Procedure: RELEASE, CARPAL TUNNEL;  Surgeon: Williams, Claude S. IV, MD;  Location: [facility] OR;  Service: Orthopedics;  Laterality: Right;    CORONARY ARTERY BYPASS GRAFT       CORONARY STENT PLACEMENT      CYSTOSCOPY      prostate infection    EXCISION OF GANGLION OF WRIST Right 9/29/2023    Procedure: EXCISION, GANGLION CYST, WRIST;  Surgeon: Williams, Claude S. IV, MD;  Location: [facility];  Service: Orthopedics;  Laterality: Right;    FOOT SURGERY      RECTOPERITONEAL FISTULA CLOSURE      SHOULDER SURGERY      Triple bypass      VASECTOMY      VOCAL FOLD LESION EXCISION N/A 09/28/2021    Procedure: EXCISION, LESION, VOCAL CORD;  Surgeon: Rommel Stubbs MD;  Location: [address];  Service: ENT;  Laterality: N/A;     Allergies:     Review of patient's allergies indicates:   Allergen Reactions    Penicillins Anaphylaxis     Can take keflex    Hydrocodone      Causes constipation    Amoxicillin Swelling and Rash     Medications:     Current Outpatient Medications on File Prior to Visit   Medication Sig Dispense Refill    albuterol (PROVENTIL HFA) 90 mcg/actuation inhaler Inhale 2 puffs into the lungs every 6 (six) hours as needed for Wheezing. Rescue 85 g 4    ammonium lactate (LAC-HYDRIN) 12 % lotion Apply topically every evening. Hands and arms and then later scalp, forehead, temples 225 g 4    ascorbic acid, vitamin C, (VITAMIN C) 100 MG tablet Take 500 mg by mouth once daily.      atorvastatin (LIPITOR) 80 MG tablet TAKE 1 TABLET BY MOUTH ONCE DAILY IN THE EVENING 90 tablet 3    blood sugar diagnostic Strp To check BG 2 times daily, to use with insurance preferred meter 100 strip 5    blood-glucose meter kit To check BG 2 times daily, to use with insurance preferred meter 1 each 0    clopidogreL (PLAVIX) 75 mg tablet Take 1 tablet by mouth once daily (Patient taking differently: Take 75 mg by mouth every other day.) 90 tablet 3    diazePAM (VALIUM) 10 MG Tab Take 1 tablet (10 mg total) by mouth as needed (anxiety). Just half to one and use very seldom-get refills from PCP 30 tablet 3    hydroCHLOROthiazide (MICROZIDE) 12.5 mg capsule Take 1 capsule (12.5 mg total) by mouth once  daily. 90 capsule 0    irbesartan (AVAPRO) 300 MG tablet TAKE 1 TABLET BY MOUTH ONCE DAILY IN THE EVENING 90 tablet 3    lancets Misc To check BG 2 times daily, to use with insurance preferred meter 100 each 5    metFORMIN (GLUCOPHAGE) 500 MG tablet TAKE 1 TABLET BY MOUTH TWICE DAILY WITH MEALS 180 tablet 0    methocarbamoL (ROBAXIN) 500 MG Tab Take 1 tablet by mouth twice daily as needed for muscle spasm 30 tablet 1    metoprolol tartrate (LOPRESSOR) 25 MG tablet TAKE 1 TABLET BY MOUTH ONCE DAILY AS NEEDED FOR  PALPITATIONS 90 tablet 1    multivitamin (THERAGRAN) per tablet Take 1 tablet by mouth once daily.      nitroGLYCERIN (NITROSTAT) 0.4 MG SL tablet Place 1 tablet (0.4 mg total) under the tongue every 5 (five) minutes as needed for Chest pain. 25 tablet 3    nystatin (MYCOSTATIN) cream Apply topically 2 (two) times daily. groin 60 g 6    nystatin (MYCOSTATIN) powder Apply topically 3 (three) times daily. Prn groin 60 g 5    tamsulosin (FLOMAX) 0.4 mg Cap Take 1 capsule (0.4 mg total) by mouth once daily. 90 capsule 3    timoloL (BETIMOL) 0.5 % ophthalmic solution       timoloL maleate (ISTALOL/TIMOLOL) 0.5 % DrpD       timolol maleate 0.5% (TIMOPTIC) 0.5 % Drop Place 1 drop into both eyes 2 (two) times daily.      XARELTO 20 mg Tab Take 1 tablet (20 mg total) by mouth every evening. 90 tablet 3     Current Facility-Administered Medications on File Prior to Visit   Medication Dose Route Frequency Provider Last Rate Last Admin    0.9%  NaCl infusion   Intravenous Continuous Rebecca Dickinson NP   Stopped at 12/02/20 1636    lactated ringers infusion   Intravenous Continuous Tyler Arana MD        LIDOcaine (PF) 10 mg/ml (1%) injection 5 mg  0.5 mL Intradermal Once Tyler Arana MD        sodium chloride 0.9% bolus 1,000 mL  1,000 mL Intravenous Once Winnie Larkin NP        sodium chloride 0.9% flush 10 mL  10 mL Intravenous PRN DELICIA Jaramillo MD        sodium chloride 0.9%  "flush 5 mL  5 mL Intravenous PRN Rebecca Dickinson, NP         Social History:     Social History     Tobacco Use    Smoking status: Former     Current packs/day: 0.00     Average packs/day: 0.5 packs/day for 23.7 years (11.9 ttl pk-yrs)     Types: Cigarettes     Start date: 1956     Quit date: 10/1/1979     Years since quittin.2    Smokeless tobacco: Never   Substance Use Topics    Alcohol use: No     Family History:     Family History   Problem Relation Age of Onset    Heart attack Father      Physical Exam:   /60   Pulse 61   Ht 6' 4" (1.93 m)   Wt 121.9 kg (268 lb 11.9 oz)   SpO2 97%   BMI 32.71 kg/m²        Physical Exam  Vitals and nursing note reviewed.   Constitutional:       General: He is not in acute distress.     Appearance: Normal appearance.   HENT:      Head: Normocephalic and atraumatic.      Nose: Nose normal.   Eyes:      General: No scleral icterus.     Extraocular Movements: Extraocular movements intact.      Conjunctiva/sclera: Conjunctivae normal.   Neck:      Thyroid: No thyromegaly.      Vascular: No carotid bruit or JVD.   Cardiovascular:      Rate and Rhythm: Normal rate and regular rhythm.      Pulses: Normal pulses.      Heart sounds: Heart sounds are distant. No murmur heard.     No friction rub. No gallop.   Pulmonary:      Effort: Pulmonary effort is normal.      Breath sounds: Normal breath sounds. Decreased air movement present. No wheezing, rhonchi or rales.   Chest:      Chest wall: No tenderness.   Abdominal:      General: Bowel sounds are normal. There is no distension.      Palpations: Abdomen is soft.      Tenderness: There is no abdominal tenderness.   Musculoskeletal:      Cervical back: Neck supple.      Right lower leg: No edema.      Left lower leg: No edema.   Skin:     General: Skin is warm and dry.      Coloration: Skin is not pale.      Findings: No erythema or rash.      Nails: There is no clubbing.   Neurological:      Mental Status: He is " alert and oriented to person, place, and time.   Psychiatric:         Mood and Affect: Mood and affect normal.         Behavior: Behavior normal.          Labs:     Lab Results   Component Value Date     11/14/2023    K 4.1 11/14/2023     11/14/2023    CO2 26 11/14/2023    BUN 16 11/14/2023    CREATININE 0.8 11/14/2023    ANIONGAP 9 11/14/2023     Lab Results   Component Value Date    HGBA1C 5.7 (H) 11/14/2023     Lab Results   Component Value Date     (H) 06/13/2022     (H) 01/21/2022     (H) 09/17/2021    Lab Results   Component Value Date    WBC 8.22 11/14/2023    HGB 13.8 (L) 11/14/2023    HCT 41.6 11/14/2023    HCT 37 12/08/2021     11/14/2023    GRAN 4.7 11/14/2023    GRAN 57.7 11/14/2023     Lab Results   Component Value Date    CHOL 107 (L) 11/14/2023    HDL 41 11/14/2023    LDLCALC 52.8 (L) 11/14/2023    TRIG 66 11/14/2023          Imaging:   Echocardiograms:   TTE 12/1/2023    Left Ventricle: The left ventricle is normal in size. Normal wall thickness. There is concentric remodeling. Normal wall motion. There is normal systolic function with a visually estimated ejection fraction of 55 - 60%. There is normal diastolic function.    Right Ventricle: Normal right ventricular cavity size. Wall thickness is normal. Right ventricle wall motion  is normal. Systolic function is normal.    Aortic Valve: The aortic valve is a trileaflet valve. There is mild aortic valve sclerosis.    Tricuspid Valve: There is mild regurgitation.    Pulmonary Artery: The estimated pulmonary artery systolic pressure is 30 mmHg.    IVC/SVC: Normal venous pressure at 3 mmHg.     TTE 3/16/2021  The left ventricle is normal in size with mild concentric hypertrophy and normal systolic function. The estimated ejection fraction is 60%  Grade II left ventricular diastolic dysfunction.  Normal right ventricular size with normal right ventricular systolic function.  Moderate left atrial  enlargement.  Normal central venous pressure (3 mmHg).    TTE 11/21/2019  Mild left atrial enlargement.  Mild right ventricular enlargement.  Moderate concentric left ventricular hypertrophy.  Mildly decreased left ventricular systolic function. The estimated ejection fraction is 45%  Grade I (mild) left ventricular diastolic dysfunction consistent with impaired relaxation.  Local segmental wall motion abnormalities.  Septal wall has abnormal motion.  Mild-to-moderate mitral regurgitation.  Mild to moderate tricuspid regurgitation.    Stress Tests:   PET Stress Test 12/10/2019    The relative PET images are normal showing no clinically significant regional resting or stress induced perfusion defects.    Whole heart absolute myocardial perfusion (cc/min/g) averaged 0.63 cc/min/g at rest (which is normal), 1.27 cc/min/g at stress (which is mildly reduced), and 2.03 CFR (which is mildly reduced).    Gated perfusion images showed an ejection fraction of 74% at rest and 78% during stress. Normal ejection fraction is greater than 51%.    Wall motion was normal at rest and during stress.    LV cavity size is normal at rest and stress.    The EKG portion of this study is negative for ischemia.    There were no arrhythmias during stress.    The patient reported no chest pain during the stress test.    There are no prior studies for comparison.    SPECT Stress test 11/21/19     ECG Stress Nuclear portion of this study will be reported separately.    The EKG portion of this study is negative for ischemia.    The patient reported no chest pain during the stress test.    There were no arrhythmias during stress.    The blood pressure response to stress was normal.  1. Normal left ventricular ejection fraction.  2. No reversible areas of ischemia identified.     Caths:   None    Other:  PFTs 3/7/2023  Spirometry shows significant obstruction with severe ventilatory impairment. This impairment may be due to air trapping from  obstruction alone but restrictive disease is not ruled out.       EKG 11/4/2023: NSR with first degree AV block     Assessment:     1. Coronary artery disease involving native coronary artery of native heart without angina pectoris    2. Paroxysmal atrial fibrillation    3. Typical atrial flutter    4. Essential hypertension    5. Type 2 diabetes mellitus without complication, without long-term current use of insulin    6. Mixed hyperlipidemia      Plan:     CAD s/p CABG and stents  Remote hx of PCI ~20 years ago and CABG ~10 years ago  Denies angina  Continue Plavix and high intensity statin  Increase aerobic exercise with a goal of at least 30 minutes, 5 days a week.     Paroxysmal atrial fibrillation and flutter s/p RFA   Maintaining NSR since last ablation and EF is now normal   CHADSVASC 6. Continue Xarelto for CVA prophylaxis.      HTN  Controlled on Irbesartan and HCTZ      Hyperlipidemia  Well controlled on Atorvastatin 80 mg  Mediterranean diet     Diabetes  Well controlled     Obesity  Discussed diet and exercise    Follow up in one year.     Signed:  Malka Leyva PA-C  Cardiology   [phone] - General

## 2023-12-15 ENCOUNTER — PATIENT MESSAGE (OUTPATIENT)
Dept: PRIMARY CARE CLINIC | Facility: CLINIC | Age: 83
End: 2023-12-15
Payer: MEDICARE

## 2023-12-15 ENCOUNTER — TELEPHONE (OUTPATIENT)
Dept: PRIMARY CARE CLINIC | Facility: CLINIC | Age: 83
End: 2023-12-15
Payer: MEDICARE

## 2023-12-15 DIAGNOSIS — J47.9 BRONCHIECTASIS WITHOUT COMPLICATION: ICD-10-CM

## 2023-12-15 RX ORDER — LEVALBUTEROL INHALATION SOLUTION 0.63 MG/3ML
1 SOLUTION RESPIRATORY (INHALATION) 4 TIMES DAILY PRN
Qty: 1 EACH | Refills: 3 | Status: CANCELLED | OUTPATIENT
Start: 2023-12-15 | End: 2024-12-14

## 2023-12-15 NOTE — TELEPHONE ENCOUNTER
LOV: 7/28/2023  Annual:  RTC:    Patient just wanted to make sure his appointment time was for 12/20 @ 10:30am

## 2023-12-15 NOTE — TELEPHONE ENCOUNTER
LOV 7/28/23      Pt said The Inhaler works fine but he would like to have the Nebulizer treatments it have a longer lasting effect.           Please Advised

## 2023-12-19 NOTE — PROGRESS NOTES
[facility] Clinic Note    Chief Complaint      Chief Complaint   Patient presents with    Medication Refill       History of Present Illness      Nick De Anda is a 83 y.o. male who presents today for   Chief Complaint   Patient presents with    Medication Refill         Patient is known to me.  He presents to clinic today for medication refill.  He states that he stopped exercising during the summer as it was too high to exercise and has not returned back to exercising.  We discussed importance of exercising daily and he states that he is ready to begin doing that again.  Otherwise he is feeling well today.  There are no complaints today.  I have reviewed past lab results with patient.  Last PSA done 6 months ago was 17.  He was referred to Urology where he was told that PSA was elevated due to a UTI he was being treated for at that time.  There are no urinary issues reported at this time.         Review of Systems   All 12 systems otherwise negative.       Family History:  family history includes Heart attack in his father.   Family history was reviewed with patient.     Medications:  Outpatient Encounter Medications as of 12/20/2023   Medication Sig Note Dispense Refill    albuterol (PROVENTIL HFA) 90 mcg/actuation inhaler Inhale 2 puffs into the lungs every 6 (six) hours as needed for Wheezing. Rescue  85 g 4    ammonium lactate (LAC-HYDRIN) 12 % lotion Apply topically every evening. Hands and arms and then later scalp, forehead, temples  225 g 4    ascorbic acid, vitamin C, (VITAMIN C) 100 MG tablet Take 500 mg by mouth once daily.       atorvastatin (LIPITOR) 80 MG tablet TAKE 1 TABLET BY MOUTH ONCE DAILY IN THE EVENING  90 tablet 3    blood sugar diagnostic Strp To check BG 2 times daily, to use with insurance preferred meter  100 strip 5    blood-glucose meter kit To check BG 2 times daily, to use with insurance preferred meter  1 each 0    clopidogreL (PLAVIX) 75 mg tablet Take 1 tablet by  mouth once daily (Patient taking differently: Take 75 mg by mouth every other day.)  90 tablet 3    diazePAM (VALIUM) 10 MG Tab Take 1 tablet (10 mg total) by mouth as needed (anxiety). Just half to one and use very seldom-get refills from PCP 9/28/2023: prn 30 tablet 3    hydroCHLOROthiazide (MICROZIDE) 12.5 mg capsule Take 1 capsule (12.5 mg total) by mouth once daily.  90 capsule 0    irbesartan (AVAPRO) 300 MG tablet TAKE 1 TABLET BY MOUTH ONCE DAILY IN THE EVENING  90 tablet 3    lancets Misc To check BG 2 times daily, to use with insurance preferred meter  100 each 5    metFORMIN (GLUCOPHAGE) 500 MG tablet TAKE 1 TABLET BY MOUTH TWICE DAILY WITH MEALS  180 tablet 0    methocarbamoL (ROBAXIN) 500 MG Tab Take 1 tablet by mouth twice daily as needed for muscle spasm  30 tablet 1    metoprolol tartrate (LOPRESSOR) 25 MG tablet TAKE 1 TABLET BY MOUTH ONCE DAILY AS NEEDED FOR  PALPITATIONS 9/28/2023: prn 90 tablet 1    multivitamin (THERAGRAN) per tablet Take 1 tablet by mouth once daily. 9/21/2021: Hold 1 week prior to surgery       nystatin (MYCOSTATIN) cream Apply topically 2 (two) times daily. groin  60 g 6    tamsulosin (FLOMAX) 0.4 mg Cap Take 1 capsule (0.4 mg total) by mouth once daily.  90 capsule 3    timoloL maleate (ISTALOL/TIMOLOL) 0.5 % DrpD        XARELTO 20 mg Tab Take 1 tablet (20 mg total) by mouth every evening.  90 tablet 3    levalbuterol (XOPENEX) 1.25 mg/3 mL nebulizer solution Take 3 mLs (1.25 mg total) by nebulization every 4 (four) hours as needed for Wheezing. Rescue  100 each 2    nitroGLYCERIN (NITROSTAT) 0.4 MG SL tablet Place 1 tablet (0.4 mg total) under the tongue every 5 (five) minutes as needed for Chest pain. 3/13/2023: PRN 25 tablet 3     Facility-Administered Encounter Medications as of 12/20/2023   Medication Dose Route Frequency Provider Last Rate Last Admin    0.9%  NaCl infusion   Intravenous Continuous Rebecca Dickinson NP   Stopped at 12/02/20 8416    lactated ringers  "infusion   Intravenous Continuous Tyler Arana MD        LIDOcaine (PF) 10 mg/ml (1%) injection 5 mg  0.5 mL Intradermal Once Tyler Arana MD        sodium chloride 0.9% bolus 1,000 mL  1,000 mL Intravenous Once Winnie Larkin NP        sodium chloride 0.9% flush 10 mL  10 mL Intravenous PRN DELICIA Jaramillo MD        sodium chloride 0.9% flush 5 mL  5 mL Intravenous PRN Rebecca Dickinson NP           Allergies:  Review of patient's allergies indicates:   Allergen Reactions    Penicillins Anaphylaxis     Can take keflex    Hydrocodone      Causes constipation    Amoxicillin Swelling and Rash       Health Maintenance:  Health Maintenance   Topic Date Due    Shingles Vaccine (2 of 2) 06/20/2023    Hemoglobin A1c  05/14/2024    Eye Exam  06/27/2024    Lipid Panel  11/14/2024    TETANUS VACCINE  06/09/2033     Health Maintenance Topics with due status: Not Due       Topic Last Completion Date    TETANUS VACCINE 06/09/2023    Eye Exam 06/27/2023    Lipid Panel 11/14/2023    Hemoglobin A1c 11/14/2023       Physical Exam      Vital Signs  Temp: 97.6 °F (36.4 °C)  Temp Source: Oral  Pulse: 60  Resp: 16  SpO2: 98 %  BP: 120/60  BP Location: Right arm  Patient Position: Sitting  Pain Score: 0-No pain  Height and Weight  Height: 6' 4" (193 cm)  Weight: 115.4 kg (254 lb 6.6 oz)  BSA (Calculated - sq m): 2.49 sq meters  BMI (Calculated): 31  Weight in (lb) to have BMI = 25: 205]    Physical Exam  Vitals reviewed.   Constitutional:       Appearance: Normal appearance. He is normal weight.   HENT:      Head: Normocephalic and atraumatic.      Nose: Nose normal.      Mouth/Throat:      Mouth: Mucous membranes are moist.      Pharynx: Oropharynx is clear.   Eyes:      Extraocular Movements: Extraocular movements intact.      Conjunctiva/sclera: Conjunctivae normal.      Pupils: Pupils are equal, round, and reactive to light.   Cardiovascular:      Rate and Rhythm: Normal rate and regular rhythm.      " Pulses: Normal pulses.      Heart sounds: Normal heart sounds.   Pulmonary:      Effort: Pulmonary effort is normal.      Breath sounds: Normal breath sounds.   Musculoskeletal:         General: Normal range of motion.      Cervical back: Normal range of motion and neck supple.   Skin:     General: Skin is warm and dry.      Capillary Refill: Capillary refill takes less than 2 seconds.   Neurological:      General: No focal deficit present.      Mental Status: He is alert and oriented to person, place, and time. Mental status is at baseline.   Psychiatric:         Mood and Affect: Mood normal.         Behavior: Behavior normal.         Thought Content: Thought content normal.         Judgment: Judgment normal.            Assessment/Plan     Nick De Anda is a 83 y.o.male with:    Type 2 diabetes mellitus without complication, without long-term current use of insulin  -     MICROALBUMIN / CREATININE RATIO URINE    Bronchiectasis without complication  -     levalbuterol (XOPENEX) 1.25 mg/3 mL nebulizer solution; Take 3 mLs (1.25 mg total) by nebulization every 4 (four) hours as needed for Wheezing. Rescue  Dispense: 100 each; Refill: 2        As above, continue current medications and maintain follow up with specialists.  Return to clinic as needed.    Greater than 50% of visit was spent face to face with patient.  All questions were answered to patient's satisfaction.          Karen L Spencer, NP-C [facility]

## 2023-12-20 ENCOUNTER — OFFICE VISIT (OUTPATIENT)
Dept: PRIMARY CARE CLINIC | Facility: CLINIC | Age: 83
End: 2023-12-20
Payer: MEDICARE

## 2023-12-20 VITALS
WEIGHT: 254.44 LBS | TEMPERATURE: 98 F | HEART RATE: 60 BPM | DIASTOLIC BLOOD PRESSURE: 60 MMHG | BODY MASS INDEX: 30.98 KG/M2 | HEIGHT: 76 IN | SYSTOLIC BLOOD PRESSURE: 120 MMHG | RESPIRATION RATE: 16 BRPM | OXYGEN SATURATION: 98 %

## 2023-12-20 DIAGNOSIS — E11.9 TYPE 2 DIABETES MELLITUS WITHOUT COMPLICATION, WITHOUT LONG-TERM CURRENT USE OF INSULIN: Primary | ICD-10-CM

## 2023-12-20 DIAGNOSIS — J47.9 BRONCHIECTASIS WITHOUT COMPLICATION: ICD-10-CM

## 2023-12-20 LAB
ALBUMIN/CREAT UR: NORMAL UG/MG (ref 0–30)
CREAT UR-MCNC: 30 MG/DL (ref 23–375)
MICROALBUMIN UR DL<=1MG/L-MCNC: <5 UG/ML

## 2023-12-20 PROCEDURE — 1101F PT FALLS ASSESS-DOCD LE1/YR: CPT | Mod: CPTII,S$GLB,, | Performed by: NURSE PRACTITIONER

## 2023-12-20 PROCEDURE — 99214 PR OFFICE/OUTPT VISIT, EST, LEVL IV, 30-39 MIN: ICD-10-PCS | Mod: S$GLB,,, | Performed by: NURSE PRACTITIONER

## 2023-12-20 PROCEDURE — 99214 OFFICE O/P EST MOD 30 MIN: CPT | Mod: S$GLB,,, | Performed by: NURSE PRACTITIONER

## 2023-12-20 PROCEDURE — 1126F PR PAIN SEVERITY QUANTIFIED, NO PAIN PRESENT: ICD-10-PCS | Mod: CPTII,S$GLB,, | Performed by: NURSE PRACTITIONER

## 2023-12-20 PROCEDURE — 1101F PR PT FALLS ASSESS DOC 0-1 FALLS W/OUT INJ PAST YR: ICD-10-PCS | Mod: CPTII,S$GLB,, | Performed by: NURSE PRACTITIONER

## 2023-12-20 PROCEDURE — 99999 PR PBB SHADOW E&M-EST. PATIENT-LVL V: CPT | Mod: PBBFAC,,, | Performed by: NURSE PRACTITIONER

## 2023-12-20 PROCEDURE — 82043 UR ALBUMIN QUANTITATIVE: CPT | Performed by: NURSE PRACTITIONER

## 2023-12-20 PROCEDURE — 3074F PR MOST RECENT SYSTOLIC BLOOD PRESSURE < 130 MM HG: ICD-10-PCS | Mod: CPTII,S$GLB,, | Performed by: NURSE PRACTITIONER

## 2023-12-20 PROCEDURE — 3074F SYST BP LT 130 MM HG: CPT | Mod: CPTII,S$GLB,, | Performed by: NURSE PRACTITIONER

## 2023-12-20 PROCEDURE — 99999 PR PBB SHADOW E&M-EST. PATIENT-LVL V: ICD-10-PCS | Mod: PBBFAC,,, | Performed by: NURSE PRACTITIONER

## 2023-12-20 PROCEDURE — 1159F PR MEDICATION LIST DOCUMENTED IN MEDICAL RECORD: ICD-10-PCS | Mod: CPTII,S$GLB,, | Performed by: NURSE PRACTITIONER

## 2023-12-20 PROCEDURE — 1159F MED LIST DOCD IN RCRD: CPT | Mod: CPTII,S$GLB,, | Performed by: NURSE PRACTITIONER

## 2023-12-20 PROCEDURE — 3288F PR FALLS RISK ASSESSMENT DOCUMENTED: ICD-10-PCS | Mod: CPTII,S$GLB,, | Performed by: NURSE PRACTITIONER

## 2023-12-20 PROCEDURE — 3078F PR MOST RECENT DIASTOLIC BLOOD PRESSURE < 80 MM HG: ICD-10-PCS | Mod: CPTII,S$GLB,, | Performed by: NURSE PRACTITIONER

## 2023-12-20 PROCEDURE — 3288F FALL RISK ASSESSMENT DOCD: CPT | Mod: CPTII,S$GLB,, | Performed by: NURSE PRACTITIONER

## 2023-12-20 PROCEDURE — 1160F PR REVIEW ALL MEDS BY PRESCRIBER/CLIN PHARMACIST DOCUMENTED: ICD-10-PCS | Mod: CPTII,S$GLB,, | Performed by: NURSE PRACTITIONER

## 2023-12-20 PROCEDURE — 1126F AMNT PAIN NOTED NONE PRSNT: CPT | Mod: CPTII,S$GLB,, | Performed by: NURSE PRACTITIONER

## 2023-12-20 PROCEDURE — 3078F DIAST BP <80 MM HG: CPT | Mod: CPTII,S$GLB,, | Performed by: NURSE PRACTITIONER

## 2023-12-20 PROCEDURE — 1160F RVW MEDS BY RX/DR IN RCRD: CPT | Mod: CPTII,S$GLB,, | Performed by: NURSE PRACTITIONER

## 2023-12-20 RX ORDER — LEVALBUTEROL INHALATION SOLUTION 1.25 MG/3ML
1 SOLUTION RESPIRATORY (INHALATION) EVERY 4 HOURS PRN
Qty: 100 EACH | Refills: 2 | Status: SHIPPED | OUTPATIENT
Start: 2023-12-20 | End: 2024-12-19

## 2023-12-21 ENCOUNTER — PATIENT MESSAGE (OUTPATIENT)
Dept: PRIMARY CARE CLINIC | Facility: CLINIC | Age: 83
End: 2023-12-21
Payer: MEDICARE

## 2024-01-02 DIAGNOSIS — I10 ESSENTIAL HYPERTENSION: ICD-10-CM

## 2024-01-02 RX ORDER — HYDROCHLOROTHIAZIDE 12.5 MG/1
12.5 CAPSULE ORAL
Qty: 90 CAPSULE | Refills: 0 | Status: SHIPPED | OUTPATIENT
Start: 2024-01-02

## 2024-01-03 NOTE — TELEPHONE ENCOUNTER
Refill Routing Note   Medication(s) are not appropriate for processing by [facility] for the following reason(s):        Responsible provider unclear    [facility] action(s):  Defer               Appointments  past 12m or future 3m with PCP    Date Provider   Last Visit   1/13/2023 Santiago Adler MD   Next Visit   Visit date not found Santiago Adler MD   ED visits in past 90 days: 0        Note composed:10:06 PM 01/02/2024

## 2024-01-19 DIAGNOSIS — E11.9 TYPE 2 DIABETES MELLITUS WITHOUT COMPLICATION, WITHOUT LONG-TERM CURRENT USE OF INSULIN: ICD-10-CM

## 2024-01-19 RX ORDER — METFORMIN HYDROCHLORIDE 500 MG/1
TABLET ORAL
Qty: 180 TABLET | Refills: 1 | Status: SHIPPED | OUTPATIENT
Start: 2024-01-19

## 2024-01-19 NOTE — TELEPHONE ENCOUNTER
Refill Routing Note   Medication(s) are not appropriate for processing by [facility] for the following reason(s):        Non-participating provider  No active prescription written by provider    [facility] action(s):  Route        Medication Therapy Plan: Last order under  (LOV 2022); Patient is following with current PCP Elma Gonzalez NP      Appointments  past 12m or future 3m with PCP    Date Provider   Last Visit   12/20/2023 Elma Gonzalez NP   Next Visit   Visit date not found Elma Gonzalez NP   ED visits in past 90 days: 0        Note composed:2:03 PM 01/19/2024

## 2024-01-23 ENCOUNTER — OFFICE VISIT (OUTPATIENT)
Dept: URGENT CARE | Facility: CLINIC | Age: 84
End: 2024-01-23
Payer: MEDICARE

## 2024-01-23 VITALS
WEIGHT: 254.44 LBS | BODY MASS INDEX: 30.98 KG/M2 | HEART RATE: 83 BPM | TEMPERATURE: 98 F | OXYGEN SATURATION: 96 % | DIASTOLIC BLOOD PRESSURE: 70 MMHG | RESPIRATION RATE: 19 BRPM | SYSTOLIC BLOOD PRESSURE: 111 MMHG | HEIGHT: 76 IN

## 2024-01-23 DIAGNOSIS — U07.1 COVID-19 VIRUS DETECTED: ICD-10-CM

## 2024-01-23 DIAGNOSIS — R05.9 COUGH, UNSPECIFIED TYPE: ICD-10-CM

## 2024-01-23 DIAGNOSIS — U07.1 COVID-19 VIRUS DETECTED: Primary | ICD-10-CM

## 2024-01-23 LAB
CTP QC/QA: YES
CTP QC/QA: YES
POC MOLECULAR INFLUENZA A AGN: NEGATIVE
POC MOLECULAR INFLUENZA B AGN: NEGATIVE
SARS-COV-2 AG RESP QL IA.RAPID: POSITIVE

## 2024-01-23 PROCEDURE — 87811 SARS-COV-2 COVID19 W/OPTIC: CPT | Mod: QW,S$GLB,, | Performed by: FAMILY MEDICINE

## 2024-01-23 PROCEDURE — 87502 INFLUENZA DNA AMP PROBE: CPT | Mod: QW,S$GLB,, | Performed by: FAMILY MEDICINE

## 2024-01-23 PROCEDURE — 99214 OFFICE O/P EST MOD 30 MIN: CPT | Mod: S$GLB,,, | Performed by: FAMILY MEDICINE

## 2024-01-23 RX ORDER — BENZONATATE 100 MG/1
CAPSULE ORAL
Qty: 30 CAPSULE | Refills: 0 | Status: SHIPPED | OUTPATIENT
Start: 2024-01-23 | End: 2024-04-11

## 2024-01-23 NOTE — PATIENT INSTRUCTIONS
YOU TESTED POSITIVE FOR COVID-19 TODAY.  IT IS IMPORTANT THAT YOU FOLLOW THE FOLLOWING ISOLATION GUIDELINES, regardless of vaccination status    Stay home for at least 5 days  Stay home for 5 days and isolate from others in your home.  Wear a well-fitting mask if you must be around others in your home.  Do not travel.    Ending isolation if you had symptoms  End isolation after 5 full days if you are fever-free for 24 hours (without the use of fever-reducing medication) and your symptoms are improving.  Ending isolation if you did NOT have symptoms  End isolation after at least 5 full days after your positive test.  If you got very sick from COVID-19 or have a weakened immune system  You should isolate for at least 10 days. Consult your doctor before ending isolation.    Take precautions until day 10  Wear a well-fitting mask   Wear a well-fitting mask for 10 full days any time you are around others inside your home or in public. Do not go to places where you are unable to wear a mask.  Do not travel  Do not travel until a full 10 days after your symptoms started or the date your positive test was taken if you had no symptoms.  Avoid being around people who are more likely to get very sick from COVID-19.          WITH ANY SEVERE CHEST PAIN OR SHORTNESS OF BREATH, YOU SHOULD GO TO THE EMERGENCY ROOM.

## 2024-01-23 NOTE — PROGRESS NOTES
"Subjective:      Patient ID: Nick De Anda is a 83 y.o. male.    Vitals:  height is 6' 4" (1.93 m) and weight is 115.4 kg (254 lb 6.6 oz). His oral temperature is 97.7 °F (36.5 °C). His blood pressure is 111/70 and his pulse is 83. His respiration is 19 and oxygen saturation is 96%.     Chief Complaint: Sinus Problem    Patient is an 83 y.o. male with the compliant of sinus issues that presented about a couple of weeks ago, he reports with taking mucinex and saline nasal spray for his current symptoms and reports with minimal relief.  He thought his sinus issue was improving but worse past 2 days.  No fever.  No chest pain or shortness a breath.    Sinus Problem  This is a new problem. The current episode started 1 to 4 weeks ago. The problem has been waxing and waning since onset. There has been no fever. His pain is at a severity of 4/10. He is experiencing no pain. Associated symptoms include coughing and sneezing. Past treatments include nasal decongestants and oral decongestants. The treatment provided no relief.       Respiratory:  Positive for cough.    Allergic/Immunologic: Positive for sneezing.      Objective:     Physical Exam   Constitutional: He is oriented to person, place, and time. He appears well-developed.  Non-toxic appearance. He does not appear ill. No distress.   HENT:   Head: Normocephalic and atraumatic.   Ears:   Right Ear: External ear normal.   Left Ear: External ear normal.   Cardiovascular: Normal rate and regular rhythm.   Pulmonary/Chest: Effort normal. No stridor. No respiratory distress. He has no wheezes. He has no rhonchi. He has no rales.   Abdominal: Normal appearance.   Neurological: He is alert and oriented to person, place, and time.   Skin: Skin is not diaphoretic.   Psychiatric: His behavior is normal. Thought content normal.   Nursing note and vitals reviewed.    Results for orders placed or performed in visit on 01/23/24   SARS Coronavirus 2 Antigen, POCT Manual " Read   Result Value Ref Range    SARS Coronavirus 2 Antigen Positive (A) Negative     Acceptable Yes    POCT Influenza A/B MOLECULAR   Result Value Ref Range    POC Molecular Influenza A Ag Negative Negative, Not Reported    POC Molecular Influenza B Ag Negative Negative, Not Reported     Acceptable Yes       Assessment:     1. COVID-19 virus detected    2. Cough, unspecified type        Plan:       COVID-19 virus detected  -     molnupiravir 200 mg capsule (EUA); Take 4 capsules (800 mg total) by mouth every 12 (twelve) hours. for 5 days  Dispense: 40 capsule; Refill: 0    Cough, unspecified type  -     SARS Coronavirus 2 Antigen, POCT Manual Read  -     POCT Influenza A/B MOLECULAR  -     benzonatate (TESSALON PERLES) 100 MG capsule; 1-2 capsules every 8 hours as needed for cough  Dispense: 30 capsule; Refill: 0      YOU TESTED POSITIVE FOR COVID-19 TODAY.  IT IS IMPORTANT THAT YOU FOLLOW THE FOLLOWING ISOLATION GUIDELINES, regardless of vaccination status    Stay home for at least 5 days  Stay home for 5 days and isolate from others in your home.  Wear a well-fitting mask if you must be around others in your home.  Do not travel.    Ending isolation if you had symptoms  End isolation after 5 full days if you are fever-free for 24 hours (without the use of fever-reducing medication) and your symptoms are improving.  Ending isolation if you did NOT have symptoms  End isolation after at least 5 full days after your positive test.  If you got very sick from COVID-19 or have a weakened immune system  You should isolate for at least 10 days. Consult your doctor before ending isolation.    Take precautions until day 10  Wear a well-fitting mask   Wear a well-fitting mask for 10 full days any time you are around others inside your home or in public. Do not go to places where you are unable to wear a mask.  Do not travel  Do not travel until a full 10 days after your symptoms started or the  date your positive test was taken if you had no symptoms.  Avoid being around people who are more likely to get very sick from COVID-19.      WITH ANY SEVERE CHEST PAIN OR SHORTNESS OF BREATH, YOU SHOULD GO TO THE EMERGENCY ROOM.

## 2024-01-29 ENCOUNTER — PATIENT MESSAGE (OUTPATIENT)
Dept: OTOLARYNGOLOGY | Facility: CLINIC | Age: 84
End: 2024-01-29
Payer: MEDICARE

## 2024-02-26 ENCOUNTER — OFFICE VISIT (OUTPATIENT)
Dept: OTOLARYNGOLOGY | Facility: CLINIC | Age: 84
End: 2024-02-26
Payer: MEDICARE

## 2024-02-26 VITALS — SYSTOLIC BLOOD PRESSURE: 109 MMHG | DIASTOLIC BLOOD PRESSURE: 68 MMHG | HEART RATE: 88 BPM

## 2024-02-26 DIAGNOSIS — R49.0 DYSPHONIA: ICD-10-CM

## 2024-02-26 DIAGNOSIS — C32.9 LARYNGEAL CANCER: Primary | ICD-10-CM

## 2024-02-26 PROCEDURE — 31579 LARYNGOSCOPY TELESCOPIC: CPT | Mod: S$GLB,,, | Performed by: OTOLARYNGOLOGY

## 2024-02-26 PROCEDURE — 99213 OFFICE O/P EST LOW 20 MIN: CPT | Mod: 25,S$GLB,, | Performed by: OTOLARYNGOLOGY

## 2024-02-26 PROCEDURE — 99999 PR PBB SHADOW E&M-EST. PATIENT-LVL IV: CPT | Mod: PBBFAC,,, | Performed by: OTOLARYNGOLOGY

## 2024-02-26 NOTE — PROGRESS NOTES
[facility]  Department of Otorhinolaryngology and Communication Sciences    Subjective:      Nick De Anda is a 83 y.o. male who presents for follow-up. He has glottic dysplasia.    9/28/2021: SML KTP assisted excision right TVF lesion.  1. BIOPSY OF RIGHT FALSE VOCAL FOLD:   MODERATE NONSPECIFIC SUBMUCOSAL CHRONIC INFLAMMATION   NO NEOPLASIA IDENTIFIED   2. BIOPSY OF VENTRICLE:   NO SIGNIFICANT HISTOLOGIC ALTERATION   NO NEOPLASIA IDENTIFIED   3. BIOPSY OF RIGHT TRUE VOCAL FOLD:   NO CARCINOMA IDENTIFIED   FOCAL MODERATE SQUAMOUS MUCOSAL DYSPLASIA    He continues to do well.  His voice is great. He denies dysphagia, throat pain, odynophagia, otalgia, hemoptysis, hematemesis, neck mass.     The patient's medications, allergies, past medical, surgical, social and family histories were reviewed and updated as appropriate.    A detailed review of systems was obtained with pertinent positives as per the above HPI, and otherwise negative.      Objective:     /68 (Patient Position: Sitting)   Pulse 88   Constitutional: comfortable, well dressed  Psychiatric: appropriate affect  Respiratory: comfortably breathing, symmetric chest rise, no stridor  Voice: minimal variable roughness/strain  Head: normocephalic  Eyes: conjunctivae and sclerae clear  Indirect laryngoscopy is limited due to gag    Procedure  Rigid Laryngeal Videostroboscopy (07932): Laryngeal videostroboscopy is indicated to assess the laryngeal vibratory biomechanics and vocal fold oscillation, which cannot be assessed with a plain light examination. This was carried out with a 70 degree endoscope. After verbal consent was obtained, the patient was positioned and the tongue was gently secured with a gauze sponge. The endoscope was passed transorally and positioned to image the larynx and hypopharynx in detail. The following features were examined: laryngeal and hypopharyngeal masses; vocal fold range and symmetry of motion; laryngeal  mucosal edema, erythema, inflammation, and hydration; salivary pooling; and gross laryngeal sensation. During phonation, the vocal folds were assessed for glottal closure; mucosal wave; vocal fold lesions; vibratory periodicity, amplitude, and phase symmetry; and vertical height match. The equipment was removed. The patient tolerated the procedure well without complication. All findings were normal except:  - right elise-laryngeal surgical site well healed; no evidence of disease  - complete closure, blunted pliability right true vocal fold      Data Reviewed  See HPI      Assessment:     Nick De Anda is a 83 y.o. male with glottic dysplasia, progressing well following microsurgical intervention.     Plan:     Options discussed included continued surveillance versus procedural/surgical treatment of the residual/recurrent leukoplakia.  Together we decided on continued surveillance.  He will follow up with me in about 4-6 months, or sooner if needed.    All questions were answered, and the patient is in agreement with the plan.    Rommel Stubbs M.D.  [facility]  Department of Otorhinolaryngology and Communication Sciences

## 2024-03-31 DIAGNOSIS — E78.5 HYPERLIPIDEMIA, UNSPECIFIED HYPERLIPIDEMIA TYPE: ICD-10-CM

## 2024-04-01 RX ORDER — ATORVASTATIN CALCIUM 80 MG/1
80 TABLET, FILM COATED ORAL NIGHTLY
Qty: 90 TABLET | Refills: 3 | Status: SHIPPED | OUTPATIENT
Start: 2024-04-01

## 2024-04-04 ENCOUNTER — PATIENT MESSAGE (OUTPATIENT)
Dept: CARDIOLOGY | Facility: CLINIC | Age: 84
End: 2024-04-04
Payer: MEDICARE

## 2024-04-04 DIAGNOSIS — I25.10 CORONARY ARTERY DISEASE INVOLVING NATIVE CORONARY ARTERY OF NATIVE HEART WITHOUT ANGINA PECTORIS: ICD-10-CM

## 2024-04-04 DIAGNOSIS — I10 ESSENTIAL HYPERTENSION: ICD-10-CM

## 2024-04-04 RX ORDER — HYDROCHLOROTHIAZIDE 12.5 MG/1
12.5 CAPSULE ORAL DAILY
Qty: 90 CAPSULE | Refills: 3 | Status: SHIPPED | OUTPATIENT
Start: 2024-04-04

## 2024-04-04 RX ORDER — AMLODIPINE BESYLATE 5 MG/1
TABLET ORAL
Qty: 90 TABLET | Refills: 3 | Status: SHIPPED | OUTPATIENT
Start: 2024-04-04

## 2024-04-04 NOTE — TELEPHONE ENCOUNTER
Nick HERRING Staff (supporting Romel Alas MD)1 hour ago (3:09 PM)       I just realized that my Amlodipine Medication is not on my meds list. I need to get a new Rx for this because I still take 1 ea 5 mg daily in the evening along with my other PM Meds. ( I take I ea Irbesartan 300 mg in the mornings).   I need my Diuretic refilled, and it will also require a new Rx.   Please let me know the status of this request when you have reviewed its content because I have only a few days left on both Prescriptions.  Thank you for your attention in this matter.  Nick De Anda.

## 2024-04-11 ENCOUNTER — OFFICE VISIT (OUTPATIENT)
Dept: OTOLARYNGOLOGY | Facility: CLINIC | Age: 84
End: 2024-04-11
Payer: MEDICARE

## 2024-04-11 VITALS
OXYGEN SATURATION: 95 % | SYSTOLIC BLOOD PRESSURE: 127 MMHG | WEIGHT: 264.75 LBS | HEART RATE: 63 BPM | BODY MASS INDEX: 32.23 KG/M2 | DIASTOLIC BLOOD PRESSURE: 62 MMHG

## 2024-04-11 DIAGNOSIS — H61.23 BILATERAL IMPACTED CERUMEN: ICD-10-CM

## 2024-04-11 DIAGNOSIS — J34.2 NASAL SEPTAL DEVIATION: ICD-10-CM

## 2024-04-11 DIAGNOSIS — H69.90 DYSFUNCTION OF EUSTACHIAN TUBE, UNSPECIFIED LATERALITY: ICD-10-CM

## 2024-04-11 DIAGNOSIS — H91.90 HEARING LOSS, UNSPECIFIED HEARING LOSS TYPE, UNSPECIFIED LATERALITY: Primary | ICD-10-CM

## 2024-04-11 DIAGNOSIS — R05.3 CHRONIC COUGH: ICD-10-CM

## 2024-04-11 DIAGNOSIS — R09.82 PND (POST-NASAL DRIP): ICD-10-CM

## 2024-04-11 DIAGNOSIS — J34.89 NASAL OBSTRUCTION WITHOUT CHOANAL ATRESIA: ICD-10-CM

## 2024-04-11 DIAGNOSIS — J30.9 ALLERGIC RHINITIS, UNSPECIFIED SEASONALITY, UNSPECIFIED TRIGGER: ICD-10-CM

## 2024-04-11 PROCEDURE — 99999 PR PBB SHADOW E&M-EST. PATIENT-LVL V: CPT | Mod: PBBFAC,,, | Performed by: SPECIALIST

## 2024-04-11 PROCEDURE — 1160F RVW MEDS BY RX/DR IN RCRD: CPT | Mod: CPTII,S$GLB,, | Performed by: SPECIALIST

## 2024-04-11 PROCEDURE — 3074F SYST BP LT 130 MM HG: CPT | Mod: CPTII,S$GLB,, | Performed by: SPECIALIST

## 2024-04-11 PROCEDURE — 69209 REMOVE IMPACTED EAR WAX UNI: CPT | Mod: 50,S$GLB,, | Performed by: SPECIALIST

## 2024-04-11 PROCEDURE — 3078F DIAST BP <80 MM HG: CPT | Mod: CPTII,S$GLB,, | Performed by: SPECIALIST

## 2024-04-11 PROCEDURE — 1101F PT FALLS ASSESS-DOCD LE1/YR: CPT | Mod: CPTII,S$GLB,, | Performed by: SPECIALIST

## 2024-04-11 PROCEDURE — 99214 OFFICE O/P EST MOD 30 MIN: CPT | Mod: 25,S$GLB,, | Performed by: SPECIALIST

## 2024-04-11 PROCEDURE — 1159F MED LIST DOCD IN RCRD: CPT | Mod: CPTII,S$GLB,, | Performed by: SPECIALIST

## 2024-04-11 PROCEDURE — 3288F FALL RISK ASSESSMENT DOCD: CPT | Mod: CPTII,S$GLB,, | Performed by: SPECIALIST

## 2024-04-11 RX ORDER — IPRATROPIUM BROMIDE 21 UG/1
2 SPRAY, METERED NASAL 2 TIMES DAILY
Qty: 30 ML | Refills: 11 | Status: SHIPPED | OUTPATIENT
Start: 2024-04-11

## 2024-04-11 RX ORDER — LORATADINE 10 MG/1
10 TABLET ORAL DAILY
Qty: 90 TABLET | Refills: 3 | Status: SHIPPED | OUTPATIENT
Start: 2024-04-11 | End: 2025-04-11

## 2024-04-11 NOTE — PROCEDURES
"Ear Cerumen Removal    Date/Time: 4/11/2024 1:20 PM    Performed by: JUAN J Irving MD  Authorized by: JUAN J Irving MD    Time out: Immediately prior to procedure a "time out" was called to verify the correct patient, procedure, equipment, support staff and site/side marked as required.    Consent Done?:  Yes (Verbal)    Local anesthetic:  None  Location details:  Both ears  Procedure type comment:  Wire loop, 10 Fr suction  Cerumen  Removal Results:  Cerumen completely removed  Patient tolerance:  Patient tolerated the procedure well with no immediate complications    "

## 2024-04-11 NOTE — PROGRESS NOTES
Subjective:       Patient ID: Nick De Anda is a 83 y.o. male.    Chief Complaint: No chief complaint on file.      The patient is coming in for evaluation for a chronic nasal congestion and postnasal drip.  This leads to him having phlegm in his throat which then progresses to him either having a coughing spell or sneezing spell.  This is present every morning when he wakes up and persists till mid day.  The coughing is less so after that.  He has chronic lung disease and chronic shortness of breath.  He uses Xopenex HFA inhaler which gives him temporary relief for about 3 or 4 hours.  He is also having headaches in the frontal area, cheeks and brow.    He has an extensive cardiac history having had an angioplasty with 2 stents in 2000 12.  He underwent coronary artery bypass grafts of 3 vessels in 2014.  He developed atrial fibrillation in 2019 in his had 2 ablation procedures.  In 2021 he underwent excision of a laryngeal lesion.  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          Review of Systems     Constitutional: Positive for fatigue.  Negative for appetite change, chills, fever and unexpected weight loss.      HENT: Positive for hearing loss, nosebleeds, postnasal drip, sinus infection, sinus pressure, sore throat and voice change.  Negative for ear discharge, ear infection, ear pain, facial swelling, mouth sores, ringing in the ears, runny nose, stuffy nose, tonsil infection, dental problems and trouble swallowing.      Eyes:  Positive for eye itching. Negative for change in eyesight, eye drainage and photophobia.     Respiratory:  Positive for cough, shortness of breath, sleep apnea and wheezing. Negative for snoring.      Cardiovascular:  Positive for irregular heartbeat. Negative for chest pain, foot  swelling and swollen veins.     Gastrointestinal:  Positive for constipation. Negative for abdominal pain, acid reflux, diarrhea, heartburn and vomiting.     Genitourinary: Negative for difficulty urinating, sexual problems and frequent urination.     Musc: Positive for aching muscles, back pain and neck pain. Negative for aching joints.     Skin: Positive for rash.     Allergy: Positive for seasonal allergies. Negative for food allergies.     Endocrine: Positive for cold intolerance. Negative for heat intolerance.      Neurological: Negative for dizziness, headaches, light-headedness, seizures and tremors.      Hematologic: Positive for bruises/bleeds easily and swollen glands.     Psychiatric: Positive for depression, nervous/anxious and sleep disturbance. Negative for decreased concentration.                Objective:      Physical Exam  Vitals and nursing note reviewed.   Constitutional:       General: He is awake.      Appearance: Normal appearance. He is well-developed, well-groomed and overweight.   HENT:      Head: Normocephalic.      Jaw: There is normal jaw occlusion.      Salivary Glands: Right salivary gland is not diffusely enlarged or tender. Left salivary gland is not diffusely enlarged or tender.      Right Ear: Ear canal and external ear normal. Decreased hearing noted. There is impacted cerumen. Tympanic membrane is retracted.      Left Ear: Ear canal and external ear normal. Decreased hearing noted. There is impacted cerumen. Tympanic membrane is retracted.      Nose: Septal deviation (septum deviated to the left anteriorly and right posteriorly), mucosal edema (cyanotic, boggy inferior turbinates bilaterally) and rhinorrhea (clear mucus bilaterally) present. No nasal deformity. Rhinorrhea is clear.      Right Turbinates: Enlarged and pale.      Left Turbinates: Enlarged and pale.      Mouth/Throat:      Lips: No lesions.      Mouth: Mucous membranes are moist. No oral lesions.      Dentition: No  gum lesions.      Tongue: No lesions.      Palate: No mass and lesions.      Pharynx: Oropharynx is clear. Uvula midline.   Eyes:      General: Lids are normal. Vision grossly intact.         Right eye: No discharge.         Left eye: No discharge.      Extraocular Movements: Extraocular movements intact.      Conjunctiva/sclera: Conjunctivae normal.      Pupils: Pupils are equal, round, and reactive to light.   Neck:      Thyroid: No thyroid mass or thyromegaly.      Trachea: Trachea normal. No tracheal deviation.   Cardiovascular:      Rate and Rhythm: Normal rate and regular rhythm.      Pulses: Normal pulses.      Heart sounds: Normal heart sounds.   Pulmonary:      Effort: Pulmonary effort is normal.      Breath sounds: Normal breath sounds. No stridor. No decreased breath sounds, wheezing, rhonchi or rales.   Abdominal:      General: Bowel sounds are normal.      Palpations: Abdomen is soft.      Tenderness: There is no abdominal tenderness.   Musculoskeletal:      Cervical back: Neck supple. No muscular tenderness. Decreased range of motion.   Lymphadenopathy:      Head:      Right side of head: No submental, submandibular, preauricular, posterior auricular or occipital adenopathy.      Left side of head: No submental, submandibular, preauricular, posterior auricular or occipital adenopathy.      Cervical: No cervical adenopathy.   Skin:     General: Skin is warm and dry.      Findings: No petechiae or rash.      Nails: There is no clubbing.   Neurological:      Mental Status: He is alert and oriented to person, place, and time.      Cranial Nerves: No cranial nerve deficit.      Sensory: No sensory deficit.      Gait: Gait normal.   Psychiatric:         Speech: Speech normal.         Behavior: Behavior normal. Behavior is cooperative.         Thought Content: Thought content normal.         Judgment: Judgment normal.         Procedure-cerumen impactions are removed bilaterally using wire loop and 10 Setswana  suction.  The patient tolerated procedure well was discharged post procedure.    Assessment:       1. Allergic rhinitis, unspecified seasonality, unspecified trigger    2. Dysfunction of Eustachian tube, unspecified laterality    3. Impacted cerumen, unspecified laterality    4. PND (post-nasal drip)    5. Nasal obstruction without choanal atresia    6. Nasal septal deviation        Plan:       I am placing the patient on daily use of loratadine to be supplemented with ipratropium bromide spray.  I am ordering a Riboxx CT scan of the sinuses without IV contrast to rule out underlying chronic sinusitis.  I will recheck the patient in 6 weeks.                  DISCLAIMER: This note was prepared with [employer] voice recognition transcription software. Garbled syntax, mangled pronouns, and other bizarre constructions may be attributed to that software system. While efforts were made to correct any mistakes made by this voice recognition program, some errors and/or omissions may remain in the note that were missed when the note was originally created.

## 2024-04-12 ENCOUNTER — OFFICE VISIT (OUTPATIENT)
Dept: PULMONOLOGY | Facility: CLINIC | Age: 84
End: 2024-04-12
Payer: MEDICARE

## 2024-04-12 VITALS
HEART RATE: 66 BPM | SYSTOLIC BLOOD PRESSURE: 121 MMHG | DIASTOLIC BLOOD PRESSURE: 56 MMHG | WEIGHT: 266.19 LBS | BODY MASS INDEX: 32.4 KG/M2 | OXYGEN SATURATION: 97 %

## 2024-04-12 DIAGNOSIS — R05.3 CHRONIC COUGH: ICD-10-CM

## 2024-04-12 DIAGNOSIS — J43.9 PULMONARY EMPHYSEMA, UNSPECIFIED EMPHYSEMA TYPE: ICD-10-CM

## 2024-04-12 DIAGNOSIS — J92.9 PLEURAL PLAQUE: ICD-10-CM

## 2024-04-12 DIAGNOSIS — J47.9 BRONCHIECTASIS WITHOUT COMPLICATION: Primary | ICD-10-CM

## 2024-04-12 DIAGNOSIS — J44.9 CHRONIC OBSTRUCTIVE PULMONARY DISEASE, UNSPECIFIED COPD TYPE: ICD-10-CM

## 2024-04-12 PROCEDURE — 1126F AMNT PAIN NOTED NONE PRSNT: CPT | Mod: CPTII,S$GLB,,

## 2024-04-12 PROCEDURE — 1159F MED LIST DOCD IN RCRD: CPT | Mod: CPTII,S$GLB,,

## 2024-04-12 PROCEDURE — 3074F SYST BP LT 130 MM HG: CPT | Mod: CPTII,S$GLB,,

## 2024-04-12 PROCEDURE — 1101F PT FALLS ASSESS-DOCD LE1/YR: CPT | Mod: CPTII,S$GLB,,

## 2024-04-12 PROCEDURE — 99999 PR PBB SHADOW E&M-EST. PATIENT-LVL V: CPT | Mod: PBBFAC,,,

## 2024-04-12 PROCEDURE — 99215 OFFICE O/P EST HI 40 MIN: CPT | Mod: S$GLB,,,

## 2024-04-12 PROCEDURE — 3288F FALL RISK ASSESSMENT DOCD: CPT | Mod: CPTII,S$GLB,,

## 2024-04-12 PROCEDURE — 3078F DIAST BP <80 MM HG: CPT | Mod: CPTII,S$GLB,,

## 2024-04-12 RX ORDER — UMECLIDINIUM BROMIDE AND VILANTEROL TRIFENATATE 62.5; 25 UG/1; UG/1
1 POWDER RESPIRATORY (INHALATION) DAILY
Qty: 60 EACH | Refills: 5 | Status: SHIPPED | OUTPATIENT
Start: 2024-04-12

## 2024-04-12 RX ORDER — FLUTICASONE FUROATE, UMECLIDINIUM BROMIDE AND VILANTEROL TRIFENATATE 200; 62.5; 25 UG/1; UG/1; UG/1
1 POWDER RESPIRATORY (INHALATION) DAILY
Qty: 60 EACH | Refills: 0 | Status: SHIPPED | OUTPATIENT
Start: 2024-04-12 | End: 2024-04-12

## 2024-04-12 NOTE — PROGRESS NOTES
History and Physical Note  [facility]    Subjective:     Reason for visit: Chronic couh    Patient ID:  Nick De Anda is a 83 y.o. male    Interval History:  Presents today as a referral from ENT for evaluation of chronic cough. Medical history of CAD s/p CABG, Afib, HTN, DM2, HLD and laryngeal cancer. I met his very pleasant wife Ms. Casiano at her appointment. She is not here with him today.     Previously evaluated by Dr. Rawls. He was not found to have pneumonia at that time. Diagnosed with COPD but no treatment given at that time. PFT showed obstruction 2023 with evidence of hyperinflation on chest imaging.     He is bothered by a persistent coughing. He has a productive sputum especially in the mornings. He has been using his nebulizer at home as needed to help with the congestion. He bought an OPEP device to use with his nebulizer the Airphysio. This helps clear his mucous by mid day.     Can't go to the gym because he has been very short of breath.   No steroid use due to glaucoma.     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   Social History     Tobacco Use   Smoking Status Former    Current packs/day: 0.00    Average packs/day: 0.5 packs/day for 23.7 years (11.9 ttl pk-yrs)    Types: Cigarettes    Start date: 1956    Quit date: 10/1/1979    Years since quittin.5    Passive exposure: Past   Smokeless Tobacco Never       Objective:     Vitals:    24 1443   BP: (!) 121/56   BP Location: Right arm   Patient Position: Sitting   BP Method: Large (Automatic)   Pulse: 66   SpO2: 97%   Weight: 120.8 kg (266 lb 3.3 oz)         Physical Exam  Constitutional:       General: He is awake.      Appearance: Normal appearance. He is well-developed.   HENT:      Head: Normocephalic.   Pulmonary:       Effort: Pulmonary effort is normal. No respiratory distress.      Breath sounds: Decreased air movement present. No wheezing.   Skin:     General: Skin is warm.      Capillary Refill: Capillary refill takes less than 2 seconds.      Findings: No rash.      Nails: There is no clubbing.   Neurological:      Mental Status: He is alert.   Psychiatric:         Attention and Perception: Attention and perception normal.         Behavior: Behavior is cooperative.          Personal Diagnostic Review and Interpretation  CTA 02/2023: report from OSH Mild bilateral emphysematous changes are noted. There is focal mild atelectasis or infiltrate at the left lung base, with tiny left pleural effusion. Calcified pleural plaque along the left hemidiaphragm is also noted.     CXR 03/2023: lung fields clear, flattened hemidiaphragm    Pertinent Studies Reviewed & Interpreted:     Pulmonary Function Tests:   PFT 03/2023: FVC: 2.91 (63 % predicted), FEV1: 1.43 (32.6 % predicted), FEV1/FVC:  67  Moderate obstruction, severe ventilatory impairment      Echocardiograms:   12/2023:     Left Ventricle: The left ventricle is normal in size. Normal wall thickness. There is concentric remodeling. Normal wall motion. There is normal systolic function with a visually estimated ejection fraction of 55 - 60%. There is normal diastolic function.    Right Ventricle: Normal right ventricular cavity size. Wall thickness is normal. Right ventricle wall motion  is normal. Systolic function is normal.    Aortic Valve: The aortic valve is a trileaflet valve. There is mild aortic valve sclerosis.    Tricuspid Valve: There is mild regurgitation.    Pulmonary Artery: The estimated pulmonary artery systolic pressure is 30 mmHg.    IVC/SVC: Normal venous pressure at 3 mmHg.       Other Pertinent Laboratories:  Lab Results   Component Value Date    WBC 8.22 11/14/2023    RBC 4.69 11/14/2023    HGB 13.8 (L) 11/14/2023    MCV 89 11/14/2023    MCH 29.4 11/14/2023     MCHC 33.2 11/14/2023    RDW 13.7 11/14/2023     11/14/2023    MPV 11.5 11/14/2023    GRAN 4.7 11/14/2023    GRAN 57.7 11/14/2023    LYMPH 2.2 11/14/2023    LYMPH 27.1 11/14/2023    MONO 0.8 11/14/2023    MONO 9.7 11/14/2023    EOS 0.4 11/14/2023    BASO 0.06 11/14/2023           Assessment & Plan:       Plan:  Clinical impression is resonably certain and repeated evaluation prn +/- follow up will be needed as below.      1. Bronchiectasis without complication  -     Discontinue: fluticasone-umeclidin-vilanter (TRELEGY ELLIPTA) 200-62.5-25 mcg inhaler; Inhale 1 puff into the lungs once daily.  Dispense: 60 each; Refill: 11    2. Chronic cough  -     Ambulatory referral/consult to Pulmonology  -     Discontinue: fluticasone-umeclidin-vilanter (TRELEGY ELLIPTA) 200-62.5-25 mcg inhaler; Inhale 1 puff into the lungs once daily.  Dispense: 60 each; Refill: 11  -     Complete PFT with bronchodilator; Future  -     umeclidinium-vilanteroL (ANORO ELLIPTA) 62.5-25 mcg/actuation DsDv; Inhale 1 puff into the lungs once daily. Controller  Dispense: 60 each; Refill: 5    3. Pleural plaque  Overview:  Calcified and noncalcified pleural plaque in the left hemithorax.      4. Pulmonary emphysema, unspecified emphysema type  Overview:  -Needs complete PFT. Moderate obstruction noted on latest spirometry but cannot rule out a restrictive pattern.     Orders:  -     Discontinue: fluticasone-umeclidin-vilanter (TRELEGY ELLIPTA) 200-62.5-25 mcg inhaler; Inhale 1 puff into the lungs once daily.  Dispense: 60 each; Refill: 11  -     Complete PFT with bronchodilator; Future  -     Ambulatory referral/consult to Pharmacy Assistance; Future; Expected date: 04/19/2024  -     umeclidinium-vilanteroL (ANORO ELLIPTA) 62.5-25 mcg/actuation DsDv; Inhale 1 puff into the lungs once daily. Controller  Dispense: 60 each; Refill: 5    5. Chronic obstructive pulmonary disease, unspecified COPD type  -     Ambulatory referral/consult to  Pulmonology  -     Discontinue: fluticasone-umeclidin-vilanter (TRELEGY ELLIPTA) 200-62.5-25 mcg inhaler; Inhale 1 puff into the lungs once daily.  Dispense: 60 each; Refill: 11  -     Complete PFT with bronchodilator; Future  -     umeclidinium-vilanteroL (ANORO ELLIPTA) 62.5-25 mcg/actuation DsDv; Inhale 1 puff into the lungs once daily. Controller  Dispense: 60 each; Refill: 5    Other orders  -     Discontinue: fluticasone-umeclidin-vilanter (TRELEGY ELLIPTA) 200-62.5-25 mcg inhaler; Inhale 1 puff into the lungs once daily.  Dispense: 60 each; Refill: 0        Follow up in about 6 months (around 10/12/2024).    Discussed with patient above for education the following:      Patient Instructions   -Obtain complete PFT to evaluate severity of obstruction in 6 months   -Continue levalbuterol nebulizer as needed for shortness of breath and wheezing with airphysio.     Start these medications:   -Trelegy inhalers for controller medications. 1 puff daily.       RETURN TO CLINIC IN 6 MONTHS     Total professional time spent for the encounter: 60 minutes  Time was spent preparing to see the patient, reviewing results of prior testing, obtaining and/or reviewing separately obtained history, performing a medically appropriate examination and interview, counseling and educating the patient/family, ordering medications/tests/procedures, referring and communicating with other health care professionals, documenting clinical information in the electronic health record, and independently interpreting results.    Jennifer Ruvalcaba, DNP

## 2024-04-12 NOTE — PATIENT INSTRUCTIONS
-Obtain complete PFT to evaluate severity of obstruction in 6 months   -Continue levalbuterol nebulizer as needed for shortness of breath and wheezing with airphysio.     Start these medications:   -Trelegy inhalers for controller medications. 1 puff daily.

## 2024-04-16 ENCOUNTER — TELEPHONE (OUTPATIENT)
Dept: PHARMACY | Facility: CLINIC | Age: 84
End: 2024-04-16
Payer: MEDICARE

## 2024-04-16 NOTE — TELEPHONE ENCOUNTER
[facility] no longer have a Pharmacy Patient Assistance program. Patients can sign up for [employer] at [email or web address] or by calling [phone] Monday-Friday, 8:00 AM-8:00 PM ET. If patients are eligible for [employer] Coverage Gap Support, the program will let them know    I was trying to explain to the patient of the necessary documents needed to submit an appliction for Trelegy when he disconnected the call.

## 2024-04-18 ENCOUNTER — CLINICAL SUPPORT (OUTPATIENT)
Dept: AUDIOLOGY | Facility: CLINIC | Age: 84
End: 2024-04-18
Payer: MEDICARE

## 2024-04-18 DIAGNOSIS — H90.3 SENSORINEURAL HEARING LOSS (SNHL) OF BOTH EARS: Primary | ICD-10-CM

## 2024-04-18 DIAGNOSIS — H93.13 TINNITUS OF BOTH EARS: ICD-10-CM

## 2024-04-18 PROCEDURE — 92557 COMPREHENSIVE HEARING TEST: CPT | Mod: S$GLB,,, | Performed by: AUDIOLOGIST

## 2024-04-18 PROCEDURE — 99999 PR PBB SHADOW E&M-EST. PATIENT-LVL I: CPT | Mod: PBBFAC,,, | Performed by: AUDIOLOGIST

## 2024-04-18 PROCEDURE — 92567 TYMPANOMETRY: CPT | Mod: S$GLB,,, | Performed by: AUDIOLOGIST

## 2024-04-18 NOTE — PROGRESS NOTES
Audiologic Evaluation 4/18/2024:       Nick De Anda, a 83 y.o. male, was seen today in the clinic for an audiologic evaluation for hearing loss.  Mr. De Anda reported difficulty understanding conversational speech and bilateral tinnitus. He noted a history of noise exposure while in the  and as a . Mr. De Anda denied vertigo.     Tympanometry revealed Type A tympanogram in the right ear and Type A tympanogram in the left ear. Audiogram results revealed slight sloping to profound sensorineural hearing loss in the right ear and mild sloping to profound sensorineural hearing loss in the left ear.  Speech reception thresholds were noted at 55 dB in the right ear and 55 dB in the left ear.  Speech discrimination scores were 80% in the right ear and 72% in the left ear.    Recommendations:  Otologic evaluation  Hearing aid consultation  Annual audiogram  Hearing protection when in noise

## 2024-04-18 NOTE — Clinical Note
Hi Dr. Irving,  Please see the results of Mr. De Anda's audiogram. Please let me know if you have any questions.  Thanks, Nighat

## 2024-04-20 ENCOUNTER — PATIENT MESSAGE (OUTPATIENT)
Dept: ADMINISTRATIVE | Facility: HOSPITAL | Age: 84
End: 2024-04-20
Payer: MEDICARE

## 2024-04-24 ENCOUNTER — PATIENT OUTREACH (OUTPATIENT)
Dept: ADMINISTRATIVE | Facility: HOSPITAL | Age: 84
End: 2024-04-24
Payer: MEDICARE

## 2024-04-24 NOTE — PROGRESS NOTES
Patient due for the following    Hemoglobin A1c     Eye Exam       E-faxed Dr. Jimenez for eye exam records.    Immunizations: reviewed and updated  Care Everywhere: triggered  Care Teams: up to date  Outreach: completed

## 2024-04-30 DIAGNOSIS — Z00.00 ENCOUNTER FOR MEDICARE ANNUAL WELLNESS EXAM: ICD-10-CM

## 2024-05-06 DIAGNOSIS — J43.9 PULMONARY EMPHYSEMA, UNSPECIFIED EMPHYSEMA TYPE: ICD-10-CM

## 2024-05-06 DIAGNOSIS — J44.9 CHRONIC OBSTRUCTIVE PULMONARY DISEASE, UNSPECIFIED COPD TYPE: ICD-10-CM

## 2024-05-06 DIAGNOSIS — R05.3 CHRONIC COUGH: ICD-10-CM

## 2024-05-06 RX ORDER — UMECLIDINIUM BROMIDE AND VILANTEROL TRIFENATATE 62.5; 25 UG/1; UG/1
1 POWDER RESPIRATORY (INHALATION) DAILY
Qty: 60 EACH | Refills: 5 | Status: CANCELLED | OUTPATIENT
Start: 2024-05-06

## 2024-05-06 NOTE — TELEPHONE ENCOUNTER
Left a voice message for the patient to call his pharmacy and ask for the refill. Pt was given 5 refills on the Anoro on 4/16/2024.

## 2024-05-15 PROBLEM — E44.1 MILD PROTEIN-CALORIE MALNUTRITION: Status: ACTIVE | Noted: 2024-05-15

## 2024-05-15 PROBLEM — L97.511 SKIN ULCER OF TOE OF RIGHT FOOT, LIMITED TO BREAKDOWN OF SKIN: Status: ACTIVE | Noted: 2024-05-15

## 2024-05-15 NOTE — PROGRESS NOTES
"  Nick De Anda presented for a follow-up Medicare AWV today. The following components were reviewed and updated:    Medical history  Family History  Social history  Allergies and Current Medications  Health Risk Assessment  Health Maintenance  Care Team    **See Completed Assessments for Annual Wellness visit with in the encounter summary    The following assessments were completed:  Depression Screening  Cognitive function Screening  Timed Get Up Test  Whisper Test      Opioid documentation:      Patient does not have a current opioid prescription.          Vitals:    06/04/24 1030   BP: (!) 108/50   BP Location: Right arm   Patient Position: Sitting   BP Method: Medium (Manual)   Pulse: 66   SpO2: 96%   Weight: 124.6 kg (274 lb 11.1 oz)   Height: 6' 4" (1.93 m)     Body mass index is 33.44 kg/m².       Physical Exam  Constitutional:       Appearance: Normal appearance. He is normal weight.   HENT:      Head: Normocephalic and atraumatic.      Nose: Nose normal.      Mouth/Throat:      Mouth: Mucous membranes are moist.      Pharynx: Oropharynx is clear.   Eyes:      Extraocular Movements: Extraocular movements intact.      Conjunctiva/sclera: Conjunctivae normal.      Pupils: Pupils are equal, round, and reactive to light.   Cardiovascular:      Rate and Rhythm: Normal rate and regular rhythm.      Pulses: Normal pulses.      Heart sounds: Normal heart sounds.   Pulmonary:      Effort: Pulmonary effort is normal.      Breath sounds: Normal breath sounds.   Musculoskeletal:         General: Normal range of motion.      Cervical back: Normal range of motion and neck supple.      Comments: + ambulates with assistance of a cane   Skin:     General: Skin is warm and dry.      Capillary Refill: Capillary refill takes less than 2 seconds.   Neurological:      General: No focal deficit present.      Mental Status: He is alert and oriented to person, place, and time. Mental status is at baseline.   Psychiatric:         " Mood and Affect: Mood normal.         Behavior: Behavior normal.         Thought Content: Thought content normal.         Judgment: Judgment normal.         Diagnoses and health risks identified today and associated recommendations/orders:  1. Encounter for annual wellness visit (AWV) in Medicare patient      2. Skin ulcer of toe of right foot, limited to breakdown of skin  Continue f/u care with podiatry as needed.    3. Mild protein-calorie malnutrition  Today's weight is 274 pounds. Last albumin level was 3.4 on 11/14/23. Increase protein in your diet.    4. Diabetic polyneuropathy associated with type 2 diabetes mellitus  Last Hgb A1c was 5.7 on 11/14/23. Continue f/u with PCP. Continue metformin 500 mg twice a day. Continue f/u visits with podiatry. Last visit was 11/27/23.    5. Hypertensive heart disease with heart failure  Today's BP is 108/50 . Last echocardiogram revealed EF 55-60%. Continue amlodipine 5 mg daily, HCTZ 12.5 mg daily, metoprolol 25 mg daily, irbesartan 300 mg daily.    6. Paroxysmal atrial fibrillation  Continue metoprolol 25 mg daily, Plavix 75 mg daily, Xarelto 20 mg nightly. Plavix is taken every other day and Xarelto is taken daily per cardiology.    7. Atherosclerosis of native artery of right lower extremity, with unspecified presence of clinical manifestation  CTA of chest on 11/17/20 revealed: Left-sided aortic arch which demonstrates normal branching pattern. Aorta maintains normal caliber, contour, and course with moderate calcific atherosclerosis. Lipid panel on 11/14/23 revealed cholesterol 107, LDL 53  Continue atorvastatin 80 mg daily.    8. Prostate cancer screening  Last PSA result was 17 on 07/06/23. F/U visit with urology on 07/28/23: PSA elevation clearly associated with known UTI and not concerning for malignancy. Given patient's age, further prostate cancer screening with PSA testing is not recommended.     9. 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  Denies any chemo and radiation. Follow up with ENT as directed.    11. Thrombocytopenia, unspecified  Last platelet level result was 135 on 11/14/23.   ========================================================    Provided Nick with a 5-10 year written screening schedule and personal prevention plan. Recommendations were developed using the USPSTF age appropriate recommendations. Education, counseling, and referrals were provided as needed.  After Visit Summary printed and given to patient which includes a list of additional screenings\tests needed.    No follow-ups on file.      Elma Gonzalez NP    I offered to discuss advanced care planning, including how to pick a person who would make decisions for you if you were unable to make them for yourself, called a health care power of , and what kind of decisions you might make such as use of life sustaining treatments such as ventilators and tube feeding when faced with a life limiting illness recorded on a living will that they will need to know. (How you want to be cared for as you near the end of your natural life)     X Patient is interested in learning more about how to make advanced directives.  I provided them paperwork and offered to discuss this with them.  I offered to discuss advanced care planning, including how to pick a person who would make decisions for you if you were unable to make them for yourself, called a health care power of , and what kind of decisions you might make such as use of life sustaining treatments such as ventilators and tube feeding when faced with a life limiting illness recorded on a living will that they will need to know. (How you want to be cared for as you near the end of your natural life)     X Patient is interested in learning more about how to make advanced directives.  I provided them paperwork and offered to discuss this with them.

## 2024-05-20 ENCOUNTER — PATIENT MESSAGE (OUTPATIENT)
Dept: OTOLARYNGOLOGY | Facility: CLINIC | Age: 84
End: 2024-05-20
Payer: MEDICARE

## 2024-05-23 ENCOUNTER — OFFICE VISIT (OUTPATIENT)
Dept: OTOLARYNGOLOGY | Facility: CLINIC | Age: 84
End: 2024-05-23
Payer: MEDICARE

## 2024-05-23 VITALS
SYSTOLIC BLOOD PRESSURE: 121 MMHG | WEIGHT: 274.13 LBS | OXYGEN SATURATION: 95 % | BODY MASS INDEX: 33.37 KG/M2 | HEART RATE: 70 BPM | DIASTOLIC BLOOD PRESSURE: 58 MMHG

## 2024-05-23 DIAGNOSIS — J34.2 NASAL SEPTAL DEVIATION: ICD-10-CM

## 2024-05-23 DIAGNOSIS — H90.3 SENSORINEURAL HEARING LOSS (SNHL) OF BOTH EARS: Primary | ICD-10-CM

## 2024-05-23 DIAGNOSIS — R09.82 PND (POST-NASAL DRIP): ICD-10-CM

## 2024-05-23 DIAGNOSIS — J30.9 ALLERGIC RHINITIS, UNSPECIFIED SEASONALITY, UNSPECIFIED TRIGGER: ICD-10-CM

## 2024-05-23 PROCEDURE — 99214 OFFICE O/P EST MOD 30 MIN: CPT | Mod: S$GLB,,, | Performed by: SPECIALIST

## 2024-05-23 PROCEDURE — 3288F FALL RISK ASSESSMENT DOCD: CPT | Mod: CPTII,S$GLB,, | Performed by: SPECIALIST

## 2024-05-23 PROCEDURE — 1159F MED LIST DOCD IN RCRD: CPT | Mod: CPTII,S$GLB,, | Performed by: SPECIALIST

## 2024-05-23 PROCEDURE — 1126F AMNT PAIN NOTED NONE PRSNT: CPT | Mod: CPTII,S$GLB,, | Performed by: SPECIALIST

## 2024-05-23 PROCEDURE — 3074F SYST BP LT 130 MM HG: CPT | Mod: CPTII,S$GLB,, | Performed by: SPECIALIST

## 2024-05-23 PROCEDURE — 99999 PR PBB SHADOW E&M-EST. PATIENT-LVL IV: CPT | Mod: PBBFAC,,, | Performed by: SPECIALIST

## 2024-05-23 PROCEDURE — 1101F PT FALLS ASSESS-DOCD LE1/YR: CPT | Mod: CPTII,S$GLB,, | Performed by: SPECIALIST

## 2024-05-23 PROCEDURE — 1160F RVW MEDS BY RX/DR IN RCRD: CPT | Mod: CPTII,S$GLB,, | Performed by: SPECIALIST

## 2024-05-23 PROCEDURE — 3078F DIAST BP <80 MM HG: CPT | Mod: CPTII,S$GLB,, | Performed by: SPECIALIST

## 2024-05-23 RX ORDER — MONTELUKAST SODIUM 10 MG/1
TABLET ORAL
Qty: 90 TABLET | Refills: 3 | Status: SHIPPED | OUTPATIENT
Start: 2024-05-23

## 2024-05-23 NOTE — PROGRESS NOTES
Subjective:       Patient ID: Nick De Anda is a 83 y.o. male.    Chief Complaint: No chief complaint on file.      The patient is returning for a 6 week follow-up visit.  There are multiple issues to discuss:  1. Allergic rhinitis:  The patient continues to have rhinorrhea and postnasal drip.  Nasal secretions are clear  Symptoms have improved since he started using loratadine daily and supplementing with ipratropium bromide spray as needed.  2. COPD/shortness of breath:  The patient has noticed significant improvement in his breathing and reduction in his shortness of breath since he started using an Anoro inhaler routinely uses Xopenex inhaler on an as-needed basis.  3. 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  Is coming in for the results of his hearing testing. ..            Review of Systems     Constitutional: Positive for fatigue.  Negative for appetite change, chills, fever and unexpected weight loss.      HENT: Positive for hearing loss, nosebleeds, postnasal drip, sinus infection, sinus pressure, sore throat and voice change.  Negative for ear discharge, ear infection, ear pain, facial swelling, mouth sores, ringing in the ears, runny nose, stuffy nose, tonsil infection, dental problems and trouble swallowing.      Eyes:  Positive for eye itching. Negative for change in eyesight, eye drainage and photophobia.     Respiratory:  Positive for cough, shortness of breath, sleep apnea and wheezing. Negative for snoring.      Cardiovascular:  Positive for irregular heartbeat. Negative for chest pain, foot swelling and swollen veins.     Gastrointestinal:  Positive for constipation. Negative for abdominal pain, acid reflux, diarrhea, heartburn and vomiting.     Genitourinary: Negative for difficulty urinating, sexual problems and frequent urination.     Musc: Positive for aching muscles, back pain and neck pain. Negative for aching joints.     Skin:  Positive for rash.     Allergy: Positive for seasonal allergies. Negative for food allergies.     Endocrine: Positive for cold intolerance. Negative for heat intolerance.      Neurological: Negative for dizziness, headaches, light-headedness, seizures and tremors.      Hematologic: Positive for bruises/bleeds easily and swollen glands.     Psychiatric: Positive for depression, nervous/anxious and sleep disturbance. Negative for decreased concentration.                Objective:      Physical Exam  Vitals and nursing note reviewed.   Constitutional:       General: He is awake.      Appearance: Normal appearance. He is well-developed, well-groomed and overweight.   HENT:      Head: Normocephalic.      Jaw: There is normal jaw occlusion.      Salivary Glands: Right salivary gland is not diffusely enlarged or tender. Left salivary gland is not diffusely enlarged or tender.      Right Ear: Ear canal and external ear normal. Decreased hearing noted. There is impacted cerumen. Tympanic membrane is retracted.      Left Ear: Ear canal and external ear normal. Decreased hearing noted. There is impacted cerumen. Tympanic membrane is retracted.      Nose: Septal deviation (septum deviated to the left anteriorly and right posteriorly), mucosal edema (cyanotic, boggy inferior turbinates bilaterally) and rhinorrhea (clear mucus bilaterally) present. No nasal deformity. Rhinorrhea is clear.      Right Turbinates: Enlarged and pale.      Left Turbinates: Enlarged and pale.      Mouth/Throat:      Lips: No lesions.      Mouth: Mucous membranes are moist. No oral lesions.      Dentition: No gum lesions.      Tongue: No lesions.      Palate: No mass and lesions.      Pharynx: Oropharynx is clear. Uvula midline.   Eyes:      General: Lids are normal. Vision grossly intact.         Right eye: No discharge.         Left eye: No discharge.      Extraocular Movements: Extraocular movements intact.      Conjunctiva/sclera: Conjunctivae  normal.      Pupils: Pupils are equal, round, and reactive to light.   Neck:      Thyroid: No thyroid mass or thyromegaly.      Trachea: Trachea normal. No tracheal deviation.   Cardiovascular:      Rate and Rhythm: Normal rate and regular rhythm.      Pulses: Normal pulses.      Heart sounds: Normal heart sounds.   Pulmonary:      Effort: Pulmonary effort is normal.      Breath sounds: Normal breath sounds. No stridor. No decreased breath sounds, wheezing, rhonchi or rales.   Abdominal:      General: Bowel sounds are normal.      Palpations: Abdomen is soft.      Tenderness: There is no abdominal tenderness.   Musculoskeletal:      Cervical back: Neck supple. No muscular tenderness. Decreased range of motion.   Lymphadenopathy:      Head:      Right side of head: No submental, submandibular, preauricular, posterior auricular or occipital adenopathy.      Left side of head: No submental, submandibular, preauricular, posterior auricular or occipital adenopathy.      Cervical: No cervical adenopathy.   Skin:     General: Skin is warm and dry.      Findings: No petechiae or rash.      Nails: There is no clubbing.   Neurological:      Mental Status: He is alert and oriented to person, place, and time.      Cranial Nerves: No cranial nerve deficit.      Sensory: No sensory deficit.      Gait: Gait normal.   Psychiatric:         Speech: Speech normal.         Behavior: Behavior normal. Behavior is cooperative.         Thought Content: Thought content normal.         Judgment: Judgment normal.         Zvooq CT scan of the sinuses-ordered but not performed because of financial reasons      I personally reviewed the above audiogram and discussed in full detail with the patient during our visit.  I answered all his questions.  I provided him with a copy of the audiogram.  Person at findings are mild to severe sloping sensorineural hearing loss with diminished auditory discrimination and Type As tympanograms  bilaterally    Assessment:       1. Sensorineural hearing loss (SNHL) of both ears    2. Allergic rhinitis, unspecified seasonality, unspecified trigger    3. PND (post-nasal drip)    4. Nasal septal deviation          Plan:       I  will add a nighttime dose of montelukast to the patient's medications in an effort to improve the postnasal drip.  I have given the patient a copy of his audiogram with a medical clearance for bilateral amplification.  He needs to take this to the hearing aid dealer of preference for his insurance to obtain bilateral amplification/hearing aids.  He will pursue this with his hearing aid company.  I will recheck him in 8 weeks.                DISCLAIMER: This note was prepared with [employer] voice recognition transcription software. Garbled syntax, mangled pronouns, and other bizarre constructions may be attributed to that software system. While efforts were made to correct any mistakes made by this voice recognition program, some errors and/or omissions may remain in the note that were missed when the note was originally created.

## 2024-06-04 ENCOUNTER — OFFICE VISIT (OUTPATIENT)
Dept: PRIMARY CARE CLINIC | Facility: CLINIC | Age: 84
End: 2024-06-04
Payer: MEDICARE

## 2024-06-04 ENCOUNTER — PATIENT MESSAGE (OUTPATIENT)
Dept: ELECTROPHYSIOLOGY | Facility: CLINIC | Age: 84
End: 2024-06-04
Payer: MEDICARE

## 2024-06-04 VITALS
HEART RATE: 66 BPM | OXYGEN SATURATION: 96 % | BODY MASS INDEX: 33.45 KG/M2 | SYSTOLIC BLOOD PRESSURE: 108 MMHG | WEIGHT: 274.69 LBS | HEIGHT: 76 IN | DIASTOLIC BLOOD PRESSURE: 50 MMHG

## 2024-06-04 DIAGNOSIS — D69.6 THROMBOCYTOPENIA, UNSPECIFIED: ICD-10-CM

## 2024-06-04 DIAGNOSIS — I10 ESSENTIAL HYPERTENSION: ICD-10-CM

## 2024-06-04 DIAGNOSIS — Z85.828 HISTORY OF SKIN CANCER: ICD-10-CM

## 2024-06-04 DIAGNOSIS — R26.9 ABNORMALITY OF GAIT AND MOBILITY: ICD-10-CM

## 2024-06-04 DIAGNOSIS — G89.29 CHRONIC MIDLINE LOW BACK PAIN WITH RIGHT-SIDED SCIATICA: ICD-10-CM

## 2024-06-04 DIAGNOSIS — I70.201 ATHEROSCLEROSIS OF NATIVE ARTERY OF RIGHT LOWER EXTREMITY, WITH UNSPECIFIED PRESENCE OF CLINICAL MANIFESTATION: ICD-10-CM

## 2024-06-04 DIAGNOSIS — I48.0 PAROXYSMAL ATRIAL FIBRILLATION: ICD-10-CM

## 2024-06-04 DIAGNOSIS — F41.9 ANXIETY: ICD-10-CM

## 2024-06-04 DIAGNOSIS — C32.9 CARCINOMA LARYNX: ICD-10-CM

## 2024-06-04 DIAGNOSIS — Z00.00 ENCOUNTER FOR ANNUAL WELLNESS VISIT (AWV) IN MEDICARE PATIENT: Primary | ICD-10-CM

## 2024-06-04 DIAGNOSIS — E11.42 DIABETIC POLYNEUROPATHY ASSOCIATED WITH TYPE 2 DIABETES MELLITUS: ICD-10-CM

## 2024-06-04 DIAGNOSIS — M54.41 CHRONIC MIDLINE LOW BACK PAIN WITH RIGHT-SIDED SCIATICA: ICD-10-CM

## 2024-06-04 DIAGNOSIS — E44.1 MILD PROTEIN-CALORIE MALNUTRITION: ICD-10-CM

## 2024-06-04 DIAGNOSIS — I11.0 HYPERTENSIVE HEART DISEASE WITH HEART FAILURE: ICD-10-CM

## 2024-06-04 DIAGNOSIS — Z12.5 PROSTATE CANCER SCREENING: ICD-10-CM

## 2024-06-04 DIAGNOSIS — L97.511 SKIN ULCER OF TOE OF RIGHT FOOT, LIMITED TO BREAKDOWN OF SKIN: ICD-10-CM

## 2024-06-04 DIAGNOSIS — Z00.00 ENCOUNTER FOR PREVENTIVE HEALTH EXAMINATION: ICD-10-CM

## 2024-06-04 PROCEDURE — G0439 PPPS, SUBSEQ VISIT: HCPCS | Mod: S$GLB,,, | Performed by: NURSE PRACTITIONER

## 2024-06-04 PROCEDURE — 1125F AMNT PAIN NOTED PAIN PRSNT: CPT | Mod: CPTII,S$GLB,, | Performed by: NURSE PRACTITIONER

## 2024-06-04 PROCEDURE — 3078F DIAST BP <80 MM HG: CPT | Mod: CPTII,S$GLB,, | Performed by: NURSE PRACTITIONER

## 2024-06-04 PROCEDURE — 3074F SYST BP LT 130 MM HG: CPT | Mod: CPTII,S$GLB,, | Performed by: NURSE PRACTITIONER

## 2024-06-04 PROCEDURE — 1160F RVW MEDS BY RX/DR IN RCRD: CPT | Mod: CPTII,S$GLB,, | Performed by: NURSE PRACTITIONER

## 2024-06-04 PROCEDURE — 1158F ADVNC CARE PLAN TLK DOCD: CPT | Mod: CPTII,S$GLB,, | Performed by: NURSE PRACTITIONER

## 2024-06-04 PROCEDURE — 1170F FXNL STATUS ASSESSED: CPT | Mod: CPTII,S$GLB,, | Performed by: NURSE PRACTITIONER

## 2024-06-04 PROCEDURE — 1159F MED LIST DOCD IN RCRD: CPT | Mod: CPTII,S$GLB,, | Performed by: NURSE PRACTITIONER

## 2024-06-04 PROCEDURE — 3288F FALL RISK ASSESSMENT DOCD: CPT | Mod: CPTII,S$GLB,, | Performed by: NURSE PRACTITIONER

## 2024-06-04 PROCEDURE — 1101F PT FALLS ASSESS-DOCD LE1/YR: CPT | Mod: CPTII,S$GLB,, | Performed by: NURSE PRACTITIONER

## 2024-06-04 PROCEDURE — 99999 PR PBB SHADOW E&M-EST. PATIENT-LVL IV: CPT | Mod: PBBFAC,,, | Performed by: NURSE PRACTITIONER

## 2024-06-04 RX ORDER — DIAZEPAM 10 MG/1
10 TABLET ORAL
Qty: 30 TABLET | Refills: 3 | Status: SHIPPED | OUTPATIENT
Start: 2024-06-04 | End: 2024-07-04

## 2024-06-04 RX ORDER — METHOCARBAMOL 500 MG/1
TABLET, FILM COATED ORAL
Qty: 30 TABLET | Refills: 1 | Status: SHIPPED | OUTPATIENT
Start: 2024-06-04

## 2024-06-04 NOTE — PATIENT INSTRUCTIONS
Counseling and Referral of Other Preventative  (Italic type indicates deductible and co-insurance are waived)    Patient Name: Nick De Anda  Today's Date: 6/4/2024    Health Maintenance       Date Due Completion Date    Hemoglobin A1c 05/14/2024 11/14/2023    COVID-19 Vaccine (3 - Moderna risk series) 12/20/2024 (Originally 3/12/2021) 2/12/2021    Lipid Panel 11/14/2024 11/14/2023    Diabetes Urine Screening 12/20/2024 12/20/2023    Eye Exam 04/19/2025 4/19/2024 (Done)    Override on 4/19/2024: Done    Override on 6/9/2020: Done ([place] location)    Override on 3/18/2019: Done    TETANUS VACCINE 06/09/2033 6/9/2023        No orders of the defined types were placed in this encounter.      The following information is provided to all patients.  This information is to help you find resources for any of the problems found today that may be affecting your health:                  Living healthy guide: www.[facility].[place]      Understanding Diabetes: www.diabetes.org      Eating healthy: www.cdc.gov/healthyweight      [facility] home safety checklist: www.cdc.gov/steadi/patient.html      Agency on Aging: www.goea.[place].gov      Alcoholics anonymous (AA): www.aa.org      Physical Activity: www.cedric.nih.gov/hs8tlst      Tobacco use: www.quitwithusla.org         Counseling and Referral of Other Preventative  (Italic type indicates deductible and co-insurance are waived)    Patient Name: Nick De Anda  Today's Date: 6/4/2024    Health Maintenance       Date Due Completion Date    Hemoglobin A1c 05/14/2024 11/14/2023    COVID-19 Vaccine (3 - Moderna risk series) 12/20/2024 (Originally 3/12/2021) 2/12/2021    Lipid Panel 11/14/2024 11/14/2023    Diabetes Urine Screening 12/20/2024 12/20/2023    Eye Exam 04/19/2025 4/19/2024 (Done)    Override on 4/19/2024: Done    Override on 6/9/2020: Done ([facility] location)    Override on 3/18/2019: Done    TETANUS VACCINE 06/09/2033 6/9/2023        No orders of the  defined types were placed in this encounter.      The following information is provided to all patients.  This information is to help you find resources for any of the problems found today that may be affecting your health:                  Living healthy guide: www.[facility].[place].[facility]      Understanding Diabetes: www.diabetes.org      Eating healthy: www.cdc.gov/healthyweight      CDC home safety checklist: www.cdc.gov/steadi/patient.html      Agency on Aging: www.goea.[place].[facility]      Alcoholics anonymous (AA): www.aa.org      Physical Activity: www.cedric.nih.gov/kf2grai      Tobacco use: www.quitwithusla.org

## 2024-06-06 ENCOUNTER — TELEPHONE (OUTPATIENT)
Dept: ELECTROPHYSIOLOGY | Facility: CLINIC | Age: 84
End: 2024-06-06
Payer: MEDICARE

## 2024-06-06 DIAGNOSIS — I48.3 TYPICAL ATRIAL FLUTTER: Primary | ICD-10-CM

## 2024-06-06 RX ORDER — METOPROLOL TARTRATE 25 MG/1
TABLET, FILM COATED ORAL
Qty: 90 TABLET | Refills: 0 | Status: SHIPPED | OUTPATIENT
Start: 2024-06-06

## 2024-06-13 ENCOUNTER — PATIENT MESSAGE (OUTPATIENT)
Dept: OTOLARYNGOLOGY | Facility: CLINIC | Age: 84
End: 2024-06-13
Payer: MEDICARE

## 2024-06-20 ENCOUNTER — OFFICE VISIT (OUTPATIENT)
Dept: PODIATRY | Facility: CLINIC | Age: 84
End: 2024-06-20
Payer: MEDICARE

## 2024-06-20 VITALS
WEIGHT: 280 LBS | BODY MASS INDEX: 34.1 KG/M2 | HEART RATE: 81 BPM | HEIGHT: 76 IN | DIASTOLIC BLOOD PRESSURE: 70 MMHG | SYSTOLIC BLOOD PRESSURE: 129 MMHG

## 2024-06-20 DIAGNOSIS — M20.30 HALLUX, MALLEUS ACQUIRED: ICD-10-CM

## 2024-06-20 DIAGNOSIS — E11.9 ENCOUNTER FOR DIABETIC FOOT EXAM: ICD-10-CM

## 2024-06-20 DIAGNOSIS — Z86.31 HX OF DIABETIC FOOT ULCER: ICD-10-CM

## 2024-06-20 DIAGNOSIS — Z79.01 LONG TERM (CURRENT) USE OF ANTICOAGULANTS: ICD-10-CM

## 2024-06-20 DIAGNOSIS — E11.42 DIABETIC POLYNEUROPATHY ASSOCIATED WITH TYPE 2 DIABETES MELLITUS: Primary | ICD-10-CM

## 2024-06-20 PROCEDURE — 99214 OFFICE O/P EST MOD 30 MIN: CPT | Mod: S$GLB,,, | Performed by: PODIATRIST

## 2024-06-20 PROCEDURE — 1160F RVW MEDS BY RX/DR IN RCRD: CPT | Mod: CPTII,S$GLB,, | Performed by: PODIATRIST

## 2024-06-20 PROCEDURE — 1125F AMNT PAIN NOTED PAIN PRSNT: CPT | Mod: CPTII,S$GLB,, | Performed by: PODIATRIST

## 2024-06-20 PROCEDURE — 1159F MED LIST DOCD IN RCRD: CPT | Mod: CPTII,S$GLB,, | Performed by: PODIATRIST

## 2024-06-20 PROCEDURE — 99999 PR PBB SHADOW E&M-EST. PATIENT-LVL IV: CPT | Mod: PBBFAC,,, | Performed by: PODIATRIST

## 2024-06-20 PROCEDURE — 3078F DIAST BP <80 MM HG: CPT | Mod: CPTII,S$GLB,, | Performed by: PODIATRIST

## 2024-06-20 PROCEDURE — 3074F SYST BP LT 130 MM HG: CPT | Mod: CPTII,S$GLB,, | Performed by: PODIATRIST

## 2024-06-20 RX ORDER — TIMOLOL MALEATE 5 MG/ML
1 SOLUTION/ DROPS OPHTHALMIC 2 TIMES DAILY
COMMUNITY
Start: 2024-06-07

## 2024-06-20 NOTE — PROGRESS NOTES
Subjective:      Patient ID: Nick De Anda is a 83 y.o. male.    Chief Complaint:   Annual Exam and Diabetes Mellitus (PCP- 06/04/2024/Elma Gonzalez NP/)    Nick is a 83 y.o. male who returns to the clinic for evaluation and treatment of high risk feet. 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  This patient has documented high risk feet requiring routine maintenance secondary to peripheral neuropathy.     Pt missed ricki f/u  Doing ok  Relates he is here to get dm shoes      ** pt is hard of hearing.       PCP: Elam Gonzalez NP    Date Last Seen by PCP: 6/4/24    Current shoe gear:  Affected Foot: Extra depth shoes with custome accommodative insoles     Unaffected Foot: Extra depth shoes with custome accommodative insoles    Hemoglobin A1C   Date Value Ref Range Status   11/14/2023 5.7 (H) 4.0 - 5.6 % Final     Comment:     ADA Screening Guidelines:  5.7-6.4%  Consistent with prediabetes  >or=6.5%  Consistent with diabetes    High levels of fetal hemoglobin interfere with the HbA1C  assay. Heterozygous hemoglobin variants (HbS, HgC, etc)do  not significantly interfere with this assay.   However, presence of multiple variants may affect accuracy.     07/05/2023 5.6 4.7 - 5.6 % Final   06/13/2022 5.3 4.0 - 5.6 % Final     Comment:     ADA Screening Guidelines:  5.7-6.4%   Consistent with prediabetes  >or=6.5%  Consistent with diabetes    High levels of fetal hemoglobin interfere with the HbA1C  assay. Heterozygous hemoglobin variants (HbS, HgC, etc)do  not significantly interfere with this assay.   However, presence of multiple variants may affect accuracy.     09/17/2021 5.3 4.0 - 5.6 % Final     Comment:     ADA Screening Guidelines:  5.7-6.4%  Consistent with prediabetes  >or=6.5%  Consistent with diabetes    High levels of fetal hemoglobin interfere with the HbA1C  assay. Heterozygous hemoglobin variants (HbS, HgC, etc)do  not significantly interfere with this assay.   However, presence of multiple variants may affect accuracy.          Past Medical History:   Diagnosis Date    Anticoagulant long-term use     Arthritis     Cancer     skin    Carcinoma larynx 01/16/2023    CHF (congestive heart failure)     COPD (chronic obstructive pulmonary disease)     Coronary artery disease     Cough, persistent 03/30/2020    Diabetes mellitus     diet    Diabetes mellitus, type 2     Dyslipidemia 02/13/2019    Encounter for blood transfusion     Encounter for monitoring sotalol therapy 08/01/2019    History of staphylococcal infection 2012    after CABG    History of UTI 02/02/2019    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    Hypertension     Hyponatremia 02/02/2019    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    Persistent atrial fibrillation 02/02/2019    Post PTCA 02/13/2019     Past Surgical History:   Procedure Laterality Date    ABLATION OF ARRHYTHMOGENIC FOCUS FOR ATRIAL FIBRILLATION N/A 12/02/2020    Procedure: Ablation atrial fibrillation;  Surgeon: Yung Paez MD;  Location: [facility] EP LAB;  Service: Cardiology;  Laterality: N/A;  PAF, AFl, DEANGELO (Cx if SR), PVI, CTI, RFA, Carto, Gen, AL, 3 Prep    ABLATION OF ARRHYTHMOGENIC FOCUS FOR ATRIAL FIBRILLATION N/A 12/08/2021    Procedure: Ablation atrial fibrillation;  Surgeon: Yung Paez MD;  Location: [facility] EP LAB;  Service: Cardiology;   Laterality: N/A;  AF/AFL (atypical), DEANGELO (Cx if SR), PVI re-do, RFA for Atypical AFL, Carto, Gen, NH, 3 Prep    CARDIAC SURGERY      cabg    CARPAL TUNNEL RELEASE Right 9/29/2023    Procedure: RELEASE, CARPAL TUNNEL;  Surgeon: Williams, Claude S. IV, MD;  Location: [facility];  Service: Orthopedics;  Laterality: Right;    CORONARY ARTERY BYPASS GRAFT      CORONARY STENT PLACEMENT      CYSTOSCOPY      prostate infection    EXCISION OF GANGLION OF WRIST Right 9/29/2023    Procedure: EXCISION, GANGLION CYST, WRIST;  Surgeon: Williams, Claude S. IV, MD;  Location: [facility] OR;  Service: Orthopedics;  Laterality: Right;    FOOT SURGERY      RECTOPERITONEAL FISTULA CLOSURE      SHOULDER SURGERY      Triple bypass      VASECTOMY      VOCAL FOLD LESION EXCISION N/A 09/28/2021    Procedure: EXCISION, LESION, VOCAL CORD;  Surgeon: Rommel Stubbs MD;  Location: [address];  Service: ENT;  Laterality: N/A;     Current Outpatient Medications on File Prior to Visit   Medication Sig Dispense Refill    albuterol (PROVENTIL HFA) 90 mcg/actuation inhaler Inhale 2 puffs into the lungs every 6 (six) hours as needed for Wheezing. Rescue 85 g 4    amLODIPine (NORVASC) 5 MG tablet TAKE 1 TO 2 TABLETS BY MOUTH ONCE DAILY AS DIRECTED 90 tablet 3    ammonium lactate (LAC-HYDRIN) 12 % lotion Apply topically every evening. Hands and arms and then later scalp, forehead, temples 225 g 4    ascorbic acid, vitamin C, (VITAMIN C) 100 MG tablet Take 500 mg by mouth once daily.      atorvastatin (LIPITOR) 80 MG tablet Take 1 tablet (80 mg total) by mouth every evening. 90 tablet 3    blood sugar diagnostic Strp To check BG 2 times daily, to use with insurance preferred meter 100 strip 5    clopidogreL (PLAVIX) 75 mg tablet Take 1 tablet by mouth once daily (Patient taking differently: Take 75 mg by mouth every other day.) 90 tablet 3    diazePAM (VALIUM) 10 MG Tab Take 1 tablet (10 mg total) by mouth as needed (anxiety). Just half to one and use  very seldom-get refills from PCP 30 tablet 3    hydroCHLOROthiazide (MICROZIDE) 12.5 mg capsule Take 1 capsule (12.5 mg total) by mouth once daily. 90 capsule 3    ipratropium (ATROVENT) 21 mcg (0.03 %) nasal spray 2 sprays by Each Nostril route 2 (two) times daily. Use as needed to control post nasal drip 30 mL 11    irbesartan (AVAPRO) 300 MG tablet TAKE 1 TABLET BY MOUTH ONCE DAILY IN THE EVENING 90 tablet 3    lancets Misc To check BG 2 times daily, to use with insurance preferred meter 100 each 5    levalbuterol (XOPENEX) 1.25 mg/3 mL nebulizer solution Take 3 mLs (1.25 mg total) by nebulization every 4 (four) hours as needed for Wheezing. Rescue 100 each 2    loratadine (CLARITIN) 10 mg tablet Take 1 tablet (10 mg total) by mouth once daily. 90 tablet 3    metFORMIN (GLUCOPHAGE) 500 MG tablet TAKE 1 TABLET BY MOUTH TWICE DAILY WITH MEALS 180 tablet 1    methocarbamoL (ROBAXIN) 500 MG Tab Take 1 tablet by mouth twice daily as needed for muscle spasm 30 tablet 1    metoprolol tartrate (LOPRESSOR) 25 MG tablet TAKE 1 TABLET BY MOUTH ONCE DAILY AS NEEDED FOR  PALPITATIONS 90 tablet 0    montelukast (SINGULAIR) 10 mg tablet Take one tablet by mouth every evening 90 tablet 3    multivitamin (THERAGRAN) per tablet Take 1 tablet by mouth once daily.      tamsulosin (FLOMAX) 0.4 mg Cap Take 1 capsule (0.4 mg total) by mouth once daily. 90 capsule 3    timoloL maleate (ISTALOL/TIMOLOL) 0.5 % DrpD       timolol maleate 0.5% (TIMOPTIC) 0.5 % Drop Place 1 drop into both eyes 2 (two) times daily.      umeclidinium-vilanteroL (ANORO ELLIPTA) 62.5-25 mcg/actuation DsDv Inhale 1 puff into the lungs once daily. Controller 60 each 5    XARELTO 20 mg Tab Take 1 tablet (20 mg total) by mouth every evening. 90 tablet 3    blood-glucose meter kit To check BG 2 times daily, to use with insurance preferred meter 1 each 0    nitroGLYCERIN (NITROSTAT) 0.4 MG SL tablet Place 1 tablet (0.4 mg total) under the tongue every 5 (five) minutes  as needed for Chest pain. 25 tablet 3     Current Facility-Administered Medications on File Prior to Visit   Medication Dose Route Frequency Provider Last Rate Last Admin    0.9%  NaCl infusion   Intravenous Continuous Rebecca Dickinson NP   Stopped at 12/02/20 1636    lactated ringers infusion   Intravenous Continuous Tyler Arana MD        LIDOcaine (PF) 10 mg/ml (1%) injection 5 mg  0.5 mL Intradermal Once Tyler Arana MD        sodium chloride 0.9% bolus 1,000 mL  1,000 mL Intravenous Once Winnie Larkin NP        sodium chloride 0.9% flush 10 mL  10 mL Intravenous PRN DELICIA Jaramillo MD        sodium chloride 0.9% flush 5 mL  5 mL Intravenous PRN Rebecca Dickinson NP         Review of patient's allergies indicates:   Allergen Reactions    Penicillins Anaphylaxis     Can take keflex    Hydrocodone      Causes constipation    Amoxicillin Swelling and Rash       Review of Systems   Constitutional: Negative for chills, decreased appetite, fever, malaise/fatigue, night sweats, weight gain and weight loss.   Cardiovascular:  Positive for leg swelling. Negative for chest pain, claudication, palpitations and syncope.   Respiratory:  Negative for cough and shortness of breath.    Endocrine: Negative for cold intolerance and heat intolerance.   Hematologic/Lymphatic: Negative for bleeding problem. Bruises/bleeds easily.   Skin:  Positive for nail changes. Negative for color change, dry skin, flushing, itching, poor wound healing, rash, skin cancer, suspicious lesions and unusual hair distribution.   Musculoskeletal:  Positive for arthritis, back pain and stiffness. Negative for falls, gout, joint pain, joint swelling, muscle cramps, muscle weakness, myalgias and neck pain.   Gastrointestinal:  Negative for diarrhea, nausea and vomiting.   Neurological:  Positive for numbness. Negative for dizziness, focal weakness, light-headedness, paresthesias, tremors, vertigo and weakness.  "  Psychiatric/Behavioral:  Negative for altered mental status and depression. The patient does not have insomnia.    Allergic/Immunologic: Negative.            Objective:       Vitals:    24 1345   BP: 129/70   Pulse: 81   Weight: 127 kg (279 lb 15.8 oz)   Height: 6' 4" (1.93 m)   PainSc:   6   PainLoc: Foot   127 kg (279 lb 15.8 oz)     Physical Exam  Vitals reviewed.   Constitutional:       General: He is not in acute distress.     Appearance: He is well-developed. He is not ill-appearing, toxic-appearing or diaphoretic.   Cardiovascular:      Pulses:           Dorsalis pedis pulses are 1+ on the right side and 1+ on the left side.        Posterior tibial pulses are 2+ on the right side and 2+ on the left side.      Comments: Decreased hair growth to lower shins with evidence of chronic venous stasis dz.   Musculoskeletal:         General: No tenderness.      Right lower le+ Edema present.      Left lower le+ Edema present.      Right foot: Decreased range of motion. Deformity, bunion and prominent metatarsal heads present. No tenderness or bony tenderness.      Left foot: Decreased range of motion. Deformity, bunion and prominent metatarsal heads present. No tenderness or bony tenderness.      Comments: Flexible pes planus foot type w/ medial arch collapse and mild gastroc equinus      Semi-Reducible and non-reducable extensor and flexor contractures at the MTPJ and PIPJ of toes 2-5, bilat.      Hallux malleus contractures with mild lateral deviation    No pain on palpation or with range of motion           Feet:      Right foot:      Protective Sensation: 10 sites tested.  0 sites sensed.      Skin integrity: Dry skin present. No ulcer, blister, skin breakdown, erythema, warmth or callus.      Toenail Condition: Right toenails are abnormally thick and long.      Left foot:      Protective Sensation: 10 sites tested.  0 sites sensed.      Skin integrity: Dry skin present. No ulcer, blister, skin " breakdown, erythema, warmth or callus.      Toenail Condition: Left toenails are abnormally thick and long.      Comments:      Skin:     General: Skin is warm and dry.      Capillary Refill: Capillary refill takes 2 to 3 seconds.      Coloration: Skin is pale.      Findings: No erythema.      Nails: There is no clubbing.   Neurological:      Mental Status: He is alert and oriented to person, place, and time.      Gait: Gait abnormal.   Psychiatric:         Attention and Perception: Attention normal.         Mood and Affect: Mood normal.         Speech: Speech normal.         Behavior: Behavior normal.         Thought Content: Thought content normal.         Cognition and Memory: Cognition normal.         Judgment: Judgment normal.      Comments: ** hard of hearing               Assessment:       Encounter Diagnoses   Name Primary?    Diabetic polyneuropathy associated with type 2 diabetes mellitus Yes    Long term (current) use of anticoagulants     Hallux, malleus acquired     Hx of diabetic foot ulcer     Encounter for diabetic foot exam              Plan:       Nick was seen today for annual exam and diabetes mellitus.    Diagnoses and all orders for this visit:    Diabetic polyneuropathy associated with type 2 diabetes mellitus  -     DIABETIC SHOES FOR HOME USE    Long term (current) use of anticoagulants    Hallux, malleus acquired  -     DIABETIC SHOES FOR HOME USE    Hx of diabetic foot ulcer  -     DIABETIC SHOES FOR HOME USE    Encounter for diabetic foot exam          I counseled the patient on his conditions, their implications and medical management.      - - Shoe inspection. Diabetic Foot Education. Patient reminded of the importance of good nutrition and blood sugar control to help prevent podiatric complications of diabetes. Patient instructed on proper foot hygeine. We discussed wearing proper shoe gear, daily foot inspections, never walking without protective shoe gear, never putting sharp  instruments to feet, routine podiatric nail visits every 2-3 months.      - rx dm shoes    - doing well

## 2024-07-15 ENCOUNTER — PATIENT MESSAGE (OUTPATIENT)
Dept: ADMINISTRATIVE | Facility: HOSPITAL | Age: 84
End: 2024-07-15
Payer: MEDICARE

## 2024-07-15 ENCOUNTER — PATIENT OUTREACH (OUTPATIENT)
Dept: ADMINISTRATIVE | Facility: HOSPITAL | Age: 84
End: 2024-07-15
Payer: MEDICARE

## 2024-07-15 NOTE — PROGRESS NOTES
Patient due for the following    Hemoglobin A1c       Immunizations: reviewed and updated  Care Everywhere: triggered  Care Teams: up to date  Outreach: completed

## 2024-07-22 NOTE — PROGRESS NOTES
Subjective:       Patient ID: Nick De Anda is a 83 y.o. male.    Chief Complaint: No chief complaint on file.      The patient is returning for a 6 week follow-up visit.  There are multiple issues to discuss:  1. Allergic rhinitis:  He is taking montelukast at night and loratadine daily and supplementing with ipratropium bromide spray as needed.  I started him on a nighttime dose of montelukast.  He had to stop it after a few weeks because of various side effects including insomnia and depression.  He does continue to have some green mucus when he blows his Mohs every morning.  2. COPD/shortness of breath:   The patient uses Anoro inhaler routinely uses Xopenex inhaler on an as-needed basis.  He finds that his breathing is much improved since starting on the Anoro.  3. 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  He has not yet obtain hearing aids.  He is working with a nurse from the insurance company to try to facilitate this issue.          Review of Systems     Constitutional: Positive for fatigue.  Negative for appetite change, chills, fever and unexpected weight loss.      HENT: Positive for hearing loss, nosebleeds, postnasal drip, sinus infection, sinus pressure, sore throat and voice change.  Negative for ear discharge, ear infection, ear pain, facial swelling, mouth sores, ringing in the ears, runny nose, stuffy nose, tonsil infection, dental problems and trouble swallowing.      Eyes:  Positive for eye itching. Negative for change in eyesight, eye drainage and photophobia.     Respiratory:  Positive for cough, shortness of breath, sleep apnea and wheezing. Negative for snoring.      Cardiovascular:  Positive for irregular heartbeat. Negative for chest pain, foot swelling and swollen veins.     Gastrointestinal:  Positive for constipation. Negative for abdominal pain, acid reflux, diarrhea, heartburn and vomiting.     Genitourinary: Negative for  difficulty urinating, sexual problems and frequent urination.     Musc: Positive for aching muscles, back pain and neck pain. Negative for aching joints.     Skin: Positive for rash.     Allergy: Positive for seasonal allergies. Negative for food allergies.     Endocrine: Positive for cold intolerance. Negative for heat intolerance.      Neurological: Negative for dizziness, headaches, light-headedness, seizures and tremors.      Hematologic: Positive for bruises/bleeds easily and swollen glands.     Psychiatric: Positive for depression, nervous/anxious and sleep disturbance. Negative for decreased concentration.                Objective:      Physical Exam  Vitals and nursing note reviewed.   Constitutional:       General: He is awake.      Appearance: Normal appearance. He is well-developed, well-groomed and overweight.   HENT:      Head: Normocephalic.      Jaw: There is normal jaw occlusion.      Salivary Glands: Right salivary gland is not diffusely enlarged or tender. Left salivary gland is not diffusely enlarged or tender.      Right Ear: Ear canal and external ear normal. Decreased hearing noted. There is impacted cerumen. Tympanic membrane is retracted.      Left Ear: Ear canal and external ear normal. Decreased hearing noted. There is impacted cerumen. Tympanic membrane is retracted.      Nose: Septal deviation (septum deviated to the left anteriorly and right posteriorly), mucosal edema (cyanotic, boggy inferior turbinates bilaterally) and rhinorrhea (clear mucus bilaterally) present. No nasal deformity. Rhinorrhea is clear.      Right Turbinates: Enlarged and pale.      Left Turbinates: Enlarged and pale.      Mouth/Throat:      Lips: No lesions.      Mouth: Mucous membranes are moist. No oral lesions.      Dentition: No gum lesions.      Tongue: No lesions.      Palate: No mass and lesions.      Pharynx: Oropharynx is clear. Uvula midline.   Eyes:      General: Lids are normal. Vision grossly intact.          Right eye: No discharge.         Left eye: No discharge.      Extraocular Movements: Extraocular movements intact.      Conjunctiva/sclera: Conjunctivae normal.      Pupils: Pupils are equal, round, and reactive to light.   Neck:      Thyroid: No thyroid mass or thyromegaly.      Trachea: Trachea normal. No tracheal deviation.   Cardiovascular:      Rate and Rhythm: Normal rate and regular rhythm.      Pulses: Normal pulses.      Heart sounds: Normal heart sounds.   Pulmonary:      Effort: Pulmonary effort is normal.      Breath sounds: Normal breath sounds. No stridor. No decreased breath sounds, wheezing, rhonchi or rales.   Abdominal:      General: Bowel sounds are normal.      Palpations: Abdomen is soft.      Tenderness: There is no abdominal tenderness.   Musculoskeletal:      Cervical back: Neck supple. No muscular tenderness. Decreased range of motion.   Lymphadenopathy:      Head:      Right side of head: No submental, submandibular, preauricular, posterior auricular or occipital adenopathy.      Left side of head: No submental, submandibular, preauricular, posterior auricular or occipital adenopathy.      Cervical: No cervical adenopathy.   Skin:     General: Skin is warm and dry.      Findings: No petechiae or rash.      Nails: There is no clubbing.   Neurological:      Mental Status: He is alert and oriented to person, place, and time.      Cranial Nerves: No cranial nerve deficit.      Sensory: No sensory deficit.      Gait: Gait normal.   Psychiatric:         Speech: Speech normal.         Behavior: Behavior normal. Behavior is cooperative.         Thought Content: Thought content normal.         Judgment: Judgment normal.         Procedure- cerumen impactions removed bilaterally using wire loop and Q-tip.  The patient tolerated procedure well was discharged post procedure.      Assessment:       1. Sensorineural hearing loss (SNHL) of both ears    2. Bilateral impacted cerumen    3. Nasal septal  deviation    4. Allergic rhinitis, unspecified seasonality, unspecified trigger    5. PND (post-nasal drip)    6. Laryngeal cancer    7. Dysphonia    8. Acute non-recurrent sinusitis, unspecified location            Plan:       I  am advising that the patient use fexofenadine in an attempt to control the postnasal drip.  I am placing him on Omnicef for 10 days.  I have strongly advised that he obtain hearing aids from the hearing aid dealer of choice for [employer].  I will recheck him in 3 months, or sooner on an as-needed basis                DISCLAIMER: This note was prepared with [employer] voice recognition transcription software. Garbled syntax, mangled pronouns, and other bizarre constructions may be attributed to that software system. While efforts were made to correct any mistakes made by this voice recognition program, some errors and/or omissions may remain in the note that were missed when the note was originally created.

## 2024-07-23 ENCOUNTER — OFFICE VISIT (OUTPATIENT)
Dept: OTOLARYNGOLOGY | Facility: CLINIC | Age: 84
End: 2024-07-23
Payer: MEDICARE

## 2024-07-23 VITALS
BODY MASS INDEX: 33.65 KG/M2 | OXYGEN SATURATION: 95 % | WEIGHT: 276.44 LBS | DIASTOLIC BLOOD PRESSURE: 60 MMHG | HEART RATE: 72 BPM | SYSTOLIC BLOOD PRESSURE: 134 MMHG

## 2024-07-23 DIAGNOSIS — J01.90 ACUTE NON-RECURRENT SINUSITIS, UNSPECIFIED LOCATION: ICD-10-CM

## 2024-07-23 DIAGNOSIS — C32.9 LARYNGEAL CANCER: ICD-10-CM

## 2024-07-23 DIAGNOSIS — R09.82 PND (POST-NASAL DRIP): ICD-10-CM

## 2024-07-23 DIAGNOSIS — J30.9 ALLERGIC RHINITIS, UNSPECIFIED SEASONALITY, UNSPECIFIED TRIGGER: ICD-10-CM

## 2024-07-23 DIAGNOSIS — H61.23 BILATERAL IMPACTED CERUMEN: ICD-10-CM

## 2024-07-23 DIAGNOSIS — J34.2 NASAL SEPTAL DEVIATION: ICD-10-CM

## 2024-07-23 DIAGNOSIS — R49.0 DYSPHONIA: ICD-10-CM

## 2024-07-23 DIAGNOSIS — H90.3 SENSORINEURAL HEARING LOSS (SNHL) OF BOTH EARS: Primary | ICD-10-CM

## 2024-07-23 PROCEDURE — 3075F SYST BP GE 130 - 139MM HG: CPT | Mod: CPTII,S$GLB,, | Performed by: SPECIALIST

## 2024-07-23 PROCEDURE — 1160F RVW MEDS BY RX/DR IN RCRD: CPT | Mod: CPTII,S$GLB,, | Performed by: SPECIALIST

## 2024-07-23 PROCEDURE — 1101F PT FALLS ASSESS-DOCD LE1/YR: CPT | Mod: CPTII,S$GLB,, | Performed by: SPECIALIST

## 2024-07-23 PROCEDURE — 99214 OFFICE O/P EST MOD 30 MIN: CPT | Mod: 25,S$GLB,, | Performed by: SPECIALIST

## 2024-07-23 PROCEDURE — 99999 PR PBB SHADOW E&M-EST. PATIENT-LVL IV: CPT | Mod: PBBFAC,,, | Performed by: SPECIALIST

## 2024-07-23 PROCEDURE — 3288F FALL RISK ASSESSMENT DOCD: CPT | Mod: CPTII,S$GLB,, | Performed by: SPECIALIST

## 2024-07-23 PROCEDURE — 69210 REMOVE IMPACTED EAR WAX UNI: CPT | Mod: S$GLB,,, | Performed by: SPECIALIST

## 2024-07-23 PROCEDURE — 3078F DIAST BP <80 MM HG: CPT | Mod: CPTII,S$GLB,, | Performed by: SPECIALIST

## 2024-07-23 PROCEDURE — 1125F AMNT PAIN NOTED PAIN PRSNT: CPT | Mod: CPTII,S$GLB,, | Performed by: SPECIALIST

## 2024-07-23 PROCEDURE — 1159F MED LIST DOCD IN RCRD: CPT | Mod: CPTII,S$GLB,, | Performed by: SPECIALIST

## 2024-07-23 RX ORDER — MINERAL OIL
180 ENEMA (ML) RECTAL DAILY
Qty: 30 TABLET | Refills: 11
Start: 2024-07-23 | End: 2025-07-23

## 2024-07-23 RX ORDER — CEFDINIR 300 MG/1
CAPSULE ORAL
Qty: 20 CAPSULE | Refills: 0 | Status: SHIPPED | OUTPATIENT
Start: 2024-07-23

## 2024-07-23 NOTE — PROCEDURES
"Ear Cerumen Removal    Date/Time: 7/23/2024 1:40 PM    Performed by: JUAN J Irving MD  Authorized by: JUAN J Irving MD    Time out: Immediately prior to procedure a "time out" was called to verify the correct patient, procedure, equipment, support staff and site/side marked as required.    Consent Done?:  Yes (Verbal)    Local anesthetic:  None  Location details:  Both ears  Procedure type comment:  Wire loop and Q Tip  Cerumen  Removal Results:  Cerumen completely removed  Patient tolerance:  Patient tolerated the procedure well with no immediate complications    "

## 2024-07-24 DIAGNOSIS — N39.0 RECURRENT UTI: ICD-10-CM

## 2024-07-25 DIAGNOSIS — N39.0 RECURRENT UTI: ICD-10-CM

## 2024-07-25 RX ORDER — TAMSULOSIN HYDROCHLORIDE 0.4 MG/1
0.4 CAPSULE ORAL DAILY
Qty: 90 CAPSULE | Refills: 3 | Status: SHIPPED | OUTPATIENT
Start: 2024-07-25

## 2024-07-25 RX ORDER — TAMSULOSIN HYDROCHLORIDE 0.4 MG/1
0.4 CAPSULE ORAL DAILY
Qty: 90 CAPSULE | Refills: 3 | Status: SHIPPED | OUTPATIENT
Start: 2024-07-25 | End: 2024-07-25 | Stop reason: SDUPTHER

## 2024-07-29 ENCOUNTER — PATIENT MESSAGE (OUTPATIENT)
Dept: OTOLARYNGOLOGY | Facility: CLINIC | Age: 84
End: 2024-07-29
Payer: MEDICARE

## 2024-07-30 RX ORDER — NEOMYCIN SULFATE, POLYMYXIN B SULFATE, HYDROCORTISONE 3.5; 10000; 1 MG/ML; [USP'U]/ML; MG/ML
SOLUTION/ DROPS AURICULAR (OTIC)
Qty: 10 ML | Refills: 1 | Status: SHIPPED | OUTPATIENT
Start: 2024-07-30 | End: 2024-07-31 | Stop reason: CLARIF

## 2024-07-30 NOTE — PROGRESS NOTES
Mr. De Anda is a patient of Dr. Paez and was last seen in clinic 11/14/2023.      Subjective:   Patient ID:  Nick De Anda is an 83 y.o. male who presents for follow up of Atrial Fibrillation  .     HPI:    Mr. De Anda is an 83 y.o. male with CAD (CABG), DM, HTN, pAF (PVI 10/2020, 12/2021), AFL (RFA CTI 10/2020) here for follow up.    Background:    Primary Cardiologist: Robert Alas MD  Primary Care Physician: Santiago Adler MD    Mr. De Anda has a hx of coronary artery disease s/p CABG with preserved LVEF, type 2 diabetes, hypertension and recently discovered paroxysmal atrial fibrillation and typical atrial flutter. He recently transferred care from [facility] where his AF was diagnosed and he was initiated on xarelto. He was recently admitted for a symptomatic UTI and observed to have a paroxysm of atrial fibrillation with ventricular rates in the 40s-60s (slower rates appeared to correlate during overnight hours). He monitors his vitals at home and for a while notes it indicates he has an irregular heart beat however he feels normal. He has established care with Dr. Dean in cardiology on 2/13/2019 and was noted to be in atrial fibrillation. He has no obvious symptoms. I reviewed all available electrocardiograms in Epic which show sinus rhythm on 2/5/2014 and 2/2/2019. An ECG dated 2/3/2019 notes rate controlled atrial fibrillation. When I saw him in clinic 2/2019 he was in sinus rhythm. At that visit since he had no symptoms with AF and was rate controlled when in AF I recommended no aggressive rhythm control strategy and would continue his current management. He was to see me prn.    Mr. De Anda returned for discussion of his AF 6/2019. He reported he believed AF was causing fatigue. Notes episodes every week or so that correlate to being fatigue. He was admitted for DCCV and sotalol initiation. He converted when propofol was administered and the DEANGELO/DCCV was cancelled. He saw Faina Espinosa in  EP clinic 8/1/2019 and was in sinus rhythm. He felt better in sinus rhythm.    Mr. De Anda was seen in clinic 10/2019 and noted paroxysms of AF since starting sotalol. One episode lasted 2 days and another 3 days. He noted his pulse was irregular in this setting. He came in for an ECG which noted sinus rhythm however he felt he had already converted. We discussed increasing dosage to 120mg bid however he declined.    Mr. De Anda presented for routine AF follow-up 7/2020. He reported he continues to have some paroxysms of AF, at times last 1/2 day- 1 day. We discussed PVI, he desired to think about it.    Mr. De Anda returned for follow-up 10/2020. His AF burden had significantly increased and he felt poorly. He noted he was still paroxysmal. He desired ablation.    Mr. De Anda underwent RF-PVI and RFA of the CTI in December of 2020.     3/2021: Saw Faina Espinosa. Reported no arrhythmias. Repeat ECHO noted LVEF of 60%. Sotalol was reduced to 40mg bid.    10/2021: Saw Faina Espinosa. He was in sinus rhythm at that time however ECG from 9/2021 prior to a surgery noted atrial flutter (atypical). He wore a 14 day holter that noted a 6.4% burden of atrial flutter. There were R-R pauses up to 6.2 seconds during flutter while sleeping. He was instructed to stop sotalol.    11/16/2021: He now has recurrent atypical atrial flutter despite sotalol with periods of bradycardia while asleep. This arrhythmia has become symptomatic. He feels significantly better and is in sinus rhythm. He is having symptomatic breakthrough on sotalol. His [id number] score is 5 and long-term anticoagulation is recommended.    12/8/2021:  Successful pulmonary vein RF ablation (reisolation of the LIPV and RSPV)   Successful left atrial posterior wall isolation targeting CFEs   Successful anterior mitral isthmus ablation with bidirectional block for atypical atrial flutter that was observed as an outpatient but unable to be induced   Unsuccessful  difficult lateral mitral isthmus ablation (unable to get bidirectional block and thus created the anterior mitral isthmus ablation lesion set)  Complex modifier requested for extensive ablation and considerable time spent trying to obtain lateral mitral isthmus block.  Discharged on sotalol 40mg BID    2/7/2022: He is 3 mo s/p repeat ablation (reisolation of LIPV and RSPV, PWI, successful RFA of anterior mitral isthmus).   Doing well from a rhythm standpoint, with minimal palpitations on sotalol 40mg BID. EKG with stable intervals. [facility]    5/6/2022: He is now 5 months s/p redo ablation. He is doing well from a rhythm standpoint, with no documented or symptomatic recurrence of arrhythmia since procedure.  He weaned himself off sotalol about 2 months ago and feels wonderful. Very active and exercises regularly. [facility]    11/8/2022: He is 11 months s/p repeat ablation. He says he is feeling well. One episode of irreg HB after drinking a lot of coffee. He took a leftover sotalol and it stopped. Will provide with PRN lopressor instead. He will notify clinic if episodes become more frequent. [facility].     2/2022 PNA    5/9/2023:  Stable from rhythm standpoint. Only brief palps which resolve with PRN metoprolol. [facility].  He has SOB since PNA 2/2023. Following with pulmonology and ENT.    11/14/2023: He is now almost 2 years s/p redo ablation. Has done well from a rhythm standpoint, with no documented AF recurrence. He checks rhythm regularly at home. BP elevated today but has not had his BP meds yet today (was fasting for labs). [facility]. Follows with general cardiology. He plans to go back to the gym after the holidays.    Update (08/01/2024):    Today he says he will feel occasional palps in the morning for which he will take a metoprolol PRN. Has not needed to switch to daily dosing. COPD had worsened but improved on new meds. Now with knee  and shoulder pain. Has not been able to go to the gym. No CP, Lh, syncope reported. Fitted for new hearing aids.    He is currently taking xarelto 20mg daily for stroke prophylaxis and denies significant bleeding episodes. Will bleed more with cuts. He is currently being treated with lopressor 25mg PRN for palps.  Kidney function is stable, with a creatinine of 0.8 on 11/14/2023.    I have personally reviewed the patient's EKG today, which shows sinus rhythm with 1st deg AVB at 64bpm. ME interval is 248. QRS is 76. QTc is 427.    Relevant Cardiac Test Results:    2D Echo (12/1/2023):    Left Ventricle: The left ventricle is normal in size. Normal wall thickness. There is concentric remodeling. Normal wall motion. There is normal systolic function with a visually estimated ejection fraction of 55 - 60%. There is normal diastolic function.    Right Ventricle: Normal right ventricular cavity size. Wall thickness is normal. Right ventricle wall motion  is normal. Systolic function is normal.    Aortic Valve: The aortic valve is a trileaflet valve. There is mild aortic valve sclerosis.    Tricuspid Valve: There is mild regurgitation.    Pulmonary Artery: The estimated pulmonary artery systolic pressure is 30 mmHg.    IVC/SVC: Normal venous pressure at 3 mmHg.    Current Outpatient Medications   Medication Sig    albuterol (PROVENTIL HFA) 90 mcg/actuation inhaler Inhale 2 puffs into the lungs every 6 (six) hours as needed for Wheezing. Rescue    amLODIPine (NORVASC) 5 MG tablet TAKE 1 TO 2 TABLETS BY MOUTH ONCE DAILY AS DIRECTED    ascorbic acid, vitamin C, (VITAMIN C) 100 MG tablet Take 500 mg by mouth once daily.    atorvastatin (LIPITOR) 80 MG tablet Take 1 tablet (80 mg total) by mouth every evening.    blood sugar diagnostic Strp To check BG 2 times daily, to use with insurance preferred meter    blood-glucose meter kit To check BG 2 times daily, to use with insurance preferred meter    cefdinir (OMNICEF) 300 MG  capsule Two capsules once daily, best if taken with food    clopidogreL (PLAVIX) 75 mg tablet Take 1 tablet by mouth once daily (Patient taking differently: Take 75 mg by mouth every other day.)    diazePAM (VALIUM) 10 MG Tab Take 1 tablet (10 mg total) by mouth as needed (anxiety). Just half to one and use very seldom-get refills from PCP    fexofenadine (ALLEGRA) 180 MG tablet Take 1 tablet (180 mg total) by mouth once daily.    hydroCHLOROthiazide (MICROZIDE) 12.5 mg capsule Take 1 capsule (12.5 mg total) by mouth once daily.    ipratropium (ATROVENT) 21 mcg (0.03 %) nasal spray 2 sprays by Each Nostril route 2 (two) times daily. Use as needed to control post nasal drip    irbesartan (AVAPRO) 300 MG tablet TAKE 1 TABLET BY MOUTH ONCE DAILY IN THE EVENING    lancets Misc To check BG 2 times daily, to use with insurance preferred meter    levalbuterol (XOPENEX) 1.25 mg/3 mL nebulizer solution Take 3 mLs (1.25 mg total) by nebulization every 4 (four) hours as needed for Wheezing. Rescue    metFORMIN (GLUCOPHAGE) 500 MG tablet TAKE 1 TABLET BY MOUTH TWICE DAILY WITH MEALS    methocarbamoL (ROBAXIN) 500 MG Tab Take 1 tablet by mouth twice daily as needed for muscle spasm    metoprolol tartrate (LOPRESSOR) 25 MG tablet TAKE 1 TABLET BY MOUTH ONCE DAILY AS NEEDED FOR  PALPITATIONS    multivitamin (THERAGRAN) per tablet Take 1 tablet by mouth once daily.    neomycin-polymyxin-hydrocortisone (CORTISPORIN) otic solution Three drops to affected ear 4 times daily    nitroGLYCERIN (NITROSTAT) 0.4 MG SL tablet Place 1 tablet (0.4 mg total) under the tongue every 5 (five) minutes as needed for Chest pain.    tamsulosin (FLOMAX) 0.4 mg Cap Take 1 capsule (0.4 mg total) by mouth once daily.    timoloL maleate (ISTALOL/TIMOLOL) 0.5 % DrpD     timolol maleate 0.5% (TIMOPTIC) 0.5 % Drop Place 1 drop into both eyes 2 (two) times daily.    umeclidinium-vilanteroL (ANORO ELLIPTA) 62.5-25 mcg/actuation DsDv Inhale 1 puff into the lungs  "once daily. Controller    XARELTO 20 mg Tab Take 1 tablet (20 mg total) by mouth every evening.     No current facility-administered medications for this visit.     Facility-Administered Medications Ordered in Other Visits   Medication    0.9%  NaCl infusion    lactated ringers infusion    LIDOcaine (PF) 10 mg/ml (1%) injection 5 mg    sodium chloride 0.9% bolus 1,000 mL    sodium chloride 0.9% flush 10 mL    sodium chloride 0.9% flush 5 mL       Review of Systems   Constitutional: Negative for malaise/fatigue.   Cardiovascular:  Positive for dyspnea on exertion (COPD - improved), irregular heartbeat and palpitations. Negative for chest pain and leg swelling.   Respiratory:  Negative for shortness of breath.    Hematologic/Lymphatic: Negative for bleeding problem.   Skin:  Negative for rash.   Musculoskeletal:  Positive for joint pain. Negative for myalgias.   Gastrointestinal:  Negative for hematemesis, hematochezia and nausea.   Genitourinary:  Negative for hematuria.   Neurological:  Negative for light-headedness.   Psychiatric/Behavioral:  Negative for altered mental status.    Allergic/Immunologic: Negative for persistent infections.       Objective:          BP (!) 122/59   Pulse (!) 59   Ht 6' 4" (1.93 m)   Wt 125.3 kg (276 lb 3.8 oz)   BMI 33.62 kg/m²     Physical Exam  Vitals and nursing note reviewed.   Constitutional:       Appearance: Normal appearance. He is well-developed.   HENT:      Head: Normocephalic.      Nose: Nose normal.   Eyes:      Pupils: Pupils are equal, round, and reactive to light.   Cardiovascular:      Rate and Rhythm: Normal rate and regular rhythm.   Pulmonary:      Effort: No respiratory distress.      Breath sounds: Normal breath sounds.   Musculoskeletal:         General: Normal range of motion.   Skin:     General: Skin is warm and dry.      Findings: No erythema.   Neurological:      Mental Status: He is alert and oriented to person, place, and time.   Psychiatric:         " Speech: Speech normal.         Behavior: Behavior normal.           Lab Results   Component Value Date     11/14/2023    K 4.1 11/14/2023    BUN 16 11/14/2023    CREATININE 0.8 11/14/2023    ALT 21 11/14/2023    AST 21 11/14/2023    HGB 13.8 (L) 11/14/2023    HCT 41.6 11/14/2023    HCT 37 12/08/2021    TSH 3.388 11/14/2023    LDLCALC 52.8 (L) 11/14/2023       Recent Labs   Lab 12/01/21  1249   INR 1.1       Assessment:     1. Paroxysmal atrial fibrillation    2. Essential hypertension    3. Long term (current) use of anticoagulants      Plan:     In summary, Mr. De Anda is an 83 y.o. male with CAD (CABG), DM, HTN, pAF (PVI 10/2020, 12/2021), AFL (RFA CTI 10/2020) here for follow up.  Continues to do well from a rhythm standpoint, with minimal palps treated with PRN lopressor. Not on AAD. [facility] 5 on xarelto.   COPD symptoms improving and he follows with pulmonology. Knee and shoulder pain are keeping him from the gym recently.     Continue current meds  RTC 6 mo, sooner if needed      *A copy of this note has been sent to Dr. Paez*    Follow up in about 6 months (around 2/1/2025).    ------------------------------------------------------------------    WILLIAM Bach, NP-C  Cardiac Electrophysiology

## 2024-07-31 RX ORDER — NEOMYCIN SULFATE, POLYMYXIN B SULFATE, HYDROCORTISONE 3.5; 10000; 1 MG/ML; [USP'U]/ML; MG/ML
SOLUTION/ DROPS AURICULAR (OTIC)
Qty: 10 ML | Refills: 1 | Status: SHIPPED | OUTPATIENT
Start: 2024-07-31

## 2024-08-01 ENCOUNTER — HOSPITAL ENCOUNTER (OUTPATIENT)
Dept: CARDIOLOGY | Facility: CLINIC | Age: 84
Discharge: HOME | End: 2024-08-01
Payer: MEDICARE

## 2024-08-01 ENCOUNTER — OFFICE VISIT (OUTPATIENT)
Dept: ELECTROPHYSIOLOGY | Facility: CLINIC | Age: 84
End: 2024-08-01
Payer: MEDICARE

## 2024-08-01 VITALS
BODY MASS INDEX: 33.64 KG/M2 | DIASTOLIC BLOOD PRESSURE: 59 MMHG | WEIGHT: 276.25 LBS | HEART RATE: 59 BPM | HEIGHT: 76 IN | SYSTOLIC BLOOD PRESSURE: 122 MMHG

## 2024-08-01 DIAGNOSIS — Z79.01 LONG TERM (CURRENT) USE OF ANTICOAGULANTS: ICD-10-CM

## 2024-08-01 DIAGNOSIS — I48.0 PAROXYSMAL ATRIAL FIBRILLATION: Primary | ICD-10-CM

## 2024-08-01 DIAGNOSIS — I48.3 TYPICAL ATRIAL FLUTTER: ICD-10-CM

## 2024-08-01 DIAGNOSIS — I10 ESSENTIAL HYPERTENSION: ICD-10-CM

## 2024-08-01 LAB
OHS QRS DURATION: 76 MS
OHS QTC CALCULATION: 427 MS

## 2024-08-01 PROCEDURE — 3078F DIAST BP <80 MM HG: CPT | Mod: CPTII,S$GLB,, | Performed by: NURSE PRACTITIONER

## 2024-08-01 PROCEDURE — 3288F FALL RISK ASSESSMENT DOCD: CPT | Mod: CPTII,S$GLB,, | Performed by: NURSE PRACTITIONER

## 2024-08-01 PROCEDURE — 3074F SYST BP LT 130 MM HG: CPT | Mod: CPTII,S$GLB,, | Performed by: NURSE PRACTITIONER

## 2024-08-01 PROCEDURE — 1101F PT FALLS ASSESS-DOCD LE1/YR: CPT | Mod: CPTII,S$GLB,, | Performed by: NURSE PRACTITIONER

## 2024-08-01 PROCEDURE — 1159F MED LIST DOCD IN RCRD: CPT | Mod: CPTII,S$GLB,, | Performed by: NURSE PRACTITIONER

## 2024-08-01 PROCEDURE — 1125F AMNT PAIN NOTED PAIN PRSNT: CPT | Mod: CPTII,S$GLB,, | Performed by: NURSE PRACTITIONER

## 2024-08-01 PROCEDURE — 99214 OFFICE O/P EST MOD 30 MIN: CPT | Mod: S$GLB,,, | Performed by: NURSE PRACTITIONER

## 2024-08-01 PROCEDURE — 1160F RVW MEDS BY RX/DR IN RCRD: CPT | Mod: CPTII,S$GLB,, | Performed by: NURSE PRACTITIONER

## 2024-08-01 PROCEDURE — 93010 ELECTROCARDIOGRAM REPORT: CPT | Mod: S$GLB,,, | Performed by: INTERNAL MEDICINE

## 2024-08-01 PROCEDURE — 99999 PR PBB SHADOW E&M-EST. PATIENT-LVL V: CPT | Mod: PBBFAC,,, | Performed by: NURSE PRACTITIONER

## 2024-08-01 PROCEDURE — 93005 ELECTROCARDIOGRAM TRACING: CPT | Mod: S$GLB,,, | Performed by: INTERNAL MEDICINE

## 2024-08-01 RX ORDER — RIVAROXABAN 20 MG/1
20 TABLET, FILM COATED ORAL NIGHTLY
Qty: 90 TABLET | Refills: 3 | Status: SHIPPED | OUTPATIENT
Start: 2024-08-01

## 2024-08-09 ENCOUNTER — OFFICE VISIT (OUTPATIENT)
Dept: OTOLARYNGOLOGY | Facility: CLINIC | Age: 84
End: 2024-08-09
Payer: MEDICARE

## 2024-08-09 VITALS
OXYGEN SATURATION: 97 % | WEIGHT: 277.44 LBS | HEART RATE: 65 BPM | DIASTOLIC BLOOD PRESSURE: 65 MMHG | BODY MASS INDEX: 33.77 KG/M2 | SYSTOLIC BLOOD PRESSURE: 144 MMHG

## 2024-08-09 DIAGNOSIS — Z79.01 CURRENT USE OF ANTICOAGULANT THERAPY: ICD-10-CM

## 2024-08-09 DIAGNOSIS — H61.21 IMPACTED CERUMEN OF RIGHT EAR: ICD-10-CM

## 2024-08-09 DIAGNOSIS — J30.9 ALLERGIC RHINITIS, UNSPECIFIED SEASONALITY, UNSPECIFIED TRIGGER: ICD-10-CM

## 2024-08-09 DIAGNOSIS — R09.82 PND (POST-NASAL DRIP): ICD-10-CM

## 2024-08-09 DIAGNOSIS — J34.2 NASAL SEPTAL DEVIATION: ICD-10-CM

## 2024-08-09 DIAGNOSIS — H90.3 SENSORINEURAL HEARING LOSS (SNHL) OF BOTH EARS: Primary | ICD-10-CM

## 2024-08-09 PROCEDURE — 1159F MED LIST DOCD IN RCRD: CPT | Mod: CPTII,S$GLB,, | Performed by: SPECIALIST

## 2024-08-09 PROCEDURE — 1160F RVW MEDS BY RX/DR IN RCRD: CPT | Mod: CPTII,S$GLB,, | Performed by: SPECIALIST

## 2024-08-09 PROCEDURE — 3288F FALL RISK ASSESSMENT DOCD: CPT | Mod: CPTII,S$GLB,, | Performed by: SPECIALIST

## 2024-08-09 PROCEDURE — 1125F AMNT PAIN NOTED PAIN PRSNT: CPT | Mod: CPTII,S$GLB,, | Performed by: SPECIALIST

## 2024-08-09 PROCEDURE — 99214 OFFICE O/P EST MOD 30 MIN: CPT | Mod: 25,S$GLB,, | Performed by: SPECIALIST

## 2024-08-09 PROCEDURE — 1101F PT FALLS ASSESS-DOCD LE1/YR: CPT | Mod: CPTII,S$GLB,, | Performed by: SPECIALIST

## 2024-08-09 PROCEDURE — 69210 REMOVE IMPACTED EAR WAX UNI: CPT | Mod: RT,S$GLB,, | Performed by: SPECIALIST

## 2024-08-09 PROCEDURE — 3078F DIAST BP <80 MM HG: CPT | Mod: CPTII,S$GLB,, | Performed by: SPECIALIST

## 2024-08-09 PROCEDURE — 3077F SYST BP >= 140 MM HG: CPT | Mod: CPTII,S$GLB,, | Performed by: SPECIALIST

## 2024-08-09 PROCEDURE — 99999 PR PBB SHADOW E&M-EST. PATIENT-LVL IV: CPT | Mod: PBBFAC,,, | Performed by: SPECIALIST

## 2024-08-09 NOTE — PROGRESS NOTES
"Subjective:       Patient ID: Nick De Anda is a 83 y.o. male.    Chief Complaint: No chief complaint on file.      The patient is returning for a 6 week follow-up visit.  There are multiple issues to discuss:  1. Allergic rhinitis:  Nasal secretions are clear he is not having issues currently breathing through his nose.  He is taking  loratadine daily and supplementing with ipratropium bromide spray as needed.  I started him on a nighttime dose of montelukast.     2. COPD/shortness of breath:   The patient uses Anoro inhaler routinely uses Xopenex inhaler on an as-needed basis.  He finds that his breathing is much improved since starting on the Anoro.  3. Sensorineural hearing loss the patient is purchasing hearing aids.  The hearing aid dealer noticed "a lesion" in his right ear.  He will be picking the hearing aids up when this issue is cleared.  4. Chronic anticoagulant therapy:  The patient is taking both Xarelto and Plavix.  One from the cardiologist who did an angioplasty with 2 stents in the other from electrophysiology cardiologist.  The patient has paroxysmal atrial fibrillation.  When he has minor trauma he bleeds excessively.            Review of Systems     Constitutional: Positive for fatigue.  Negative for appetite change, chills, fever and unexpected weight loss.      HENT: Positive for ear discharge, hearing loss, nosebleeds, postnasal drip, sinus infection, sinus pressure, sore throat and voice change.  Negative for ear infection, ear pain, facial swelling, mouth sores, ringing in the ears, runny nose, stuffy nose, tonsil infection, dental problems and trouble swallowing.      Eyes:  Positive for eye itching. Negative for change in eyesight, eye drainage and photophobia.     Respiratory:  Positive for cough, shortness of breath, sleep apnea and wheezing. Negative for snoring.      Cardiovascular:  Positive for irregular heartbeat. Negative for chest pain, foot swelling and swollen veins. "     Gastrointestinal:  Positive for constipation. Negative for abdominal pain, acid reflux, diarrhea, heartburn and vomiting.     Genitourinary: Negative for difficulty urinating, sexual problems and frequent urination.     Musc: Positive for aching muscles, back pain and neck pain. Negative for aching joints.     Skin: Positive for rash.     Allergy: Positive for seasonal allergies. Negative for food allergies.     Endocrine: Positive for cold intolerance. Negative for heat intolerance.      Neurological: Negative for dizziness, headaches, light-headedness, seizures and tremors.      Hematologic: Positive for bruises/bleeds easily and swollen glands.     Psychiatric: Positive for depression, nervous/anxious and sleep disturbance. Negative for decreased concentration.                Objective:      Physical Exam  Vitals and nursing note reviewed.   Constitutional:       General: He is awake.      Appearance: Normal appearance. He is well-developed, well-groomed and overweight.   HENT:      Head: Normocephalic.      Jaw: There is normal jaw occlusion.      Salivary Glands: Right salivary gland is not diffusely enlarged or tender. Left salivary gland is not diffusely enlarged or tender.      Right Ear: Ear canal and external ear normal. Decreased hearing noted. There is impacted cerumen (Dried blood on the entire surface of the bony external canal). Tympanic membrane is retracted.      Left Ear: Ear canal and external ear normal. Decreased hearing noted. Tympanic membrane is retracted.      Nose: Septal deviation (septum deviated to the left anteriorly and right posteriorly), mucosal edema (cyanotic, boggy inferior turbinates bilaterally) and rhinorrhea (clear mucus bilaterally) present. No nasal deformity. Rhinorrhea is clear.      Right Turbinates: Enlarged and pale.      Left Turbinates: Enlarged and pale.      Mouth/Throat:      Lips: No lesions.      Mouth: Mucous membranes are moist. No oral lesions.       Dentition: No gum lesions.      Tongue: No lesions.      Palate: No mass and lesions.      Pharynx: Oropharynx is clear. Uvula midline.   Eyes:      General: Lids are normal. Vision grossly intact.         Right eye: No discharge.         Left eye: No discharge.      Extraocular Movements: Extraocular movements intact.      Conjunctiva/sclera: Conjunctivae normal.      Pupils: Pupils are equal, round, and reactive to light.   Neck:      Thyroid: No thyroid mass or thyromegaly.      Trachea: Trachea normal. No tracheal deviation.   Cardiovascular:      Rate and Rhythm: Normal rate and regular rhythm.      Pulses: Normal pulses.      Heart sounds: Normal heart sounds.   Pulmonary:      Effort: Pulmonary effort is normal.      Breath sounds: Normal breath sounds. No stridor. No decreased breath sounds, wheezing, rhonchi or rales.   Abdominal:      General: Bowel sounds are normal.      Palpations: Abdomen is soft.      Tenderness: There is no abdominal tenderness.   Musculoskeletal:      Cervical back: Neck supple. No muscular tenderness. Decreased range of motion.   Lymphadenopathy:      Head:      Right side of head: No submental, submandibular, preauricular, posterior auricular or occipital adenopathy.      Left side of head: No submental, submandibular, preauricular, posterior auricular or occipital adenopathy.      Cervical: No cervical adenopathy.   Skin:     General: Skin is warm and dry.      Findings: No petechiae or rash.      Nails: There is no clubbing.   Neurological:      Mental Status: He is alert and oriented to person, place, and time.      Cranial Nerves: No cranial nerve deficit.      Sensory: No sensory deficit.      Gait: Gait normal.   Psychiatric:         Speech: Speech normal.         Behavior: Behavior normal. Behavior is cooperative.         Thought Content: Thought content normal.         Judgment: Judgment normal.         Procedure-dried blood and debris removed from external bony canal  using wire loop, bayonet forceps, 8 [place] suction and irrigation.  The patient tolerated procedure well was discharged post procedure       Assessment:       1. Sensorineural hearing loss (SNHL) of both ears    2. Impacted cerumen of right ear    3. Allergic rhinitis, unspecified seasonality, unspecified trigger    4. Nasal septal deviation    5. PND (post-nasal drip)    6. Current use of anticoagulant therapy            Plan:       I  will have the patient return to the hearing aid dealer to  his hearing aids and start using them.  He has a three-month postop visit scheduled with me already which we will keep.  He will continue his other medications.              DISCLAIMER: This note was prepared with [employer] voice recognition transcription software. Garbled syntax, mangled pronouns, and other bizarre constructions may be attributed to that software system. While efforts were made to correct any mistakes made by this voice recognition program, some errors and/or omissions may remain in the note that were missed when the note was originally created.

## 2024-08-09 NOTE — PROCEDURES
"Ear Cerumen Removal    Date/Time: 8/9/2024 8:40 AM    Performed by: JUAN J Irving MD  Authorized by: JUAN J Irving MD    Time out: Immediately prior to procedure a "time out" was called to verify the correct patient, procedure, equipment, support staff and site/side marked as required.    Consent Done?:  Yes (Verbal)    Local anesthetic:  None  Location details:  Right ear  Procedure type comment:  8 Fr suction, wire loop, bayonet forceps and irrigation  Cerumen  Removal Results:  Cerumen completely removed  Patient tolerance:  Patient tolerated the procedure well with no immediate complications    "

## 2024-09-27 DIAGNOSIS — R05.3 CHRONIC COUGH: ICD-10-CM

## 2024-09-27 DIAGNOSIS — J44.9 CHRONIC OBSTRUCTIVE PULMONARY DISEASE, UNSPECIFIED COPD TYPE: ICD-10-CM

## 2024-09-27 DIAGNOSIS — J43.9 PULMONARY EMPHYSEMA, UNSPECIFIED EMPHYSEMA TYPE: ICD-10-CM

## 2024-09-27 RX ORDER — UMECLIDINIUM BROMIDE AND VILANTEROL TRIFENATATE 62.5; 25 UG/1; UG/1
1 POWDER RESPIRATORY (INHALATION) DAILY
Qty: 60 EACH | Refills: 5 | Status: SHIPPED | OUTPATIENT
Start: 2024-09-27

## 2024-09-30 DIAGNOSIS — I25.10 CORONARY ARTERY DISEASE INVOLVING NATIVE CORONARY ARTERY OF NATIVE HEART WITHOUT ANGINA PECTORIS: ICD-10-CM

## 2024-09-30 DIAGNOSIS — I10 ESSENTIAL HYPERTENSION: ICD-10-CM

## 2024-10-01 RX ORDER — AMLODIPINE BESYLATE 5 MG/1
TABLET ORAL
Qty: 180 TABLET | Refills: 3 | Status: SHIPPED | OUTPATIENT
Start: 2024-10-01

## 2024-10-15 ENCOUNTER — OFFICE VISIT (OUTPATIENT)
Dept: PULMONOLOGY | Facility: CLINIC | Age: 84
End: 2024-10-15
Payer: MEDICARE

## 2024-10-15 VITALS
RESPIRATION RATE: 15 BRPM | SYSTOLIC BLOOD PRESSURE: 130 MMHG | DIASTOLIC BLOOD PRESSURE: 60 MMHG | OXYGEN SATURATION: 95 % | WEIGHT: 271.5 LBS | BODY MASS INDEX: 33.06 KG/M2 | HEIGHT: 76 IN | HEART RATE: 91 BPM | TEMPERATURE: 98 F

## 2024-10-15 DIAGNOSIS — J30.9 ALLERGIC RHINITIS, UNSPECIFIED SEASONALITY, UNSPECIFIED TRIGGER: ICD-10-CM

## 2024-10-15 DIAGNOSIS — T78.40XA ALLERGY, UNSPECIFIED, INITIAL ENCOUNTER: ICD-10-CM

## 2024-10-15 DIAGNOSIS — Z71.85 IMMUNIZATION COUNSELING: ICD-10-CM

## 2024-10-15 DIAGNOSIS — J92.9 PLEURAL PLAQUE: ICD-10-CM

## 2024-10-15 DIAGNOSIS — J44.9 CHRONIC OBSTRUCTIVE PULMONARY DISEASE, UNSPECIFIED COPD TYPE: Primary | ICD-10-CM

## 2024-10-15 DIAGNOSIS — J47.9 BRONCHIECTASIS WITHOUT COMPLICATION: ICD-10-CM

## 2024-10-15 PROCEDURE — 99213 OFFICE O/P EST LOW 20 MIN: CPT | Mod: S$GLB,,,

## 2024-10-15 PROCEDURE — 1160F RVW MEDS BY RX/DR IN RCRD: CPT | Mod: CPTII,S$GLB,,

## 2024-10-15 PROCEDURE — 1101F PT FALLS ASSESS-DOCD LE1/YR: CPT | Mod: CPTII,S$GLB,,

## 2024-10-15 PROCEDURE — 90653 IIV ADJUVANT VACCINE IM: CPT | Mod: S$GLB,,,

## 2024-10-15 PROCEDURE — 1125F AMNT PAIN NOTED PAIN PRSNT: CPT | Mod: CPTII,S$GLB,,

## 2024-10-15 PROCEDURE — 3288F FALL RISK ASSESSMENT DOCD: CPT | Mod: CPTII,S$GLB,,

## 2024-10-15 PROCEDURE — 3075F SYST BP GE 130 - 139MM HG: CPT | Mod: CPTII,S$GLB,,

## 2024-10-15 PROCEDURE — 99999 PR PBB SHADOW E&M-EST. PATIENT-LVL V: CPT | Mod: PBBFAC,,,

## 2024-10-15 PROCEDURE — 3078F DIAST BP <80 MM HG: CPT | Mod: CPTII,S$GLB,,

## 2024-10-15 PROCEDURE — G0008 ADMIN INFLUENZA VIRUS VAC: HCPCS | Mod: S$GLB,,,

## 2024-10-15 PROCEDURE — 1159F MED LIST DOCD IN RCRD: CPT | Mod: CPTII,S$GLB,,

## 2024-10-15 NOTE — PROGRESS NOTES
History and Physical Note  [facility]    Subjective:     Reason for visit: COPD    Patient ID:  Nick De Anda is a 84 y.o. male    Interval History 10/15/2024:   This is the patient's COPD follow up. He has to take guanifecin very frequently to help the mucous build. Breathing has been significantly better since starting the anoro. He has a new air purifier at home that helps with his chronic rhinitis.   Cough: copious amount of sputum; it happens all day; scheduled to see ENT   Sputum: green   MMRC grade level: 4  Respiratory illness: none in the last 3 months   Current COPD treatment plan: anoro, breathing treatment with an OPEP device, and rescue inhaler.    Interval History 04/12/2024:  Presents today as a referral from ENT for evaluation of chronic cough. Medical history of CAD s/p CABG, Afib, HTN, DM2, HLD and laryngeal cancer. I met his very pleasant wife Ms. Casiano at her appointment. She is not here with him today.     Previously evaluated by Dr. Rawls. He was not found to have pneumonia at that time. Diagnosed with COPD but no treatment given at that time. PFT showed obstruction 03/2023 with evidence of hyperinflation on chest imaging.     He is bothered by a persistent coughing. He has a productive sputum especially in the mornings. He has been using his nebulizer at home as needed to help with the congestion. He bought an OPEP device to use with his nebulizer the Airphysio. This helps clear his mucous by mid day.     Can't go to the gym because he has been very short of breath.   No steroid use due to glaucoma.     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   Social History     Tobacco Use   Smoking Status Former    Current packs/day: 0.00    Average packs/day: 0.5 packs/day for 23.7 years (11.9 ttl  "pk-yrs)    Types: Cigarettes    Start date: 1956    Quit date: 10/1/1979    Years since quittin.0    Passive exposure: Past   Smokeless Tobacco Never       Objective:     Vitals:    10/15/24 1449   BP: 130/60   Pulse: 91   Resp: 15   Temp: 97.9 °F (36.6 °C)   TempSrc: Temporal   SpO2: 95%   Weight: 123.1 kg (271 lb 7.9 oz)   Height: 6' 4" (1.93 m)         Physical Exam  Constitutional:       General: He is awake.      Appearance: Normal appearance. He is well-developed.   HENT:      Head: Normocephalic.   Pulmonary:      Effort: Pulmonary effort is normal. No respiratory distress.      Breath sounds: Decreased air movement present. No wheezing.   Skin:     General: Skin is warm.      Capillary Refill: Capillary refill takes less than 2 seconds.      Findings: No rash.      Nails: There is no clubbing.   Neurological:      Mental Status: He is alert.   Psychiatric:         Attention and Perception: Attention and perception normal.         Behavior: Behavior is cooperative.          Personal Diagnostic Review and Interpretation  CTA 2023: report from OSH Mild bilateral emphysematous changes are noted. There is focal mild atelectasis or infiltrate at the left lung base, with tiny left pleural effusion. Calcified pleural plaque along the left hemidiaphragm is also noted.     CXR 2023: lung fields clear, flattened hemidiaphragm    Pertinent Studies Reviewed & Interpreted:     Pulmonary Function Tests:   PFT 2023: FVC: 2.91 (63 % predicted), FEV1: 1.43 (32.6 % predicted), FEV1/FVC:  67  Moderate obstruction, severe ventilatory impairment      Echocardiograms:   2023:     Left Ventricle: The left ventricle is normal in size. Normal wall thickness. There is concentric remodeling. Normal wall motion. There is normal systolic function with a visually estimated ejection fraction of 55 - 60%. There is normal diastolic function.    Right Ventricle: Normal right ventricular cavity size. Wall thickness is " normal. Right ventricle wall motion  is normal. Systolic function is normal.    Aortic Valve: The aortic valve is a trileaflet valve. There is mild aortic valve sclerosis.    Tricuspid Valve: There is mild regurgitation.    Pulmonary Artery: The estimated pulmonary artery systolic pressure is 30 mmHg.    IVC/SVC: Normal venous pressure at 3 mmHg.       Other Pertinent Laboratories:  Lab Results   Component Value Date    WBC 7.22 10/15/2024    RBC 4.64 10/15/2024    HGB 13.8 (L) 10/15/2024    MCV 90 10/15/2024    MCH 29.7 10/15/2024    MCHC 33.2 10/15/2024    RDW 14.0 10/15/2024     10/15/2024    MPV 10.5 10/15/2024    GRAN 3.7 10/15/2024    GRAN 51.7 10/15/2024    LYMPH 2.0 10/15/2024    LYMPH 27.1 10/15/2024    MONO 0.9 10/15/2024    MONO 12.6 10/15/2024    EOS 0.5 10/15/2024    BASO 0.06 10/15/2024       Assessment & Plan:       Plan:  Clinical impression is resonably certain and repeated evaluation prn +/- follow up will be needed as below.      1. Chronic obstructive pulmonary disease, unspecified COPD type  Overview:  Moderate obstruction noted on latest spirometry but cannot rule out a restrictive pattern.     COPD MANAGEMENT PLAN  -Anoro for maintenance therapy. Rinse mouth after each use.   -Levalbuterol for rescue therapy. 1-2 puffs as needed for shortness of breath or wheezing  -Xopenex treatments for shortness of breath or wheezing. Use OPEP.     -Obtain IgE and CBC to check inflammatory markers    Orders:  -     CBC auto differential; Future; Expected date: 10/15/2024  -     IGE; Future; Expected date: 10/15/2024    2. Bronchiectasis without complication  Overview:  Pleural plaques which show rounds atelectasis. Benign findings.   Continue nebulizer treatments daily with OPEP device     Orders:  -     CBC auto differential; Future; Expected date: 10/15/2024    3. Pleural plaque  Overview:  Calcified and noncalcified pleural plaque in the left hemithorax.      4. Allergic rhinitis, unspecified  seasonality, unspecified trigger  Comments:  Check allergic inflammatory markers  Orders:  -     IGE; Future; Expected date: 10/15/2024    5. Allergy, unspecified, initial encounter  -     IGE; Future; Expected date: 10/15/2024    6. Immunization counseling  Comments:  Flu shot given today  Orders:  -     influenza (adjuvanted) (Fluad) 45 mcg/0.5 mL IM vaccine (> or = 66 yo) 0.5 mL          Follow up in about 6 months (around 4/15/2025), or if symptoms worsen or fail to improve.    Discussed with patient above for education the following:      Patient Instructions   COPD MANAGEMENT PLAN  -Anoro for maintenance therapy. Rinse mouth after each use.   -Levalbuterol for rescue therapy. 1-2 puffs as needed for shortness of breath or wheezing  -Duonebs treatments for shortness of breath or wheezing. Use OPEP.     -Obtain IgE and CBC to check inflammatory markers      RETURN TO CLINIC IN 6 MONTHS     Total professional time spent for the encounter: 25 minutes  Time was spent preparing to see the patient, reviewing results of prior testing, obtaining and/or reviewing separately obtained history, performing a medically appropriate examination and interview, counseling and educating the patient/family, ordering medications/tests/procedures, referring and communicating with other health care professionals, documenting clinical information in the electronic health record, and independently interpreting results.    Jennifer Ruvalcaba, DNP

## 2024-10-15 NOTE — PATIENT INSTRUCTIONS
COPD MANAGEMENT PLAN  -Anoro for maintenance therapy. Rinse mouth after each use.   -Levalbuterol for rescue therapy. 1-2 puffs as needed for shortness of breath or wheezing  -Duonebs treatments for shortness of breath or wheezing. Use OPEP.     -Obtain IgE and CBC to check inflammatory markers

## 2024-10-21 ENCOUNTER — PATIENT MESSAGE (OUTPATIENT)
Dept: ADMINISTRATIVE | Facility: HOSPITAL | Age: 84
End: 2024-10-21
Payer: MEDICARE

## 2024-10-21 ENCOUNTER — PATIENT MESSAGE (OUTPATIENT)
Dept: PRIMARY CARE CLINIC | Facility: CLINIC | Age: 84
End: 2024-10-21
Payer: MEDICARE

## 2024-10-21 DIAGNOSIS — E11.9 TYPE 2 DIABETES MELLITUS WITHOUT COMPLICATION, WITHOUT LONG-TERM CURRENT USE OF INSULIN: ICD-10-CM

## 2024-10-21 DIAGNOSIS — I70.201 ATHEROSCLEROSIS OF NATIVE ARTERY OF RIGHT LOWER EXTREMITY, WITH UNSPECIFIED PRESENCE OF CLINICAL MANIFESTATION: Primary | ICD-10-CM

## 2024-10-21 NOTE — PROGRESS NOTES
[facility] Note    Chief Complaint      Chief Complaint   Patient presents with    Annual Exam       History of Present Illness      Nick De Anda is a 84 y.o. male who presents today for   Chief Complaint   Patient presents with    Annual Exam         Patient is known to me.  He presents to clinic today for his annual medical exam.  He reports feeling well today.  He does have some right hip pain today.  He reports that he bumped into a to box in his garage with his right hip and soon after began having hip pain.   He is being followed by outside orthopedics with [facility] for back pain issues and will follow up for right hip pain.  He will get an x-ray of his right hip today.  Otherwise he is feeling well today.  There are no other complaints today.         Review of Systems   Musculoskeletal:  Positive for joint pain.        + right hip pain   All 12 systems otherwise negative.       Family History:  family history includes Breast cancer in his sister; Diabetes type I in his son; Diabetes type II in his daughter; Heart attack in his father and maternal grandfather; Heart disease in his brother, brother, brother, and brother; No Known Problems in his maternal grandmother, mother, paternal grandfather, paternal grandmother, and son.   Family history was reviewed with patient.     Medications:  Outpatient Encounter Medications as of 11/7/2024   Medication Sig Note Dispense Refill    albuterol (PROVENTIL HFA) 90 mcg/actuation inhaler Inhale 2 puffs into the lungs every 6 (six) hours as needed for Wheezing. Rescue  85 g 4    amLODIPine (NORVASC) 5 MG tablet TAKE 1 TO 2 TABLETS BY MOUTH ONCE DAILY AS DIRECTED  180 tablet 3    ascorbic acid, vitamin C, (VITAMIN C) 100 MG tablet Take 500 mg by mouth once daily.       atorvastatin (LIPITOR) 80 MG tablet Take 1 tablet (80 mg total) by mouth every evening.  90 tablet 3    blood sugar diagnostic Strp To check BG 2 times daily, to use  with insurance preferred meter  100 strip 5    blood-glucose meter kit To check BG 2 times daily, to use with insurance preferred meter  1 each 0    clopidogreL (PLAVIX) 75 mg tablet Take 1 tablet by mouth once daily  90 tablet 3    fexofenadine (ALLEGRA) 180 MG tablet Take 1 tablet (180 mg total) by mouth once daily.  30 tablet 11    hydroCHLOROthiazide (MICROZIDE) 12.5 mg capsule Take 1 capsule (12.5 mg total) by mouth once daily.  90 capsule 3    irbesartan (AVAPRO) 300 MG tablet TAKE 1 TABLET BY MOUTH ONCE DAILY IN THE EVENING  90 tablet 3    lancets Misc To check BG 2 times daily, to use with insurance preferred meter  100 each 5    levalbuterol (XOPENEX) 1.25 mg/3 mL nebulizer solution Take 3 mLs (1.25 mg total) by nebulization every 4 (four) hours as needed for Wheezing. Rescue  100 each 2    metFORMIN (GLUCOPHAGE) 500 MG tablet TAKE 1 TABLET BY MOUTH TWICE DAILY WITH MEALS  180 tablet 1    methocarbamoL (ROBAXIN) 500 MG Tab Take 1 tablet by mouth twice daily as needed for muscle spasm  30 tablet 1    metoprolol tartrate (LOPRESSOR) 25 MG tablet TAKE 1 TABLET BY MOUTH ONCE DAILY AS NEEDED FOR  PALPITATIONS  90 tablet 0    montelukast (SINGULAIR) 10 mg tablet One tablet by mouth every evening  90 tablet 3    multivitamin (THERAGRAN) per tablet Take 1 tablet by mouth once daily. 9/21/2021: Hold 1 week prior to surgery       nitroGLYCERIN (NITROSTAT) 0.4 MG SL tablet Place 1 tablet (0.4 mg total) under the tongue every 5 (five) minutes as needed for Chest pain. 3/13/2023: PRN 25 tablet 3    tamsulosin (FLOMAX) 0.4 mg Cap Take 1 capsule (0.4 mg total) by mouth once daily.  90 capsule 3    timoloL maleate (ISTALOL/TIMOLOL) 0.5 % DrpD        umeclidinium-vilanteroL (ANORO ELLIPTA) 62.5-25 mcg/actuation DsDv Inhale 1 puff into the lungs once daily. Controller  60 each 5    XARELTO 20 mg Tab Take 1 tablet (20 mg total) by mouth every evening.  90 tablet 3    [DISCONTINUED] diazePAM (VALIUM) 10 MG Tab Take 1 tablet  (10 mg total) by mouth as needed (anxiety). Just half to one and use very seldom-get refills from PCP  30 tablet 3    diazePAM (VALIUM) 10 MG Tab Take 1 tablet (10 mg total) by mouth as needed (anxiety). Just 1/2 to 1 and use very seldom-get refills from PCP  30 tablet 3    [DISCONTINUED] cefdinir (OMNICEF) 300 MG capsule Two capsules once daily, best if taken with food (Patient not taking: Reported on 10/15/2024)  20 capsule 0    [DISCONTINUED] cefdinir (OMNICEF) 300 MG capsule Two capsules once daily, best if taken with food (Patient not taking: Reported on 11/7/2024)  20 capsule 0    [DISCONTINUED] ipratropium (ATROVENT) 21 mcg (0.03 %) nasal spray 2 sprays by Each Nostril route 2 (two) times daily. Use as needed to control post nasal drip (Patient not taking: Reported on 11/7/2024)  30 mL 11    [DISCONTINUED] irbesartan (AVAPRO) 300 MG tablet TAKE 1 TABLET BY MOUTH ONCE DAILY IN THE EVENING  90 tablet 3    [DISCONTINUED] neomycin-polymyxin-hydrocortisone (CORTISPORIN) otic solution Three drops to affected ear 4 times daily  10 mL 1    [DISCONTINUED] timolol maleate 0.5% (TIMOPTIC) 0.5 % Drop Place 1 drop into both eyes 2 (two) times daily.        Facility-Administered Encounter Medications as of 11/7/2024   Medication Dose Route Frequency Provider Last Rate Last Admin    0.9%  NaCl infusion   Intravenous Continuous Rebecca Dickinson NP   Stopped at 12/02/20 1636    lactated ringers infusion   Intravenous Continuous Tyler Arana MD        LIDOcaine (PF) 10 mg/ml (1%) injection 5 mg  0.5 mL Intradermal Once Tyler Arana MD        sodium chloride 0.9% bolus 1,000 mL  1,000 mL Intravenous Once Winnie Larkin NP        sodium chloride 0.9% flush 10 mL  10 mL Intravenous PRN DELICIA Jaramillo MD        sodium chloride 0.9% flush 5 mL  5 mL Intravenous PRN Rebecca Dickinson NP           Allergies:  Review of patient's allergies indicates:   Allergen Reactions    Penicillins  "Anaphylaxis     Can take keflex    Hydrocodone      Causes constipation    Amoxicillin Swelling and Rash       Health Maintenance:  Health Maintenance   Topic Date Due    Eye Exam  04/19/2025    Hemoglobin A1c  04/24/2025    Lipid Panel  10/24/2025    TETANUS VACCINE  06/09/2033    Shingles Vaccine  Completed     Health Maintenance Topics with due status: Not Due       Topic Last Completion Date    TETANUS VACCINE 06/09/2023    Diabetes Urine Screening 12/20/2023    Eye Exam 04/19/2024    Lipid Panel 10/24/2024    Hemoglobin A1c 10/24/2024       Physical Exam      Vital Signs  Pulse: 70  SpO2: 97 %  BP: (!) 86/48  BP Location: Right arm  Patient Position: Sitting  Pain Score: 0-No pain  Height and Weight  Height: 6' 4" (193 cm)  Weight: 122.8 kg (270 lb 11.6 oz)  BSA (Calculated - sq m): 2.57 sq meters  BMI (Calculated): 33  Weight in (lb) to have BMI = 25: 205]    Physical Exam  Vitals reviewed.   Constitutional:       Appearance: Normal appearance. He is normal weight.   HENT:      Head: Normocephalic and atraumatic.      Right Ear: Tympanic membrane, ear canal and external ear normal.      Left Ear: Tympanic membrane, ear canal and external ear normal.      Nose: Nose normal.      Mouth/Throat:      Mouth: Mucous membranes are moist.      Pharynx: Oropharynx is clear.   Eyes:      Extraocular Movements: Extraocular movements intact.      Conjunctiva/sclera: Conjunctivae normal.      Pupils: Pupils are equal, round, and reactive to light.   Cardiovascular:      Rate and Rhythm: Normal rate and regular rhythm.      Pulses: Normal pulses.      Heart sounds: Normal heart sounds.   Pulmonary:      Effort: Pulmonary effort is normal.      Breath sounds: Normal breath sounds.   Abdominal:      General: Abdomen is flat. Bowel sounds are normal.      Palpations: Abdomen is soft.   Musculoskeletal:         General: Normal range of motion.      Cervical back: Normal range of motion and neck supple.   Skin:     General: Skin " is warm and dry.      Capillary Refill: Capillary refill takes less than 2 seconds.   Neurological:      General: No focal deficit present.      Mental Status: He is alert and oriented to person, place, and time. Mental status is at baseline.   Psychiatric:         Mood and Affect: Mood normal.         Behavior: Behavior normal.         Thought Content: Thought content normal.         Judgment: Judgment normal.            Assessment/Plan     Nick De Anda is a 84 y.o.male with:    Type 2 diabetes mellitus without complication, without long-term current use of insulin  -     Comprehensive Metabolic Panel; Future; Expected date: 11/07/2024    Other fatigue  -     Comprehensive Metabolic Panel; Future; Expected date: 11/07/2024  -     T4, Free; Future; Expected date: 11/07/2024  -     TSH; Future; Expected date: 11/07/2024    Anxiety  -     diazePAM (VALIUM) 10 MG Tab; Take 1 tablet (10 mg total) by mouth as needed (anxiety). Just 1/2 to 1 and use very seldom-get refills from PCP  Dispense: 30 tablet; Refill: 3    Right hip pain  -     X-Ray Hip 1 View Right (with Pelvis when performed); Future; Expected date: 11/07/2024        As above, continue current medications and maintain follow up with specialists.  Return to clinic as needed.    Greater than 50% of visit was spent face to face with patient.  All questions were answered to patient's satisfaction.          Karen L Spencer, NP-C [facility]

## 2024-10-24 ENCOUNTER — OFFICE VISIT (OUTPATIENT)
Dept: OTOLARYNGOLOGY | Facility: CLINIC | Age: 84
End: 2024-10-24
Payer: MEDICARE

## 2024-10-24 ENCOUNTER — LAB VISIT (OUTPATIENT)
Dept: LAB | Facility: HOSPITAL | Age: 84
End: 2024-10-24
Attending: NURSE PRACTITIONER
Payer: MEDICARE

## 2024-10-24 VITALS — HEART RATE: 55 BPM | SYSTOLIC BLOOD PRESSURE: 128 MMHG | OXYGEN SATURATION: 97 % | DIASTOLIC BLOOD PRESSURE: 62 MMHG

## 2024-10-24 DIAGNOSIS — Z79.01 CURRENT USE OF ANTICOAGULANT THERAPY: ICD-10-CM

## 2024-10-24 DIAGNOSIS — Z97.4 HEARING AID WORN: ICD-10-CM

## 2024-10-24 DIAGNOSIS — J30.9 ALLERGIC RHINITIS, UNSPECIFIED SEASONALITY, UNSPECIFIED TRIGGER: ICD-10-CM

## 2024-10-24 DIAGNOSIS — I70.201 ATHEROSCLEROSIS OF NATIVE ARTERY OF RIGHT LOWER EXTREMITY, WITH UNSPECIFIED PRESENCE OF CLINICAL MANIFESTATION: ICD-10-CM

## 2024-10-24 DIAGNOSIS — E11.9 TYPE 2 DIABETES MELLITUS WITHOUT COMPLICATION, WITHOUT LONG-TERM CURRENT USE OF INSULIN: ICD-10-CM

## 2024-10-24 DIAGNOSIS — H90.3 SENSORINEURAL HEARING LOSS (SNHL) OF BOTH EARS: Primary | ICD-10-CM

## 2024-10-24 DIAGNOSIS — J34.2 NASAL SEPTAL DEVIATION: ICD-10-CM

## 2024-10-24 LAB
CHOLEST SERPL-MCNC: 107 MG/DL (ref 120–199)
CHOLEST/HDLC SERPL: 2.7 {RATIO} (ref 2–5)
HDLC SERPL-MCNC: 39 MG/DL (ref 40–75)
HDLC SERPL: 36.4 % (ref 20–50)
LDLC SERPL CALC-MCNC: 54 MG/DL (ref 63–159)
NONHDLC SERPL-MCNC: 68 MG/DL
TRIGL SERPL-MCNC: 70 MG/DL (ref 30–150)

## 2024-10-24 PROCEDURE — 1125F AMNT PAIN NOTED PAIN PRSNT: CPT | Mod: CPTII,S$GLB,, | Performed by: SPECIALIST

## 2024-10-24 PROCEDURE — 99999 PR PBB SHADOW E&M-EST. PATIENT-LVL IV: CPT | Mod: PBBFAC,,, | Performed by: SPECIALIST

## 2024-10-24 PROCEDURE — 36415 COLL VENOUS BLD VENIPUNCTURE: CPT | Mod: PN | Performed by: NURSE PRACTITIONER

## 2024-10-24 PROCEDURE — 1159F MED LIST DOCD IN RCRD: CPT | Mod: CPTII,S$GLB,, | Performed by: SPECIALIST

## 2024-10-24 PROCEDURE — 80061 LIPID PANEL: CPT | Performed by: NURSE PRACTITIONER

## 2024-10-24 PROCEDURE — 99214 OFFICE O/P EST MOD 30 MIN: CPT | Mod: S$GLB,,, | Performed by: SPECIALIST

## 2024-10-24 PROCEDURE — 3288F FALL RISK ASSESSMENT DOCD: CPT | Mod: CPTII,S$GLB,, | Performed by: SPECIALIST

## 2024-10-24 PROCEDURE — 83036 HEMOGLOBIN GLYCOSYLATED A1C: CPT | Performed by: NURSE PRACTITIONER

## 2024-10-24 PROCEDURE — 1160F RVW MEDS BY RX/DR IN RCRD: CPT | Mod: CPTII,S$GLB,, | Performed by: SPECIALIST

## 2024-10-24 PROCEDURE — 1101F PT FALLS ASSESS-DOCD LE1/YR: CPT | Mod: CPTII,S$GLB,, | Performed by: SPECIALIST

## 2024-10-24 PROCEDURE — 3074F SYST BP LT 130 MM HG: CPT | Mod: CPTII,S$GLB,, | Performed by: SPECIALIST

## 2024-10-24 PROCEDURE — 3078F DIAST BP <80 MM HG: CPT | Mod: CPTII,S$GLB,, | Performed by: SPECIALIST

## 2024-10-24 RX ORDER — CEFDINIR 300 MG/1
CAPSULE ORAL
Qty: 20 CAPSULE | Refills: 0 | Status: SHIPPED | OUTPATIENT
Start: 2024-10-24

## 2024-10-24 RX ORDER — MONTELUKAST SODIUM 10 MG/1
TABLET ORAL
Qty: 90 TABLET | Refills: 3 | Status: SHIPPED | OUTPATIENT
Start: 2024-10-24

## 2024-10-24 NOTE — PROGRESS NOTES
Subjective:       Patient ID: Nick De Anda is a 84 y.o. male.    Chief Complaint: No chief complaint on file.      The patient is returning for a 6 week follow-up visit.  There are multiple issues to discuss:  1. Allergic rhinitis:  The patient is having trouble some coughing at night when he lays down.  He feels the postnasal drip initiates it.  He is taking  loratadine daily and supplementing with ipratropium bromide spray as needed.  He recently had a sinus infection which cleared with antibiotic therapy but purulent rhinorrhea resume within 48 hours of stopping antibiotics.    2. COPD/shortness of breath:   The patient has noticed a significant improvement in his breathing since he started using the Anoro inhaler routinely uses Xopenex inhaler on an as-needed basis. .  3. Sensorineural hearing loss/hearing aids worn:  Since his last visit the patient has purchase bilateral hearing aids.  He finds it very helpful.  The Bluetooth to his telephone in his having some difficulties getting that connection to work properly.  4. Chronic anticoagulant therapy:  The patient is taking both Xarelto and Plavix.  One from the cardiologist who did an angioplasty with 2 stents in the other from electrophysiology cardiologist.  The patient has paroxysmal atrial fibrillation.            Review of Systems     Constitutional: Positive for fatigue.  Negative for appetite change, chills, fever and unexpected weight loss.      HENT: Positive for ear discharge, hearing loss, nosebleeds, postnasal drip, sinus infection, sinus pressure, sore throat and voice change.  Negative for ear infection, ear pain, facial swelling, mouth sores, ringing in the ears, runny nose, stuffy nose, tonsil infection, dental problems and trouble swallowing.      Eyes:  Positive for eye itching. Negative for change in eyesight, eye drainage and photophobia.     Respiratory:  Positive for cough, shortness of breath, sleep apnea and wheezing. Negative  for snoring.      Cardiovascular:  Positive for irregular heartbeat. Negative for chest pain, foot swelling and swollen veins.     Gastrointestinal:  Positive for constipation. Negative for abdominal pain, acid reflux, diarrhea, heartburn and vomiting.     Genitourinary: Negative for difficulty urinating, sexual problems and frequent urination.     Musc: Positive for aching muscles, back pain and neck pain. Negative for aching joints.     Skin: Positive for rash.     Allergy: Positive for seasonal allergies. Negative for food allergies.     Endocrine: Positive for cold intolerance. Negative for heat intolerance.      Neurological: Negative for dizziness, headaches, light-headedness, seizures and tremors.      Hematologic: Positive for bruises/bleeds easily and swollen glands.     Psychiatric: Positive for depression, nervous/anxious and sleep disturbance. Negative for decreased concentration.                Objective:      Physical Exam  Vitals and nursing note reviewed.   Constitutional:       General: He is awake.      Appearance: Normal appearance. He is well-developed, well-groomed and overweight.   HENT:      Head: Normocephalic.      Jaw: There is normal jaw occlusion.      Salivary Glands: Right salivary gland is not diffusely enlarged or tender. Left salivary gland is not diffusely enlarged or tender.      Right Ear: Ear canal and external ear normal. Decreased hearing noted. Impacted cerumen: Dried blood on the entire surface of the bony external canal. Tympanic membrane is retracted. Tympanic membrane has decreased mobility.      Left Ear: Ear canal and external ear normal. Decreased hearing noted. Tympanic membrane is retracted. Tympanic membrane has decreased mobility.      Nose: Septal deviation (septum deviated to the left anteriorly and right posteriorly), mucosal edema (cyanotic, boggy inferior turbinates bilaterally) and rhinorrhea (clear mucus bilaterally) present. No nasal deformity. Rhinorrhea is  clear.      Right Turbinates: Enlarged and pale.      Left Turbinates: Enlarged and pale.      Mouth/Throat:      Lips: No lesions.      Mouth: Mucous membranes are moist. No oral lesions.      Dentition: No gum lesions.      Tongue: No lesions.      Palate: No mass and lesions.      Pharynx: Uvula midline. Oropharyngeal exudate (mild) and postnasal drip (thin turbid mucus in the nasopharynx bilaterally) present.   Eyes:      General: Lids are normal. Vision grossly intact.         Right eye: No discharge.         Left eye: No discharge.      Extraocular Movements: Extraocular movements intact.      Conjunctiva/sclera: Conjunctivae normal.      Pupils: Pupils are equal, round, and reactive to light.   Neck:      Thyroid: No thyroid mass or thyromegaly.      Trachea: Trachea normal. No tracheal deviation.   Cardiovascular:      Rate and Rhythm: Normal rate and regular rhythm.      Pulses: Normal pulses.      Heart sounds: Normal heart sounds.   Pulmonary:      Effort: Pulmonary effort is normal.      Breath sounds: Normal breath sounds. No stridor. No decreased breath sounds, wheezing, rhonchi or rales.   Abdominal:      General: Bowel sounds are normal.      Palpations: Abdomen is soft.      Tenderness: There is no abdominal tenderness.   Musculoskeletal:      Cervical back: Neck supple. No muscular tenderness. Decreased range of motion.   Lymphadenopathy:      Head:      Right side of head: No submental, submandibular, preauricular, posterior auricular or occipital adenopathy.      Left side of head: No submental, submandibular, preauricular, posterior auricular or occipital adenopathy.      Cervical: No cervical adenopathy.   Skin:     General: Skin is warm and dry.      Findings: No petechiae or rash.      Nails: There is no clubbing.   Neurological:      Mental Status: He is alert and oriented to person, place, and time.      Cranial Nerves: No cranial nerve deficit.      Sensory: No sensory deficit.      Gait:  Gait normal.   Psychiatric:         Speech: Speech normal.         Behavior: Behavior normal. Behavior is cooperative.         Thought Content: Thought content normal.         Judgment: Judgment normal.              Assessment:       1. Sensorineural hearing loss (SNHL) of both ears    2. Hearing aid worn    3. Allergic rhinitis, unspecified seasonality, unspecified trigger    4. Nasal septal deviation    5. Current use of anticoagulant therapy              Plan:       I  am placing the patient on 10 day course of cefdinir.  I am starting him on a nighttime dose of montelukast to supplement his daily loratadine and ipratropium bromide.  I will recheck him in 3 months, or sooner on an as-needed basis.              DISCLAIMER: This note was prepared with [employer] voice recognition transcription software. Garbled syntax, mangled pronouns, and other bizarre constructions may be attributed to that software system. While efforts were made to correct any mistakes made by this voice recognition program, some errors and/or omissions may remain in the note that were missed when the note was originally created.

## 2024-10-25 LAB
ESTIMATED AVG GLUCOSE: 117 MG/DL (ref 68–131)
HBA1C MFR BLD: 5.7 % (ref 4–5.6)

## 2024-10-30 DIAGNOSIS — I10 ESSENTIAL HYPERTENSION: ICD-10-CM

## 2024-10-30 RX ORDER — IRBESARTAN 300 MG/1
TABLET ORAL
Qty: 90 TABLET | Refills: 3 | Status: SHIPPED | OUTPATIENT
Start: 2024-10-30

## 2024-11-07 ENCOUNTER — OFFICE VISIT (OUTPATIENT)
Dept: PRIMARY CARE CLINIC | Facility: CLINIC | Age: 84
End: 2024-11-07
Payer: MEDICARE

## 2024-11-07 ENCOUNTER — HOSPITAL ENCOUNTER (OUTPATIENT)
Dept: RADIOLOGY | Facility: HOSPITAL | Age: 84
Discharge: HOME | End: 2024-11-07
Attending: NURSE PRACTITIONER
Payer: MEDICARE

## 2024-11-07 VITALS
SYSTOLIC BLOOD PRESSURE: 86 MMHG | BODY MASS INDEX: 32.97 KG/M2 | DIASTOLIC BLOOD PRESSURE: 48 MMHG | HEIGHT: 76 IN | WEIGHT: 270.75 LBS | HEART RATE: 70 BPM | OXYGEN SATURATION: 97 %

## 2024-11-07 DIAGNOSIS — E11.9 TYPE 2 DIABETES MELLITUS WITHOUT COMPLICATION, WITHOUT LONG-TERM CURRENT USE OF INSULIN: Primary | ICD-10-CM

## 2024-11-07 DIAGNOSIS — M25.551 RIGHT HIP PAIN: ICD-10-CM

## 2024-11-07 DIAGNOSIS — R53.83 OTHER FATIGUE: ICD-10-CM

## 2024-11-07 DIAGNOSIS — F41.9 ANXIETY: ICD-10-CM

## 2024-11-07 PROCEDURE — 99999 PR PBB SHADOW E&M-EST. PATIENT-LVL III: CPT | Mod: PBBFAC,,, | Performed by: NURSE PRACTITIONER

## 2024-11-07 PROCEDURE — 73502 X-RAY EXAM HIP UNI 2-3 VIEWS: CPT | Mod: TC,PN,RT

## 2024-11-07 PROCEDURE — 73502 X-RAY EXAM HIP UNI 2-3 VIEWS: CPT | Mod: 26,RT,, | Performed by: RADIOLOGY

## 2024-11-07 RX ORDER — DIAZEPAM 10 MG/1
10 TABLET ORAL
Qty: 30 TABLET | Refills: 3 | Status: SHIPPED | OUTPATIENT
Start: 2024-11-07 | End: 2024-12-07

## 2024-11-29 DIAGNOSIS — Z98.61 POST PTCA: ICD-10-CM

## 2024-11-29 RX ORDER — CLOPIDOGREL BISULFATE 75 MG/1
TABLET ORAL
Qty: 90 TABLET | Refills: 0 | Status: SHIPPED | OUTPATIENT
Start: 2024-11-29

## 2024-12-13 DIAGNOSIS — M54.41 CHRONIC MIDLINE LOW BACK PAIN WITH RIGHT-SIDED SCIATICA: ICD-10-CM

## 2024-12-13 DIAGNOSIS — G89.29 CHRONIC MIDLINE LOW BACK PAIN WITH RIGHT-SIDED SCIATICA: ICD-10-CM

## 2024-12-16 RX ORDER — METHOCARBAMOL 500 MG/1
TABLET, FILM COATED ORAL
Qty: 30 TABLET | Refills: 1 | Status: SHIPPED | OUTPATIENT
Start: 2024-12-16

## 2025-01-09 ENCOUNTER — OFFICE VISIT (OUTPATIENT)
Dept: PRIMARY CARE CLINIC | Facility: CLINIC | Age: 85
End: 2025-01-09
Payer: MEDICARE

## 2025-01-09 VITALS
OXYGEN SATURATION: 97 % | DIASTOLIC BLOOD PRESSURE: 62 MMHG | SYSTOLIC BLOOD PRESSURE: 108 MMHG | HEART RATE: 83 BPM | WEIGHT: 273.56 LBS | BODY MASS INDEX: 33.31 KG/M2 | HEIGHT: 76 IN

## 2025-01-09 DIAGNOSIS — E11.9 TYPE 2 DIABETES MELLITUS WITHOUT COMPLICATION, WITHOUT LONG-TERM CURRENT USE OF INSULIN: ICD-10-CM

## 2025-01-09 DIAGNOSIS — R09.82 POST-NASAL DRIP: Primary | ICD-10-CM

## 2025-01-09 PROCEDURE — 99999 PR PBB SHADOW E&M-EST. PATIENT-LVL III: CPT | Mod: PBBFAC,,,

## 2025-01-09 RX ORDER — AZELASTINE 1 MG/ML
1 SPRAY, METERED NASAL 2 TIMES DAILY
Qty: 30 ML | Refills: 2 | Status: SHIPPED | OUTPATIENT
Start: 2025-01-09

## 2025-01-09 NOTE — PROGRESS NOTES
Assessment:       1. Post-nasal drip    2. Type 2 diabetes mellitus without complication, without long-term current use of insulin       Plan:       Post-nasal drip  -     azelastine (ASTELIN) 137 mcg (0.1 %) nasal spray; 1 spray (137 mcg total) by Nasal route 2 (two) times daily.  Dispense: 30 mL; Refill: 2    Type 2 diabetes mellitus without complication, without long-term current use of insulin  -     Microalbumin/creatinine urine ratio    Assessment & Plan    - Considered patient's chronic post-nasal drip and copious phlegm production, likely related to allergies and COPD  - Noted previous ENT consultations-pt reports these were unhelpful in resolving symptoms  - Recognized patient's complex cardiac history, including AFib, ablation, and use of anticoagulants, which may limit treatment options  - Will trial Azelastine nasal spray as an antihistamine treatment, targeting the source of mucus production    Type 2 Diabetes:  - Noted outstanding issue related to glycosuria.  - Follow up on urinalysis results.    Post Nasal Drip:  - Noted patient's report of chronic post-nasal drip, copious sputum production, and observed coughing during the visit.  - Acknowledged chronic nature of symptoms and considered alternative treatment options.  - Prescribed Azelastine (AstaPro) nasal spray: 1 spray in each nostril twice daily.  - Continued current regimen: Allegra in the morning, montelukast at night, and Flonase used sparingly during the day.  -- Noted patient's chronic post-nasal drip and allergies, with previous treatments including immunotherapy providing only temporary relief.  - Explained the difference between steroid nasal sprays (e.g., Flonase) as daily preventatives and antihistamine nasal sprays as targeted treatments.  - Discussed how reducing sinus cavity moisture may help alleviate post-nasal drip.  - Noted patient attributes allergies to age and lifestyle factors.    COPD:  - Acknowledged patient's persistent  symptoms, including copious sputum production and persistent cough.  - Observed an exacerbation with audible coughing during the visit.  - Confirmed the patient is under the care of pulmonologist, Dr. Jennifer Owen.  Follow up:   -follow up with upcoming scheduled appointments with cardiology in Jan. 2025 and Pulmonology in April 2025.       Medication List with Changes/Refills   New Medications    AZELASTINE (ASTELIN) 137 MCG (0.1 %) NASAL SPRAY    1 spray (137 mcg total) by Nasal route 2 (two) times daily.   Current Medications    ALBUTEROL (PROVENTIL HFA) 90 MCG/ACTUATION INHALER    Inhale 2 puffs into the lungs every 6 (six) hours as needed for Wheezing. Rescue    AMLODIPINE (NORVASC) 5 MG TABLET    TAKE 1 TO 2 TABLETS BY MOUTH ONCE DAILY AS DIRECTED    ASCORBIC ACID, VITAMIN C, (VITAMIN C) 100 MG TABLET    Take 500 mg by mouth once daily.    ATORVASTATIN (LIPITOR) 80 MG TABLET    Take 1 tablet (80 mg total) by mouth every evening.    BLOOD SUGAR DIAGNOSTIC STRP    To check BG 2 times daily, to use with insurance preferred meter    BLOOD-GLUCOSE METER KIT    To check BG 2 times daily, to use with insurance preferred meter    CLOPIDOGREL (PLAVIX) 75 MG TABLET    Take 1 tablet by mouth once daily    DIAZEPAM (VALIUM) 10 MG TAB    Take 1 tablet (10 mg total) by mouth as needed (anxiety). Just 1/2 to 1 and use very seldom-get refills from PCP    FEXOFENADINE (ALLEGRA) 180 MG TABLET    Take 1 tablet (180 mg total) by mouth once daily.    HYDROCHLOROTHIAZIDE (MICROZIDE) 12.5 MG CAPSULE    Take 1 capsule (12.5 mg total) by mouth once daily.    IRBESARTAN (AVAPRO) 300 MG TABLET    TAKE 1 TABLET BY MOUTH ONCE DAILY IN THE EVENING    LANCETS MISC    To check BG 2 times daily, to use with insurance preferred meter    LEVALBUTEROL (XOPENEX) 1.25 MG/3 ML NEBULIZER SOLUTION    Take 3 mLs (1.25 mg total) by nebulization every 4 (four) hours as needed for Wheezing. Rescue    METFORMIN (GLUCOPHAGE) 500 MG TABLET    TAKE 1 TABLET  BY MOUTH TWICE DAILY WITH MEALS    METHOCARBAMOL (ROBAXIN) 500 MG TAB    Take 1 tablet by mouth twice daily as needed for muscle spasm    METOPROLOL TARTRATE (LOPRESSOR) 25 MG TABLET    TAKE 1 TABLET BY MOUTH ONCE DAILY AS NEEDED FOR  PALPITATIONS    MONTELUKAST (SINGULAIR) 10 MG TABLET    One tablet by mouth every evening    MULTIVITAMIN (THERAGRAN) PER TABLET    Take 1 tablet by mouth once daily.    NITROGLYCERIN (NITROSTAT) 0.4 MG SL TABLET    Place 1 tablet (0.4 mg total) under the tongue every 5 (five) minutes as needed for Chest pain.    TAMSULOSIN (FLOMAX) 0.4 MG CAP    Take 1 capsule (0.4 mg total) by mouth once daily.    TIMOLOL MALEATE (ISTALOL/TIMOLOL) 0.5 % DRPD        TIMOLOL MALEATE 0.5% (TIMOPTIC) 0.5 % DROP    Instill 1 drop Both Eyes once a day    UMECLIDINIUM-VILANTEROL (ANORO ELLIPTA) 62.5-25 MCG/ACTUATION DSDV    Inhale 1 puff into the lungs once daily. Controller    XARELTO 20 MG TAB    Take 1 tablet (20 mg total) by mouth every evening.         Subjective:    Patient ID: Nick De Anda is a 84 y.o. male.  Chief Complaint: Annual Exam    HPI  History of Present Illness    Mr. De Anda presents with concerns about chronic post-nasal drip and to discuss an outstanding issue related to sugar in the urine.  Mr. De Anda is an 84-year-old male with chronic post-nasal drip. He has consulted 3 different ENT specialists over the past year. The most recent ENT visit, 1-2 months ago, resulted in an injection providing relief for about 2 weeks before symptoms recurred. He reports copious, constant phlegm and mucus production. He manages symptoms with Allegra in the morning, montelukast at night, and uses Flonase sparingly during the day due to concerns about interference with his atrial fibrillation management.  Mr. De Anda has COPD, contributing to his respiratory symptoms and daily coughing. He has a history of cardiovascular issues, including open-heart surgery leading to atrial fibrillation. He  "underwent an ablation procedure, with the first attempt unsuccessful. He has 2 stents and takes Xarelto and Plavix (4 times per week, every other day) for blood thinning, resulting in easy bruising.  Mr. De Anda is frustrated that none of the ENT specialists have effectively addressed his chronic post-nasal drip, despite multiple visits and treatment attempts.      Review of Systems   Constitutional:  Negative for activity change and unexpected weight change.   HENT:  Positive for rhinorrhea. Negative for hearing loss and trouble swallowing.    Eyes:  Positive for discharge. Negative for visual disturbance.   Respiratory:  Positive for wheezing. Negative for chest tightness.    Cardiovascular:  Negative for chest pain and palpitations.   Gastrointestinal:  Negative for blood in stool, constipation, diarrhea and vomiting.   Endocrine: Negative for polydipsia and polyuria.   Genitourinary:  Negative for difficulty urinating, hematuria and urgency.   Musculoskeletal:  Positive for arthralgias and joint swelling. Negative for neck pain.   Neurological:  Positive for weakness and headaches.   Psychiatric/Behavioral:  Negative for confusion and dysphoric mood.        Objective:      Vitals:    01/09/25 1439   BP: 108/62   BP Location: Left arm   Patient Position: Sitting   Pulse: 83   SpO2: 97%   Weight: 124.1 kg (273 lb 9.5 oz)   Height: 6' 4" (1.93 m)     BP Readings from Last 5 Encounters:   01/09/25 108/62   11/07/24 (!) 86/48   10/24/24 128/62   10/15/24 130/60   08/09/24 (!) 144/65     Wt Readings from Last 5 Encounters:   01/09/25 124.1 kg (273 lb 9.5 oz)   11/07/24 122.8 kg (270 lb 11.6 oz)   10/15/24 123.1 kg (271 lb 7.9 oz)   08/09/24 125.8 kg (277 lb 7.2 oz)   08/01/24 125.3 kg (276 lb 3.8 oz)     Physical Exam  Constitutional:       General: He is not in acute distress.  HENT:      Head: Normocephalic.      Nose: Congestion and rhinorrhea present. Rhinorrhea is purulent.   Eyes:      Extraocular Movements: " Extraocular movements intact.      Pupils: Pupils are equal, round, and reactive to light.   Cardiovascular:      Rate and Rhythm: Normal rate and regular rhythm.      Pulses: Normal pulses.      Heart sounds: No murmur heard.     No friction rub. No gallop.   Pulmonary:      Effort: Pulmonary effort is normal.      Breath sounds: Normal breath sounds.   Abdominal:      General: Abdomen is flat. There is no distension.      Palpations: Abdomen is soft.   Musculoskeletal:         General: Normal range of motion.      Cervical back: Normal range of motion.   Skin:     General: Skin is warm.      Findings: Bruising present.   Neurological:      General: No focal deficit present.      Mental Status: He is alert.   Psychiatric:         Mood and Affect: Mood normal.     Physical Exam              Lab Results   Component Value Date    WBC 7.22 10/15/2024    HGB 13.8 (L) 10/15/2024    HCT 41.6 10/15/2024     10/15/2024    CHOL 107 (L) 10/24/2024    TRIG 70 10/24/2024    HDL 39 (L) 10/24/2024    ALT 21 11/07/2024    AST 21 11/07/2024     11/07/2024    K 4.5 11/07/2024     11/07/2024    CREATININE 1.0 11/07/2024    BUN 17 11/07/2024    CO2 23 11/07/2024    TSH 1.797 11/07/2024    PSA 17.0 (H) 07/06/2023    INR 1.1 12/01/2021    HGBA1C 5.7 (H) 10/24/2024      This note was generated with the assistance of ambient listening technology. Verbal consent was obtained by the patient and accompanying visitor(s) for the recording of patient appointment to facilitate this note. I attest to having reviewed and edited the generated note for accuracy, though some syntax or spelling errors may persist. Please contact the author of this note for any clarification.    WILLIAM Parish, PARESH

## 2025-01-21 ENCOUNTER — PATIENT MESSAGE (OUTPATIENT)
Dept: CARDIOLOGY | Facility: CLINIC | Age: 85
End: 2025-01-21
Payer: MEDICARE

## 2025-01-23 ENCOUNTER — TELEPHONE (OUTPATIENT)
Dept: CARDIOLOGY | Facility: CLINIC | Age: 85
End: 2025-01-23
Payer: MEDICARE
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
## 2025-01-28 ENCOUNTER — OFFICE VISIT (OUTPATIENT)
Dept: CARDIOLOGY | Facility: CLINIC | Age: 85
End: 2025-01-28
Payer: MEDICARE

## 2025-01-28 ENCOUNTER — PATIENT MESSAGE (OUTPATIENT)
Dept: ELECTROPHYSIOLOGY | Facility: CLINIC | Age: 85
End: 2025-01-28
Payer: MEDICARE

## 2025-01-28 VITALS
OXYGEN SATURATION: 99 % | DIASTOLIC BLOOD PRESSURE: 67 MMHG | BODY MASS INDEX: 33.56 KG/M2 | SYSTOLIC BLOOD PRESSURE: 120 MMHG | HEART RATE: 64 BPM | WEIGHT: 275.56 LBS | HEIGHT: 76 IN

## 2025-01-28 DIAGNOSIS — E11.9 TYPE 2 DIABETES MELLITUS WITHOUT COMPLICATION, WITHOUT LONG-TERM CURRENT USE OF INSULIN: ICD-10-CM

## 2025-01-28 DIAGNOSIS — E78.2 MIXED HYPERLIPIDEMIA: ICD-10-CM

## 2025-01-28 DIAGNOSIS — I48.0 PAROXYSMAL ATRIAL FIBRILLATION: ICD-10-CM

## 2025-01-28 DIAGNOSIS — I25.10 CORONARY ARTERY DISEASE INVOLVING NATIVE CORONARY ARTERY OF NATIVE HEART WITHOUT ANGINA PECTORIS: Primary | ICD-10-CM

## 2025-01-28 DIAGNOSIS — I10 ESSENTIAL HYPERTENSION: ICD-10-CM

## 2025-01-28 DIAGNOSIS — I48.0 PAROXYSMAL ATRIAL FIBRILLATION: Primary | ICD-10-CM

## 2025-01-28 PROBLEM — I11.0 HYPERTENSIVE HEART DISEASE WITH HEART FAILURE: Status: RESOLVED | Noted: 2021-03-16 | Resolved: 2025-01-28

## 2025-01-28 PROCEDURE — 99999 PR PBB SHADOW E&M-EST. PATIENT-LVL III: CPT | Mod: PBBFAC,,, | Performed by: PHYSICIAN ASSISTANT

## 2025-01-28 PROCEDURE — 1159F MED LIST DOCD IN RCRD: CPT | Mod: CPTII,S$GLB,, | Performed by: PHYSICIAN ASSISTANT

## 2025-01-28 PROCEDURE — 1101F PT FALLS ASSESS-DOCD LE1/YR: CPT | Mod: CPTII,S$GLB,, | Performed by: PHYSICIAN ASSISTANT

## 2025-01-28 PROCEDURE — 3074F SYST BP LT 130 MM HG: CPT | Mod: CPTII,S$GLB,, | Performed by: PHYSICIAN ASSISTANT

## 2025-01-28 PROCEDURE — 3078F DIAST BP <80 MM HG: CPT | Mod: CPTII,S$GLB,, | Performed by: PHYSICIAN ASSISTANT

## 2025-01-28 PROCEDURE — 3288F FALL RISK ASSESSMENT DOCD: CPT | Mod: CPTII,S$GLB,, | Performed by: PHYSICIAN ASSISTANT

## 2025-01-28 PROCEDURE — 1160F RVW MEDS BY RX/DR IN RCRD: CPT | Mod: CPTII,S$GLB,, | Performed by: PHYSICIAN ASSISTANT

## 2025-01-28 PROCEDURE — 1126F AMNT PAIN NOTED NONE PRSNT: CPT | Mod: CPTII,S$GLB,, | Performed by: PHYSICIAN ASSISTANT

## 2025-01-28 PROCEDURE — 99214 OFFICE O/P EST MOD 30 MIN: CPT | Mod: S$GLB,,, | Performed by: PHYSICIAN ASSISTANT

## 2025-01-28 NOTE — PROGRESS NOTES
General Cardiology Clinic Note  Reason for Visit: Follow up   Last Clinic Visit: 12/4/2023    HPI:   Nick De Anda is a 84 y.o. male who presents for follow up.    Problems:  CAD s/p CABG (remote)  Atrial fibrillation and flutter (s/p PVI/CTI 10/2020, redo PVI and atypical flutter ablation 12/2021)   Hypertension   Hyperlipidemia  DM 2  COPD  Obesity     Interval HPI   Patient presents for annual follow up. Continues to report cough with copious phlegm production, particularly in the morning, sneezing, and SOB. He reports chronic RODRIGEZ with moderate exertion that is relatively stable since last year, and which he attributes to a combination of COPD, deconditioning, and obesity. He does not feel that he is having any cardiac issues currently. He has occasional palpitations, which he treats with PRN Metoprolol. He denies angina. He does have mild pedal edema, but states this is not new. He has had gradual weight gain, but reports being sedentary. He denies orthopnea, PND. He denies syncope, near syncope.     12/4/2023 HPI  Patient presents for routine follow up. His only complaint today is of productive cough in the morning and a lot of phlegm production. He was diagnosed with bronchiectasis in the past and takes Mucinex for this. He has chronic RODRIGEZ that has been stable since he was last seen. He recovers quickly with rest. He uses a cane to ambulate usually, but will use a rolling walker for far distances. He stopped going to the gym over the summer and gained about 13 lbs as a result. He denies angina, orthopnea, PND, edema, palpitations, syncope, near syncope. He checks his BP regularly at home and it runs 125-130/65. He states that overall he is very happy with how he feels.     ROS:      Review of Systems   Constitutional: Positive for weight gain. Negative for diaphoresis, malaise/fatigue and weight loss.   HENT:  Negative for nosebleeds.    Eyes:  Negative for vision loss in left eye, vision loss in  right eye and visual disturbance.   Cardiovascular:  Positive for dyspnea on exertion and leg swelling. Negative for chest pain, claudication, irregular heartbeat, near-syncope, orthopnea, palpitations, paroxysmal nocturnal dyspnea and syncope.   Respiratory:  Positive for cough and sputum production. Negative for shortness of breath, sleep disturbances due to breathing, snoring and wheezing.    Hematologic/Lymphatic: Negative for bleeding problem. Does not bruise/bleed easily.   Skin:  Negative for poor wound healing and rash.   Musculoskeletal:  Positive for joint pain. Negative for muscle cramps and myalgias.   Gastrointestinal:  Negative for bloating, abdominal pain, diarrhea, heartburn, melena, nausea and vomiting.   Genitourinary:  Negative for hematuria and nocturia.   Neurological:  Negative for brief paralysis, dizziness, headaches, light-headedness, numbness and weakness.   Psychiatric/Behavioral:  Negative for depression.    Allergic/Immunologic: Negative for hives.       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    CHF (congestive heart failure)     COPD (chronic obstructive pulmonary disease)     Coronary artery disease     Cough, persistent 03/30/2020    Diabetes mellitus     diet    Diabetes mellitus, type 2     Dyslipidemia 02/13/2019    Encounter for blood transfusion     Encounter for monitoring sotalol therapy 08/01/2019    History of staphylococcal infection 2012    after CABG    History of UTI 02/02/2019    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    Hypertension     Hyponatremia 02/02/2019    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    Persistent atrial fibrillation 02/02/2019    Post PTCA 02/13/2019     Past Surgical History:   Procedure Laterality Date    ABLATION OF ARRHYTHMOGENIC FOCUS FOR ATRIAL FIBRILLATION N/A 12/02/2020    Procedure: Ablation atrial fibrillation;  Surgeon: Yung Paez MD;   Location: [facility];  Service: Cardiology;  Laterality: N/A;  PAF, AFl, DEANGELO (Cx if SR), PVI, CTI, RFA, Carto, Gen, IL, 3 Prep    ABLATION OF ARRHYTHMOGENIC FOCUS FOR ATRIAL FIBRILLATION N/A 12/08/2021    Procedure: Ablation atrial fibrillation;  Surgeon: Yung Paez MD;  Location: [facility];  Service: Cardiology;  Laterality: N/A;  AF/AFL (atypical), DEANGELO (Cx if SR), PVI re-do, RFA for Atypical AFL, Carto, Gen, IL, 3 Prep    CARDIAC SURGERY      cabg    CARPAL TUNNEL RELEASE Right 9/29/2023    Procedure: RELEASE, CARPAL TUNNEL;  Surgeon: Williams, Claude S. IV, MD;  Location: [facility] OR;  Service: Orthopedics;  Laterality: Right;    CORONARY ARTERY BYPASS GRAFT      CORONARY STENT PLACEMENT      CYSTOSCOPY      prostate infection    EXCISION OF GANGLION OF WRIST Right 9/29/2023    Procedure: EXCISION, GANGLION CYST, WRIST;  Surgeon: Williams, Claude S. IV, MD;  Location: [facility];  Service: Orthopedics;  Laterality: Right;    FOOT SURGERY      RECTOPERITONEAL FISTULA CLOSURE      SHOULDER SURGERY      Triple bypass      VASECTOMY      VOCAL FOLD LESION EXCISION N/A 09/28/2021    Procedure: EXCISION, LESION, VOCAL CORD;  Surgeon: Rommel Stubbs MD;  Location: [address];  Service: ENT;  Laterality: N/A;     Allergies:     Review of patient's allergies indicates:   Allergen Reactions    Penicillins Anaphylaxis     Can take keflex    Hydrocodone      Causes constipation    Amoxicillin Swelling and Rash     Medications:     Current Outpatient Medications on File Prior to Visit   Medication Sig Dispense Refill    albuterol (PROVENTIL HFA) 90 mcg/actuation inhaler Inhale 2 puffs into the lungs every 6 (six) hours as needed for Wheezing. Rescue 85 g 4    amLODIPine (NORVASC) 5 MG tablet TAKE 1 TO 2 TABLETS BY MOUTH ONCE DAILY AS DIRECTED 180 tablet 3    ascorbic acid, vitamin C, (VITAMIN C) 100 MG tablet Take 500 mg by mouth once daily.      atorvastatin (LIPITOR) 80 MG tablet Take 1 tablet (80 mg total) by  mouth every evening. 90 tablet 3    azelastine (ASTELIN) 137 mcg (0.1 %) nasal spray 1 spray (137 mcg total) by Nasal route 2 (two) times daily. 30 mL 2    blood sugar diagnostic Strp To check BG 2 times daily, to use with insurance preferred meter 100 strip 5    blood-glucose meter kit To check BG 2 times daily, to use with insurance preferred meter 1 each 0    clopidogreL (PLAVIX) 75 mg tablet Take 1 tablet by mouth once daily 90 tablet 0    diazePAM (VALIUM) 10 MG Tab Take 1 tablet (10 mg total) by mouth as needed (anxiety). Just 1/2 to 1 and use very seldom-get refills from PCP 30 tablet 3    fexofenadine (ALLEGRA) 180 MG tablet Take 1 tablet (180 mg total) by mouth once daily. 30 tablet 11    hydroCHLOROthiazide (MICROZIDE) 12.5 mg capsule Take 1 capsule (12.5 mg total) by mouth once daily. 90 capsule 3    irbesartan (AVAPRO) 300 MG tablet TAKE 1 TABLET BY MOUTH ONCE DAILY IN THE EVENING 90 tablet 3    lancets Misc To check BG 2 times daily, to use with insurance preferred meter 100 each 5    levalbuterol (XOPENEX) 1.25 mg/3 mL nebulizer solution Take 3 mLs (1.25 mg total) by nebulization every 4 (four) hours as needed for Wheezing. Rescue 100 each 2    metFORMIN (GLUCOPHAGE) 500 MG tablet TAKE 1 TABLET BY MOUTH TWICE DAILY WITH MEALS 180 tablet 1    methocarbamoL (ROBAXIN) 500 MG Tab Take 1 tablet by mouth twice daily as needed for muscle spasm 30 tablet 1    metoprolol tartrate (LOPRESSOR) 25 MG tablet TAKE 1 TABLET BY MOUTH ONCE DAILY AS NEEDED FOR  PALPITATIONS 90 tablet 0    montelukast (SINGULAIR) 10 mg tablet One tablet by mouth every evening 90 tablet 3    nitroGLYCERIN (NITROSTAT) 0.4 MG SL tablet Place 1 tablet (0.4 mg total) under the tongue every 5 (five) minutes as needed for Chest pain. 25 tablet 3    tamsulosin (FLOMAX) 0.4 mg Cap Take 1 capsule (0.4 mg total) by mouth once daily. 90 capsule 3    timolol maleate 0.5% (TIMOPTIC) 0.5 % Drop Instill 1 drop Both Eyes once a day 15 mL 3     "umeclidinium-vilanteroL (ANORO ELLIPTA) 62.5-25 mcg/actuation DsDv Inhale 1 puff into the lungs once daily. Controller 60 each 5    XARELTO 20 mg Tab Take 1 tablet (20 mg total) by mouth every evening. 90 tablet 3     Current Facility-Administered Medications on File Prior to Visit   Medication Dose Route Frequency Provider Last Rate Last Admin    0.9%  NaCl infusion   Intravenous Continuous Rebecca Dickinson NP   Stopped at 20 1636    lactated ringers infusion   Intravenous Continuous Tyler Arana MD        LIDOcaine (PF) 10 mg/ml (1%) injection 5 mg  0.5 mL Intradermal Once Tyler Arana MD        sodium chloride 0.9% bolus 1,000 mL  1,000 mL Intravenous Once Winnie Larkin NP        sodium chloride 0.9% flush 10 mL  10 mL Intravenous PRN DELICIA Jaramillo MD        sodium chloride 0.9% flush 5 mL  5 mL Intravenous PRN Rebecca Dickinson NP         Social History:     Social History     Tobacco Use    Smoking status: Former     Current packs/day: 0.00     Average packs/day: 0.5 packs/day for 23.7 years (11.9 ttl pk-yrs)     Types: Cigarettes     Start date: 1956     Quit date: 10/1/1979     Years since quittin.3     Passive exposure: Past    Smokeless tobacco: Never   Substance Use Topics    Alcohol use: No     Family History:     Family History   Problem Relation Name Age of Onset    No Known Problems Mother      Heart attack Father      Breast cancer Sister Audrey     Heart disease Brother Arian     Heart disease Brother Pastor ANALI     Heart disease Brother Herminia     Heart disease Brother Rod     No Known Problems Maternal Grandmother      Heart attack Maternal Grandfather      No Known Problems Paternal Grandmother      No Known Problems Paternal Grandfather      Diabetes type I Son Ismael     No Known Problems Son Nick     Diabetes type II Daughter Caprice      Physical Exam:   /67 (Patient Position: Sitting)   Pulse 64   Ht 6' 4" (1.93 m)   Wt 125 kg " (275 lb 9.2 oz)   SpO2 99%   BMI 33.54 kg/m²        Physical Exam  Vitals and nursing note reviewed.   Constitutional:       General: He is not in acute distress.     Appearance: Normal appearance.   HENT:      Head: Normocephalic and atraumatic.      Nose: Nose normal.   Eyes:      General: No scleral icterus.     Extraocular Movements: Extraocular movements intact.      Conjunctiva/sclera: Conjunctivae normal.   Neck:      Thyroid: No thyromegaly.      Vascular: No carotid bruit or JVD.   Cardiovascular:      Rate and Rhythm: Normal rate and regular rhythm.      Pulses: Normal pulses.      Heart sounds: Heart sounds not distant. No murmur heard.     No friction rub. No gallop.   Pulmonary:      Effort: Pulmonary effort is normal.      Breath sounds: Normal breath sounds. Decreased air movement present. No wheezing, rhonchi or rales.   Chest:      Chest wall: No tenderness.   Abdominal:      General: Bowel sounds are normal. There is no distension.      Palpations: Abdomen is soft.      Tenderness: There is no abdominal tenderness.   Musculoskeletal:      Cervical back: Neck supple.      Right lower le+ Pitting Edema present.      Left lower le+ Pitting Edema present.   Skin:     General: Skin is warm and dry.      Coloration: Skin is not pale.      Findings: No erythema or rash.      Nails: There is no clubbing.   Neurological:      Mental Status: He is alert and oriented to person, place, and time.   Psychiatric:         Mood and Affect: Mood and affect normal.         Behavior: Behavior normal.          Labs:     Lab Results   Component Value Date     2024    K 4.5 2024     2024    CO2 23 2024    BUN 17 2024    CREATININE 1.0 2024    ANIONGAP 10 2024     Lab Results   Component Value Date    HGBA1C 5.7 (H) 10/24/2024     Lab Results   Component Value Date     (H) 2022     (H) 2022     (H) 2021    Lab Results    Component Value Date    WBC 7.22 10/15/2024    HGB 13.8 (L) 10/15/2024    HCT 41.6 10/15/2024    HCT 37 12/08/2021     10/15/2024    GRAN 3.7 10/15/2024    GRAN 51.7 10/15/2024     Lab Results   Component Value Date    CHOL 107 (L) 10/24/2024    HDL 39 (L) 10/24/2024    LDLCALC 54.0 (L) 10/24/2024    TRIG 70 10/24/2024          Imaging:   Echocardiograms:   TTE 12/1/2023    Left Ventricle: The left ventricle is normal in size. Normal wall thickness. There is concentric remodeling. Normal wall motion. There is normal systolic function with a visually estimated ejection fraction of 55 - 60%. There is normal diastolic function.    Right Ventricle: Normal right ventricular cavity size. Wall thickness is normal. Right ventricle wall motion  is normal. Systolic function is normal.    Aortic Valve: The aortic valve is a trileaflet valve. There is mild aortic valve sclerosis.    Tricuspid Valve: There is mild regurgitation.    Pulmonary Artery: The estimated pulmonary artery systolic pressure is 30 mmHg.    IVC/SVC: Normal venous pressure at 3 mmHg.     TTE 3/16/2021  The left ventricle is normal in size with mild concentric hypertrophy and normal systolic function. The estimated ejection fraction is 60%  Grade II left ventricular diastolic dysfunction.  Normal right ventricular size with normal right ventricular systolic function.  Moderate left atrial enlargement.  Normal central venous pressure (3 mmHg).    TTE 11/21/2019  Mild left atrial enlargement.  Mild right ventricular enlargement.  Moderate concentric left ventricular hypertrophy.  Mildly decreased left ventricular systolic function. The estimated ejection fraction is 45%  Grade I (mild) left ventricular diastolic dysfunction consistent with impaired relaxation.  Local segmental wall motion abnormalities.  Septal wall has abnormal motion.  Mild-to-moderate mitral regurgitation.  Mild to moderate tricuspid regurgitation.    Stress Tests:   PET Stress Test  12/10/2019    The relative PET images are normal showing no clinically significant regional resting or stress induced perfusion defects.    Whole heart absolute myocardial perfusion (cc/min/g) averaged 0.63 cc/min/g at rest (which is normal), 1.27 cc/min/g at stress (which is mildly reduced), and 2.03 CFR (which is mildly reduced).    Gated perfusion images showed an ejection fraction of 74% at rest and 78% during stress. Normal ejection fraction is greater than 51%.    Wall motion was normal at rest and during stress.    LV cavity size is normal at rest and stress.    The EKG portion of this study is negative for ischemia.    There were no arrhythmias during stress.    The patient reported no chest pain during the stress test.    There are no prior studies for comparison.    SPECT Stress test 11/21/19     ECG Stress Nuclear portion of this study will be reported separately.    The EKG portion of this study is negative for ischemia.    The patient reported no chest pain during the stress test.    There were no arrhythmias during stress.    The blood pressure response to stress was normal.  1. Normal left ventricular ejection fraction.  2. No reversible areas of ischemia identified.     Caths:   None    Other:  PFTs 3/7/2023  Spirometry shows significant obstruction with severe ventilatory impairment. This impairment may be due to air trapping from obstruction alone but restrictive disease is not ruled out.     Chest CT 2/19/23  1. No evidence of pulmonary thromboembolic disease..   2. Small focus of atelectasis or infiltrate at the posterior left lung base with small left pleural effusion.   3. Additional chronic findings as above.       EKG 11/4/2023: NSR with first degree AV block     Assessment:     1. Coronary artery disease involving native coronary artery of native heart without angina pectoris    2. Paroxysmal atrial fibrillation    3. Essential hypertension    4. Mixed hyperlipidemia    5. Type 2 diabetes  mellitus without complication, without long-term current use of insulin        Plan:     CAD s/p CABG and stents  Remote hx of PCI ~20 years ago and CABG ~10 years ago  Denies angina  Continue Plavix and high intensity statin  Increase aerobic exercise with a goal of at least 30 minutes, 5 days a week.     Paroxysmal atrial fibrillation and flutter s/p RFA   Maintaining NSR since last ablation and EF is now normal   CHADSVASC 6. Continue Xarelto for CVA prophylaxis.      HTN  Controlled on Amlodipine, Irbesartan, and HCTZ      Hyperlipidemia  Well controlled on Atorvastatin 80 mg  Mediterranean diet     Diabetes  Well controlled. A1c 5.7%       Follow up in one year.     Signed:  Malka Leyva PA-C  Cardiology   [phone] - General

## 2025-02-12 NOTE — PROGRESS NOTES
Mr. De Anda is a patient of Dr. Paez and was last seen in clinic 8/1/2024.      Subjective:   Patient ID:  Nick De Anda is an 84 y.o. male who presents for follow up of Atrial Fibrillation  .     HPI:    Mr. De Anda is an 84 y.o. male with CAD (CABG), DM, HTN, pAF (PVI 10/2020, 12/2021), AFL (RFA CTI 10/2020), COPD here for follow up.    Background:    Primary Cardiologist: Robert Alas MD  Primary Care Physician: Santiago Adler MD    Mr. De Anda has a hx of coronary artery disease s/p CABG with preserved LVEF, type 2 diabetes, hypertension and recently discovered paroxysmal atrial fibrillation and typical atrial flutter. He recently transferred care from [facility] where his AF was diagnosed and he was initiated on xarelto. He was recently admitted for a symptomatic UTI and observed to have a paroxysm of atrial fibrillation with ventricular rates in the 40s-60s (slower rates appeared to correlate during overnight hours). He monitors his vitals at home and for a while notes it indicates he has an irregular heart beat however he feels normal. He has established care with Dr. Dean in cardiology on 2/13/2019 and was noted to be in atrial fibrillation. He has no obvious symptoms. I reviewed all available electrocardiograms in Epic which show sinus rhythm on 2/5/2014 and 2/2/2019. An ECG dated 2/3/2019 notes rate controlled atrial fibrillation. When I saw him in clinic 2/2019 he was in sinus rhythm. At that visit since he had no symptoms with AF and was rate controlled when in AF I recommended no aggressive rhythm control strategy and would continue his current management. He was to see me prn.    Mr. De Anda returned for discussion of his AF 6/2019. He reported he believed AF was causing fatigue. Notes episodes every week or so that correlate to being fatigue. He was admitted for DCCV and sotalol initiation. He converted when propofol was administered and the DEANGELO/DCCV was cancelled. He saw Faina  Jesús in EP clinic 8/1/2019 and was in sinus rhythm. He felt better in sinus rhythm.    Mr. De Anda was seen in clinic 10/2019 and noted paroxysms of AF since starting sotalol. One episode lasted 2 days and another 3 days. He noted his pulse was irregular in this setting. He came in for an ECG which noted sinus rhythm however he felt he had already converted. We discussed increasing dosage to 120mg bid however he declined.    Mr. De Anda presented for routine AF follow-up 7/2020. He reported he continues to have some paroxysms of AF, at times last 1/2 day- 1 day. We discussed PVI, he desired to think about it.    Mr. De Anda returned for follow-up 10/2020. His AF burden had significantly increased and he felt poorly. He noted he was still paroxysmal. He desired ablation.    Mr. De Anda underwent RF-PVI and RFA of the CTI in December of 2020.     3/2021: Saw Faina Espinosa. Reported no arrhythmias. Repeat ECHO noted LVEF of 60%. Sotalol was reduced to 40mg bid.    10/2021: Saw Faina Espinosa. He was in sinus rhythm at that time however ECG from 9/2021 prior to a surgery noted atrial flutter (atypical). He wore a 14 day holter that noted a 6.4% burden of atrial flutter. There were R-R pauses up to 6.2 seconds during flutter while sleeping. He was instructed to stop sotalol.    11/16/2021: He now has recurrent atypical atrial flutter despite sotalol with periods of bradycardia while asleep. This arrhythmia has become symptomatic. He feels significantly better and is in sinus rhythm. He is having symptomatic breakthrough on sotalol. His [id number] score is 5 and long-term anticoagulation is recommended.    12/8/2021:  Successful pulmonary vein RF ablation (reisolation of the LIPV and RSPV)   Successful left atrial posterior wall isolation targeting CFEs   Successful anterior mitral isthmus ablation with bidirectional block for atypical atrial flutter that was observed as an outpatient but unable to be induced    Unsuccessful difficult lateral mitral isthmus ablation (unable to get bidirectional block and thus created the anterior mitral isthmus ablation lesion set)  Complex modifier requested for extensive ablation and considerable time spent trying to obtain lateral mitral isthmus block.  Discharged on sotalol 40mg BID    2/7/2022: He is 3 mo s/p repeat ablation (reisolation of LIPV and RSPV, PWI, successful RFA of anterior mitral isthmus).   Doing well from a rhythm standpoint, with minimal palpitations on sotalol 40mg BID. EKG with stable intervals. [facility]    5/6/2022: He is now 5 months s/p redo ablation. He is doing well from a rhythm standpoint, with no documented or symptomatic recurrence of arrhythmia since procedure.  He weaned himself off sotalol about 2 months ago and feels wonderful. Very active and exercises regularly. [facility]    11/8/2022: He is 11 months s/p repeat ablation. He says he is feeling well. One episode of irreg HB after drinking a lot of coffee. He took a leftover sotalol and it stopped. Will provide with PRN lopressor instead. He will notify clinic if episodes become more frequent. [facility].     2/2022 PNA    5/9/2023:  Stable from rhythm standpoint. Only brief palps which resolve with PRN metoprolol. KYLEVAS 6 on xarelto.  He has SOB since PNA 2/2023. Following with pulmonology and ENT.    11/14/2023: He is now almost 2 years s/p redo ablation. Has done well from a rhythm standpoint, with no documented AF recurrence. He checks rhythm regularly at home. BP elevated today but has not had his BP meds yet today (was fasting for labs). KYLE[facility] 4 on xarelto. Follows with general cardiology. He plans to go back to the gym after the holidays.      8/1/2024: Continues to do well from a rhythm standpoint, with minimal palps treated with PRN lopressor. Not on AAD. [facility] 5 on xarelto.  COPD symptoms improving and he follows with pulmonology. Knee and  shoulder pain are keeping him from the gym recently.     Update (02/13/2025):    Today his primary complaint is chronic postnasal drip. Sometimes worse than others. Doing a bit better lately and is planning on returning to the gym. Chronic mild RODRIGEZ secondary to COPD. Says he has had maybe one episode of AF in the last 6 mo lasting minutes in the morning. Treated with PRN metoprolol. No CP, Lh, syncope reported.    He is currently taking xarelto 20mg daily for stroke prophylaxis and denies significant bleeding episodes. Easy bleeding.  He is currently being treated with lopressor 25mg PRN for palps.  Kidney function is stable, with a creatinine of 1 on 11/7/2024.    I have personally reviewed the patient's EKG today, which shows sinus rhythm at 68bpm. MS interval is 206. QRS is 92. QTc is 440.    Relevant Cardiac Test Results:    2D Echo (12/1/2023):    Left Ventricle: The left ventricle is normal in size. Normal wall thickness. There is concentric remodeling. Normal wall motion. There is normal systolic function with a visually estimated ejection fraction of 55 - 60%. There is normal diastolic function.    Right Ventricle: Normal right ventricular cavity size. Wall thickness is normal. Right ventricle wall motion  is normal. Systolic function is normal.    Aortic Valve: The aortic valve is a trileaflet valve. There is mild aortic valve sclerosis.    Tricuspid Valve: There is mild regurgitation.    Pulmonary Artery: The estimated pulmonary artery systolic pressure is 30 mmHg.    IVC/SVC: Normal venous pressure at 3 mmHg.    Current Outpatient Medications   Medication Sig    albuterol (PROVENTIL HFA) 90 mcg/actuation inhaler Inhale 2 puffs into the lungs every 6 (six) hours as needed for Wheezing. Rescue    amLODIPine (NORVASC) 5 MG tablet TAKE 1 TO 2 TABLETS BY MOUTH ONCE DAILY AS DIRECTED    ascorbic acid, vitamin C, (VITAMIN C) 100 MG tablet Take 500 mg by mouth once daily.    atorvastatin (LIPITOR) 80 MG tablet  Take 1 tablet (80 mg total) by mouth every evening.    azelastine (ASTELIN) 137 mcg (0.1 %) nasal spray 1 spray (137 mcg total) by Nasal route 2 (two) times daily.    blood sugar diagnostic Strp To check BG 2 times daily, to use with insurance preferred meter    blood-glucose meter kit To check BG 2 times daily, to use with insurance preferred meter    clopidogreL (PLAVIX) 75 mg tablet Take 1 tablet by mouth once daily    diazePAM (VALIUM) 10 MG Tab Take 1 tablet (10 mg total) by mouth as needed (anxiety). Just 1/2 to 1 and use very seldom-get refills from PCP    fexofenadine (ALLEGRA) 180 MG tablet Take 1 tablet (180 mg total) by mouth once daily.    hydroCHLOROthiazide (MICROZIDE) 12.5 mg capsule Take 1 capsule (12.5 mg total) by mouth once daily.    irbesartan (AVAPRO) 300 MG tablet TAKE 1 TABLET BY MOUTH ONCE DAILY IN THE EVENING    lancets Misc To check BG 2 times daily, to use with insurance preferred meter    levalbuterol (XOPENEX) 1.25 mg/3 mL nebulizer solution Take 3 mLs (1.25 mg total) by nebulization every 4 (four) hours as needed for Wheezing. Rescue    metFORMIN (GLUCOPHAGE) 500 MG tablet TAKE 1 TABLET BY MOUTH TWICE DAILY WITH MEALS    methocarbamoL (ROBAXIN) 500 MG Tab Take 1 tablet by mouth twice daily as needed for muscle spasm    metoprolol tartrate (LOPRESSOR) 25 MG tablet TAKE 1 TABLET BY MOUTH ONCE DAILY AS NEEDED FOR  PALPITATIONS    montelukast (SINGULAIR) 10 mg tablet One tablet by mouth every evening    nitroGLYCERIN (NITROSTAT) 0.4 MG SL tablet Place 1 tablet (0.4 mg total) under the tongue every 5 (five) minutes as needed for Chest pain.    tamsulosin (FLOMAX) 0.4 mg Cap Take 1 capsule (0.4 mg total) by mouth once daily.    timolol maleate 0.5% (TIMOPTIC) 0.5 % Drop Instill 1 drop Both Eyes once a day    umeclidinium-vilanteroL (ANORO ELLIPTA) 62.5-25 mcg/actuation DsDv Inhale 1 puff into the lungs once daily. Controller    XARELTO 20 mg Tab Take 1 tablet (20 mg total) by mouth every  "evening.     No current facility-administered medications for this visit.     Facility-Administered Medications Ordered in Other Visits   Medication    0.9%  NaCl infusion    lactated ringers infusion    LIDOcaine (PF) 10 mg/ml (1%) injection 5 mg    sodium chloride 0.9% bolus 1,000 mL    sodium chloride 0.9% flush 10 mL    sodium chloride 0.9% flush 5 mL       Review of Systems   Constitutional: Negative for malaise/fatigue.   HENT:  Positive for congestion (PND) and hearing loss (wearing hearing aids).    Cardiovascular:  Positive for dyspnea on exertion (chronic, mild (COPD)). Negative for chest pain, irregular heartbeat, leg swelling and palpitations.   Respiratory:  Negative for shortness of breath.    Hematologic/Lymphatic: Negative for bleeding problem. Bruises/bleeds easily.   Skin:  Negative for rash.   Musculoskeletal:  Negative for myalgias.   Gastrointestinal:  Negative for hematemesis, hematochezia and nausea.   Genitourinary:  Negative for hematuria.   Neurological:  Negative for light-headedness.   Psychiatric/Behavioral:  Negative for altered mental status.    Allergic/Immunologic: Negative for persistent infections.       Objective:          BP (!) 141/64   Pulse 68   Ht 6' 4" (1.93 m)   Wt 125.4 kg (276 lb 7.3 oz)   BMI 33.65 kg/m²     Physical Exam  Vitals and nursing note reviewed.   Constitutional:       Appearance: Normal appearance. He is well-developed.   HENT:      Head: Normocephalic.      Nose: Nose normal.   Eyes:      Pupils: Pupils are equal, round, and reactive to light.   Cardiovascular:      Rate and Rhythm: Normal rate and regular rhythm.   Pulmonary:      Effort: No respiratory distress.   Musculoskeletal:         General: Normal range of motion.   Skin:     General: Skin is warm and dry.      Findings: No erythema.   Neurological:      Mental Status: He is alert and oriented to person, place, and time.   Psychiatric:         Speech: Speech normal.         Behavior: Behavior " normal.           Lab Results   Component Value Date     11/07/2024    K 4.5 11/07/2024    BUN 17 11/07/2024    CREATININE 1.0 11/07/2024    ALT 21 11/07/2024    AST 21 11/07/2024    HGB 13.8 (L) 10/15/2024    HCT 41.6 10/15/2024    HCT 37 12/08/2021    TSH 1.797 11/07/2024    LDLCALC 54.0 (L) 10/24/2024             Assessment:     1. Paroxysmal atrial fibrillation    2. Essential hypertension    3. Typical atrial flutter    4. Long term (current) use of anticoagulants        Plan:     In summary, Mr. De Anda is an 84 y.o. male with CAD (CABG), DM, HTN, pAF (PVI 10/2020, 12/2021), AFL (RFA CTI 10/2020), COPD here for follow up.  Continues to do well from a rhythm standpoint, with very rare AF since last clinic visit, resolved with PRN lopressor. Remains on xarelto for CVA prophylaxis. Plans on returning to gym.  Overall feeling well.     Continue current meds  RTC 6 mo, sooner if needed    *A copy of this note has been sent to Dr. Paez*    Follow up in about 6 months (around 8/13/2025).      ------------------------------------------------------------------    WILLIAM Bach, NP-C  Cardiac Electrophysiology

## 2025-02-13 ENCOUNTER — HOSPITAL ENCOUNTER (OUTPATIENT)
Dept: CARDIOLOGY | Facility: CLINIC | Age: 85
Discharge: HOME | End: 2025-02-13
Payer: MEDICARE

## 2025-02-13 ENCOUNTER — OFFICE VISIT (OUTPATIENT)
Dept: ELECTROPHYSIOLOGY | Facility: CLINIC | Age: 85
End: 2025-02-13
Payer: MEDICARE

## 2025-02-13 VITALS
SYSTOLIC BLOOD PRESSURE: 141 MMHG | HEIGHT: 76 IN | BODY MASS INDEX: 33.66 KG/M2 | HEART RATE: 68 BPM | WEIGHT: 276.44 LBS | DIASTOLIC BLOOD PRESSURE: 64 MMHG

## 2025-02-13 DIAGNOSIS — I48.0 PAROXYSMAL ATRIAL FIBRILLATION: ICD-10-CM

## 2025-02-13 DIAGNOSIS — I48.0 PAROXYSMAL ATRIAL FIBRILLATION: Primary | ICD-10-CM

## 2025-02-13 DIAGNOSIS — Z79.01 LONG TERM (CURRENT) USE OF ANTICOAGULANTS: ICD-10-CM

## 2025-02-13 DIAGNOSIS — I48.3 TYPICAL ATRIAL FLUTTER: ICD-10-CM

## 2025-02-13 DIAGNOSIS — I10 ESSENTIAL HYPERTENSION: ICD-10-CM

## 2025-02-13 PROCEDURE — 1160F RVW MEDS BY RX/DR IN RCRD: CPT | Mod: CPTII,S$GLB,, | Performed by: NURSE PRACTITIONER

## 2025-02-13 PROCEDURE — 1101F PT FALLS ASSESS-DOCD LE1/YR: CPT | Mod: CPTII,S$GLB,, | Performed by: NURSE PRACTITIONER

## 2025-02-13 PROCEDURE — 3077F SYST BP >= 140 MM HG: CPT | Mod: CPTII,S$GLB,, | Performed by: NURSE PRACTITIONER

## 2025-02-13 PROCEDURE — 1159F MED LIST DOCD IN RCRD: CPT | Mod: CPTII,S$GLB,, | Performed by: NURSE PRACTITIONER

## 2025-02-13 PROCEDURE — 99214 OFFICE O/P EST MOD 30 MIN: CPT | Mod: S$GLB,,, | Performed by: NURSE PRACTITIONER

## 2025-02-13 PROCEDURE — 99999 PR PBB SHADOW E&M-EST. PATIENT-LVL III: CPT | Mod: PBBFAC,,, | Performed by: NURSE PRACTITIONER

## 2025-02-13 PROCEDURE — 3288F FALL RISK ASSESSMENT DOCD: CPT | Mod: CPTII,S$GLB,, | Performed by: NURSE PRACTITIONER

## 2025-02-13 PROCEDURE — 3078F DIAST BP <80 MM HG: CPT | Mod: CPTII,S$GLB,, | Performed by: NURSE PRACTITIONER

## 2025-02-13 PROCEDURE — 1126F AMNT PAIN NOTED NONE PRSNT: CPT | Mod: CPTII,S$GLB,, | Performed by: NURSE PRACTITIONER

## 2025-02-13 PROCEDURE — 93010 ELECTROCARDIOGRAM REPORT: CPT | Mod: S$GLB,,, | Performed by: INTERNAL MEDICINE

## 2025-02-13 RX ORDER — METOPROLOL TARTRATE 25 MG/1
25 TABLET, FILM COATED ORAL DAILY PRN
Qty: 90 TABLET | Refills: 0 | Status: SHIPPED | OUTPATIENT
Start: 2025-02-13

## 2025-02-14 LAB
OHS QRS DURATION: 92 MS
OHS QTC CALCULATION: 440 MS

## 2025-02-27 DIAGNOSIS — Z98.61 POST PTCA: ICD-10-CM

## 2025-03-02 RX ORDER — CLOPIDOGREL BISULFATE 75 MG/1
75 TABLET ORAL
Qty: 90 TABLET | Refills: 3 | Status: SHIPPED | OUTPATIENT
Start: 2025-03-02

## 2025-03-26 DIAGNOSIS — J43.9 PULMONARY EMPHYSEMA, UNSPECIFIED EMPHYSEMA TYPE: ICD-10-CM

## 2025-03-26 DIAGNOSIS — J44.9 CHRONIC OBSTRUCTIVE PULMONARY DISEASE, UNSPECIFIED COPD TYPE: ICD-10-CM

## 2025-03-26 DIAGNOSIS — E11.9 TYPE 2 DIABETES MELLITUS WITHOUT COMPLICATION, WITHOUT LONG-TERM CURRENT USE OF INSULIN: ICD-10-CM

## 2025-03-26 DIAGNOSIS — R05.3 CHRONIC COUGH: ICD-10-CM

## 2025-03-27 RX ORDER — UMECLIDINIUM BROMIDE AND VILANTEROL TRIFENATATE 62.5; 25 UG/1; UG/1
1 POWDER RESPIRATORY (INHALATION) DAILY
Qty: 60 EACH | Refills: 5 | Status: SHIPPED | OUTPATIENT
Start: 2025-03-27

## 2025-03-27 RX ORDER — METFORMIN HYDROCHLORIDE 500 MG/1
500 TABLET ORAL 2 TIMES DAILY WITH MEALS
Qty: 180 TABLET | Refills: 0 | Status: SHIPPED | OUTPATIENT
Start: 2025-03-27

## 2025-03-27 NOTE — TELEPHONE ENCOUNTER
Refill Routing Note   Medication(s) are not appropriate for processing by [facility] for the following reason(s):        Non-participating provider    [facility] action(s):  Route             Appointments  past 12m or future 3m with PCP    Date Provider   Last Visit   11/7/2024 Elma Gonzalez, PARISA   Next Visit   Visit date not found Elma Gonzalez NP   ED visits in past 90 days: 0        Note composed:8:51 AM 03/27/2025

## 2025-03-29 DIAGNOSIS — I10 ESSENTIAL HYPERTENSION: ICD-10-CM

## 2025-03-29 RX ORDER — HYDROCHLOROTHIAZIDE 12.5 MG/1
12.5 CAPSULE ORAL
Qty: 90 CAPSULE | Refills: 0 | Status: SHIPPED | OUTPATIENT
Start: 2025-03-29

## 2025-03-29 NOTE — TELEPHONE ENCOUNTER
Refill Routing Note   Medication(s) are not appropriate for processing by [facility] for the following reason(s):        Required vitals abnormal-last BP -02/13/25 (!) 141/64     [facility] action(s):  Defer      Medication Therapy Plan: last BP -02/13/25 (!) 141/64      Appointments  past 12m or future 3m with PCP    Date Provider   Last Visit   9/21/2022 Romel Alas MD   Next Visit   Visit date not found Romel Alas MD   ED visits in past 90 days: 0        Note composed:9:44 AM 03/29/2025

## 2025-04-03 RX ORDER — MONTELUKAST SODIUM 10 MG/1
TABLET ORAL
Qty: 90 TABLET | Refills: 3 | Status: SHIPPED | OUTPATIENT
Start: 2025-04-03

## 2025-04-16 ENCOUNTER — OFFICE VISIT (OUTPATIENT)
Dept: PULMONOLOGY | Facility: CLINIC | Age: 85
End: 2025-04-16
Payer: MEDICARE

## 2025-04-16 VITALS
WEIGHT: 272.69 LBS | OXYGEN SATURATION: 98 % | DIASTOLIC BLOOD PRESSURE: 73 MMHG | SYSTOLIC BLOOD PRESSURE: 121 MMHG | BODY MASS INDEX: 33.2 KG/M2 | HEART RATE: 86 BPM

## 2025-04-16 DIAGNOSIS — J43.9 PULMONARY EMPHYSEMA, UNSPECIFIED EMPHYSEMA TYPE: ICD-10-CM

## 2025-04-16 DIAGNOSIS — J92.9 PLEURAL PLAQUE: ICD-10-CM

## 2025-04-16 DIAGNOSIS — J47.9 BRONCHIECTASIS WITHOUT COMPLICATION: ICD-10-CM

## 2025-04-16 DIAGNOSIS — J44.9 CHRONIC OBSTRUCTIVE PULMONARY DISEASE, UNSPECIFIED COPD TYPE: Primary | ICD-10-CM

## 2025-04-16 PROCEDURE — 99999 PR PBB SHADOW E&M-EST. PATIENT-LVL III: CPT | Mod: PBBFAC,,,

## 2025-04-16 RX ORDER — SULFAMETHOXAZOLE AND TRIMETHOPRIM 800; 160 MG/1; MG/1
1 TABLET ORAL 2 TIMES DAILY
Qty: 14 TABLET | Refills: 0 | Status: SHIPPED | OUTPATIENT
Start: 2025-04-16 | End: 2025-04-16

## 2025-04-16 RX ORDER — SULFAMETHOXAZOLE AND TRIMETHOPRIM 800; 160 MG/1; MG/1
1 TABLET ORAL 2 TIMES DAILY
Qty: 28 TABLET | Refills: 0 | Status: SHIPPED | OUTPATIENT
Start: 2025-04-16

## 2025-04-16 NOTE — PROGRESS NOTES
History and Physical Note  [facility]    Subjective:     Reason for visit: COPD management     Patient ID:  Nick De Anda is a 84 y.o. male    Interval History 04/16/2025  History of Present Illness    CHIEF COMPLAINT:  Patient presents today for six month COPD follow-up.    RESPIRATORY:  He reports persistent post-nasal drip and productive cough with copious thick green phlegm. He experiences nosebleeds when green nasal discharge is present. He reports shortness of breath with activities such as mowing the lawn. He has had recurrent sinus infections that have impeded his efforts to return to the gym. He uses a nasal spray prescribed by his PCP.    MEDICATIONS:  He takes Anoro Ellipta in the morning and uses a rescue inhaler in the afternoon, particularly during outdoor activities. He takes Mucinex with cough medicine for phlegm management, which provides temporary relief. For cardiac management, he takes Timolol, and due to his stents, he is on Xarelto and Plavix for anticoagulation therapy.    CARDIOVASCULAR HISTORY:  He has recurrent atrial fibrillation and has two stents in place. His cardiac history includes two ablations and a triple bypass surgery.    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 He has an upcoming appointment with a new dermatologist.    PHYSICAL ACTIVITY:  He only walks for exercise, with attempts to return to the gym being limited by recurrent sinus infections.    ENVIRONMENTAL HISTORY:  He lives in a large, older house surrounded by pecan and pine trees, with significant pollen exposure noted by yellow dust accumulation on his car each morning.    Interval history 10/15/2024:   This is the patient's COPD follow up. He has to take guanifecin very frequently to help the mucous build. Breathing has been significantly better since starting the anoro. He has a new air purifier at home that helps with his chronic rhinitis.   Cough: copious  amount of sputum; it happens all day; scheduled to see ENT   Sputum: green   MMRC grade level: 4  Respiratory illness: none in the last 3 months   Current COPD treatment plan: anoro, breathing treatment with an OPEP device, and rescue inhaler.    Interval History 2024:  Presents today as a referral from ENT for evaluation of chronic cough. Medical history of CAD s/p CABG, Afib, HTN, DM2, HLD and laryngeal cancer. I met his very pleasant wife Ms. Casiano at her appointment. She is not here with him today.     Previously evaluated by Dr. Rawls. He was not found to have pneumonia at that time. Diagnosed with COPD but no treatment given at that time. PFT showed obstruction 2023 with evidence of hyperinflation on chest imaging.     He is bothered by a persistent coughing. He has a productive sputum especially in the mornings. He has been using his nebulizer at home as needed to help with the congestion. He bought an OPEP device to use with his nebulizer the Airphysio. This helps clear his mucous by mid day.     Can't go to the gym because he has been very short of breath.   No steroid use due to glaucoma.     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   Social History     Tobacco Use   Smoking Status Former    Current packs/day: 0.00    Average packs/day: 0.5 packs/day for 23.7 years (11.9 ttl pk-yrs)    Types: Cigarettes    Start date: 1956    Quit date: 10/1/1979    Years since quittin.5    Passive exposure: Past   Smokeless Tobacco Never       Objective:     Vitals:    25 1110   BP: 121/73   BP Location: Left arm   Patient Position: Sitting   Pulse: 86   SpO2: 98%   Weight: 123.7 kg (272 lb 11.3 oz)           Physical Exam  Constitutional:       General: He is awake.      Appearance: Normal appearance. He is  well-developed.   HENT:      Head: Normocephalic.   Pulmonary:      Effort: Pulmonary effort is normal. No respiratory distress.      Breath sounds: Decreased air movement present. No wheezing.   Skin:     General: Skin is warm.      Capillary Refill: Capillary refill takes less than 2 seconds.      Findings: No rash.      Nails: There is no clubbing.   Neurological:      Mental Status: He is alert.   Psychiatric:         Attention and Perception: Attention and perception normal.         Behavior: Behavior is cooperative.          Personal Diagnostic Review and Interpretation  CTA 02/2023: report from OSH Mild bilateral emphysematous changes are noted. There is focal mild atelectasis or infiltrate at the left lung base, with tiny left pleural effusion. Calcified pleural plaque along the left hemidiaphragm is also noted.     CXR 03/2023: lung fields clear, flattened hemidiaphragm    Pertinent Studies Reviewed & Interpreted:     Pulmonary Function Tests:   PFT 03/2023: FVC: 2.91 (63 % predicted), FEV1: 1.43 (32.6 % predicted), FEV1/FVC:  67  Moderate obstruction, severe ventilatory impairment      Echocardiograms:   12/2023:     Left Ventricle: The left ventricle is normal in size. Normal wall thickness. There is concentric remodeling. Normal wall motion. There is normal systolic function with a visually estimated ejection fraction of 55 - 60%. There is normal diastolic function.    Right Ventricle: Normal right ventricular cavity size. Wall thickness is normal. Right ventricle wall motion  is normal. Systolic function is normal.    Aortic Valve: The aortic valve is a trileaflet valve. There is mild aortic valve sclerosis.    Tricuspid Valve: There is mild regurgitation.    Pulmonary Artery: The estimated pulmonary artery systolic pressure is 30 mmHg.    IVC/SVC: Normal venous pressure at 3 mmHg.     Other Pertinent Laboratories:  Lab Results   Component Value Date    WBC 7.22 10/15/2024    RBC 4.64 10/15/2024    HGB  13.8 (L) 10/15/2024    MCV 90 10/15/2024    MCH 29.7 10/15/2024    MCHC 33.2 10/15/2024    RDW 14.0 10/15/2024     10/15/2024    MPV 10.5 10/15/2024    GRAN 3.7 10/15/2024    GRAN 51.7 10/15/2024    LYMPH 2.0 10/15/2024    LYMPH 27.1 10/15/2024    MONO 0.9 10/15/2024    MONO 12.6 10/15/2024    EOS 0.5 10/15/2024    BASO 0.06 10/15/2024       Assessment & Plan:       Plan:  Clinical impression is resonably certain and repeated evaluation prn +/- follow up will be needed as below.    Assessment & Plan    J47.9 Bronchiectasis without complication  J43.9 Pulmonary emphysema, unspecified emphysema type  J44.9 Chronic obstructive pulmonary disease, unspecified COPD type  J92.9 Pleural plaque    IMPRESSION:  - Assessed ongoing COPD symptoms, particularly persistent cough with green sputum production and post-nasal drip.  - Potential underlying infection given prolonged symptoms and previous relief with antibiotics.  - Evaluated current COPD management, maintaining Anoro Ellipta as primary controller medication, considering switching administration time to nighttime.  - Considered pulmonary function testing to reassess lung function.    BRONCHIECTASIS WITHOUT COMPLICATION:  - Explained importance of consistent nebulizer use in mucus clearance.  - Patient to continue using nebulizer treatments to help bring up mucus.  - Explained purpose of sputum culture in identifying potential infectious agents.  - Patient to collect sputum sample in provided cup and deliver to [facility].  - Ordered sputum culture.    PULMONARY EMPHYSEMA, UNSPECIFIED EMPHYSEMA TYPE:  - Ordered pulmonary function test.  - Follow up for pulmonary function test before next appointment.    CHRONIC OBSTRUCTIVE PULMONARY DISEASE, UNSPECIFIED COPD TYPE:  - Discussed role of pulmonary rehabilitation in improving COPD management and overall lung function.  - Referred to pulmonary rehabilitation program at [facility].  - Started Bactrim for 2 weeks to  address cough and potential infection.  - Ordered CT Chest.  - Follow up in 1 month to reassess cough and COPD symptoms.  - Contact office sooner if symptoms not improving with antibiotic treatment.         Follow up in about 4 weeks (around 5/14/2025), or if symptoms worsen or fail to improve.    Discussed with patient above for education the following:      Patient Instructions   Bring back sputum cup   Obtain PFT     Pulmonary Rehab locations include:  1) [facility]: phone# [phone]; fax# [phone]        This note was generated with the assistance of ambient listening technology. Verbal consent was obtained by the patient and accompanying visitor(s) for the recording of patient appointment to facilitate this note. I attest to having reviewed and edited the generated note for accuracy, though some syntax or spelling errors may persist. Please contact the author of this note for any clarification.    RETURN TO CLINIC IN 1 MONTHS     Total professional time spent for the encounter: 48 minutes  Time was spent preparing to see the patient, reviewing results of prior testing, obtaining and/or reviewing separately obtained history, performing a medically appropriate examination and interview, counseling and educating the patient/family, ordering medications/tests/procedures, referring and communicating with other health care professionals, documenting clinical information in the electronic health record, and independently interpreting results.    Jennifer Ruvalcaba, DNP

## 2025-04-16 NOTE — PATIENT INSTRUCTIONS
Bring back sputum cup   Obtain PFT     Pulmonary Rehab locations include:  1) [facility]: phone# [phone]; fax# [phone]

## 2025-04-17 ENCOUNTER — LAB VISIT (OUTPATIENT)
Dept: LAB | Facility: HOSPITAL | Age: 85
End: 2025-04-17
Payer: MEDICARE

## 2025-04-17 DIAGNOSIS — J44.9 CHRONIC OBSTRUCTIVE PULMONARY DISEASE, UNSPECIFIED COPD TYPE: ICD-10-CM

## 2025-04-17 PROCEDURE — 87206 SMEAR FLUORESCENT/ACID STAI: CPT

## 2025-04-17 PROCEDURE — 87116 MYCOBACTERIA CULTURE: CPT

## 2025-04-21 ENCOUNTER — LAB VISIT (OUTPATIENT)
Dept: LAB | Facility: HOSPITAL | Age: 85
End: 2025-04-21
Payer: MEDICARE

## 2025-04-21 DIAGNOSIS — J44.9 CHRONIC OBSTRUCTIVE PULMONARY DISEASE, UNSPECIFIED COPD TYPE: ICD-10-CM

## 2025-04-21 DIAGNOSIS — J47.9 BRONCHIECTASIS WITHOUT COMPLICATION: ICD-10-CM

## 2025-04-21 LAB — ACID FAST MOD KINY STN SPEC: NORMAL

## 2025-04-21 PROCEDURE — 87116 MYCOBACTERIA CULTURE: CPT

## 2025-04-21 PROCEDURE — 87206 SMEAR FLUORESCENT/ACID STAI: CPT

## 2025-04-22 LAB — ACID FAST MOD KINY STN SPEC: NORMAL

## 2025-05-20 ENCOUNTER — OFFICE VISIT (OUTPATIENT)
Dept: PULMONOLOGY | Facility: CLINIC | Age: 85
End: 2025-05-20
Payer: MEDICARE

## 2025-05-20 VITALS
WEIGHT: 271.81 LBS | HEART RATE: 70 BPM | BODY MASS INDEX: 33.09 KG/M2 | OXYGEN SATURATION: 97 % | SYSTOLIC BLOOD PRESSURE: 113 MMHG | DIASTOLIC BLOOD PRESSURE: 69 MMHG

## 2025-05-20 DIAGNOSIS — J47.9 BRONCHIECTASIS WITHOUT COMPLICATION: ICD-10-CM

## 2025-05-20 DIAGNOSIS — J44.9 CHRONIC OBSTRUCTIVE PULMONARY DISEASE, UNSPECIFIED COPD TYPE: Primary | ICD-10-CM

## 2025-05-20 DIAGNOSIS — M77.31 EXOSTOSIS OF RIGHT POSTERIOR CALCANEUS: ICD-10-CM

## 2025-05-20 DIAGNOSIS — J43.9 PULMONARY EMPHYSEMA, UNSPECIFIED EMPHYSEMA TYPE: ICD-10-CM

## 2025-05-20 PROCEDURE — 99999 PR PBB SHADOW E&M-EST. PATIENT-LVL V: CPT | Mod: PBBFAC,,,

## 2025-05-20 RX ORDER — FLUTICASONE FUROATE, UMECLIDINIUM BROMIDE AND VILANTEROL TRIFENATATE 100; 62.5; 25 UG/1; UG/1; UG/1
1 POWDER RESPIRATORY (INHALATION) DAILY
Qty: 60 EACH | Refills: 11 | Status: SHIPPED | OUTPATIENT
Start: 2025-05-20

## 2025-05-20 NOTE — PROGRESS NOTES
History and Physical Note  [facility]    Subjective:     Reason for visit: COPD management     Patient ID:  Nick De Anda is a 84 y.o. male    Interval History 05/20/2025:   Patient presents today for follow up of COPD.  He reports significant dyspnea with exertion, unable to walk long distances without requiring rest. His symptoms worsen during hot summer months, particularly when going outside.  He takes Anoro Ellipta and uses albuterol nebulizer at 10:00 AM daily. He takes Allegra for allergy management. He has an active prescription for Singulair but reports no therapeutic benefit despite regular nightly use.    Interval History 04/16/2025  Patient presents today for six month COPD follow-up.    RESPIRATORY:  He reports persistent post-nasal drip and productive cough with copious thick green phlegm. He experiences nosebleeds when green nasal discharge is present. He reports shortness of breath with activities such as mowing the lawn. He has had recurrent sinus infections that have impeded his efforts to return to the gym. He uses a nasal spray prescribed by his PCP.    MEDICATIONS:  He takes Anoro Ellipta in the morning and uses a rescue inhaler in the afternoon, particularly during outdoor activities. He takes Mucinex with cough medicine for phlegm management, which provides temporary relief. For cardiac management, he takes Timolol, and due to his stents, he is on Xarelto and Plavix for anticoagulation therapy.    CARDIOVASCULAR HISTORY:  He has recurrent atrial fibrillation and has two stents in place. His cardiac history includes two ablations and a triple bypass surgery.    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 He has an upcoming appointment with a new dermatologist.    PHYSICAL ACTIVITY:  He only walks for exercise, with attempts to return to the gym being limited by recurrent sinus infections.    ENVIRONMENTAL HISTORY:  He lives in a large,  older house surrounded by pecan and pine trees, with significant pollen exposure noted by yellow dust accumulation on his car each morning.    Interval history 10/15/2024:   This is the patient's COPD follow up. He has to take guanifecin very frequently to help the mucous build. Breathing has been significantly better since starting the anoro. He has a new air purifier at home that helps with his chronic rhinitis.   Cough: copious amount of sputum; it happens all day; scheduled to see ENT   Sputum: green   MMRC grade level: 4  Respiratory illness: none in the last 3 months   Current COPD treatment plan: anoro, breathing treatment with an OPEP device, and rescue inhaler.    Interval History 04/12/2024:  Presents today as a referral from ENT for evaluation of chronic cough. Medical history of CAD s/p CABG, Afib, HTN, DM2, HLD and laryngeal cancer. I met his very pleasant wife Ms. Casiano at her appointment. She is not here with him today.     Previously evaluated by Dr. aRwls. He was not found to have pneumonia at that time. Diagnosed with COPD but no treatment given at that time. PFT showed obstruction 03/2023 with evidence of hyperinflation on chest imaging.     He is bothered by a persistent coughing. He has a productive sputum especially in the mornings. He has been using his nebulizer at home as needed to help with the congestion. He bought an OPEP device to use with his nebulizer the Airphysio. This helps clear his mucous by mid day.     Can't go to the gym because he has been very short of breath.   No steroid use due to glaucoma.     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   Social History     Tobacco Use   Smoking Status Former    Current packs/day: 0.00    Average packs/day: 0.5 packs/day for 23.7 years (11.9 ttl pk-yrs)     Types: Cigarettes    Start date: 1956    Quit date: 10/1/1979    Years since quittin.6    Passive exposure: Past   Smokeless Tobacco Never       Objective:     Vitals:    25 1100   BP: 113/69   BP Location: Left arm   Patient Position: Sitting   Pulse: 70   SpO2: 97%   Weight: 123.3 kg (271 lb 13.2 oz)           Physical Exam  Constitutional:       General: He is awake.      Appearance: Normal appearance. He is well-developed.   HENT:      Head: Normocephalic.   Pulmonary:      Effort: Pulmonary effort is normal. No respiratory distress.      Breath sounds: Decreased air movement present. No wheezing.   Skin:     General: Skin is warm.      Capillary Refill: Capillary refill takes less than 2 seconds.      Findings: No rash.      Nails: There is no clubbing.   Neurological:      Mental Status: He is alert.   Psychiatric:         Attention and Perception: Attention and perception normal.         Behavior: Behavior is cooperative.        Personal Diagnostic Review and Interpretation  CTA 2023: report from OSH Mild bilateral emphysematous changes are noted. There is focal mild atelectasis or infiltrate at the left lung base, with tiny left pleural effusion. Calcified pleural plaque along the left hemidiaphragm is also noted.     CXR 2023: lung fields clear, flattened hemidiaphragm    Pertinent Studies Reviewed & Interpreted:     Pulmonary Function Tests:   PFT 2023: FVC: 2.91 (63 % predicted), FEV1: 1.43 (32.6 % predicted), FEV1/FVC:  67  Moderate obstruction, severe ventilatory impairment    2024 Severe obstruction with air trapping and decreased dlco    Echocardiograms:   2023:     Left Ventricle: The left ventricle is normal in size. Normal wall thickness. There is concentric remodeling. Normal wall motion. There is normal systolic function with a visually estimated ejection fraction of 55 - 60%. There is normal diastolic function.    Right Ventricle: Normal right ventricular cavity  size. Wall thickness is normal. Right ventricle wall motion  is normal. Systolic function is normal.    Aortic Valve: The aortic valve is a trileaflet valve. There is mild aortic valve sclerosis.    Tricuspid Valve: There is mild regurgitation.    Pulmonary Artery: The estimated pulmonary artery systolic pressure is 30 mmHg.    IVC/SVC: Normal venous pressure at 3 mmHg.  Other Pertinent Laboratories:  Lab Results   Component Value Date    WBC 7.22 10/15/2024    RBC 4.64 10/15/2024    HGB 13.8 (L) 10/15/2024    MCV 90 10/15/2024    MCH 29.7 10/15/2024    MCHC 33.2 10/15/2024    RDW 14.0 10/15/2024     10/15/2024    MPV 10.5 10/15/2024    GRAN 3.7 10/15/2024    GRAN 51.7 10/15/2024    LYMPH 2.0 10/15/2024    LYMPH 27.1 10/15/2024    MONO 0.9 10/15/2024    MONO 12.6 10/15/2024    EOS 0.5 10/15/2024    BASO 0.06 10/15/2024       Assessment & Plan:     CHRONIC OBSTRUCTIVE PULMONARY DISEASE, UNSPECIFIED COPD TYPE:  - Explained purpose and results of pulmonary function test.  - Discussed benefits and potential side effects of Trelegy inhaler, including risk of thrush, and proper technique for using inhaler.  - Discussed pulmonary rehabilitation program benefits.  - Started Trelegy (triple therapy inhaler) daily, replacing Anoro, with instructions to rinse mouth after use to prevent thrush.  - Continued albuterol inhaler as needed for rescue.  - Continued Singulair (montelukast) nightly.  - Ordered CT chest to evaluate lung condition.  - Referred to pulmonary rehabilitation program with instructions for patient to call provided number for scheduling.  - Contact office if any issues arise with new medication regimen.      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      RETURN TO CLINIC IN 3 MONTHS   This note was generated with the assistance of ambient listening  technology. Verbal consent was obtained by the patient and accompanying visitor(s) for the recording of patient appointment to facilitate this note. I attest to having reviewed and edited the generated note for accuracy, though some syntax or spelling errors may persist. Please contact the author of this note for any clarification.    Total professional time spent for the encounter: 35 minutes  Time was spent preparing to see the patient, reviewing results of prior testing, obtaining and/or reviewing separately obtained history, performing a medically appropriate examination and interview, counseling and educating the patient/family, ordering medications/tests/procedures, referring and communicating with other health care professionals, documenting clinical information in the electronic health record, and independently interpreting results.    Jennifer Ruvalcaba, DNP

## 2025-05-26 DIAGNOSIS — Z00.00 ENCOUNTER FOR MEDICARE ANNUAL WELLNESS EXAM: ICD-10-CM

## 2025-05-28 ENCOUNTER — PATIENT MESSAGE (OUTPATIENT)
Dept: PULMONOLOGY | Facility: CLINIC | Age: 85
End: 2025-05-28
Payer: MEDICARE

## 2025-06-02 DIAGNOSIS — E78.5 HYPERLIPIDEMIA, UNSPECIFIED HYPERLIPIDEMIA TYPE: ICD-10-CM

## 2025-06-02 RX ORDER — ATORVASTATIN CALCIUM 80 MG/1
80 TABLET, FILM COATED ORAL NIGHTLY
Qty: 90 TABLET | Refills: 3 | Status: SHIPPED | OUTPATIENT
Start: 2025-06-02

## 2025-06-03 DIAGNOSIS — J44.9 CHRONIC OBSTRUCTIVE PULMONARY DISEASE, UNSPECIFIED COPD TYPE: ICD-10-CM

## 2025-06-03 DIAGNOSIS — J44.9 CHRONIC OBSTRUCTIVE PULMONARY DISEASE, UNSPECIFIED COPD TYPE: Primary | ICD-10-CM

## 2025-06-03 RX ORDER — UMECLIDINIUM BROMIDE AND VILANTEROL TRIFENATATE 62.5; 25 UG/1; UG/1
1 POWDER RESPIRATORY (INHALATION) DAILY
Qty: 30 EACH | Refills: 11 | Status: SHIPPED | OUTPATIENT
Start: 2025-06-03

## 2025-06-03 RX ORDER — UMECLIDINIUM BROMIDE AND VILANTEROL TRIFENATATE 62.5; 25 UG/1; UG/1
1 POWDER RESPIRATORY (INHALATION) DAILY
Qty: 30 EACH | Refills: 11 | Status: SHIPPED | OUTPATIENT
Start: 2025-06-03 | End: 2025-06-03 | Stop reason: SDUPTHER

## 2025-06-06 ENCOUNTER — OFFICE VISIT (OUTPATIENT)
Dept: PODIATRY | Facility: CLINIC | Age: 85
End: 2025-06-06
Payer: MEDICARE

## 2025-06-06 VITALS
HEIGHT: 76 IN | DIASTOLIC BLOOD PRESSURE: 72 MMHG | SYSTOLIC BLOOD PRESSURE: 123 MMHG | WEIGHT: 270.06 LBS | BODY MASS INDEX: 32.89 KG/M2 | HEART RATE: 74 BPM

## 2025-06-06 DIAGNOSIS — E11.42 DIABETIC POLYNEUROPATHY ASSOCIATED WITH TYPE 2 DIABETES MELLITUS: Primary | ICD-10-CM

## 2025-06-06 DIAGNOSIS — B35.1 ONYCHOMYCOSIS DUE TO DERMATOPHYTE: ICD-10-CM

## 2025-06-06 DIAGNOSIS — M20.41 HAMMER TOES OF BOTH FEET: ICD-10-CM

## 2025-06-06 DIAGNOSIS — E11.9 ENCOUNTER FOR DIABETIC FOOT EXAM: ICD-10-CM

## 2025-06-06 DIAGNOSIS — Z86.31 HX OF DIABETIC FOOT ULCER: ICD-10-CM

## 2025-06-06 DIAGNOSIS — M20.30 HALLUX, MALLEUS ACQUIRED: ICD-10-CM

## 2025-06-06 DIAGNOSIS — M20.42 HAMMER TOES OF BOTH FEET: ICD-10-CM

## 2025-06-06 DIAGNOSIS — Z79.01 LONG TERM (CURRENT) USE OF ANTICOAGULANTS: ICD-10-CM

## 2025-06-06 PROCEDURE — 99999 PR PBB SHADOW E&M-EST. PATIENT-LVL IV: CPT | Mod: PBBFAC,,, | Performed by: PODIATRIST

## 2025-06-06 RX ORDER — TRIAMCINOLONE ACETONIDE 1 MG/G
OINTMENT TOPICAL 2 TIMES DAILY
COMMUNITY
Start: 2025-03-24

## 2025-06-22 DIAGNOSIS — E11.9 TYPE 2 DIABETES MELLITUS WITHOUT COMPLICATION, WITHOUT LONG-TERM CURRENT USE OF INSULIN: ICD-10-CM

## 2025-06-23 RX ORDER — METFORMIN HYDROCHLORIDE 500 MG/1
500 TABLET ORAL 2 TIMES DAILY WITH MEALS
Qty: 180 TABLET | Refills: 0 | Status: SHIPPED | OUTPATIENT
Start: 2025-06-23

## 2025-06-26 DIAGNOSIS — I10 ESSENTIAL HYPERTENSION: ICD-10-CM

## 2025-06-26 NOTE — TELEPHONE ENCOUNTER
Refill Routing Note   Medication(s) are not appropriate for processing by [facility] for the following reason(s):        Patient not seen by provider within 15 months    [facility] action(s):  Defer               Appointments  past 12m or future 3m with PCP    Date Provider   Last Visit   9/21/2022 Romel Alas MD   Next Visit   Visit date not found Romel Alas MD   ED visits in past 90 days: 0        Note composed:7:50 AM 06/26/2025

## 2025-06-27 RX ORDER — HYDROCHLOROTHIAZIDE 12.5 MG/1
12.5 CAPSULE ORAL
Qty: 90 CAPSULE | Refills: 3 | Status: SHIPPED | OUTPATIENT
Start: 2025-06-27

## 2025-07-01 ENCOUNTER — OFFICE VISIT (OUTPATIENT)
Dept: PRIMARY CARE CLINIC | Facility: CLINIC | Age: 85
End: 2025-07-01
Payer: MEDICARE

## 2025-07-01 VITALS
BODY MASS INDEX: 32.73 KG/M2 | HEART RATE: 71 BPM | WEIGHT: 268.75 LBS | HEIGHT: 76 IN | OXYGEN SATURATION: 98 % | DIASTOLIC BLOOD PRESSURE: 60 MMHG | SYSTOLIC BLOOD PRESSURE: 120 MMHG

## 2025-07-01 DIAGNOSIS — I70.201 ATHEROSCLEROSIS OF NATIVE ARTERY OF RIGHT LOWER EXTREMITY, WITH UNSPECIFIED PRESENCE OF CLINICAL MANIFESTATION: ICD-10-CM

## 2025-07-01 DIAGNOSIS — J30.2 SEASONAL ALLERGIES: Primary | ICD-10-CM

## 2025-07-01 DIAGNOSIS — Z00.00 ENCOUNTER FOR ANNUAL WELLNESS VISIT (AWV) IN MEDICARE PATIENT: ICD-10-CM

## 2025-07-01 DIAGNOSIS — F41.9 ANXIETY: ICD-10-CM

## 2025-07-01 DIAGNOSIS — Z00.00 ENCOUNTER FOR PREVENTIVE HEALTH EXAMINATION: ICD-10-CM

## 2025-07-01 DIAGNOSIS — Z12.5 ENCOUNTER FOR SCREENING FOR MALIGNANT NEOPLASM OF PROSTATE: ICD-10-CM

## 2025-07-01 DIAGNOSIS — E11.9 TYPE 2 DIABETES MELLITUS WITHOUT COMPLICATION, WITHOUT LONG-TERM CURRENT USE OF INSULIN: ICD-10-CM

## 2025-07-01 DIAGNOSIS — D69.6 THROMBOCYTOPENIA, UNSPECIFIED: ICD-10-CM

## 2025-07-01 DIAGNOSIS — R53.83 OTHER FATIGUE: ICD-10-CM

## 2025-07-01 DIAGNOSIS — L97.511 SKIN ULCER OF TOE OF RIGHT FOOT, LIMITED TO BREAKDOWN OF SKIN: ICD-10-CM

## 2025-07-01 PROCEDURE — 99999 PR PBB SHADOW E&M-EST. PATIENT-LVL V: CPT | Mod: PBBFAC,,, | Performed by: NURSE PRACTITIONER

## 2025-07-01 RX ORDER — DIAZEPAM 10 MG/1
10 TABLET ORAL
Qty: 30 TABLET | Refills: 3 | Status: SHIPPED | OUTPATIENT
Start: 2025-07-01 | End: 2025-07-31

## 2025-07-01 RX ORDER — FEXOFENADINE HCL 180 MG/1
180 TABLET ORAL DAILY
Qty: 90 TABLET | Refills: 3
Start: 2025-07-01 | End: 2026-07-01

## 2025-07-01 NOTE — PROGRESS NOTES
[facility] Note    Chief Complaint      Chief Complaint   Patient presents with    Annual Exam       History of Present Illness      Nick De Anda is a 84 y.o. male who presents today for   Chief Complaint   Patient presents with    Annual Exam         CHIEF COMPLAINT:  Patient presents for medication refills and to discuss three specific issues, including a podiatry referral and COPD management.    HPI:  Patient has severe COPD under the care of Dr. Jennifer Owen. His main concern is mucus in his lungs. He takes OTC Mucinex with dextromethorphan to thin out secretions. His COPD medication regimen includes Anoro Ellipta powder in the morning, nebulizer treatments during the day, and a rescue inhaler as needed. He also takes Singulair (montelukast) 10 mg daily in the afternoon for long-term management.    Patient has ongoing foot issues. He had operations on both feet 5-6 years ago, performed by different surgeons. The left foot operation, which included work on his toes and heel, was successful. The right foot operation, performed by a different surgeon in [place], resulted in complications. His left great toe is deviated towards his other foot, causing problems. He has pain in the area, particularly when he drops something on his foot or wears ill-fitting shoes.    Patient had a tumor removed from his vocal cords, resulting in half of his right main vocal cord being removed. This makes coughing painful and difficult to stop, often leading to multiple coughs followed by a sneeze.    Patient denies noticing any difference when taking Allegra for allergies.    MEDICAL HISTORY:  Patient has a history of COPD and a tumor on his vocal cords.      ROS:  General: -fever, -chills, -fatigue, -weight gain, -weight loss  Eyes: -vision changes, -redness, -discharge  ENT: -ear pain, -nasal congestion, -sore throat, +pain with respiration  Cardiovascular: -chest pain, -palpitations, -lower extremity  "edema  Respiratory: +cough, -shortness of breath, +productive cough  Gastrointestinal: -abdominal pain, -nausea, -vomiting, -diarrhea, -constipation, -blood in stool  Genitourinary: -dysuria, -hematuria, -frequency  Musculoskeletal: -joint pain, -muscle pain, +bone deformity  Skin: -rash, -lesion  Neurological: -headache, -dizziness, -numbness, -tingling  Psychiatric: -anxiety, -depression, -sleep difficulty             Family History:  family history includes Breast cancer in his sister; Diabetes type I in his son; Diabetes type II in his daughter; Heart attack in his father and maternal grandfather; Heart disease in his brother, brother, brother, and brother; No Known Problems in his maternal grandmother, mother, paternal grandfather, paternal grandmother, and son.   Family history was reviewed with patient.     Medications:  Encounter Medications[1]    Allergies:  Review of patient's allergies indicates:   Allergen Reactions    Penicillins Anaphylaxis     Can take keflex    Hydrocodone      Causes constipation    Amoxicillin Swelling and Rash       Health Maintenance:  Health Maintenance   Topic Date Due    COVID-19 Vaccine (3 - Moderna risk series) 03/12/2021    Diabetes Urine Screening  12/20/2024    Hemoglobin A1c  04/24/2025    Influenza Vaccine (1) 09/01/2025    Lipid Panel  10/24/2025    Diabetic Eye Exam  12/10/2025    TETANUS VACCINE  06/09/2033    Shingles Vaccine  Completed    RSV Vaccine (Age 60+ and Pregnant patients)  Completed    Pneumococcal Vaccines (Age 50+)  Completed     Health Maintenance Topics with due status: Not Due       Topic Last Completion Date    TETANUS VACCINE 06/09/2023    Influenza Vaccine 10/15/2024    Lipid Panel 10/24/2024    Diabetic Eye Exam 12/10/2024       Physical Exam      Vital Signs  Pulse: 71  SpO2: 98 %  BP: 120/60  BP Location: Right arm  Patient Position: Sitting  Pain Score:   6  Pain Loc: Knee  Height and Weight  Height: 6' 4" (193 cm)  Weight: 121.9 " kg (268 lb 11.9 oz)  BSA (Calculated - sq m): 2.56 sq meters  BMI (Calculated): 32.7  Weight in (lb) to have BMI = 25: 205]    Physical Exam  Vitals reviewed.   Constitutional:       Appearance: Normal appearance. He is normal weight.   HENT:      Head: Normocephalic and atraumatic.      Nose: Nose normal.      Mouth/Throat:      Mouth: Mucous membranes are moist.      Pharynx: Oropharynx is clear.   Eyes:      Extraocular Movements: Extraocular movements intact.      Conjunctiva/sclera: Conjunctivae normal.      Pupils: Pupils are equal, round, and reactive to light.   Cardiovascular:      Rate and Rhythm: Normal rate and regular rhythm.      Pulses: Normal pulses.      Heart sounds: Normal heart sounds.   Pulmonary:      Effort: Pulmonary effort is normal.      Breath sounds: Normal breath sounds.   Musculoskeletal:         General: Normal range of motion.      Cervical back: Normal range of motion and neck supple.   Skin:     General: Skin is warm and dry.      Capillary Refill: Capillary refill takes less than 2 seconds.   Neurological:      General: No focal deficit present.      Mental Status: He is alert and oriented to person, place, and time. Mental status is at baseline.   Psychiatric:         Mood and Affect: Mood normal.         Behavior: Behavior normal.         Thought Content: Thought content normal.         Judgment: Judgment normal.          Assessment/Plan     Nick De Anda is a 84 y.o.male with:    Seasonal allergies  -     fexofenadine (ALLEGRA) 180 MG tablet; Take 1 tablet (180 mg total) by mouth once daily.  Dispense: 90 tablet; Refill: 3    Skin ulcer of toe of right foot, limited to breakdown of skin  -     Cancel: Ambulatory referral/consult to Podiatry; Future; Expected date: 07/08/2025  -     Ambulatory referral/consult to Podiatry; Future; Expected date: 07/08/2025    Anxiety  -     diazePAM (VALIUM) 10 MG Tab; Take 1 tablet (10 mg total) by mouth as needed (anxiety). Just 1/2 to 1  and use very seldom-get refills from PCP  Dispense: 30 tablet; Refill: 3    Type 2 diabetes mellitus without complication, without long-term current use of insulin  -     Hemoglobin A1C; Future; Expected date: 07/01/2025    Other fatigue  -     CBC Auto Differential; Future; Expected date: 07/01/2025  -     Comprehensive Metabolic Panel; Future; Expected date: 07/01/2025  -     T4, Free; Future; Expected date: 07/01/2025  -     TSH; Future; Expected date: 07/01/2025    Atherosclerosis of native artery of right lower extremity, with unspecified presence of clinical manifestation  -     Lipid Panel; Future; Expected date: 07/01/2025    Thrombocytopenia, unspecified  -     CBC Auto Differential; Future; Expected date: 07/01/2025    Encounter for screening for malignant neoplasm of prostate  -     PSA, Screening; Future; Expected date: 07/01/2025    Encounter for preventive health examination  -     CBC Auto Differential; Future; Expected date: 07/01/2025  -     Comprehensive Metabolic Panel; Future; Expected date: 07/01/2025  -     Hemoglobin A1C; Future; Expected date: 07/01/2025  -     Lipid Panel; Future; Expected date: 07/01/2025  -     T4, Free; Future; Expected date: 07/01/2025  -     TSH; Future; Expected date: 07/01/2025    Encounter for annual wellness visit (AWV) in Medicare patient  -     Ambulatory Referral/Consult to Enhanced Annual Wellness Visit (eAWV); Future; Expected date: 07/08/2025        As above, continue current medications and maintain follow up with specialists.  Return to clinic as needed.    Greater than 50% of visit was spent face to face with patient.  All questions were answered to patient's satisfaction.          Karen L Spencer, NP-C [facility]                       [1]  Outpatient Encounter Medications as of 7/1/2025   Medication Sig Note Dispense Refill    albuterol (PROVENTIL HFA) 90 mcg/actuation inhaler Inhale 2 puffs into the lungs every 6 (six) hours as needed for  Wheezing. Rescue  85 g 4    amLODIPine (NORVASC) 5 MG tablet TAKE 1 TO 2 TABLETS BY MOUTH ONCE DAILY AS DIRECTED  180 tablet 3    ascorbic acid, vitamin C, (VITAMIN C) 100 MG tablet Take 500 mg by mouth once daily.       atorvastatin (LIPITOR) 80 MG tablet Take 1 tablet (80 mg total) by mouth every evening.  90 tablet 3    azelastine (ASTELIN) 137 mcg (0.1 %) nasal spray 1 spray (137 mcg total) by Nasal route 2 (two) times daily.  30 mL 2    blood sugar diagnostic Strp To check BG 2 times daily, to use with insurance preferred meter  100 strip 5    blood-glucose meter kit To check BG 2 times daily, to use with insurance preferred meter  1 each 0    clopidogreL (PLAVIX) 75 mg tablet Take 1 tablet by mouth once daily  90 tablet 3    hydroCHLOROthiazide (MICROZIDE) 12.5 mg capsule Take 1 capsule by mouth once daily  90 capsule 3    irbesartan (AVAPRO) 300 MG tablet TAKE 1 TABLET BY MOUTH ONCE DAILY IN THE EVENING  90 tablet 3    lancets Misc To check BG 2 times daily, to use with insurance preferred meter  100 each 5    levalbuterol (XOPENEX) 1.25 mg/3 mL nebulizer solution Take 3 mLs (1.25 mg total) by nebulization every 4 (four) hours as needed for Wheezing. Rescue  100 each 2    metFORMIN (GLUCOPHAGE) 500 MG tablet TAKE 1 TABLET BY MOUTH TWICE DAILY WITH MEALS  180 tablet 0    methocarbamoL (ROBAXIN) 500 MG Tab Take 1 tablet by mouth twice daily as needed for muscle spasm  30 tablet 1    metoprolol tartrate (LOPRESSOR) 25 MG tablet Take 1 tablet (25 mg total) by mouth daily as needed (Palpitations).  90 tablet 0    montelukast (SINGULAIR) 10 mg tablet Take one tablet by mouth every evening  90 tablet 3    multivitamin with minerals tablet        nitroGLYCERIN (NITROSTAT) 0.4 MG SL tablet Place 1 tablet (0.4 mg total) under the tongue every 5 (five) minutes as needed for Chest pain. 3/13/2023: PRN 25 tablet 3    tamsulosin (FLOMAX) 0.4 mg Cap Take 1 capsule (0.4 mg total) by mouth once daily.  90  capsule 3    timolol maleate 0.5% (TIMOPTIC) 0.5 % Drop Instill 1 drop Both Eyes once a day  15 mL 3    triamcinolone acetonide 0.1% (KENALOG) 0.1 % ointment Apply topically 2 (two) times daily.       umeclidinium-vilanteroL (ANORO ELLIPTA) 62.5-25 mcg/actuation DsDv Inhale 1 puff into the lungs once daily. Controller  30 each 11    XARELTO 20 mg Tab Take 1 tablet (20 mg total) by mouth every evening.  90 tablet 3    [DISCONTINUED] diazePAM (VALIUM) 10 MG Tab Take 1 tablet (10 mg total) by mouth as needed (anxiety). Just 1/2 to 1 and use very seldom-get refills from PCP  30 tablet 3    diazePAM (VALIUM) 10 MG Tab Take 1 tablet (10 mg total) by mouth as needed (anxiety). Just 1/2 to 1 and use very seldom-get refills from PCP  30 tablet 3    fexofenadine (ALLEGRA) 180 MG tablet Take 1 tablet (180 mg total) by mouth once daily.  90 tablet 3    [DISCONTINUED] fexofenadine (ALLEGRA) 180 MG tablet Take 1 tablet (180 mg total) by mouth once daily. (Patient not taking: Reported on 7/1/2025)  30 tablet 11    [DISCONTINUED] fluticasone-umeclidin-vilanter (TRELEGY ELLIPTA) 100-62.5-25 mcg DsDv Inhale 1 puff into the lungs once daily. (Patient not taking: Reported on 6/6/2025)  60 each 11    [DISCONTINUED] hydroCHLOROthiazide (MICROZIDE) 12.5 mg capsule Take 1 capsule by mouth once daily  90 capsule 0    [DISCONTINUED] metFORMIN (GLUCOPHAGE) 500 MG tablet TAKE 1 TABLET BY MOUTH TWICE DAILY WITH MEALS  180 tablet 0    [DISCONTINUED] montelukast (SINGULAIR) 10 mg tablet One tablet by mouth every evening  90 tablet 3    [DISCONTINUED] sulfamethoxazole-trimethoprim 800-160mg (BACTRIM DS) 800-160 mg Tab Take 1 tablet by mouth 2 (two) times daily.  28 tablet 0     Facility-Administered Encounter Medications as of 7/1/2025   Medication Dose Route Frequency Provider Last Rate Last Admin    0.9%  NaCl infusion   Intravenous Continuous Rebecca Dickinson NP   Stopped at 12/02/20 1636    lactated ringers infusion    Intravenous Continuous Tyler Arana MD        LIDOcaine (PF) 10 mg/ml (1%) injection 5 mg  0.5 mL Intradermal Once Tyler Arana MD        sodium chloride 0.9% bolus 1,000 mL  1,000 mL Intravenous Once Winnie Larkin, NP        sodium chloride 0.9% flush 10 mL  10 mL Intravenous PRN DELICIA Jaramillo MD        sodium chloride 0.9% flush 5 mL  5 mL Intravenous PRN Rebecca Dickinson, NP

## 2025-07-02 ENCOUNTER — PATIENT OUTREACH (OUTPATIENT)
Dept: ADMINISTRATIVE | Facility: HOSPITAL | Age: 85
End: 2025-07-02
Payer: MEDICARE

## 2025-07-02 DIAGNOSIS — E11.9 TYPE 2 DIABETES MELLITUS WITHOUT COMPLICATION, WITHOUT LONG-TERM CURRENT USE OF INSULIN: Primary | ICD-10-CM

## 2025-07-02 NOTE — PROGRESS NOTES
Diabetic shoe order uploaded to media  Urine micro order placed- linked to upcoming lab appt  Pt saw PCP yesterday 7/1/25    HM, care everywhere, immunizations updated  Chart review complete

## 2025-07-07 ENCOUNTER — RESULTS FOLLOW-UP (OUTPATIENT)
Dept: PULMONOLOGY | Facility: CLINIC | Age: 85
End: 2025-07-07

## 2025-07-08 ENCOUNTER — TELEPHONE (OUTPATIENT)
Dept: ORTHOPEDICS | Facility: CLINIC | Age: 85
End: 2025-07-08
Payer: MEDICARE

## 2025-07-08 DIAGNOSIS — M25.561 RIGHT KNEE PAIN, UNSPECIFIED CHRONICITY: Primary | ICD-10-CM

## 2025-07-08 NOTE — TELEPHONE ENCOUNTER
Returned the pt's call to inform him that xray are needed and his have been ordered and scheduled. Pt v/u

## 2025-07-10 ENCOUNTER — TELEPHONE (OUTPATIENT)
Dept: PRIMARY CARE CLINIC | Facility: CLINIC | Age: 85
End: 2025-07-10
Payer: MEDICARE

## 2025-07-10 NOTE — TELEPHONE ENCOUNTER
Copied from CRM #[id number]. Topic: General Inquiry - Patient Advice  >> Jul 10, 2025  2:56 PM Wendy wrote:  .1MEDICALADVICE     Patient is calling for Medical Advice regarding:calling to verify chronic illness of patient.     How long has patient had these symptoms:    Pharmacy name and phone#:    Patient wants a call back or thru [facility], provide patient's call back phone number:    Comments:[facility], [phone] fax  reference # [id number]    Please advise patient replies from provider may take up to 48 hours.

## 2025-07-10 NOTE — PROGRESS NOTES
Discussed sputum culture results. One of two samples grew    MYCOBACTERIUM FORTUITUM. Explained this may represent colonization, contamination, or possible infection. CT chest is important to evaluate for any lung abnormalities that could support infection.  Plan to move up CT chest and pulmonary appointment from August to sooner. Patient understands and agrees with the plan. Awaiting scheduling.

## 2025-07-11 ENCOUNTER — PATIENT OUTREACH (OUTPATIENT)
Dept: ADMINISTRATIVE | Facility: HOSPITAL | Age: 85
End: 2025-07-11
Payer: MEDICARE

## 2025-07-11 ENCOUNTER — TELEPHONE (OUTPATIENT)
Dept: PRIMARY CARE CLINIC | Facility: CLINIC | Age: 85
End: 2025-07-11
Payer: MEDICARE

## 2025-07-11 ENCOUNTER — OFFICE VISIT (OUTPATIENT)
Dept: ORTHOPEDICS | Facility: CLINIC | Age: 85
End: 2025-07-11
Payer: MEDICARE

## 2025-07-11 VITALS
DIASTOLIC BLOOD PRESSURE: 70 MMHG | HEART RATE: 66 BPM | WEIGHT: 268.75 LBS | HEIGHT: 76 IN | BODY MASS INDEX: 32.73 KG/M2 | SYSTOLIC BLOOD PRESSURE: 123 MMHG

## 2025-07-11 DIAGNOSIS — M17.12 PRIMARY OSTEOARTHRITIS OF LEFT KNEE: ICD-10-CM

## 2025-07-11 DIAGNOSIS — M17.11 ARTHRITIS OF RIGHT KNEE: Primary | ICD-10-CM

## 2025-07-11 PROCEDURE — 99999 PR PBB SHADOW E&M-EST. PATIENT-LVL V: CPT | Mod: PBBFAC,,,

## 2025-07-11 NOTE — TELEPHONE ENCOUNTER
Copied from CRM #[id number]. Topic: General Inquiry - Patient Advice  >> Jul 10, 2025  2:56 PM Wendy wrote:  .1MEDICALADVICE     Patient is calling for Medical Advice regarding:calling to verify chronic illness of patient.     How long has patient had these symptoms:    Pharmacy name and phone#:    Patient wants a call back or thru [facility], provide patient's call back phone number:    Comments:[facility], [phone] fax  reference # [id number]    Please advise patient replies from provider may take up to 48 hours.

## 2025-07-11 NOTE — TELEPHONE ENCOUNTER
Copied from CRM #[id number]. Topic: General Inquiry - Patient Advice  >> Jul 10, 2025  2:56 PM Wendy wrote:  .1MEDICALADVICE     Patient is calling for Medical Advice regarding:calling to verify chronic illness of patient.     How long has patient had these symptoms:    Pharmacy name and phone#:    Patient wants a call back or thru [facility], provide patient's call back phone number:    Comments:[facility], [phone] fax  reference # [id number]    Please advise patient replies from provider may take up to 48 hours.

## 2025-07-11 NOTE — TELEPHONE ENCOUNTER
Copied from CRM #[id number]. Topic: General Inquiry - Patient Advice  >> Jul 10, 2025  2:56 PM Wendy wrote:  .1MEDICALADVICE     Patient is calling for Medical Advice regarding:calling to verify chronic illness of patient.     How long has patient had these symptoms:    Pharmacy name and phone#:    Patient wants a call back or thru [facility], provide patient's call back phone number:    Comments:[facility], [phone] fax  reference # [id number]    Please advise patient replies from provider may take up to 48 hours.   OPERATIVE REPORT  PATIENT NAME: Mone Camarena  :  1971  MRN: [id number]  Pt Location: [facility]    SURGERY DATE: 22    Surgeon(s) and Role:     * Barbie Green MD - Primary     * Jarett Gale PA-C - Assisting    Pre-Op Diagnosis:  Corean Bump tenosynovitis [M65 4]    Post-Op Diagnosis Codes:     Teola Covert Quervain's tenosynovitis [M65 4]    Procedure(s):  RELEASE DEQUERVAINS (Right)    Specimen(s):  * No orders in the log *    Estimated Blood Loss:   Minimal    Anesthesia Type:   Conscious Sedation     IMPLANTS:  * No implants in log *    PERIOPERATIVE ANTIBIOTICS:    cefazolin, 1 gram    Tourniquet Time: 7 min  at 250 mmHg          Operative Indications: The patient has a history of de Quervain stenosis tenosynovitis  right that was recalcitrant to conservative management  The decision was made to bring the patient to the operating room for de Quervain release  right  Risks of the procedure were explained which include, but are not limited to bleeding; infection; damage to nerves, arteries,veins, tendons; scar; pain; need for reoperation; failure to give desired result; and risks of anaesthesia  All questions were answered to satisfaction and they were willing to proceed  Operative Findings:  significant hypertrophy extensor retinaculum     Complications:   None    Procedure and Technique:  After the patient, site, and procedure were identified, the patient was brought into the operating room in a supine position  Local anaesthesia and sedation were provided  A well padded tourniquet was applied to the extremity, set at 250 mmHg  The  right upper extremity was then prepped and drapped in a normal, sterile, orthopedic fashion  A longitudinal  incision was made in line with the first dorsal compartment  Dissection was carried down under loupe magnification  Skin and subcutaneous tissues were sharply incised   Dissection was  carried down under loupe magnification  The superficial nerve and its branches were identified, mobilized and a neurolysis was performed  The nerve branches were noted to not  Be  adherent to the underlying thickened first dorsal compartment and they were mobilized  Next the first dorsal compartment was exposed and noted to  be significantly thickened   The  first dorsal compartment was then incised  The APL and the EPB tendons were released, the EPB tendon was noted to  have a separate subsheath  Tenosynovitis was   noted  Tenosynovectomy of the APL and EPB tendons was then carried out  Next, the EPB was mobilized as was the APL  The the central ridge was  removed   The wrist was then put through a range of motion and the tendons were observed to glide well   The integrity of the tendons was verified, no subluxation was noted, the extensor retinaculum was loosely repaired over a freer  At the completion of the procedure, hemostasis was obtained with cautery and direct pressure  The wounds were copiously irrigated with sterile solution  The wounds were closed with Monocryl and Steri-strips  Sterile dressings were applied, including Xeroform, gauze, tweeners, webril, ACE and Thumb Spica Splint  Please note, all sponge, needle, and instrument counts were correct prior to closure  Loupe magnification was utilized  The patient tolerated the procedure well       I was present for the entire procedure, A qualified resident physician was not available and A physician assistant was required during the procedure for retraction tissue handling,dissection and suturing    Patient Disposition:  PACU     SIGNATURE: Rachael Saeed MD  DATE: 04/08/22  TIME: 8:33 AM

## 2025-07-11 NOTE — TELEPHONE ENCOUNTER
Copied from CRM #[id number]. Topic: General Inquiry - Patient Advice  >> Jul 10, 2025  2:56 PM Wendy wrote:  .1MEDICALADVICE     Patient is calling for Medical Advice regarding:calling to verify chronic illness of patient.     How long has patient had these symptoms:    Pharmacy name and phone#:    Patient wants a call back or thru [facility], provide patient's call back phone number:    Comments:[facility], [phone] fax  reference # [id number]    Please advise patient replies from provider may take up to 48 hours.

## 2025-07-11 NOTE — TELEPHONE ENCOUNTER
Ade Muller at [facility] just verifying chronic conditions for the patient

## 2025-07-11 NOTE — PROGRESS NOTES
[facility] chronic condition verification form linked to media

## 2025-07-11 NOTE — PROGRESS NOTES
"Patient ID: Nick De Anda is a 84 y.o. male    Pain of the Right Knee    History of Present Illness:    Nick De Anda presents to clinic for right knee pain.  He does have COPD and is following up with pulmonology. States injury years ago with his right achilles tendon, treated conservatively. Patient denies any recent injury to his knees. The pain started 3 months ago and is becoming progressively worse.  Pain is located over (points to) anterior knee. He reports that the pain is a 7 /10 sharp pain toda. The pain is affecting ADLs and limiting desired level of activity. Denies numbness, tingling, radiation and inability to bear weight. Reports he has seen [facility] orthopedics for shoulder pain and knee pain in the past. He is s/p right shoulder arthroscopy for rotator cuff repair, states they would not "even try" for the left shoulder, but denies any left shoulder surgery.    Reports he previously had right knee CSI years ago with improvement.     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    CHF (congestive heart failure)     COPD (chronic obstructive pulmonary disease)     Coronary artery disease     Cough, persistent 03/30/2020    Diabetes mellitus     diet    Diabetes mellitus, type 2     Dyslipidemia 02/13/2019    Encounter for blood transfusion     Encounter for monitoring sotalol therapy 08/01/2019    History of staphylococcal infection 2012    after CABG    History of UTI 02/02/2019    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    Hypertension     Hyponatremia 02/02/2019    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    Persistent atrial fibrillation 02/02/2019    Post PTCA 02/13/2019 "     PAST SURGICAL HISTORY:   Past Surgical History:   Procedure Laterality Date    ABLATION OF ARRHYTHMOGENIC FOCUS FOR ATRIAL FIBRILLATION N/A 12/02/2020    Procedure: Ablation atrial fibrillation;  Surgeon: Yung Paez MD;  Location: [facility];  Service: Cardiology;  Laterality: N/A;  PAF, AFl, DEANGELO (Cx if SR), PVI, CTI, RFA, Carto, Gen, TX, 3 Prep    ABLATION OF ARRHYTHMOGENIC FOCUS FOR ATRIAL FIBRILLATION N/A 12/08/2021    Procedure: Ablation atrial fibrillation;  Surgeon: Yung Paez MD;  Location: [facility];  Service: Cardiology;  Laterality: N/A;  AF/AFL (atypical), DEANGELO (Cx if SR), PVI re-do, RFA for Atypical AFL, Carto, Gen, TX, 3 Prep    CARDIAC SURGERY      cabg    CARPAL TUNNEL RELEASE Right 09/29/2023    Procedure: RELEASE, CARPAL TUNNEL;  Surgeon: Williams, Claude S. IV, MD;  Location: [facility];  Service: Orthopedics;  Laterality: Right;    CORONARY ARTERY BYPASS GRAFT      CORONARY STENT PLACEMENT      CYSTOSCOPY      prostate infection    EXCISION OF GANGLION OF WRIST Right 09/29/2023    Procedure: EXCISION, GANGLION CYST, WRIST;  Surgeon: Williams, Claude S. IV, MD;  Location: [facility];  Service: Orthopedics;  Laterality: Right;    FOOT SURGERY      RECTOPERITONEAL FISTULA CLOSURE      SHOULDER SURGERY      Triple bypass      VASECTOMY      VOCAL FOLD LESION EXCISION N/A 09/28/2021    Procedure: EXCISION, LESION, VOCAL CORD;  Surgeon: Rommel Stubbs MD;  Location: [address];  Service: ENT;  Laterality: N/A;     FAMILY HISTORY:   Family History   Problem Relation Name Age of Onset    No Known Problems Mother      Heart attack Father      Breast cancer Sister Audrey     Heart disease Brother Arian     Heart disease Brother Pastor D     Heart disease Brother Herminia     Heart disease Brother Rod     No Known Problems Maternal Grandmother      Heart attack Maternal Grandfather      No Known Problems Paternal Grandmother      No Known Problems Paternal Grandfather      Diabetes type I  Son Ismael     No Known Problems Son Nick     Diabetes type II Daughter Caprice      SOCIAL HISTORY:   Social History     Occupational History    Not on file   Tobacco Use    Smoking status: Former     Current packs/day: 0.00     Average packs/day: 0.5 packs/day for 23.7 years (11.9 ttl pk-yrs)     Types: Cigarettes     Start date: 1956     Quit date: 10/1/1979     Years since quittin.8     Passive exposure: Past    Smokeless tobacco: Never   Substance and Sexual Activity    Alcohol use: No    Drug use: Never    Sexual activity: Not Currently     Partners: Female     Birth control/protection: None        MEDICATIONS: Current Medications[1]  ALLERGIES:   Review of patient's allergies indicates:   Allergen Reactions    Penicillins Anaphylaxis     Can take keflex    Hydrocodone      Causes constipation    Amoxicillin Swelling and Rash         Physical Exam     There were no vitals filed for this visit.  Alert and oriented to person, place and time. No acute distress. Well-groomed, not ill appearing. Pupils round and reactive, normal respiratory effort, no audible wheezing.     GENERAL:  A well-developed, well-nourished 84 y.o. male who is alert and       oriented in no acute distress.      Gait: He  walks with a normal gait.                   EXTREMITIES:  Examination of lower extremities reveals there is no visible mass or deformity.    Right knee:  ROM 10-95 *    Ligamentously stable to varus/valgus stress.    Anterior and posterior drawers negative.    No pain over pes bursa.    No warmth    No erythema    Effusion No    medial joint line tenderness    Positive Patellofemoral grind/crepitus     The skin over both lower extremities is normal and unremarkable.  He has a  painless range of motion of the hips and ankles bilaterally.   Sensation is intact in both lower extremities.    There are no motor deficits in the lower extremities bilaterally.   Pedal pulses are palpable distally bilaterally.    He has no  calf tenderness to palpation nor edema.    Imaging:     Bilateral knee X-rays ordered/reviewed by me showing no evidence of fracture or dislocation. There is no obvious malalignment. No evidence of masses, lesions or foreign bodies.  Severe tricompartmental arthritis bilaterally.  Kellgren Eb grade 4    Assessment & Plan    Primary osteoarthritis of left knee    Arthritis of right knee  -     Ambulatory referral/consult to Orthopedics         I made the decision to obtain old records of the patient including previous notes and imaging. New imaging was ordered today of the extremity or extremities evaluated. I independently reviewed and interpreted the radiographs and/or MRIs/CT scan today as well as prior imaging.    We discussed at length different treatment options including conservative vs surgical management. These include anti-inflammatories, acetaminophen, rest, ice, heat, formal physical therapy including strengthening and stretching exercises, home exercise programs, injections, dry needling, and finally surgical intervention.      Medical Necessity for viscosupplementation use: After thorough evaluation of the patient, I have determined that viscosupplementation treatment is medically necessary. The patient has painful degenerative joint disease (DJD) of the knee(s) with failure of conservative treatments including lifestyle modifications and rehabilitation exercises. Oral analgesics including NSAIDs have not adequately controlled the patient's symptoms. There is radiographic evidence of Kellgren-Eb grade II (or greater) osteoarthritic (OA) changes, or if lack of radiographic evidence, there is arthroscopic or other evidence of chondrosis of the knee(s).     Pre-authorization placed for right Synvisc-One injections.  Ice compress to the affected area 2-3x a day for 15-20 minutes as needed for pain management  Tylenol prn  RTC to see Obdulia miller PA-C for Synvisc-One injections.  If no  improvement we will refer to main campus due to co-morbidities to consider right TKA    Follow up: right knee synvisc-one  X-rays next visit: none    All questions were answered and patient is agreeable to the above plan.                    [1]   Current Outpatient Medications:     albuterol (PROVENTIL HFA) 90 mcg/actuation inhaler, Inhale 2 puffs into the lungs every 6 (six) hours as needed for Wheezing. Rescue, Disp: 85 g, Rfl: 4    amLODIPine (NORVASC) 5 MG tablet, TAKE 1 TO 2 TABLETS BY MOUTH ONCE DAILY AS DIRECTED, Disp: 180 tablet, Rfl: 3    ascorbic acid, vitamin C, (VITAMIN C) 100 MG tablet, Take 500 mg by mouth once daily., Disp: , Rfl:     atorvastatin (LIPITOR) 80 MG tablet, Take 1 tablet (80 mg total) by mouth every evening., Disp: 90 tablet, Rfl: 3    azelastine (ASTELIN) 137 mcg (0.1 %) nasal spray, 1 spray (137 mcg total) by Nasal route 2 (two) times daily., Disp: 30 mL, Rfl: 2    blood sugar diagnostic Strp, To check BG 2 times daily, to use with insurance preferred meter, Disp: 100 strip, Rfl: 5    blood-glucose meter kit, To check BG 2 times daily, to use with insurance preferred meter, Disp: 1 each, Rfl: 0    clopidogreL (PLAVIX) 75 mg tablet, Take 1 tablet by mouth once daily, Disp: 90 tablet, Rfl: 3    diazePAM (VALIUM) 10 MG Tab, Take 1 tablet (10 mg total) by mouth as needed (anxiety). Just 1/2 to 1 and use very seldom-get refills from PCP, Disp: 30 tablet, Rfl: 3    fexofenadine (ALLEGRA) 180 MG tablet, Take 1 tablet (180 mg total) by mouth once daily., Disp: 90 tablet, Rfl: 3    hydroCHLOROthiazide (MICROZIDE) 12.5 mg capsule, Take 1 capsule by mouth once daily, Disp: 90 capsule, Rfl: 3    irbesartan (AVAPRO) 300 MG tablet, TAKE 1 TABLET BY MOUTH ONCE DAILY IN THE EVENING, Disp: 90 tablet, Rfl: 3    lancets Misc, To check BG 2 times daily, to use with insurance preferred meter, Disp: 100 each, Rfl: 5    levalbuterol (XOPENEX) 1.25 mg/3 mL nebulizer solution, Take 3 mLs (1.25 mg total) by  nebulization every 4 (four) hours as needed for Wheezing. Rescue, Disp: 100 each, Rfl: 2    metFORMIN (GLUCOPHAGE) 500 MG tablet, TAKE 1 TABLET BY MOUTH TWICE DAILY WITH MEALS, Disp: 180 tablet, Rfl: 0    methocarbamoL (ROBAXIN) 500 MG Tab, Take 1 tablet by mouth twice daily as needed for muscle spasm, Disp: 30 tablet, Rfl: 1    metoprolol tartrate (LOPRESSOR) 25 MG tablet, Take 1 tablet (25 mg total) by mouth daily as needed (Palpitations)., Disp: 90 tablet, Rfl: 0    montelukast (SINGULAIR) 10 mg tablet, Take one tablet by mouth every evening, Disp: 90 tablet, Rfl: 3    multivitamin with minerals tablet, , Disp: , Rfl:     nitroGLYCERIN (NITROSTAT) 0.4 MG SL tablet, Place 1 tablet (0.4 mg total) under the tongue every 5 (five) minutes as needed for Chest pain., Disp: 25 tablet, Rfl: 3    tamsulosin (FLOMAX) 0.4 mg Cap, Take 1 capsule (0.4 mg total) by mouth once daily., Disp: 90 capsule, Rfl: 3    timolol maleate 0.5% (TIMOPTIC) 0.5 % Drop, Instill 1 drop Both Eyes once a day, Disp: 15 mL, Rfl: 3    triamcinolone acetonide 0.1% (KENALOG) 0.1 % ointment, Apply topically 2 (two) times daily., Disp: , Rfl:     umeclidinium-vilanteroL (ANORO ELLIPTA) 62.5-25 mcg/actuation DsDv, Inhale 1 puff into the lungs once daily. Controller, Disp: 30 each, Rfl: 11    XARELTO 20 mg Tab, Take 1 tablet (20 mg total) by mouth every evening., Disp: 90 tablet, Rfl: 3  No current facility-administered medications for this visit.    Facility-Administered Medications Ordered in Other Visits:     0.9%  NaCl infusion, , Intravenous, Continuous, Rebecca Dickinson NP, Stopped at 12/02/20 1636    lactated ringers infusion, , Intravenous, Continuous, Tyler Arana MD    LIDOcaine (PF) 10 mg/ml (1%) injection 5 mg, 0.5 mL, Intradermal, Once, Tyler Arana MD    sodium chloride 0.9% bolus 1,000 mL, 1,000 mL, Intravenous, Once, Winnie Larkin, NP    sodium chloride 0.9% flush 10 mL, 10 mL, Intravenous, PRN, DELICIA Jaramillo  MD Javier    sodium chloride 0.9% flush 5 mL, 5 mL, Intravenous, PRN, Rebecca Dickinson, NP

## 2025-07-11 NOTE — TELEPHONE ENCOUNTER
Copied from CRM #[id number]. Topic: General Inquiry - Patient Advice  >> Jul 10, 2025  2:56 PM Wendy wrote:  .1MEDICALADVICE     Patient is calling for Medical Advice regarding:calling to verify chronic illness of patient.     How long has patient had these symptoms:    Pharmacy name and phone#:    Patient wants a call back or thru [facility], provide patient's call back phone number:    Comments:[facility], [phone] fax  reference # [id number]    Please advise patient replies from provider may take up to 48 hours.

## 2025-07-14 ENCOUNTER — TELEPHONE (OUTPATIENT)
Dept: PODIATRY | Facility: CLINIC | Age: 85
End: 2025-07-14
Payer: MEDICARE

## 2025-07-14 NOTE — TELEPHONE ENCOUNTER
Spoke with patient in reference to concerns about diabetic shoe script is not being filled in a timely manner. Phone number for [facility] was provided by patient the company shoe script was dropped off to. We will reach out to company to see if there are any updates on patient diabetic shoe order.

## 2025-07-14 NOTE — TELEPHONE ENCOUNTER
Spoke with patient to give update on diabetic shoe script. Patient voiced understanding to conversation.

## 2025-07-17 ENCOUNTER — TELEPHONE (OUTPATIENT)
Dept: PODIATRY | Facility: CLINIC | Age: 85
End: 2025-07-17
Payer: MEDICARE

## 2025-07-17 NOTE — TELEPHONE ENCOUNTER
Copied from CRM #[id number]. Topic: Appointments - Amb Referral  >> Jul 17, 2025 12:32 PM Kaylie wrote:  Type:Cnm and Clinical notes Requested     Who Called:Kaylan with [facility]    Would the patient rather a call back or a response via [facility]? Call back    Best Call Back Number: [phone] office [phone]    Additional Information:[facility] called to f/u on cmn and clinical requested for patients shoe order.      RX route to

## 2025-07-18 ENCOUNTER — TELEPHONE (OUTPATIENT)
Dept: PODIATRY | Facility: CLINIC | Age: 85
End: 2025-07-18
Payer: MEDICARE

## 2025-07-18 NOTE — TELEPHONE ENCOUNTER
DM shoe prescription from [facility] has been received by fax and signed.    There is a signature that is still needed by pt's pcp Dr. Walters   ( Treating pt for dm)    Medical assistant mc will fax back to vendor.

## 2025-07-20 DIAGNOSIS — N39.0 RECURRENT UTI: ICD-10-CM

## 2025-07-21 DIAGNOSIS — N39.0 RECURRENT UTI: ICD-10-CM

## 2025-07-21 RX ORDER — TAMSULOSIN HYDROCHLORIDE 0.4 MG/1
1 CAPSULE ORAL
Qty: 90 CAPSULE | Refills: 0 | Status: SHIPPED | OUTPATIENT
Start: 2025-07-21

## 2025-07-22 ENCOUNTER — OFFICE VISIT (OUTPATIENT)
Dept: ORTHOPEDICS | Facility: CLINIC | Age: 85
End: 2025-07-22
Payer: MEDICARE

## 2025-07-22 VITALS
SYSTOLIC BLOOD PRESSURE: 138 MMHG | BODY MASS INDEX: 32.73 KG/M2 | HEART RATE: 66 BPM | DIASTOLIC BLOOD PRESSURE: 61 MMHG | HEIGHT: 76 IN | WEIGHT: 268.75 LBS

## 2025-07-22 DIAGNOSIS — M17.11 ARTHRITIS OF RIGHT KNEE: Primary | ICD-10-CM

## 2025-07-22 PROCEDURE — 99499 UNLISTED E&M SERVICE: CPT | Mod: S$GLB,,,

## 2025-07-22 PROCEDURE — 99999 PR PBB SHADOW E&M-EST. PATIENT-LVL V: CPT | Mod: PBBFAC,,,

## 2025-07-22 PROCEDURE — 20610 DRAIN/INJ JOINT/BURSA W/O US: CPT | Mod: RT,S$GLB,,

## 2025-07-22 RX ORDER — TAMSULOSIN HYDROCHLORIDE 0.4 MG/1
1 CAPSULE ORAL
Qty: 90 CAPSULE | Refills: 0 | OUTPATIENT
Start: 2025-07-22

## 2025-07-22 NOTE — TELEPHONE ENCOUNTER
Copied from CRM #[id number]. Topic: General Inquiry - Return Call  >> Jul 21, 2025 12:35 PM Sonali wrote:  Type:  Patient Returning Call    Who Called:Kaylan mckeon/[employer]  Who Left Message for Patient: Fax  Dr Walters  Does the patient know what this is regarding?:  Shoe Order  Would the patient rather a call back or a response via [facility]?  Call back  Best Call Back Number: [phone]  ext [phone]  Additional Information: Requesting all clinical notes for the patient to receive his new Diabetic Shoe order    Please call and advise.    Thank You  >> Jul 21, 2025  4:45 PM Med Assistant Ernestine wrote:

## 2025-07-22 NOTE — TELEPHONE ENCOUNTER
Progress note from podiatry and NP Elma Gonzalez faxed to [facility] [phone] 7/22/2025. Voice message left on [phone] ext [phone].

## 2025-07-22 NOTE — PROCEDURES
Large Joint Aspiration/Injection: R knee    Date/Time: 7/22/2025 3:30 PM    Performed by: Obdulia Tee PA-C  Authorized by: Obdulia Tee PA-C    Consent Done?:  Yes (Verbal)  Indications:  Pain and arthritis  Site marked: the procedure site was marked    Timeout: prior to procedure the correct patient, procedure, and site was verified    Prep: patient was prepped and draped in usual sterile fashion    Local anesthetic:  Topical anesthetic    Details:  Needle Size:  21 G  Ultrasonic Guidance for needle placement?: No    Approach:  Anterolateral  Location:  Knee  Site:  R knee  Medications:  48 mg hylan g-f (SYNVISC ONE) 48 mg/6 mL injection ([place] formulary preferred)  Patient tolerance:  Patient tolerated the procedure well with no immediate complications

## 2025-07-22 NOTE — PROGRESS NOTES
Nick De Anda is here for follow up of right knee arthritis. Pt is requesting Synvisc-One injection.  [facility] reviewed per encounter record. Has failed other conservative modalities including NSAIDS, activity modification, weight loss.    The prior shot was tolerated well.    PHYSICAL EXAMINATION:     General: The patient is alert and oriented x 3. Mood is pleasant.   Observation of ears, eyes and nose reveals no gross abnormalities. No   labored breathing observed.     No signs of infection or adverse reaction to knee.    Medical Necessity for viscosupplementation use: After thorough evaluation of the patient, I have determined that viscosupplementation treatment is medically necessary. The patient has painful degenerative joint disease (DJD) of the knee(s) with failure of conservative treatments including lifestyle modifications and rehabilitation exercises. Oral analgesics including NSAIDs have not adequately controlled the patient's symptoms. There is radiographic evidence of Kellgren-Eb grade II (or greater) osteoarthritic (OA) changes, or if lack of radiographic evidence, there is arthroscopic or other evidence of chondrosis of the knee(s).     Injection Procedure  A time out was performed, including verification of patient ID, procedure, site and side, availability of information and equipment, review of safety issues, and agreement with consent, the procedure site was marked.    After time out was performed, the patient was prepped aseptically with chloraprep. A diagnostic and therapeutic injection of 6cc's Synvisc-One was given under sterile technique using a 21g x 1.5 needle from the Superolateral aspect of the right Knee Joint in the supine position.      Nick De Anda had no adverse reactions to the medication. Pain decreased. He was instructed to apply ice to the joint for 20 minutes and avoid strenuous activities for 24-36 hours following the injection. He was warned of possible blood  sugar and/or blood pressure changes during that time. Following that time, he can resume regular activities.    He was reminded to call the clinic immediately for any adverse side effects as explained in clinic today.    RTC to see Obdulia Tee PA-C in 3 months or prn for follow-up.    All of the patient's questions were answered and the patient will contact us if they have any questions or concerns in the interim.

## 2025-07-23 DIAGNOSIS — J30.2 SEASONAL ALLERGIES: ICD-10-CM

## 2025-07-23 RX ORDER — FEXOFENADINE HCL 180 MG/1
180 TABLET ORAL DAILY
Qty: 90 TABLET | Refills: 3
Start: 2025-07-23 | End: 2026-07-23

## 2025-08-05 ENCOUNTER — OFFICE VISIT (OUTPATIENT)
Dept: OTOLARYNGOLOGY | Facility: CLINIC | Age: 85
End: 2025-08-05
Payer: MEDICARE

## 2025-08-05 VITALS — SYSTOLIC BLOOD PRESSURE: 120 MMHG | HEART RATE: 52 BPM | DIASTOLIC BLOOD PRESSURE: 69 MMHG

## 2025-08-05 DIAGNOSIS — R49.0 DYSPHONIA: ICD-10-CM

## 2025-08-05 DIAGNOSIS — C32.9 LARYNGEAL CANCER: Primary | ICD-10-CM

## 2025-08-05 PROCEDURE — 99213 OFFICE O/P EST LOW 20 MIN: CPT | Mod: 25,S$GLB,, | Performed by: PHYSICIAN ASSISTANT

## 2025-08-05 PROCEDURE — 1159F MED LIST DOCD IN RCRD: CPT | Mod: CPTII,S$GLB,, | Performed by: PHYSICIAN ASSISTANT

## 2025-08-05 PROCEDURE — 31579 LARYNGOSCOPY TELESCOPIC: CPT | Mod: S$GLB,,, | Performed by: PHYSICIAN ASSISTANT

## 2025-08-05 PROCEDURE — 1126F AMNT PAIN NOTED NONE PRSNT: CPT | Mod: CPTII,S$GLB,, | Performed by: PHYSICIAN ASSISTANT

## 2025-08-05 PROCEDURE — 3074F SYST BP LT 130 MM HG: CPT | Mod: CPTII,S$GLB,, | Performed by: PHYSICIAN ASSISTANT

## 2025-08-05 PROCEDURE — 99999 PR PBB SHADOW E&M-EST. PATIENT-LVL III: CPT | Mod: PBBFAC,,, | Performed by: PHYSICIAN ASSISTANT

## 2025-08-05 PROCEDURE — 3078F DIAST BP <80 MM HG: CPT | Mod: CPTII,S$GLB,, | Performed by: PHYSICIAN ASSISTANT

## 2025-08-05 NOTE — PROGRESS NOTES
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 male who presents with his wife. He was a previous patient of Dr. Felix. He had a history of mucosal dysplasia of right TVF in 2021. He has been doing well since and with no significant changes over the course of his observation. He states he does have a history of COPD, sees pulmonology and has noticed some blood tinged mucus/specs last week after coughing. He was concerned this could be related to his vocal cords--has not been seen since 2/2024. He will be following up with pulmonology next week due to finding of an "abnormal bacteria." There has been no obvious changes to his voice. He denies dysphagia, odynophagia, throat pain, weight loss, night sweats and otalgia. There is no hemoptysis or hematemesis. He is breathing well. He does take plavix.    9/28/2021: SML KTP assisted excision right TVF lesion.  1. BIOPSY OF RIGHT FALSE VOCAL FOLD:   MODERATE NONSPECIFIC SUBMUCOSAL CHRONIC INFLAMMATION   NO NEOPLASIA IDENTIFIED   2. BIOPSY OF VENTRICLE:   NO SIGNIFICANT HISTOLOGIC ALTERATION   NO NEOPLASIA IDENTIFIED   3. 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    CHF (congestive heart failure)     COPD (chronic obstructive pulmonary disease)     Coronary artery disease     Cough, persistent 03/30/2020    Diabetes mellitus     diet    Diabetes mellitus, type 2     Dyslipidemia 02/13/2019    Encounter for blood transfusion     Encounter for monitoring sotalol therapy " 08/01/2019    History of staphylococcal infection 2012    after CABG    History of UTI 02/02/2019    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    Hypertension     Hyponatremia 02/02/2019    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    Persistent atrial fibrillation 02/02/2019    Post PTCA 02/13/2019       Past Surgical History    Past Surgical History:   Procedure Laterality Date    ABLATION OF ARRHYTHMOGENIC FOCUS FOR ATRIAL FIBRILLATION N/A 12/02/2020    Procedure: Ablation atrial fibrillation;  Surgeon: Yung Paez MD;  Location: [facility];  Service: Cardiology;  Laterality: N/A;  PAF, AFl, DEANGELO (Cx if SR), PVI, CTI, RFA, Carto, Gen, WI, 3 Prep    ABLATION OF ARRHYTHMOGENIC FOCUS FOR ATRIAL FIBRILLATION N/A 12/08/2021    Procedure: Ablation atrial fibrillation;  Surgeon: Yung Paez MD;  Location: [facility];  Service: Cardiology;  Laterality: N/A;  AF/AFL (atypical), DEANGELO (Cx if SR), PVI re-do, RFA for Atypical AFL, Carto, Gen, WI, 3 Prep    CARDIAC SURGERY      cabg    CARPAL TUNNEL RELEASE Right 09/29/2023    Procedure: RELEASE, CARPAL TUNNEL;  Surgeon: Williams, Claude S. IV, MD;  Location: [facility];  Service: Orthopedics;  Laterality: Right;    CORONARY ARTERY BYPASS GRAFT      CORONARY STENT PLACEMENT      CYSTOSCOPY      prostate infection    EXCISION OF GANGLION OF WRIST Right 09/29/2023    Procedure: EXCISION, GANGLION CYST, WRIST;  Surgeon: Williams, Claude S. IV, MD;  Location: [facility];  Service: Orthopedics;  Laterality: Right;    FOOT SURGERY      RECTOPERITONEAL FISTULA CLOSURE      SHOULDER SURGERY      Triple bypass      VASECTOMY      VOCAL FOLD LESION EXCISION N/A 09/28/2021    Procedure: EXCISION, LESION, VOCAL CORD;  Surgeon: Rommel Stubbs MD;  Location: [address];  Service: ENT;  Laterality: N/A;        Family History  Family History   Problem Relation Name Age of Onset    No Known Problems Mother      Heart attack Father      Breast cancer Sister Audrey     Heart disease Brother Arian      Heart disease Brother Dawit BRIONES     Heart disease Brother Herminia     Heart disease Brother Rod     No Known Problems Maternal Grandmother      Heart attack Maternal Grandfather      No Known Problems Paternal Grandmother      No Known Problems Paternal Grandfather      Diabetes type I Son Ismael     No Known Problems Son Nick     Diabetes type II Daughter Caprice        Social History  Social History     Socioeconomic History    Marital status:    Tobacco Use    Smoking status: Former     Current packs/day: 0.00     Average packs/day: 0.5 packs/day for 23.7 years (11.9 ttl pk-yrs)     Types: Cigarettes     Start date: 1956     Quit date: 10/1/1979     Years since quittin.8     Passive exposure: Past    Smokeless tobacco: Never   Substance and Sexual Activity    Alcohol use: No    Drug use: Never    Sexual activity: Not Currently     Partners: Female     Birth control/protection: None     Social Drivers of Health     Financial Resource Strain: Medium Risk (2024)    Overall Financial Resource Strain (CARDIA)     Difficulty of Paying Living Expenses: Somewhat hard   Food Insecurity: Food Insecurity Present (2025)    Hunger Vital Sign     Worried About Running Out of Food in the Last Year: Sometimes true     Ran Out of Food in the Last Year: Sometimes true   Transportation Needs: No Transportation Needs (2024)    PRAPARE - Transportation     Lack of Transportation (Medical): No     Lack of Transportation (Non-Medical): No   Physical Activity: Unknown (2025)    Exercise Vital Sign     Days of Exercise per Week: 1 day   Stress: Stress Concern Present (2025)    [employer] [place] [employer] - Occupational Stress Questionnaire     Feeling of Stress : To some extent   Housing Stability: Low Risk  (2024)    Housing Stability Vital Sign     Unable to Pay for Housing in the Last Year: No     Number of Places Lived in the Last Year: 1     Unstable Housing in the Last  Year: No       Allergies  Penicillins, Hydrocodone, and Amoxicillin    Medications  Current Outpatient Medications   Medication Sig Dispense Refill    albuterol (PROVENTIL HFA) 90 mcg/actuation inhaler Inhale 2 puffs into the lungs every 6 (six) hours as needed for Wheezing. Rescue 85 g 4    amLODIPine (NORVASC) 5 MG tablet TAKE 1 TO 2 TABLETS BY MOUTH ONCE DAILY AS DIRECTED 180 tablet 3    ascorbic acid, vitamin C, (VITAMIN C) 100 MG tablet Take 500 mg by mouth once daily.      atorvastatin (LIPITOR) 80 MG tablet Take 1 tablet (80 mg total) by mouth every evening. 90 tablet 3    blood sugar diagnostic Strp To check BG 2 times daily, to use with insurance preferred meter 100 strip 5    clopidogreL (PLAVIX) 75 mg tablet Take 1 tablet by mouth once daily 90 tablet 3    diazePAM (VALIUM) 10 MG Tab Take 1 tablet (10 mg total) by mouth as needed (anxiety). Just 1/2 to 1 and use very seldom-get refills from PCP 30 tablet 3    fexofenadine (ALLEGRA) 180 MG tablet Take 1 tablet (180 mg total) by mouth once daily. 90 tablet 3    fluorometholone 0.1% (FML) 0.1 % DrpS Instill one drop into left eye four times daily 5 mL 0    hydroCHLOROthiazide (MICROZIDE) 12.5 mg capsule Take 1 capsule by mouth once daily 90 capsule 3    irbesartan (AVAPRO) 300 MG tablet TAKE 1 TABLET BY MOUTH ONCE DAILY IN THE EVENING 90 tablet 3    lancets Misc To check BG 2 times daily, to use with insurance preferred meter 100 each 5    levalbuterol (XOPENEX) 1.25 mg/3 mL nebulizer solution Take 3 mLs (1.25 mg total) by nebulization every 4 (four) hours as needed for Wheezing. Rescue 100 each 2    metFORMIN (GLUCOPHAGE) 500 MG tablet TAKE 1 TABLET BY MOUTH TWICE DAILY WITH MEALS 180 tablet 0    methocarbamoL (ROBAXIN) 500 MG Tab Take 1 tablet by mouth twice daily as needed for muscle spasm 30 tablet 1    metoprolol tartrate (LOPRESSOR) 25 MG tablet Take 1 tablet (25 mg total) by mouth daily as needed (Palpitations). 90 tablet 0    montelukast (SINGULAIR)  10 mg tablet Take one tablet by mouth every evening 90 tablet 3    multivitamin with minerals tablet       nitroGLYCERIN (NITROSTAT) 0.4 MG SL tablet Place 1 tablet (0.4 mg total) under the tongue every 5 (five) minutes as needed for Chest pain. 25 tablet 3    tamsulosin (FLOMAX) 0.4 mg Cap Take 1 capsule by mouth once daily 90 capsule 0    timolol maleate 0.5% (TIMOPTIC) 0.5 % Drop Instill 1 drop Both Eyes once a day 15 mL 3    triamcinolone acetonide 0.1% (KENALOG) 0.1 % ointment Apply topically 2 (two) times daily.      umeclidinium-vilanteroL (ANORO ELLIPTA) 62.5-25 mcg/actuation DsDv Inhale 1 puff into the lungs once daily. Controller 30 each 11    XARELTO 20 mg Tab Take 1 tablet (20 mg total) by mouth every evening. 90 tablet 3    azelastine (ASTELIN) 137 mcg (0.1 %) nasal spray 1 spray (137 mcg total) by Nasal route 2 (two) times daily. (Patient not taking: Reported on 8/5/2025) 30 mL 2    blood-glucose meter kit To check BG 2 times daily, to use with insurance preferred meter 1 each 0     No current facility-administered medications for this visit.     Facility-Administered Medications Ordered in Other Visits   Medication Dose Route Frequency Provider Last Rate Last Admin    0.9%  NaCl infusion   Intravenous Continuous Rebecca Dickinson NP   Stopped at 12/02/20 1636    lactated ringers infusion   Intravenous Continuous Tyler Arana MD        LIDOcaine (PF) 10 mg/ml (1%) injection 5 mg  0.5 mL Intradermal Once Tyler Arana MD        sodium chloride 0.9% bolus 1,000 mL  1,000 mL Intravenous Once Winnie Larkin NP        sodium chloride 0.9% flush 10 mL  10 mL Intravenous PRN DELICIA Jaramillo MD        sodium chloride 0.9% flush 5 mL  5 mL Intravenous PRN Rebecca Dickinson NP           ROS:  Pertinent positive and negative review of systems as noted in HPI.     Objective:     /69 (BP Location: Left forearm, Patient Position: Sitting)   Pulse (!) 52      Physical  Exam    General Appearance:   Awake, Alert and Oriented. NAD. Appropriate affect and appearance      Neuro:   Spontaneous eye opening, appropriate verbal responses, follows commands  Pupils equal, round & brisk. EOMI, no proptosis  Face is symmetric, non-edematous bilaterally     Head and Face:   skin is intact with no lesions noted.  Parotid and submandibular glands are symmetric and non-tender.     Nose:   External nose is symmetric, no skin lesions  Inferior turbinate hypertrophy, No polyps or rhinorrhea     OC/OP:  Tongue midline on extension, non-edematous, soft  No labial, buccal, oral tongue or floor of mouth lesions  Soft palate symmetric, midline and without lesions or masses,  No masses or lesions of the visualized oropharynx     Neck:  Neck is symmetric, non-edematous, non-erythematous  Trachea is midline  No thyromegaly. No lymphadenopathy.     Respiratory:  Normal work of breathing, no accessory muscle use, no stridor     Voice:    Quality: minimal variable roughness/strain    Volume: appropriate for age and gender   Pitch: appropriate for age and gender   Flexibility: appropriate for age and gender       Data Review:   IMAGING        Procedures:   Rigid Laryngeal Videostroboscopy (84360): Laryngeal videostroboscopy is indicated to assess the laryngeal vibratory biomechanics and vocal fold oscillation, which cannot be assessed with a plain light examination. This was carried out with a 70 degree endoscope. After verbal consent was obtained, the patient was positioned and the tongue was gently secured with a gauze sponge. The endoscope was passed transorally and positioned to image the larynx and hypopharynx in detail. The following features were examined: laryngeal and hypopharyngeal masses; vocal fold range and symmetry of motion; laryngeal mucosal edema, erythema, inflammation, and hydration; salivary pooling; and gross laryngeal sensation. During phonation, the vocal folds were assessed for glottal  closure; mucosal wave; vocal fold lesions; vibratory periodicity, amplitude, and phase symmetry; and vertical height match. The equipment was removed. The patient tolerated the procedure well without complication. All findings were normal except:    Oropharynx/base of tongue without masses or lesions    True vocal folds:  - right elise-laryngeal surgical site well healed; no evidence of disease  - complete closure, blunted pliability right true vocal fold    Assessment and Plan:     1. Laryngeal cancer    2. Dysphonia    I demonstrated the examination to the patient on the video monitor. I had a long discussion with the patient regarding his condition and the further workup and management options. Scope looks good today and normal/no changes compared to previous exam. Recommend he follow up with pulmonology as planned next week, sooner for any new or worsening symptoms. Questions were encouraged and answered. The patient understands to return sooner for any new symptoms/concerns/worsening prior to any scheduled visits.      Problem List Items Addressed This Visit    None

## 2025-08-20 ENCOUNTER — OFFICE VISIT (OUTPATIENT)
Dept: PULMONOLOGY | Facility: CLINIC | Age: 85
End: 2025-08-20
Payer: MEDICARE

## 2025-08-20 VITALS
WEIGHT: 269.19 LBS | OXYGEN SATURATION: 98 % | HEART RATE: 64 BPM | DIASTOLIC BLOOD PRESSURE: 68 MMHG | BODY MASS INDEX: 32.77 KG/M2 | SYSTOLIC BLOOD PRESSURE: 108 MMHG

## 2025-08-20 DIAGNOSIS — J92.9 PLEURAL PLAQUE: ICD-10-CM

## 2025-08-20 DIAGNOSIS — J43.2 CENTRILOBULAR EMPHYSEMA: ICD-10-CM

## 2025-08-20 DIAGNOSIS — J47.9 BRONCHIECTASIS WITHOUT COMPLICATION: Primary | ICD-10-CM

## 2025-08-20 PROCEDURE — 3078F DIAST BP <80 MM HG: CPT | Mod: CPTII,S$GLB,,

## 2025-08-20 PROCEDURE — 1126F AMNT PAIN NOTED NONE PRSNT: CPT | Mod: CPTII,S$GLB,,

## 2025-08-20 PROCEDURE — 99999 PR PBB SHADOW E&M-EST. PATIENT-LVL V: CPT | Mod: PBBFAC,,,

## 2025-08-20 PROCEDURE — 3074F SYST BP LT 130 MM HG: CPT | Mod: CPTII,S$GLB,,

## 2025-08-20 PROCEDURE — 1159F MED LIST DOCD IN RCRD: CPT | Mod: CPTII,S$GLB,,

## 2025-08-20 PROCEDURE — 3288F FALL RISK ASSESSMENT DOCD: CPT | Mod: CPTII,S$GLB,,

## 2025-08-20 PROCEDURE — 99214 OFFICE O/P EST MOD 30 MIN: CPT | Mod: S$GLB,,,

## 2025-08-20 PROCEDURE — 1101F PT FALLS ASSESS-DOCD LE1/YR: CPT | Mod: CPTII,S$GLB,,

## 2025-08-20 PROCEDURE — 1160F RVW MEDS BY RX/DR IN RCRD: CPT | Mod: CPTII,S$GLB,,

## 2025-08-20 RX ORDER — SODIUM CHLORIDE FOR INHALATION 3 %
4 VIAL, NEBULIZER (ML) INHALATION
Qty: 120 ML | Refills: 11 | Status: SHIPPED | OUTPATIENT
Start: 2025-08-20

## 2025-09-05 ENCOUNTER — TELEPHONE (OUTPATIENT)
Dept: ELECTROPHYSIOLOGY | Facility: CLINIC | Age: 85
End: 2025-09-05
Payer: MEDICARE

## (undated) DEVICE — SEE MEDLINE ITEM 107746

## (undated) DEVICE — RENTAL KTP LASER

## (undated) DEVICE — CATH THERMOCOOL SMTCH SF D F

## (undated) DEVICE — BOWL FLUID - BACK STOP

## (undated) DEVICE — SHEATH INTRODUCER 9FR 11CM

## (undated) DEVICE — SUT MONOCRYL PLUS 4-0 P3

## (undated) DEVICE — CORD BIPOLAR 12 FOOT

## (undated) DEVICE — KIT PROBE COVER WITH GEL

## (undated) DEVICE — ALCOHOL ISOPROPYL BLU 70% 16OZ

## (undated) DEVICE — LINE PRESSURE MONITORING 96IN

## (undated) DEVICE — PAD CAST SPECIALIST STRL 4

## (undated) DEVICE — INTRODUCER HEMOSTASIS 7.5F

## (undated) DEVICE — COVER DRAPE ACUSON STERILE

## (undated) DEVICE — ELECTRODE POLYHESIVEPRE-ATTACH

## (undated) DEVICE — PAD DEFIB CADENCE ADULT R2

## (undated) DEVICE — SET HBE EXT CARESITE FILTER

## (undated) DEVICE — SPONGE COTTON TRAY 4X4IN

## (undated) DEVICE — INTRO AGILIS MED CRL 8.5F 71CM

## (undated) DEVICE — CATH BIDIRECTIONAL DF CRV 7FR

## (undated) DEVICE — SHEATH HEMOSTASIS 8.5FR

## (undated) DEVICE — NDL BROCKENBROUGH ADULT

## (undated) DEVICE — REPROCESSED CATH ACUNAV 8FR

## (undated) DEVICE — PAD RADI FEMORAL

## (undated) DEVICE — PENCIL ROCKER SWITCH 10FT CORD

## (undated) DEVICE — TRAY ENT 4/CS

## (undated) DEVICE — SET SMARTABLATE IRR TUBE

## (undated) DEVICE — CATH PENTARY F 2-6-2MM 115CM

## (undated) DEVICE — PATCH CARTO REFERENCE

## (undated) DEVICE — HANDPIECE KTP

## (undated) DEVICE — KNIFE CARPAL TUNNEL

## (undated) DEVICE — PAD CAST SPECIALIST STRL 3

## (undated) DEVICE — GLOVE BIOGEL SKINSENSE PI 7.5

## (undated) DEVICE — PACK UPPER EXTREMITY BAPTIST

## (undated) DEVICE — UNDERGLOVES BIOGEL PI SIZE 8

## (undated) DEVICE — APPLICATOR CHLORAPREP ORN 26ML

## (undated) DEVICE — CATH LASSO NAV 25/15

## (undated) DEVICE — INTRO FAST-CATH SL1 8.5FR 63CM

## (undated) DEVICE — CATH TRICUSPID HALO XP 7FRX110

## (undated) DEVICE — PACK EP DRAPE

## (undated) DEVICE — NDL TRNSSPTL BRK-1 18GA 98CM

## (undated) DEVICE — TOURNIQUET SB QC DP 18X4IN

## (undated) DEVICE — SUT 4/0 18IN ETHILON BL P3

## (undated) DEVICE — SOL IRR SOD CHL .9% POUR

## (undated) DEVICE — KTP/YAG .4MM LP BARE FIBER

## (undated) DEVICE — NDL HYPO REG 25G X 1 1/2

## (undated) DEVICE — WRAP COHES MULTCLR NS 3INX5YD

## (undated) DEVICE — ELECTRODE REM PLYHSV RETURN 9

## (undated) DEVICE — KIT ANTIFOG

## (undated) DEVICE — SUT 4-0 PROLENE 18 P-3

## (undated) DEVICE — SYR B-D DISP CONTROL 10CC100/C

## (undated) DEVICE — KIT URINE METER 350ML STAT LOC

## (undated) DEVICE — NDL TRANSEPTAL ADULT 71.0

## (undated) DEVICE — DRESSING XEROFORM NONADH 1X8IN